# Patient Record
Sex: MALE | Race: WHITE | Employment: OTHER | URBAN - METROPOLITAN AREA
[De-identification: names, ages, dates, MRNs, and addresses within clinical notes are randomized per-mention and may not be internally consistent; named-entity substitution may affect disease eponyms.]

---

## 2017-01-24 ENCOUNTER — OFFICE VISIT (OUTPATIENT)
Dept: FAMILY MEDICINE CLINIC | Age: 67
End: 2017-01-24

## 2017-01-24 VITALS
DIASTOLIC BLOOD PRESSURE: 80 MMHG | TEMPERATURE: 96 F | HEART RATE: 60 BPM | SYSTOLIC BLOOD PRESSURE: 130 MMHG | WEIGHT: 265.4 LBS | RESPIRATION RATE: 12 BRPM | BODY MASS INDEX: 37.99 KG/M2 | HEIGHT: 70 IN

## 2017-01-24 DIAGNOSIS — R53.83 OTHER MALAISE AND FATIGUE: ICD-10-CM

## 2017-01-24 DIAGNOSIS — R53.81 OTHER MALAISE AND FATIGUE: ICD-10-CM

## 2017-01-24 DIAGNOSIS — F19.982 DRUG INDUCED INSOMNIA (HCC): Primary | ICD-10-CM

## 2017-01-24 PROCEDURE — G8419 CALC BMI OUT NRM PARAM NOF/U: HCPCS | Performed by: FAMILY MEDICINE

## 2017-01-24 PROCEDURE — 1123F ACP DISCUSS/DSCN MKR DOCD: CPT | Performed by: FAMILY MEDICINE

## 2017-01-24 PROCEDURE — G8484 FLU IMMUNIZE NO ADMIN: HCPCS | Performed by: FAMILY MEDICINE

## 2017-01-24 PROCEDURE — 1036F TOBACCO NON-USER: CPT | Performed by: FAMILY MEDICINE

## 2017-01-24 PROCEDURE — 4040F PNEUMOC VAC/ADMIN/RCVD: CPT | Performed by: FAMILY MEDICINE

## 2017-01-24 PROCEDURE — 99213 OFFICE O/P EST LOW 20 MIN: CPT | Performed by: FAMILY MEDICINE

## 2017-01-24 PROCEDURE — G8427 DOCREV CUR MEDS BY ELIG CLIN: HCPCS | Performed by: FAMILY MEDICINE

## 2017-01-24 PROCEDURE — 3017F COLORECTAL CA SCREEN DOC REV: CPT | Performed by: FAMILY MEDICINE

## 2017-01-24 RX ORDER — FOLIC ACID 1 MG/1
1 TABLET ORAL DAILY
COMMUNITY
End: 2017-03-07 | Stop reason: SDUPTHER

## 2017-01-24 RX ORDER — TRAZODONE HYDROCHLORIDE 50 MG/1
25-50 TABLET ORAL NIGHTLY
Qty: 30 TABLET | Refills: 5 | Status: SHIPPED | OUTPATIENT
Start: 2017-01-24 | End: 2017-02-08 | Stop reason: DRUGHIGH

## 2017-01-24 RX ORDER — ALPRAZOLAM 1 MG/1
1 TABLET ORAL 3 TIMES DAILY PRN
Qty: 90 TABLET | Refills: 0 | Status: CANCELLED | OUTPATIENT
Start: 2017-01-24

## 2017-01-25 ENCOUNTER — TELEPHONE (OUTPATIENT)
Dept: FAMILY MEDICINE CLINIC | Age: 67
End: 2017-01-25

## 2017-02-02 ENCOUNTER — HOSPITAL ENCOUNTER (OUTPATIENT)
Dept: SLEEP CENTER | Age: 67
Discharge: HOME | End: 2017-02-02
Payer: MEDICARE

## 2017-02-02 PROCEDURE — 95810 POLYSOM 6/> YRS 4/> PARAM: CPT

## 2017-02-06 RX ORDER — DULOXETIN HYDROCHLORIDE 60 MG/1
60 CAPSULE, DELAYED RELEASE ORAL DAILY
Qty: 30 CAPSULE | Refills: 5 | Status: SHIPPED | OUTPATIENT
Start: 2017-02-06 | End: 2017-08-27 | Stop reason: SDUPTHER

## 2017-02-08 ENCOUNTER — TELEPHONE (OUTPATIENT)
Dept: FAMILY MEDICINE CLINIC | Age: 67
End: 2017-02-08

## 2017-02-08 RX ORDER — TRAZODONE HYDROCHLORIDE 100 MG/1
100 TABLET ORAL NIGHTLY
Qty: 30 TABLET | Refills: 5 | Status: SHIPPED | OUTPATIENT
Start: 2017-02-08 | End: 2017-07-13 | Stop reason: SDUPTHER

## 2017-03-07 ENCOUNTER — OFFICE VISIT (OUTPATIENT)
Dept: FAMILY MEDICINE CLINIC | Age: 67
End: 2017-03-07

## 2017-03-07 VITALS
DIASTOLIC BLOOD PRESSURE: 82 MMHG | HEART RATE: 66 BPM | TEMPERATURE: 95.7 F | BODY MASS INDEX: 36.85 KG/M2 | RESPIRATION RATE: 12 BRPM | SYSTOLIC BLOOD PRESSURE: 132 MMHG | HEIGHT: 70 IN | WEIGHT: 257.4 LBS

## 2017-03-07 DIAGNOSIS — F33.1 MAJOR DEPRESSIVE DISORDER, RECURRENT EPISODE, MODERATE (HCC): ICD-10-CM

## 2017-03-07 DIAGNOSIS — K51.90 ULCERATIVE COLITIS WITHOUT COMPLICATIONS, UNSPECIFIED LOCATION (HCC): ICD-10-CM

## 2017-03-07 DIAGNOSIS — I10 ESSENTIAL HYPERTENSION: ICD-10-CM

## 2017-03-07 DIAGNOSIS — G47.33 OSA (OBSTRUCTIVE SLEEP APNEA): Primary | ICD-10-CM

## 2017-03-07 PROCEDURE — 4040F PNEUMOC VAC/ADMIN/RCVD: CPT | Performed by: FAMILY MEDICINE

## 2017-03-07 PROCEDURE — G8427 DOCREV CUR MEDS BY ELIG CLIN: HCPCS | Performed by: FAMILY MEDICINE

## 2017-03-07 PROCEDURE — 1036F TOBACCO NON-USER: CPT | Performed by: FAMILY MEDICINE

## 2017-03-07 PROCEDURE — G8484 FLU IMMUNIZE NO ADMIN: HCPCS | Performed by: FAMILY MEDICINE

## 2017-03-07 PROCEDURE — 1123F ACP DISCUSS/DSCN MKR DOCD: CPT | Performed by: FAMILY MEDICINE

## 2017-03-07 PROCEDURE — 99214 OFFICE O/P EST MOD 30 MIN: CPT | Performed by: FAMILY MEDICINE

## 2017-03-07 PROCEDURE — G8417 CALC BMI ABV UP PARAM F/U: HCPCS | Performed by: FAMILY MEDICINE

## 2017-03-07 PROCEDURE — 3017F COLORECTAL CA SCREEN DOC REV: CPT | Performed by: FAMILY MEDICINE

## 2017-03-07 RX ORDER — TRAZODONE HYDROCHLORIDE 50 MG/1
TABLET ORAL
COMMUNITY
Start: 2017-02-20 | End: 2017-04-18 | Stop reason: DRUGHIGH

## 2017-03-07 RX ORDER — FOLIC ACID 1 MG/1
1 TABLET ORAL DAILY
Qty: 30 TABLET | Refills: 5 | Status: SHIPPED | OUTPATIENT
Start: 2017-03-07 | End: 2018-01-03 | Stop reason: SDUPTHER

## 2017-03-07 ASSESSMENT — PATIENT HEALTH QUESTIONNAIRE - PHQ9
8. MOVING OR SPEAKING SO SLOWLY THAT OTHER PEOPLE COULD HAVE NOTICED. OR THE OPPOSITE, BEING SO FIGETY OR RESTLESS THAT YOU HAVE BEEN MOVING AROUND A LOT MORE THAN USUAL: 3
6. FEELING BAD ABOUT YOURSELF - OR THAT YOU ARE A FAILURE OR HAVE LET YOURSELF OR YOUR FAMILY DOWN: 0
SUM OF ALL RESPONSES TO PHQ9 QUESTIONS 1 & 2: 6
4. FEELING TIRED OR HAVING LITTLE ENERGY: 3
5. POOR APPETITE OR OVEREATING: 3
3. TROUBLE FALLING OR STAYING ASLEEP: 2
1. LITTLE INTEREST OR PLEASURE IN DOING THINGS: 3
SUM OF ALL RESPONSES TO PHQ QUESTIONS 1-9: 20
2. FEELING DOWN, DEPRESSED OR HOPELESS: 3
10. IF YOU CHECKED OFF ANY PROBLEMS, HOW DIFFICULT HAVE THESE PROBLEMS MADE IT FOR YOU TO DO YOUR WORK, TAKE CARE OF THINGS AT HOME, OR GET ALONG WITH OTHER PEOPLE: 1
9. THOUGHTS THAT YOU WOULD BE BETTER OFF DEAD, OR OF HURTING YOURSELF: 0
7. TROUBLE CONCENTRATING ON THINGS, SUCH AS READING THE NEWSPAPER OR WATCHING TELEVISION: 3

## 2017-03-09 ENCOUNTER — TELEPHONE (OUTPATIENT)
Dept: FAMILY MEDICINE CLINIC | Age: 67
End: 2017-03-09

## 2017-03-22 ENCOUNTER — OFFICE VISIT (OUTPATIENT)
Dept: BEHAVIORAL/MENTAL HEALTH | Age: 67
End: 2017-03-22

## 2017-03-22 DIAGNOSIS — F33.1 MAJOR DEPRESSIVE DISORDER, RECURRENT EPISODE, MODERATE (HCC): Primary | ICD-10-CM

## 2017-03-22 PROCEDURE — 90832 PSYTX W PT 30 MINUTES: CPT | Performed by: PSYCHOLOGIST

## 2017-03-22 ASSESSMENT — PATIENT HEALTH QUESTIONNAIRE - PHQ9
3. TROUBLE FALLING OR STAYING ASLEEP: 3
6. FEELING BAD ABOUT YOURSELF - OR THAT YOU ARE A FAILURE OR HAVE LET YOURSELF OR YOUR FAMILY DOWN: 1
SUM OF ALL RESPONSES TO PHQ9 QUESTIONS 1 & 2: 6
4. FEELING TIRED OR HAVING LITTLE ENERGY: 3
2. FEELING DOWN, DEPRESSED OR HOPELESS: 3
SUM OF ALL RESPONSES TO PHQ QUESTIONS 1-9: 22
9. THOUGHTS THAT YOU WOULD BE BETTER OFF DEAD, OR OF HURTING YOURSELF: 0
7. TROUBLE CONCENTRATING ON THINGS, SUCH AS READING THE NEWSPAPER OR WATCHING TELEVISION: 3
10. IF YOU CHECKED OFF ANY PROBLEMS, HOW DIFFICULT HAVE THESE PROBLEMS MADE IT FOR YOU TO DO YOUR WORK, TAKE CARE OF THINGS AT HOME, OR GET ALONG WITH OTHER PEOPLE: 3
8. MOVING OR SPEAKING SO SLOWLY THAT OTHER PEOPLE COULD HAVE NOTICED. OR THE OPPOSITE, BEING SO FIGETY OR RESTLESS THAT YOU HAVE BEEN MOVING AROUND A LOT MORE THAN USUAL: 3
1. LITTLE INTEREST OR PLEASURE IN DOING THINGS: 3
5. POOR APPETITE OR OVEREATING: 3

## 2017-03-23 ENCOUNTER — HOSPITAL ENCOUNTER (OUTPATIENT)
Dept: SLEEP CENTER | Age: 67
Discharge: HOME | End: 2017-03-23
Payer: MEDICARE

## 2017-03-23 PROCEDURE — 95811 POLYSOM 6/>YRS CPAP 4/> PARM: CPT

## 2017-04-03 DIAGNOSIS — G47.33 OSA (OBSTRUCTIVE SLEEP APNEA): ICD-10-CM

## 2017-04-05 ENCOUNTER — OFFICE VISIT (OUTPATIENT)
Dept: BEHAVIORAL/MENTAL HEALTH | Age: 67
End: 2017-04-05

## 2017-04-05 DIAGNOSIS — F33.1 MAJOR DEPRESSIVE DISORDER, RECURRENT EPISODE, MODERATE (HCC): Primary | ICD-10-CM

## 2017-04-05 PROCEDURE — 90832 PSYTX W PT 30 MINUTES: CPT | Performed by: PSYCHOLOGIST

## 2017-04-05 ASSESSMENT — PATIENT HEALTH QUESTIONNAIRE - PHQ9
1. LITTLE INTEREST OR PLEASURE IN DOING THINGS: 3
6. FEELING BAD ABOUT YOURSELF - OR THAT YOU ARE A FAILURE OR HAVE LET YOURSELF OR YOUR FAMILY DOWN: 3
SUM OF ALL RESPONSES TO PHQ9 QUESTIONS 1 & 2: 6
3. TROUBLE FALLING OR STAYING ASLEEP: 3
4. FEELING TIRED OR HAVING LITTLE ENERGY: 3
5. POOR APPETITE OR OVEREATING: 3
10. IF YOU CHECKED OFF ANY PROBLEMS, HOW DIFFICULT HAVE THESE PROBLEMS MADE IT FOR YOU TO DO YOUR WORK, TAKE CARE OF THINGS AT HOME, OR GET ALONG WITH OTHER PEOPLE: 3
2. FEELING DOWN, DEPRESSED OR HOPELESS: 3
9. THOUGHTS THAT YOU WOULD BE BETTER OFF DEAD, OR OF HURTING YOURSELF: 0
8. MOVING OR SPEAKING SO SLOWLY THAT OTHER PEOPLE COULD HAVE NOTICED. OR THE OPPOSITE, BEING SO FIGETY OR RESTLESS THAT YOU HAVE BEEN MOVING AROUND A LOT MORE THAN USUAL: 3
SUM OF ALL RESPONSES TO PHQ QUESTIONS 1-9: 24
7. TROUBLE CONCENTRATING ON THINGS, SUCH AS READING THE NEWSPAPER OR WATCHING TELEVISION: 3

## 2017-04-07 ENCOUNTER — TELEPHONE (OUTPATIENT)
Dept: FAMILY MEDICINE CLINIC | Age: 67
End: 2017-04-07

## 2017-04-07 DIAGNOSIS — Z99.89 OSA ON CPAP: Primary | ICD-10-CM

## 2017-04-07 DIAGNOSIS — G47.33 OSA ON CPAP: Primary | ICD-10-CM

## 2017-04-07 DIAGNOSIS — G47.33 OSA (OBSTRUCTIVE SLEEP APNEA): Primary | ICD-10-CM

## 2017-04-10 ENCOUNTER — TELEPHONE (OUTPATIENT)
Dept: FAMILY MEDICINE CLINIC | Age: 67
End: 2017-04-10

## 2017-04-18 ENCOUNTER — OFFICE VISIT (OUTPATIENT)
Dept: FAMILY MEDICINE CLINIC | Age: 67
End: 2017-04-18

## 2017-04-18 VITALS
BODY MASS INDEX: 35.16 KG/M2 | WEIGHT: 245.6 LBS | TEMPERATURE: 96.2 F | HEIGHT: 70 IN | DIASTOLIC BLOOD PRESSURE: 74 MMHG | HEART RATE: 66 BPM | SYSTOLIC BLOOD PRESSURE: 128 MMHG | RESPIRATION RATE: 12 BRPM

## 2017-04-18 DIAGNOSIS — E78.5 DYSLIPIDEMIA ASSOCIATED WITH TYPE 2 DIABETES MELLITUS (HCC): ICD-10-CM

## 2017-04-18 DIAGNOSIS — K51.911 ULCERATIVE COLITIS WITH RECTAL BLEEDING, UNSPECIFIED LOCATION (HCC): ICD-10-CM

## 2017-04-18 DIAGNOSIS — I10 ESSENTIAL HYPERTENSION: ICD-10-CM

## 2017-04-18 DIAGNOSIS — E11.69 DYSLIPIDEMIA ASSOCIATED WITH TYPE 2 DIABETES MELLITUS (HCC): ICD-10-CM

## 2017-04-18 DIAGNOSIS — Z12.5 SCREENING PSA (PROSTATE SPECIFIC ANTIGEN): ICD-10-CM

## 2017-04-18 DIAGNOSIS — I10 ESSENTIAL HYPERTENSION: Primary | ICD-10-CM

## 2017-04-18 DIAGNOSIS — I49.9 IRREGULAR HEART RHYTHM: ICD-10-CM

## 2017-04-18 LAB
BASOPHILS ABSOLUTE: 0 K/UL (ref 0–0.2)
BASOPHILS RELATIVE PERCENT: 0.4 %
CHOLESTEROL, TOTAL: 196 MG/DL (ref 0–199)
CREATININE URINE: 217.9 MG/DL
EOSINOPHILS ABSOLUTE: 0.1 K/UL (ref 0–0.7)
EOSINOPHILS RELATIVE PERCENT: 1.1 %
HBA1C MFR BLD: 5.6 % (ref 4.8–5.9)
HCT VFR BLD CALC: 45.5 % (ref 42–52)
HDLC SERPL-MCNC: 47 MG/DL (ref 40–59)
HEMOGLOBIN: 15.3 G/DL (ref 14–18)
LDL CHOLESTEROL CALCULATED: 122 MG/DL (ref 0–129)
LYMPHOCYTES ABSOLUTE: 1.1 K/UL (ref 1–4.8)
LYMPHOCYTES RELATIVE PERCENT: 15.8 %
MCH RBC QN AUTO: 29.2 PG (ref 27–31.3)
MCHC RBC AUTO-ENTMCNC: 33.6 % (ref 33–37)
MCV RBC AUTO: 86.9 FL (ref 80–100)
MICROALBUMIN UR-MCNC: <1.2 MG/DL
MICROALBUMIN/CREAT UR-RTO: NORMAL MG/G (ref 0–30)
MONOCYTES ABSOLUTE: 0.4 K/UL (ref 0.2–0.8)
MONOCYTES RELATIVE PERCENT: 6.4 %
NEUTROPHILS ABSOLUTE: 5.3 K/UL (ref 1.4–6.5)
NEUTROPHILS RELATIVE PERCENT: 76.3 %
PDW BLD-RTO: 16.8 % (ref 11.5–14.5)
PLATELET # BLD: 184 K/UL (ref 130–400)
PROSTATE SPECIFIC ANTIGEN: 2.46 NG/ML (ref 0–5.4)
RBC # BLD: 5.23 M/UL (ref 4.7–6.1)
TRIGL SERPL-MCNC: 136 MG/DL (ref 0–200)
TSH REFLEX: 3.27 UIU/ML (ref 0.27–4.2)
WBC # BLD: 7 K/UL (ref 4.8–10.8)

## 2017-04-18 PROCEDURE — 99214 OFFICE O/P EST MOD 30 MIN: CPT | Performed by: FAMILY MEDICINE

## 2017-04-18 PROCEDURE — G8417 CALC BMI ABV UP PARAM F/U: HCPCS | Performed by: FAMILY MEDICINE

## 2017-04-18 PROCEDURE — 3044F HG A1C LEVEL LT 7.0%: CPT | Performed by: FAMILY MEDICINE

## 2017-04-18 PROCEDURE — 93000 ELECTROCARDIOGRAM COMPLETE: CPT | Performed by: FAMILY MEDICINE

## 2017-04-18 PROCEDURE — 4040F PNEUMOC VAC/ADMIN/RCVD: CPT | Performed by: FAMILY MEDICINE

## 2017-04-18 PROCEDURE — 1123F ACP DISCUSS/DSCN MKR DOCD: CPT | Performed by: FAMILY MEDICINE

## 2017-04-18 PROCEDURE — 3017F COLORECTAL CA SCREEN DOC REV: CPT | Performed by: FAMILY MEDICINE

## 2017-04-18 PROCEDURE — 1036F TOBACCO NON-USER: CPT | Performed by: FAMILY MEDICINE

## 2017-04-18 PROCEDURE — G8427 DOCREV CUR MEDS BY ELIG CLIN: HCPCS | Performed by: FAMILY MEDICINE

## 2017-04-18 RX ORDER — ARIPIPRAZOLE 2 MG/1
2 TABLET ORAL DAILY
Qty: 30 TABLET | Refills: 2 | Status: SHIPPED | OUTPATIENT
Start: 2017-04-18 | End: 2017-05-25 | Stop reason: ALTCHOICE

## 2017-04-26 ENCOUNTER — OFFICE VISIT (OUTPATIENT)
Dept: BEHAVIORAL/MENTAL HEALTH | Age: 67
End: 2017-04-26

## 2017-04-26 DIAGNOSIS — F33.1 MAJOR DEPRESSIVE DISORDER, RECURRENT EPISODE, MODERATE (HCC): Primary | ICD-10-CM

## 2017-04-26 PROCEDURE — 90832 PSYTX W PT 30 MINUTES: CPT | Performed by: PSYCHOLOGIST

## 2017-04-26 ASSESSMENT — PATIENT HEALTH QUESTIONNAIRE - PHQ9
8. MOVING OR SPEAKING SO SLOWLY THAT OTHER PEOPLE COULD HAVE NOTICED. OR THE OPPOSITE, BEING SO FIGETY OR RESTLESS THAT YOU HAVE BEEN MOVING AROUND A LOT MORE THAN USUAL: 3
6. FEELING BAD ABOUT YOURSELF - OR THAT YOU ARE A FAILURE OR HAVE LET YOURSELF OR YOUR FAMILY DOWN: 3
3. TROUBLE FALLING OR STAYING ASLEEP: 0
5. POOR APPETITE OR OVEREATING: 3
9. THOUGHTS THAT YOU WOULD BE BETTER OFF DEAD, OR OF HURTING YOURSELF: 0
7. TROUBLE CONCENTRATING ON THINGS, SUCH AS READING THE NEWSPAPER OR WATCHING TELEVISION: 3
SUM OF ALL RESPONSES TO PHQ9 QUESTIONS 1 & 2: 6
1. LITTLE INTEREST OR PLEASURE IN DOING THINGS: 3
10. IF YOU CHECKED OFF ANY PROBLEMS, HOW DIFFICULT HAVE THESE PROBLEMS MADE IT FOR YOU TO DO YOUR WORK, TAKE CARE OF THINGS AT HOME, OR GET ALONG WITH OTHER PEOPLE: 2
SUM OF ALL RESPONSES TO PHQ QUESTIONS 1-9: 21
2. FEELING DOWN, DEPRESSED OR HOPELESS: 3
4. FEELING TIRED OR HAVING LITTLE ENERGY: 3

## 2017-05-10 ENCOUNTER — TELEPHONE (OUTPATIENT)
Dept: FAMILY MEDICINE CLINIC | Age: 67
End: 2017-05-10

## 2017-05-25 ENCOUNTER — OFFICE VISIT (OUTPATIENT)
Dept: BEHAVIORAL/MENTAL HEALTH | Age: 67
End: 2017-05-25

## 2017-05-25 ENCOUNTER — OFFICE VISIT (OUTPATIENT)
Dept: FAMILY MEDICINE CLINIC | Age: 67
End: 2017-05-25

## 2017-05-25 VITALS
DIASTOLIC BLOOD PRESSURE: 78 MMHG | RESPIRATION RATE: 12 BRPM | SYSTOLIC BLOOD PRESSURE: 128 MMHG | BODY MASS INDEX: 36.19 KG/M2 | HEART RATE: 66 BPM | TEMPERATURE: 96.6 F | WEIGHT: 252.8 LBS | HEIGHT: 70 IN

## 2017-05-25 DIAGNOSIS — R35.0 URINARY FREQUENCY: ICD-10-CM

## 2017-05-25 DIAGNOSIS — G47.33 OSA ON CPAP: ICD-10-CM

## 2017-05-25 DIAGNOSIS — F33.1 MAJOR DEPRESSIVE DISORDER, RECURRENT EPISODE, MODERATE (HCC): Primary | ICD-10-CM

## 2017-05-25 DIAGNOSIS — I10 ESSENTIAL HYPERTENSION: Primary | ICD-10-CM

## 2017-05-25 DIAGNOSIS — Z99.89 OSA ON CPAP: ICD-10-CM

## 2017-05-25 DIAGNOSIS — F33.1 MAJOR DEPRESSIVE DISORDER, RECURRENT EPISODE, MODERATE (HCC): ICD-10-CM

## 2017-05-25 DIAGNOSIS — E11.9 TYPE 2 DIABETES MELLITUS WITHOUT COMPLICATION, WITHOUT LONG-TERM CURRENT USE OF INSULIN (HCC): ICD-10-CM

## 2017-05-25 LAB
BILIRUBIN, POC: NORMAL
BLOOD URINE, POC: NORMAL
CLARITY, POC: NORMAL
COLOR, POC: NORMAL
GLUCOSE URINE, POC: NORMAL
KETONES, POC: NORMAL
LEUKOCYTE EST, POC: NORMAL
NITRITE, POC: NORMAL
PH, POC: 6
PROTEIN, POC: NORMAL
SPECIFIC GRAVITY, POC: 1.03
UROBILINOGEN, POC: NORMAL

## 2017-05-25 PROCEDURE — G8417 CALC BMI ABV UP PARAM F/U: HCPCS | Performed by: FAMILY MEDICINE

## 2017-05-25 PROCEDURE — 4040F PNEUMOC VAC/ADMIN/RCVD: CPT | Performed by: FAMILY MEDICINE

## 2017-05-25 PROCEDURE — 81003 URINALYSIS AUTO W/O SCOPE: CPT | Performed by: FAMILY MEDICINE

## 2017-05-25 PROCEDURE — 3017F COLORECTAL CA SCREEN DOC REV: CPT | Performed by: FAMILY MEDICINE

## 2017-05-25 PROCEDURE — 1123F ACP DISCUSS/DSCN MKR DOCD: CPT | Performed by: FAMILY MEDICINE

## 2017-05-25 PROCEDURE — 1036F TOBACCO NON-USER: CPT | Performed by: FAMILY MEDICINE

## 2017-05-25 PROCEDURE — G8427 DOCREV CUR MEDS BY ELIG CLIN: HCPCS | Performed by: FAMILY MEDICINE

## 2017-05-25 PROCEDURE — 90832 PSYTX W PT 30 MINUTES: CPT | Performed by: PSYCHOLOGIST

## 2017-05-25 PROCEDURE — 3044F HG A1C LEVEL LT 7.0%: CPT | Performed by: FAMILY MEDICINE

## 2017-05-25 PROCEDURE — 99213 OFFICE O/P EST LOW 20 MIN: CPT | Performed by: FAMILY MEDICINE

## 2017-05-25 RX ORDER — ARIPIPRAZOLE 2 MG/1
TABLET ORAL
COMMUNITY
Start: 2017-04-18 | End: 2017-05-25 | Stop reason: ALTCHOICE

## 2017-05-25 RX ORDER — MESALAMINE 1000 MG/1
1000 SUPPOSITORY RECTAL NIGHTLY
COMMUNITY
End: 2017-07-13 | Stop reason: ALTCHOICE

## 2017-05-25 RX ORDER — MESALAMINE 0.38 G/1
1.5 CAPSULE, EXTENDED RELEASE ORAL DAILY
COMMUNITY
End: 2017-10-16 | Stop reason: ALTCHOICE

## 2017-05-25 ASSESSMENT — PATIENT HEALTH QUESTIONNAIRE - PHQ9
9. THOUGHTS THAT YOU WOULD BE BETTER OFF DEAD, OR OF HURTING YOURSELF: 0
SUM OF ALL RESPONSES TO PHQ QUESTIONS 1-9: 0
5. POOR APPETITE OR OVEREATING: 0
2. FEELING DOWN, DEPRESSED OR HOPELESS: 0
3. TROUBLE FALLING OR STAYING ASLEEP: 0
SUM OF ALL RESPONSES TO PHQ9 QUESTIONS 1 & 2: 0
1. LITTLE INTEREST OR PLEASURE IN DOING THINGS: 0
10. IF YOU CHECKED OFF ANY PROBLEMS, HOW DIFFICULT HAVE THESE PROBLEMS MADE IT FOR YOU TO DO YOUR WORK, TAKE CARE OF THINGS AT HOME, OR GET ALONG WITH OTHER PEOPLE: 0
7. TROUBLE CONCENTRATING ON THINGS, SUCH AS READING THE NEWSPAPER OR WATCHING TELEVISION: 0
6. FEELING BAD ABOUT YOURSELF - OR THAT YOU ARE A FAILURE OR HAVE LET YOURSELF OR YOUR FAMILY DOWN: 0
4. FEELING TIRED OR HAVING LITTLE ENERGY: 0
8. MOVING OR SPEAKING SO SLOWLY THAT OTHER PEOPLE COULD HAVE NOTICED. OR THE OPPOSITE, BEING SO FIGETY OR RESTLESS THAT YOU HAVE BEEN MOVING AROUND A LOT MORE THAN USUAL: 0

## 2017-05-27 LAB — URINE CULTURE, ROUTINE: NORMAL

## 2017-05-30 ENCOUNTER — HOSPITAL ENCOUNTER (OUTPATIENT)
Dept: ULTRASOUND IMAGING | Age: 67
Discharge: HOME | End: 2017-05-30
Payer: MEDICARE

## 2017-05-30 DIAGNOSIS — R35.0 URINARY FREQUENCY: ICD-10-CM

## 2017-05-30 PROCEDURE — 76770 US EXAM ABDO BACK WALL COMP: CPT

## 2017-06-30 LAB
ALBUMIN SERPL-MCNC: 4.3 G/DL
ALP BLD-CCNC: 54 U/L
ALT SERPL-CCNC: 20 U/L
AST SERPL-CCNC: 14 U/L
BASOPHILS ABSOLUTE: 0 /ΜL
BASOPHILS RELATIVE PERCENT: 0 %
BILIRUB SERPL-MCNC: 0.3 MG/DL (ref 0.1–1.4)
BUN BLDV-MCNC: 25 MG/DL
CALCIUM SERPL-MCNC: 10.3 MG/DL
CHLORIDE BLD-SCNC: 101 MMOL/L
CO2: 25 MMOL/L
CREAT SERPL-MCNC: 0.94 MG/DL
EOSINOPHILS ABSOLUTE: 0 /ΜL
EOSINOPHILS RELATIVE PERCENT: 0 %
GFR CALCULATED: 85
GLUCOSE BLD-MCNC: 111 MG/DL
HCT VFR BLD CALC: 46 % (ref 41–53)
HEMOGLOBIN: 15.5 G/DL (ref 13.5–17.5)
LYMPHOCYTES ABSOLUTE: 1.1 /ΜL
LYMPHOCYTES RELATIVE PERCENT: 7 %
MCH RBC QN AUTO: 30 PG
MCHC RBC AUTO-ENTMCNC: 33.7 G/DL
MCV RBC AUTO: 89 FL
MONOCYTES ABSOLUTE: 0.6 /ΜL
MONOCYTES RELATIVE PERCENT: 4 %
NEUTROPHILS ABSOLUTE: 13 /ΜL
NEUTROPHILS RELATIVE PERCENT: 89 %
PLATELET # BLD: 237 K/ΜL
PMV BLD AUTO: NORMAL FL
POTASSIUM SERPL-SCNC: 5.1 MMOL/L
RBC # BLD: 5.16 10^6/ΜL
SODIUM BLD-SCNC: 143 MMOL/L
TOTAL PROTEIN: 6.9
WBC # BLD: 14.9 10^3/ML

## 2017-07-13 ENCOUNTER — OFFICE VISIT (OUTPATIENT)
Dept: FAMILY MEDICINE CLINIC | Age: 67
End: 2017-07-13

## 2017-07-13 VITALS
BODY MASS INDEX: 36.54 KG/M2 | WEIGHT: 255.2 LBS | RESPIRATION RATE: 12 BRPM | HEIGHT: 70 IN | DIASTOLIC BLOOD PRESSURE: 80 MMHG | HEART RATE: 72 BPM | SYSTOLIC BLOOD PRESSURE: 132 MMHG | TEMPERATURE: 96.7 F

## 2017-07-13 DIAGNOSIS — N32.89 MASS OF URINARY BLADDER: Primary | ICD-10-CM

## 2017-07-13 DIAGNOSIS — Z99.89 OSA ON CPAP: ICD-10-CM

## 2017-07-13 DIAGNOSIS — G47.33 OSA ON CPAP: ICD-10-CM

## 2017-07-13 DIAGNOSIS — I10 ESSENTIAL HYPERTENSION: ICD-10-CM

## 2017-07-13 DIAGNOSIS — R25.2 CRAMPS OF RIGHT LOWER EXTREMITY: ICD-10-CM

## 2017-07-13 DIAGNOSIS — E11.9 TYPE 2 DIABETES MELLITUS WITHOUT COMPLICATION, WITHOUT LONG-TERM CURRENT USE OF INSULIN (HCC): ICD-10-CM

## 2017-07-13 PROCEDURE — G8417 CALC BMI ABV UP PARAM F/U: HCPCS | Performed by: FAMILY MEDICINE

## 2017-07-13 PROCEDURE — G8427 DOCREV CUR MEDS BY ELIG CLIN: HCPCS | Performed by: FAMILY MEDICINE

## 2017-07-13 PROCEDURE — 99214 OFFICE O/P EST MOD 30 MIN: CPT | Performed by: FAMILY MEDICINE

## 2017-07-13 PROCEDURE — 3046F HEMOGLOBIN A1C LEVEL >9.0%: CPT | Performed by: FAMILY MEDICINE

## 2017-07-13 PROCEDURE — 4040F PNEUMOC VAC/ADMIN/RCVD: CPT | Performed by: FAMILY MEDICINE

## 2017-07-13 PROCEDURE — 1123F ACP DISCUSS/DSCN MKR DOCD: CPT | Performed by: FAMILY MEDICINE

## 2017-07-13 PROCEDURE — 3017F COLORECTAL CA SCREEN DOC REV: CPT | Performed by: FAMILY MEDICINE

## 2017-07-13 PROCEDURE — 1036F TOBACCO NON-USER: CPT | Performed by: FAMILY MEDICINE

## 2017-07-13 RX ORDER — TRAZODONE HYDROCHLORIDE 100 MG/1
100 TABLET ORAL NIGHTLY
COMMUNITY
End: 2017-07-26 | Stop reason: SDUPTHER

## 2017-07-13 RX ORDER — PREDNISONE 1 MG/1
5 TABLET ORAL SEE ADMIN INSTRUCTIONS
COMMUNITY
Start: 2017-07-03 | End: 2018-01-16 | Stop reason: ALTCHOICE

## 2017-07-24 ENCOUNTER — OFFICE VISIT (OUTPATIENT)
Dept: BEHAVIORAL/MENTAL HEALTH | Age: 67
End: 2017-07-24

## 2017-07-24 DIAGNOSIS — F33.1 MAJOR DEPRESSIVE DISORDER, RECURRENT EPISODE, MODERATE (HCC): Primary | ICD-10-CM

## 2017-07-24 PROCEDURE — 90832 PSYTX W PT 30 MINUTES: CPT | Performed by: PSYCHOLOGIST

## 2017-07-24 ASSESSMENT — PATIENT HEALTH QUESTIONNAIRE - PHQ9
4. FEELING TIRED OR HAVING LITTLE ENERGY: 0
1. LITTLE INTEREST OR PLEASURE IN DOING THINGS: 0
7. TROUBLE CONCENTRATING ON THINGS, SUCH AS READING THE NEWSPAPER OR WATCHING TELEVISION: 0
5. POOR APPETITE OR OVEREATING: 0
8. MOVING OR SPEAKING SO SLOWLY THAT OTHER PEOPLE COULD HAVE NOTICED. OR THE OPPOSITE, BEING SO FIGETY OR RESTLESS THAT YOU HAVE BEEN MOVING AROUND A LOT MORE THAN USUAL: 0
6. FEELING BAD ABOUT YOURSELF - OR THAT YOU ARE A FAILURE OR HAVE LET YOURSELF OR YOUR FAMILY DOWN: 0
2. FEELING DOWN, DEPRESSED OR HOPELESS: 0
SUM OF ALL RESPONSES TO PHQ QUESTIONS 1-9: 0
SUM OF ALL RESPONSES TO PHQ9 QUESTIONS 1 & 2: 0
9. THOUGHTS THAT YOU WOULD BE BETTER OFF DEAD, OR OF HURTING YOURSELF: 0
10. IF YOU CHECKED OFF ANY PROBLEMS, HOW DIFFICULT HAVE THESE PROBLEMS MADE IT FOR YOU TO DO YOUR WORK, TAKE CARE OF THINGS AT HOME, OR GET ALONG WITH OTHER PEOPLE: 0
3. TROUBLE FALLING OR STAYING ASLEEP: 0

## 2017-07-26 RX ORDER — TRAZODONE HYDROCHLORIDE 100 MG/1
TABLET ORAL
Qty: 30 TABLET | Refills: 5 | Status: SHIPPED | OUTPATIENT
Start: 2017-07-26 | End: 2018-01-03 | Stop reason: SDUPTHER

## 2017-07-27 ENCOUNTER — OFFICE VISIT (OUTPATIENT)
Dept: UROLOGY | Age: 67
End: 2017-07-27

## 2017-07-27 VITALS
DIASTOLIC BLOOD PRESSURE: 86 MMHG | HEIGHT: 72 IN | WEIGHT: 245 LBS | BODY MASS INDEX: 33.18 KG/M2 | SYSTOLIC BLOOD PRESSURE: 132 MMHG | HEART RATE: 60 BPM

## 2017-07-27 DIAGNOSIS — D49.4 BLADDER TUMOR: ICD-10-CM

## 2017-07-27 DIAGNOSIS — R35.0 FREQUENCY OF URINATION: Primary | ICD-10-CM

## 2017-07-27 LAB
BILIRUBIN, POC: NORMAL
BLOOD URINE, POC: NORMAL
CLARITY, POC: CLEAR
COLOR, POC: YELLOW
GLUCOSE URINE, POC: NORMAL
KETONES, POC: NORMAL
LEUKOCYTE EST, POC: NORMAL
NITRITE, POC: NORMAL
PH, POC: 6
PROTEIN, POC: NORMAL
SPECIFIC GRAVITY, POC: 1.02
UROBILINOGEN, POC: 0.2

## 2017-07-27 PROCEDURE — 81003 URINALYSIS AUTO W/O SCOPE: CPT | Performed by: UROLOGY

## 2017-07-27 PROCEDURE — G8417 CALC BMI ABV UP PARAM F/U: HCPCS | Performed by: UROLOGY

## 2017-07-27 PROCEDURE — 99204 OFFICE O/P NEW MOD 45 MIN: CPT | Performed by: UROLOGY

## 2017-07-27 PROCEDURE — 4040F PNEUMOC VAC/ADMIN/RCVD: CPT | Performed by: UROLOGY

## 2017-07-27 PROCEDURE — 4004F PT TOBACCO SCREEN RCVD TLK: CPT | Performed by: UROLOGY

## 2017-07-27 PROCEDURE — 3017F COLORECTAL CA SCREEN DOC REV: CPT | Performed by: UROLOGY

## 2017-07-27 PROCEDURE — 1123F ACP DISCUSS/DSCN MKR DOCD: CPT | Performed by: UROLOGY

## 2017-07-27 PROCEDURE — G8427 DOCREV CUR MEDS BY ELIG CLIN: HCPCS | Performed by: UROLOGY

## 2017-07-27 ASSESSMENT — ENCOUNTER SYMPTOMS
RESPIRATORY NEGATIVE: 1
GASTROINTESTINAL NEGATIVE: 1
EYES NEGATIVE: 1
ALLERGIC/IMMUNOLOGIC NEGATIVE: 1

## 2017-07-28 ENCOUNTER — HOSPITAL ENCOUNTER (OUTPATIENT)
Dept: CT IMAGING | Age: 67
Discharge: HOME | End: 2017-07-28
Payer: MEDICARE

## 2017-07-28 ENCOUNTER — HOSPITAL ENCOUNTER (OUTPATIENT)
Dept: GENERAL RADIOLOGY | Age: 67
Discharge: HOME | End: 2017-07-28
Payer: MEDICARE

## 2017-07-28 VITALS — BODY MASS INDEX: 33.23 KG/M2 | WEIGHT: 245 LBS

## 2017-07-28 DIAGNOSIS — D49.4 BLADDER TUMOR: ICD-10-CM

## 2017-07-28 PROCEDURE — 74178 CT ABD&PLV WO CNTR FLWD CNTR: CPT

## 2017-07-28 PROCEDURE — 74020 XR ABDOMEN STANDARD: CPT

## 2017-07-28 PROCEDURE — 6360000004 HC RX CONTRAST MEDICATION: Performed by: RADIOLOGY

## 2017-07-28 RX ADMIN — IOPAMIDOL 100 ML: 755 INJECTION, SOLUTION INTRAVENOUS at 10:35

## 2017-08-09 ENCOUNTER — PROCEDURE VISIT (OUTPATIENT)
Dept: UROLOGY | Age: 67
End: 2017-08-09

## 2017-08-09 VITALS
WEIGHT: 245 LBS | BODY MASS INDEX: 33.18 KG/M2 | SYSTOLIC BLOOD PRESSURE: 132 MMHG | DIASTOLIC BLOOD PRESSURE: 80 MMHG | HEART RATE: 71 BPM | HEIGHT: 72 IN

## 2017-08-09 DIAGNOSIS — N32.89 BLADDER MASS: ICD-10-CM

## 2017-08-09 DIAGNOSIS — R35.0 FREQUENCY OF URINATION: Primary | ICD-10-CM

## 2017-08-09 DIAGNOSIS — N28.1 RENAL CYSTS, ACQUIRED, BILATERAL: ICD-10-CM

## 2017-08-09 LAB
BILIRUBIN, POC: NORMAL
BLOOD URINE, POC: NORMAL
CLARITY, POC: CLEAR
COLOR, POC: YELLOW
GLUCOSE URINE, POC: NORMAL
KETONES, POC: NORMAL
LEUKOCYTE EST, POC: NORMAL
NITRITE, POC: NORMAL
PH, POC: 7
PROTEIN, POC: NORMAL
SPECIFIC GRAVITY, POC: 1.02
UROBILINOGEN, POC: 0.2

## 2017-08-09 PROCEDURE — 81003 URINALYSIS AUTO W/O SCOPE: CPT | Performed by: UROLOGY

## 2017-08-09 PROCEDURE — 99213 OFFICE O/P EST LOW 20 MIN: CPT | Performed by: UROLOGY

## 2017-08-09 PROCEDURE — 52000 CYSTOURETHROSCOPY: CPT | Performed by: UROLOGY

## 2017-08-09 PROCEDURE — 4040F PNEUMOC VAC/ADMIN/RCVD: CPT | Performed by: UROLOGY

## 2017-08-09 PROCEDURE — 3017F COLORECTAL CA SCREEN DOC REV: CPT | Performed by: UROLOGY

## 2017-08-09 PROCEDURE — 4004F PT TOBACCO SCREEN RCVD TLK: CPT | Performed by: UROLOGY

## 2017-08-09 PROCEDURE — G8427 DOCREV CUR MEDS BY ELIG CLIN: HCPCS | Performed by: UROLOGY

## 2017-08-09 PROCEDURE — G8417 CALC BMI ABV UP PARAM F/U: HCPCS | Performed by: UROLOGY

## 2017-08-09 PROCEDURE — 1123F ACP DISCUSS/DSCN MKR DOCD: CPT | Performed by: UROLOGY

## 2017-08-09 RX ORDER — CIPROFLOXACIN 500 MG/1
500 TABLET, FILM COATED ORAL ONCE
Qty: 1 TABLET | Refills: 0 | COMMUNITY
Start: 2017-08-09 | End: 2017-08-09

## 2017-08-27 RX ORDER — DULOXETIN HYDROCHLORIDE 60 MG/1
CAPSULE, DELAYED RELEASE ORAL
Qty: 30 CAPSULE | Refills: 5 | Status: SHIPPED | OUTPATIENT
Start: 2017-08-27 | End: 2017-11-13

## 2017-10-16 ENCOUNTER — TELEPHONE (OUTPATIENT)
Dept: FAMILY MEDICINE CLINIC | Age: 67
End: 2017-10-16

## 2017-10-16 ENCOUNTER — OFFICE VISIT (OUTPATIENT)
Dept: FAMILY MEDICINE CLINIC | Age: 67
End: 2017-10-16

## 2017-10-16 VITALS
OXYGEN SATURATION: 97 % | HEART RATE: 61 BPM | BODY MASS INDEX: 33.21 KG/M2 | HEIGHT: 72 IN | DIASTOLIC BLOOD PRESSURE: 88 MMHG | TEMPERATURE: 97.4 F | SYSTOLIC BLOOD PRESSURE: 128 MMHG | WEIGHT: 245.2 LBS

## 2017-10-16 DIAGNOSIS — I10 ESSENTIAL HYPERTENSION: ICD-10-CM

## 2017-10-16 DIAGNOSIS — F33.1 MAJOR DEPRESSIVE DISORDER, RECURRENT EPISODE, MODERATE (HCC): Primary | ICD-10-CM

## 2017-10-16 DIAGNOSIS — Z23 NEEDS FLU SHOT: ICD-10-CM

## 2017-10-16 DIAGNOSIS — Z23 NEED FOR PNEUMOCOCCAL VACCINATION: ICD-10-CM

## 2017-10-16 PROCEDURE — 1123F ACP DISCUSS/DSCN MKR DOCD: CPT | Performed by: FAMILY MEDICINE

## 2017-10-16 PROCEDURE — 4040F PNEUMOC VAC/ADMIN/RCVD: CPT | Performed by: FAMILY MEDICINE

## 2017-10-16 PROCEDURE — G8427 DOCREV CUR MEDS BY ELIG CLIN: HCPCS | Performed by: FAMILY MEDICINE

## 2017-10-16 PROCEDURE — G8417 CALC BMI ABV UP PARAM F/U: HCPCS | Performed by: FAMILY MEDICINE

## 2017-10-16 PROCEDURE — 4004F PT TOBACCO SCREEN RCVD TLK: CPT | Performed by: FAMILY MEDICINE

## 2017-10-16 PROCEDURE — 99214 OFFICE O/P EST MOD 30 MIN: CPT | Performed by: FAMILY MEDICINE

## 2017-10-16 PROCEDURE — G8484 FLU IMMUNIZE NO ADMIN: HCPCS | Performed by: FAMILY MEDICINE

## 2017-10-16 PROCEDURE — 90662 IIV NO PRSV INCREASED AG IM: CPT | Performed by: FAMILY MEDICINE

## 2017-10-16 PROCEDURE — G0008 ADMIN INFLUENZA VIRUS VAC: HCPCS | Performed by: FAMILY MEDICINE

## 2017-10-16 PROCEDURE — 3017F COLORECTAL CA SCREEN DOC REV: CPT | Performed by: FAMILY MEDICINE

## 2017-10-16 RX ORDER — LEVOMEFOLATE CALCIUM 15 MG
1 TABLET ORAL DAILY
Qty: 30 TABLET | Refills: 5 | Status: SHIPPED | OUTPATIENT
Start: 2017-10-16 | End: 2017-10-18 | Stop reason: CLARIF

## 2017-10-16 NOTE — PROGRESS NOTES
Chief Complaint   Patient presents with    3 Month Follow-Up     LOV 07/13/17    Mass     Urinary bladder    Hypertension    Diabetes Mellitus     Type 2 w/o comp, w/o long-term insulin    Other     Cramps of RT lower extremity     HPI: Yvonne Baumann is a 78 y.o. male presenting for follow-up of Mass on urinary bladder, HTN, DM Type 2, and cramping of the RT lower extremity. I last saw the patient 3 months ago. He does note that his colitis has flared up and he is not hungry. He is on Remicade. He is having difficulty focusing and concentrating. He is not sleeping well. He is using his CPAP machine. He is falling up with Dr. Pérez Martinez tomorrow. He notes that he does not feel social and has difficulty talking with people. He has had some difficulty with episodes of diarrhea and has been concerned that he may have an accident. Past Medical History:   Diagnosis Date    Colitis     Hypertension     Type II or unspecified type diabetes mellitus without mention of complication, not stated as uncontrolled        Past Surgical History:   Procedure Laterality Date    ANTERIOR CRUCIATE LIGAMENT REPAIR      GALLBLADDER SURGERY      TOE SURGERY      TONSILLECTOMY      WRIST SURGERY  2013       family history includes Cancer in his mother and paternal uncle; Diabetes in his father. Social History     Social History    Marital status: Single     Spouse name: N/A    Number of children: N/A    Years of education: N/A     Occupational History    Not on file.      Social History Main Topics    Smoking status: Light Tobacco Smoker     Types: Cigars    Smokeless tobacco: Never Used    Alcohol use 0.0 oz/week      Comment: occ    Drug use: No    Sexual activity: Not on file     Other Topics Concern    Not on file     Social History Narrative    No narrative on file       Allergies   Allergen Reactions    Imuran [Azathioprine]        Review of Systems - General ROS: negative  Psychological ROS: positive for - anxiety, concentration difficulties, depression and sleep disturbances  ENT ROS: negative  Hematological and Lymphatic ROS: negative  Respiratory ROS: no cough, shortness of breath, or wheezing  Cardiovascular ROS: no chest pain or dyspnea on exertion  Gastrointestinal ROS: positive for - appetite loss, diarrhea, gas/bloating and stool incontinence  Genito-Urinary ROS: no dysuria, trouble voiding, or hematuria  Musculoskeletal ROS: negative  Neurological ROS: no TIA or stroke symptoms  Dermatological ROS: negative    Vitals:    10/16/17 1032   BP: 128/88   Pulse: 61   Temp: 97.4 °F (36.3 °C)   TempSrc: Temporal   SpO2: 97%   Weight: 245 lb 3.2 oz (111.2 kg)   Height: 6' (1.829 m)     Physical Examination: General appearance - alert, well appearing, and in no distress. Patient is obese. Affect is sad and mood is depressed. Skin - normal coloration and turgor, no rashes, no suspicious skin lesions noted  Normal  Neck - supple, no significant adenopathy, bilateral symmetric anterior adenopathy, carotids upstroke normal bilaterally, no bruits, thyroid exam: thyroid is normal in size without nodules or tenderness  Chest - clear to auscultation, no wheezes, rales or rhonchi, symmetric air entry. Heart - normal rate, regular rhythm, normal S1, S2, no murmurs, rubs, clicks or gallops. Abdomen - soft, nontender, nondistended, no masses or organomegaly  bowel sounds normal  Obese  Extremities - peripheral pulses normal, no pedal edema, no clubbing or cyanosis. I have reviewed the following diagnostic data: cystoscopy per Dr. Bradley Chow - normal.  Please see report for additional information. ASSESSMENT:  1. Major depressive disorder, recurrent episode, moderate (HCC)     2. Essential hypertension     3. Needs flu shot  INFLUENZA, HIGH DOSE, 65 YRS +, IM, PF, PREFILL SYR, 0.5ML (FLUZONE HD)   4.  Need for pneumococcal vaccination  CANCELED: Pneumococcal polysaccharide vaccine 23-valent greater than or equal to 3yo subcutaneous/IM       PLAN:  Orders Placed This Encounter   Procedures    INFLUENZA, HIGH DOSE, 65 YRS +, IM, PF, PREFILL SYR, 0.5ML (FLUZONE HD)        Orders Placed This Encounter   Medications    L-Methylfolate 15 MG TABS     Sig: Take 1 tablet by mouth Daily     Dispense:  30 tablet     Refill:  5       Patient will continue with use of the CPAP on a nightly basis. Patient was encouraged to continue low cholesterol diet, weight loss and exercise as tolerated. .  Patient was begun on L methyl folate 15 mg by mouth daily. Will obtain a GeneSight test on the patient for further evaluation of medications that will be effective. Return in about 4 weeks (around 11/13/2017) for follow up on medications.

## 2017-10-30 ENCOUNTER — OFFICE VISIT (OUTPATIENT)
Dept: BEHAVIORAL/MENTAL HEALTH | Age: 67
End: 2017-10-30

## 2017-10-30 DIAGNOSIS — F33.1 MAJOR DEPRESSIVE DISORDER, RECURRENT EPISODE, MODERATE (HCC): Primary | ICD-10-CM

## 2017-10-30 PROCEDURE — 4004F PT TOBACCO SCREEN RCVD TLK: CPT | Performed by: PSYCHOLOGIST

## 2017-10-30 PROCEDURE — 90832 PSYTX W PT 30 MINUTES: CPT | Performed by: PSYCHOLOGIST

## 2017-10-30 ASSESSMENT — PATIENT HEALTH QUESTIONNAIRE - PHQ9
9. THOUGHTS THAT YOU WOULD BE BETTER OFF DEAD, OR OF HURTING YOURSELF: 0
7. TROUBLE CONCENTRATING ON THINGS, SUCH AS READING THE NEWSPAPER OR WATCHING TELEVISION: 3
5. POOR APPETITE OR OVEREATING: 3
3. TROUBLE FALLING OR STAYING ASLEEP: 3
SUM OF ALL RESPONSES TO PHQ9 QUESTIONS 1 & 2: 6
10. IF YOU CHECKED OFF ANY PROBLEMS, HOW DIFFICULT HAVE THESE PROBLEMS MADE IT FOR YOU TO DO YOUR WORK, TAKE CARE OF THINGS AT HOME, OR GET ALONG WITH OTHER PEOPLE: 3
SUM OF ALL RESPONSES TO PHQ QUESTIONS 1-9: 24
1. LITTLE INTEREST OR PLEASURE IN DOING THINGS: 3
8. MOVING OR SPEAKING SO SLOWLY THAT OTHER PEOPLE COULD HAVE NOTICED. OR THE OPPOSITE, BEING SO FIGETY OR RESTLESS THAT YOU HAVE BEEN MOVING AROUND A LOT MORE THAN USUAL: 3
2. FEELING DOWN, DEPRESSED OR HOPELESS: 3
6. FEELING BAD ABOUT YOURSELF - OR THAT YOU ARE A FAILURE OR HAVE LET YOURSELF OR YOUR FAMILY DOWN: 3
4. FEELING TIRED OR HAVING LITTLE ENERGY: 3

## 2017-10-30 NOTE — PATIENT INSTRUCTIONS
1. Review the list of unhelpful thinking patterns and pick out any \"go tos\"  2. Pay attention to your body's red flags that you might be engaged in this type of thinking  3. Jump in and apply rational counter statements to increase the balance in your thinking  4. Schedule something everyday and be accountable! Give sister the key to the safe. 5. Return in 2-3 weeks        Thinking Errors  provided by [employer] © 2012    Thinking errors (or cognitive distortions) are irrational thoughts that can influence your emotions. Everyone experiences cognitive distortions to some degree, but in their more extreme forms they can be harmful. Magnification and Minimization: Exaggerating or minimizing the importance of  events. One might believe their own achievements are unimportant, or that their  mistakes are excessively important. Catastrophizing: Seeing only the worst possible outcomes of a situation. Overgeneralization: Making broad interpretations from a single or few events. I felt  awkward during my job interview. I am always so awkward.     Magical Thinking: The belief that acts will influence unrelated situations. I am a good  personbad things shouldnt happen to me.     Personalization: The belief that one is responsible for events outside of their own  control. My mom is always upset. She would be fine if I did more to help her.     Jumping to Conclusions: Interpreting the meaning of a situation with little or no  Evidence. Mind Reading: Interpreting the thoughts and beliefs of others without adequate  evidence. She would not go on a date with me. She probably thinks Im ugly.     Fortune Telling: The expectation that a situation will turn out badly without adequate evidence. Emotional Reasoning: The assumption that emotions reflect the way things really are. I feel like a bad friend, therefor I must be a bad friend.     Disqualifying the Positive: Recognizing only the negative aspects of a situation while  ignoring the positive. One might receive many compliments on an evaluation, but focus  on the single piece of negative feedback. Should Statements: The belief that things should be a certain way. I should always  be friendly.     All-or-Nothing Thinking: Thinking in absolutes such as [place], or every. I  never do a good enough job on anything.                       Automatic Thoughts (Provided by [employer] © 2013)    Our thoughts control how we feel about ourselves and the world around us. Positive thoughts lead to us feeling good and negative thoughts can put us down. Sometimes our thoughts happen so quickly that we fail to notice them, but they can still affect our mood. These are called automatic thoughts. Oftentimes, our automatic thoughts are negative and irrational. Identifying these negative  automatic thoughts and replacing them with new rational thoughts (or counterstatement) can improve our mood. Trigger   Automatic Thought     New Thought                                Example:I made a mistake at work. Im probably going to be fired. I always mess up. This is it. Im no good at this job. \"     I messed up, but mistakes happen. Im going to work through this, like I always do.                                                                                  Thought Log Provided by [employer] © 2012        Event     AutomaticThought     Behavior /  Consequence   Rational   Counterstatement       Example: Supervisor at work is angry. I must have made a mistakenow Ive done it. Theyll fire for me sure.      Feeling of sadness and anxiety/Spend time obsessing over mistakes   [facility] supervisor couldve been angry about anything. They are usually happy  with my work, so even if Ive made a mistake, it isn't a big deal.\"                                                                            Countering Anxiety Provided by [employer] © 2012    Come up with a rational counterstatement for each of the following thoughts:    Anxiety-Producing Thought:       I cant go to the mall with my hair like this--everyone will notice me. Rational Counterstatement Example:  My hair looks a little messy, but everyone will be too occupied with other things to notice. Even if they do notice, I doubt they would care.    -------------------------------------------------------------------------------    Anxiety-Producing Thought:  I know I wont be able to finish my work on time. Rational Counterstatement Example:        --------------------------------------------------------------------------------    Anxiety-Producing Thought:  I cant face by boss. Shes going to yell at me.     Rational Counterstatement Example:          Next, think of three examples of anxiety-producing thoughts and rational counterstatements from your own life:    Example 1:    Anxiety-Producing Thought:      Rational Counterstatement:      Example 2:    Anxiety-Producing Thought:      Rational Counterstatement:        Example 3:    Anxiety-Producing Thought:      Rational Counterstatement:

## 2017-11-06 ENCOUNTER — OFFICE VISIT (OUTPATIENT)
Dept: BEHAVIORAL/MENTAL HEALTH | Age: 67
End: 2017-11-06

## 2017-11-06 DIAGNOSIS — F33.1 MAJOR DEPRESSIVE DISORDER, RECURRENT EPISODE, MODERATE (HCC): Primary | ICD-10-CM

## 2017-11-06 PROCEDURE — 90832 PSYTX W PT 30 MINUTES: CPT | Performed by: PSYCHOLOGIST

## 2017-11-06 ASSESSMENT — PATIENT HEALTH QUESTIONNAIRE - PHQ9
7. TROUBLE CONCENTRATING ON THINGS, SUCH AS READING THE NEWSPAPER OR WATCHING TELEVISION: 3
SUM OF ALL RESPONSES TO PHQ QUESTIONS 1-9: 20
1. LITTLE INTEREST OR PLEASURE IN DOING THINGS: 3
3. TROUBLE FALLING OR STAYING ASLEEP: 1
4. FEELING TIRED OR HAVING LITTLE ENERGY: 3
8. MOVING OR SPEAKING SO SLOWLY THAT OTHER PEOPLE COULD HAVE NOTICED. OR THE OPPOSITE, BEING SO FIGETY OR RESTLESS THAT YOU HAVE BEEN MOVING AROUND A LOT MORE THAN USUAL: 3
SUM OF ALL RESPONSES TO PHQ9 QUESTIONS 1 & 2: 6
10. IF YOU CHECKED OFF ANY PROBLEMS, HOW DIFFICULT HAVE THESE PROBLEMS MADE IT FOR YOU TO DO YOUR WORK, TAKE CARE OF THINGS AT HOME, OR GET ALONG WITH OTHER PEOPLE: 3
2. FEELING DOWN, DEPRESSED OR HOPELESS: 3
6. FEELING BAD ABOUT YOURSELF - OR THAT YOU ARE A FAILURE OR HAVE LET YOURSELF OR YOUR FAMILY DOWN: 2
9. THOUGHTS THAT YOU WOULD BE BETTER OFF DEAD, OR OF HURTING YOURSELF: 0
5. POOR APPETITE OR OVEREATING: 2

## 2017-11-06 NOTE — PATIENT INSTRUCTIONS
feel like a failure, so I must be a failure. \"   Negative self-talk. You undervalue yourself, put yourself down or use self-deprecating humor. This can result from overreacting to a situation, such as making a mistake. For example, \"I don't deserve anything better. \"    4. Adjust your thoughts and beliefs  Now replace negative or inaccurate thoughts with accurate, constructive thoughts. Try these strategies:  Use hopeful statements. Treat yourself with kindness and encouragement. Pessimism can be a self-fulfilling prophecy. For example, if you think your presentation isn't going to go well, you might indeed stumble through it. Try telling yourself things such as, \"Even though it's tough, I can handle this situation. \"   Forgive yourself. Everyone makes mistakes  and mistakes aren't permanent reflections on you as a person. They're isolated moments in time. Tell yourself, \"I made a mistake, but that doesn't make me a bad person. \"   Avoid 'should' and 'must' statements. If you find that your thoughts are full of these words, you might be putting unreasonable demands on yourself  or on others. Removing these words from your thoughts can lead to more realistic expectations. Focus on the positive. Think about the good parts of your life. Remind yourself of things that have gone well recently. Consider the skills you've used to cope with challenging situations. Relabel upsetting thoughts. You don't need to react negatively to negative thoughts. Instead, think of negative thoughts as signals to try new, healthy patterns. Ask yourself, \"What can I think and do to make this less stressful? \"   Encourage yourself. Give yourself credit for making positive changes.  For example, \"My presentation might not have been perfect, but my colleagues asked questions and remained engaged  which means that I accomplished my goal.\"

## 2017-11-06 NOTE — PROGRESS NOTES
Behavioral Health Consultation  Susana Amato, Ph.D., [facility]  Psychologist  11/6/17  10:33 AM      Time spent with Patient: 30 minutes  This is patient's tenth  [facility] appointment. Reason for Consult:  depression, anxiety, stress and sleep concerns and numerous chronic medical issues  Referring Provider: Jericoh Christianson MD  [facility], [address] [place] [address]      Feedback given to PCP. S:  Patient reports limited progress towards goals stating \"my anxiety is off the charts\". Pt notes some improved sleep but significant distress otherwise. \"I've gotta start feeling better about myself\". Patient notes his all or nothing thinking and other concerns related to his self-confidence when he has symptoms distress but ultimately exacerbates his distress. Patient describes his daily functioning and difficulty with motivation. Explored specific struggles related to ruminating thoughts and unclear thinking such as when he is at the grocery store. Explored techniques to combat these difficulties and how self compassion might be contributing to his distress. Pt denies SI/HI.           O:  MSE:    Appearance    alert, cooperative, moderate distress  Appetite abnormal  Sleep disturbance Yes  Fatigue Yes  Loss of pleasure Yes  Impulsive behavior Yes  Speech    spontaneous, normal rate and normal volume  Mood    Anxious  Affect    anxiety  Thought Content    intact  Thought Process    linear, goal directed and coherent  Associations    logical connections, tangential connections  Insight    Fair  Judgment    Intact  Orientation    oriented to person, place, time, and general circumstances  Memory    recent and remote memory intact  Attention/Concentration    intact  Morbid ideation No  Suicide Assessment    no suicidal ideation      History:    Medications:   Current Outpatient Prescriptions   Medication Sig Dispense Refill    brexpiprazole (REXULTI) 1 MG TABS tablet Take 1 tablet by mouth daily 30 tablet 5    DULoxetine

## 2017-11-13 ENCOUNTER — OFFICE VISIT (OUTPATIENT)
Dept: FAMILY MEDICINE CLINIC | Age: 67
End: 2017-11-13

## 2017-11-13 VITALS
HEART RATE: 89 BPM | HEIGHT: 72 IN | DIASTOLIC BLOOD PRESSURE: 86 MMHG | BODY MASS INDEX: 32.4 KG/M2 | WEIGHT: 239.2 LBS | TEMPERATURE: 96.6 F | SYSTOLIC BLOOD PRESSURE: 136 MMHG | OXYGEN SATURATION: 98 %

## 2017-11-13 DIAGNOSIS — E11.9 TYPE 2 DIABETES MELLITUS WITHOUT COMPLICATION, WITHOUT LONG-TERM CURRENT USE OF INSULIN (HCC): ICD-10-CM

## 2017-11-13 DIAGNOSIS — E11.69 DYSLIPIDEMIA ASSOCIATED WITH TYPE 2 DIABETES MELLITUS (HCC): ICD-10-CM

## 2017-11-13 DIAGNOSIS — E78.5 DYSLIPIDEMIA ASSOCIATED WITH TYPE 2 DIABETES MELLITUS (HCC): ICD-10-CM

## 2017-11-13 DIAGNOSIS — G47.33 OSA ON CPAP: ICD-10-CM

## 2017-11-13 DIAGNOSIS — F33.1 MAJOR DEPRESSIVE DISORDER, RECURRENT EPISODE, MODERATE (HCC): Primary | ICD-10-CM

## 2017-11-13 DIAGNOSIS — Z99.89 OSA ON CPAP: ICD-10-CM

## 2017-11-13 PROCEDURE — 4040F PNEUMOC VAC/ADMIN/RCVD: CPT | Performed by: FAMILY MEDICINE

## 2017-11-13 PROCEDURE — G8484 FLU IMMUNIZE NO ADMIN: HCPCS | Performed by: FAMILY MEDICINE

## 2017-11-13 PROCEDURE — 4004F PT TOBACCO SCREEN RCVD TLK: CPT | Performed by: FAMILY MEDICINE

## 2017-11-13 PROCEDURE — 3017F COLORECTAL CA SCREEN DOC REV: CPT | Performed by: FAMILY MEDICINE

## 2017-11-13 PROCEDURE — 1123F ACP DISCUSS/DSCN MKR DOCD: CPT | Performed by: FAMILY MEDICINE

## 2017-11-13 PROCEDURE — G0444 DEPRESSION SCREEN ANNUAL: HCPCS | Performed by: FAMILY MEDICINE

## 2017-11-13 PROCEDURE — 3044F HG A1C LEVEL LT 7.0%: CPT | Performed by: FAMILY MEDICINE

## 2017-11-13 PROCEDURE — 99213 OFFICE O/P EST LOW 20 MIN: CPT | Performed by: FAMILY MEDICINE

## 2017-11-13 PROCEDURE — G8417 CALC BMI ABV UP PARAM F/U: HCPCS | Performed by: FAMILY MEDICINE

## 2017-11-13 PROCEDURE — G8427 DOCREV CUR MEDS BY ELIG CLIN: HCPCS | Performed by: FAMILY MEDICINE

## 2017-11-13 RX ORDER — DULOXETIN HYDROCHLORIDE 60 MG/1
CAPSULE, DELAYED RELEASE ORAL
Qty: 30 CAPSULE | Refills: 5
Start: 2017-11-13 | End: 2017-11-24 | Stop reason: SDUPTHER

## 2017-11-13 RX ORDER — LEVOMEFOLATE CALCIUM 15 MG
1 TABLET ORAL DAILY
COMMUNITY
End: 2017-11-13

## 2017-11-13 ASSESSMENT — PATIENT HEALTH QUESTIONNAIRE - PHQ9
1. LITTLE INTEREST OR PLEASURE IN DOING THINGS: 3
10. IF YOU CHECKED OFF ANY PROBLEMS, HOW DIFFICULT HAVE THESE PROBLEMS MADE IT FOR YOU TO DO YOUR WORK, TAKE CARE OF THINGS AT HOME, OR GET ALONG WITH OTHER PEOPLE: 3
4. FEELING TIRED OR HAVING LITTLE ENERGY: 3
SUM OF ALL RESPONSES TO PHQ9 QUESTIONS 1 & 2: 6
8. MOVING OR SPEAKING SO SLOWLY THAT OTHER PEOPLE COULD HAVE NOTICED. OR THE OPPOSITE, BEING SO FIGETY OR RESTLESS THAT YOU HAVE BEEN MOVING AROUND A LOT MORE THAN USUAL: 3
2. FEELING DOWN, DEPRESSED OR HOPELESS: 3
9. THOUGHTS THAT YOU WOULD BE BETTER OFF DEAD, OR OF HURTING YOURSELF: 0
5. POOR APPETITE OR OVEREATING: 2
6. FEELING BAD ABOUT YOURSELF - OR THAT YOU ARE A FAILURE OR HAVE LET YOURSELF OR YOUR FAMILY DOWN: 1
3. TROUBLE FALLING OR STAYING ASLEEP: 3
7. TROUBLE CONCENTRATING ON THINGS, SUCH AS READING THE NEWSPAPER OR WATCHING TELEVISION: 3
SUM OF ALL RESPONSES TO PHQ QUESTIONS 1-9: 21

## 2017-11-13 NOTE — PROGRESS NOTES
no cough, shortness of breath, or wheezing  Cardiovascular ROS: no chest pain or dyspnea on exertion  Gastrointestinal ROS: positive for - constipation, diarrhea and gas/bloating  Genito-Urinary ROS: no dysuria, trouble voiding, or hematuria  Musculoskeletal ROS: negative  Neurological ROS: no TIA or stroke symptoms  Dermatological ROS: negative    Vitals:    11/13/17 1103 11/13/17 1113 11/13/17 1141   BP: (!) 140/100 (!) 140/100 136/86   Pulse: 89     Temp: 96.6 °F (35.9 °C)     TempSrc: Temporal     SpO2: 98%     Weight: 239 lb 3.2 oz (108.5 kg)     Height: 6' (1.829 m)       Physical Examination: General appearance - alert, well appearing, and in no distress. Patient is obese. Affect is sad and mood is depressed. Skin - normal coloration and turgor, no rashes, no suspicious skin lesions noted  Normal  Neck - supple, no significant adenopathy, bilateral symmetric anterior adenopathy, carotids upstroke normal bilaterally, no bruits, thyroid exam: thyroid is normal in size without nodules or tenderness  Chest - clear to auscultation, no wheezes, rales or rhonchi, symmetric air entry. Heart - normal rate, regular rhythm, normal S1, S2, no murmurs, rubs, clicks or gallops. Extremities - peripheral pulses normal, no pedal edema, no clubbing or cyanosis. I have reviewed the following diagnostic data: NA.  Please see report for additional information. ASSESSMENT:  1. Major depressive disorder, recurrent episode, moderate (HCC)     2. Type 2 diabetes mellitus without complication, without long-term current use of insulin ([address]     3. Dyslipidemia associated with type 2 diabetes mellitus ([address]     4. ETIENNE on CPAP         PLAN:  No orders of the defined types were placed in this encounter.        Orders Placed This Encounter   Medications    DULoxetine (CYMBALTA) 60 MG extended release capsule     Sig: TAKE 1 CAPSULE BY MOUTH DAILY     Dispense:  30 capsule     Refill:  5     Patient will continue on his duloxetine

## 2017-11-15 ENCOUNTER — TELEPHONE (OUTPATIENT)
Dept: FAMILY MEDICINE CLINIC | Age: 67
End: 2017-11-15

## 2017-11-15 NOTE — TELEPHONE ENCOUNTER
patient called in confused about what medication he was supposed to be taking. Patient thought Dr. Kina Jeffrey was changing his medication and after going over his medication with me he realized there was no change in his medication. Told patient to call back if he had anymore questions.

## 2017-11-20 ENCOUNTER — TELEPHONE (OUTPATIENT)
Dept: FAMILY MEDICINE CLINIC | Age: 67
End: 2017-11-20

## 2017-11-20 ENCOUNTER — OFFICE VISIT (OUTPATIENT)
Dept: BEHAVIORAL/MENTAL HEALTH | Age: 67
End: 2017-11-20

## 2017-11-20 DIAGNOSIS — F33.1 MAJOR DEPRESSIVE DISORDER, RECURRENT EPISODE, MODERATE (HCC): Primary | ICD-10-CM

## 2017-11-20 PROCEDURE — 90834 PSYTX W PT 45 MINUTES: CPT | Performed by: PSYCHOLOGIST

## 2017-11-20 ASSESSMENT — PATIENT HEALTH QUESTIONNAIRE - PHQ9
8. MOVING OR SPEAKING SO SLOWLY THAT OTHER PEOPLE COULD HAVE NOTICED. OR THE OPPOSITE, BEING SO FIGETY OR RESTLESS THAT YOU HAVE BEEN MOVING AROUND A LOT MORE THAN USUAL: 3
5. POOR APPETITE OR OVEREATING: 3
SUM OF ALL RESPONSES TO PHQ QUESTIONS 1-9: 22
4. FEELING TIRED OR HAVING LITTLE ENERGY: 3
SUM OF ALL RESPONSES TO PHQ9 QUESTIONS 1 & 2: 6
10. IF YOU CHECKED OFF ANY PROBLEMS, HOW DIFFICULT HAVE THESE PROBLEMS MADE IT FOR YOU TO DO YOUR WORK, TAKE CARE OF THINGS AT HOME, OR GET ALONG WITH OTHER PEOPLE: 2
9. THOUGHTS THAT YOU WOULD BE BETTER OFF DEAD, OR OF HURTING YOURSELF: 0
3. TROUBLE FALLING OR STAYING ASLEEP: 3
1. LITTLE INTEREST OR PLEASURE IN DOING THINGS: 3
2. FEELING DOWN, DEPRESSED OR HOPELESS: 3
7. TROUBLE CONCENTRATING ON THINGS, SUCH AS READING THE NEWSPAPER OR WATCHING TELEVISION: 3
6. FEELING BAD ABOUT YOURSELF - OR THAT YOU ARE A FAILURE OR HAVE LET YOURSELF OR YOUR FAMILY DOWN: 1

## 2017-11-20 NOTE — PROGRESS NOTES
change. Pt would benefit from continued [facility] services to increase coping skills to provide symptom management/control/relief. PHQ Scores 11/20/2017 11/13/2017 11/6/2017 10/30/2017 7/24/2017 5/25/2017 4/26/2017   PHQ2 Score 6 6 6 6 0 0 6   PHQ9 Score 22 21 20 24 0 0 21     Interpretation of Total Score Depression Severity: 1-4 = Minimal depression, 5-9 = Mild depression, 10-14 = Moderate depression, 15-19 = Moderately severe depression, 20-27 = Severe depression      Diagnosis:    Major depressive disorder; recurrent, moderate with some anxious distress, seasonal pattern      Diagnosis Date    Colitis     Hypertension     Type II or unspecified type diabetes mellitus without mention of complication, not stated as uncontrolled          Plan:  Pt interventions:  Conducted functional assessment, [place]-setting to identify pt's primary goals for [facility] visit / overall health, Supportive techniques, Provided Psychoeducation re: MH benefits of exercise and increased activity, CBT to target cognitive distortions including catastraphizing, increase self-confidence, behavioral strategies such as writing down his to do list for behavioral activitation, practice of gratitudes, actively questioning his thinking to shift from \"why\" to [place] now\", use of spiritual base for coping, Identified maladaptive thoughts and Emphasized importance of regular practice of relaxation strategies to target / promote symptom relief. Pt Behavioral Change Plan:    1. Consider some of the spiritual/biblical info below about suffering  2. Continue to write down your todo list and gratitudes  3. When you ask the \"Why\" go to the \"what now\". 4. Follow through with [employer] as soon as that card arrives. Increase that activity  5.  Return in 2 weeks

## 2017-11-20 NOTE — PATIENT INSTRUCTIONS
1. Consider some of the spiritual/biblical info below about suffering  2. Continue to write down your todo list and gratitudes  3. When you ask the \"Why\" go to the \"what now\". 4. Follow through with [employer] as soon as that card arrives. Increase that activity  5. Return in 2 weeks        Scriptures to Remember*  When Suffering with Chronic Pain/Illness    Some Scriptures of Comfort in the Midst of Suffering    Psalm 73:26 My flesh and my heart may fail, but God is the strength of my heart and my portion forever. Psalm 102 The entire Psalm. Rony Angry 40:29-31  He gives strength to the weary and increases the power of the weak. Even youths grow tired and weary, and young men stumble and fall; but those who hope in the [place] will renew their strength. They will soar on wings like eagles; they will run and not grow weary, they will walk and not grow faint. [id number]:93-58  Come to me, all you who are weary and burdened, and I will give you rest. Take my yoke upon you and learn from me, for I am gentle and humble in heart, and you will find rest for your souls. For my yoke is easy and my burden is light. Margo Scott 16:21-22  A woman giving birth to a child has pain because her time has come; but when her baby is born she forgets the anguish because of her lili that a child is born into the world. So with you:  Now is your time of grief, but I will see you again and you will rejoice, and no one will take away your lili. Lashon 8:18 I consider that our present sufferings are not worth comparing with the glory that will be revealed in us. Lashon 8:28 And we know that in all things God works for the good of those who love Him, who have been called according to His purpose.     II Ranjit 1:3-5 Praise be to the God and Father of our [employer], the Father of compassion and the God of all comfort, who comforts us in all our troubles, so that we can comfort those in any trouble with the comfort we ourselves have received from God. For just as the sufferings of Daniel flow over into our lives, so all through [place] our comfort overflows. Nicolas 3:20-21  But our citizenship is in heaven. And we eagerly await a Savior from there, the [facility], who, by the power that enables Him to bring everything under His control, will transform our lowly bodies so that they will be like his glorious body. Some Scriptures to Answer the [facility]?  question    Job 2:7-10  (Entire book is helpful, in particular the following verses) So Evan went out from the presence of the Lord and afflicted Job with painful sores from the soles of his feet to the top of his head. Then Job took a piece of broken pottery and scraped himself with it as he sat among the ashes. His wife said to him, Elige Skeeters you still holding on to your integrity? Curse God and die!   He replied, You are talking like a foolish woman. Shall we accept good from God, and not trouble?   In all this, Job did not sin in what he said. Psalm 38  The entire Psa. Tunde Sultana 9:2-3  His disciples asked him, Neha Hester, who sinned, this man or his parents, that he was born blind?   Neither this man nor his parents sinned, said Bashir, but this happened so that the work of God might be displayed in his life. Yvonna Pain 11:3-4  So the sisters sent word to Claudia Doan, the one you love is sick.   When he heard this, Bashir said, This sickness will not end in death. No, it is for Gods glory so that [employer] may be glorified through it.     II Ranjit 1: 8-9  We do not want you to be uninformed brothers, about the hardships we suffered in the [place]. We were under great pressure, far beyond our ability to endure, so that we despaired even of life. Indeed, in our hearts we felt the sentence of death. But this happened that we might not rely on ourselves but on God, who raises the dead.       II Ranjit 12:7-9   To keep me from becoming conceited because of these surpassingly great revelations, there was given me a thorn in my flesh, a messenger of Evan, to torment me. Three times I pleaded with the Lord to take it away from me. But he said to me, [facility] argelia is sufficient for you, for my power is made perfect in weakness.   Therefore I will boast all the more gladly about my weaknesses, so that Daniels power may rest on me. Major Brenda 1:2,12 Consider it pure lili, my brothers, whenever you face trials of many kinds, because you know that the testing of you walter develops perseverance. Perseverance must finish its work so that you may be mature and complete, not lacking in anything. Blessed is the man who perseveres under trial, because when he has stood the test, he will receive the crown of life that God has promised to those who love Him. I Peter1:6-7  In this you greatly rejoice, though now for a little while you may have had to suffer grief in all kinds of trials. These have come so that your walterof greater worth than gold, which perishes even though refined by firemay be proved genuine and may result in praise, glory and honor when [employer] is revealed. Some Scriptures on How God Calls You to Respond to Suffering    II Chronicles 16:12 In the [age]-n[age] year of his reign [facility] was afflicted with a disease in his feet. Though his disease was severe, even in his illness he did not seek help from the [facility], but only from physicians. [facility] 32:3-5  When I kept silent, my bones wasted away through my groaning all day long. For day and night your hand was heavy upon me: my strength was sapped as in the heat of summer. Then I acknowledged my sin to you and did not cover up my iniquity. I said I confess my transgressions to the Lord  and you forgave the guilt of my sin. Proverbs 3:7-8  Do not be wise in your own eyes; fear the Lord and shun evil.   This will bring health to your body and nourishment are some tips to help:    Need to make a phone call? Walk and talk. Skip the elevator and take the stairs. Head outside for 10 mins during lunch. Park at the back of the parking lot and walk. Do an activity you enjoy & it wont be a chore. Get an exercise partner to hold you accountable. What days of the week can you find time to exercise? Choose a minimum of three days per week to exercise. You should exercise for at least  30 minutes, but the time can be split up throughout the day. M T W TH F       SAT    SUN      List three types of exercise you would like to try. The best exercises are activities that youll genuinely enjoy, without having too many obstacles to get started. 1._____________________________________________________________________    2._____________________________________________________________________    3._____________________________________________________________________      What strategies can you use to make sure you remember to exercise? Its easy to put off exercise, prioritize other things, or simply forget about your plan. [place] the ideas that might help you stick to your plan:     Plan to exercise with a friend so you can both hold one another responsible. Set an alarm to remind you when to exercise. If youre using your phone alarm,  set it to automatically repeat every day at the same time! Get into a routine by exercising at the same time every day. Try attaching   exercise to a particular part of your day, such as walking every evening right   after dinner. Reward yourself for a job well done.  Treat yourself to something you enjoy!  --------------------------------------------------------------------------------------------------------------------------------------------------------------------------------------------------------------------------------------------  There are several approaches to stress management. Some of these include making changes in your life to remove stressors, seeking mental health counseling and expanding your spiritual horizons. These are great options and can provide good stress relief. But don't forget one activity that can also bring pleasure to your life while reducing stress: exercise. Regular exercise: A key to stress management  Exercise can be a mood booster. It   Relaxes tense muscles   Helps you sleep better, making you better equipped to face the stresses of your day   Releases endorphins, adrenaline, serotonin and dopamines, chemicals that give you a sense of well-being  A  and wife team (he's a cardiologist, she's a ), Susan Bess and Mariella Ramirez, have written a book called Healing Moves in which they suggest that we try to think of exercise as \"recess. \" We needed play time as children, and we still need it as adults. Remember the days when you couldn't wait to rush out the door to play after sitting at your desk? We often still want to rush out the door, but too many of us rush to our cars, then home to have dinner and watch television. As adults, we still need to stretch our muscles, get our hearts pumping, breathe fresh air and take a break from our responsibilities. Consider some of these ideas as you explore ways to reduce stress by adding exercise to your life:     Don't confuse work activities with stress-reducing exercise. It's true that if you're a  you're getting more exercise at work than if you have a desk job. But to get a stress-reducing benefit from exercise, you need to choose an activity that's separate from the work you do all day long. Chasing after children, using the stairs at work, and running the vacuum  are good ways to keep burning calories throughout the day, but they're not going to do much about reducing stress. Instead, you need activities that take your mind away from the daily grind.      Keep it varied. Many people get bored if they do the same activity day after day. And getting bored puts you at risk of giving up. Mix it up. For example, take a yoga class one night a week, go for a walk a few times per week and maybe play racoomaall with a friend regularly. Remember activities you enjoyed as a child. Chances are, you'll still like them. Was ballet a lot of fun for you? Take dance lessons. Did you love to go skating? Adults can do that too. Did you enjoy competitive sports? Check the local [employer], health club or similar organization to look into group activities. Schedule it in and keep it regular. This can be the hardest part. Only you can figure out how to fit regular exercise into your schedule. One thing you need to face is that if your days already seem too crowded without exercise, you probably need to give something up. That could mean not having dinner after work with friends as often, trying to cut back on working late, giving up going out to lunch sometimes or spending less time watching television. If you have children, see what you can do to swap childcare with other parents to give yourself some time on your own. What kind is best?  What kind of exercise is best for stress reduction? For many people, repeating the same motion for a period of time can have an effect that's similar to meditation. Swimming laps, walking, running and cycling are rhythmic exercises that can provide this effect. On the other hand, some people enjoy exercises that focus on the breath and fluid movement, such as yoga and t'ai chi. Others might want to play tennis or dance the tango. The main thing is to find things you like and commit to doing them regularly. Source:  Tobias Shen. Healing Moves. [employer], [place], [place], 2000; Lorna Mendoza. The Women's Heart Book. [place], [place], [place], [address], 2001.

## 2017-11-24 RX ORDER — ARIPIPRAZOLE 2 MG/1
2 TABLET ORAL DAILY
Qty: 30 TABLET | Refills: 2 | Status: SHIPPED | OUTPATIENT
Start: 2017-11-24 | End: 2017-12-11 | Stop reason: ALTCHOICE

## 2017-11-24 RX ORDER — DULOXETIN HYDROCHLORIDE 60 MG/1
CAPSULE, DELAYED RELEASE ORAL
Qty: 30 CAPSULE | Refills: 5 | Status: SHIPPED | OUTPATIENT
Start: 2017-11-24 | End: 2017-12-11 | Stop reason: ALTCHOICE

## 2017-12-04 ENCOUNTER — TELEPHONE (OUTPATIENT)
Dept: BEHAVIORAL/MENTAL HEALTH | Age: 67
End: 2017-12-04

## 2017-12-04 RX ORDER — FENOFIBRATE 145 MG/1
TABLET, COATED ORAL
Qty: 30 TABLET | Refills: 5 | Status: SHIPPED | OUTPATIENT
Start: 2017-12-04 | End: 2018-04-21 | Stop reason: SDUPTHER

## 2017-12-04 NOTE — TELEPHONE ENCOUNTER
PHARMACY REQUESTING REFILL  PATIENT LAST SEEN 11/13/17  LAST REFILL 11/21/16  PLEASE APPROVE OR DENY. Future Appointments  Date Time Provider Иван Schreiber   12/4/2017 11:00 AM Susana Marion,  [facility]   12/11/2017 1:45 PM MD Serg Marino [facility] EMERGENC[facility] [place]   8/2/2018 11:00 AM [facility]   8/9/2018 1:15 PM Lauren Forrester MD [facility]

## 2017-12-04 NOTE — TELEPHONE ENCOUNTER
Outreach phone call after pt did not show for scheduled [facility] appt. LVM to call the office and reschedule as soon as able.

## 2017-12-05 ENCOUNTER — OFFICE VISIT (OUTPATIENT)
Dept: BEHAVIORAL/MENTAL HEALTH | Age: 67
End: 2017-12-05

## 2017-12-05 DIAGNOSIS — F33.1 MAJOR DEPRESSIVE DISORDER, RECURRENT EPISODE, MODERATE (HCC): Primary | ICD-10-CM

## 2017-12-05 PROCEDURE — 90832 PSYTX W PT 30 MINUTES: CPT | Performed by: PSYCHOLOGIST

## 2017-12-05 ASSESSMENT — PATIENT HEALTH QUESTIONNAIRE - PHQ9
5. POOR APPETITE OR OVEREATING: 2
8. MOVING OR SPEAKING SO SLOWLY THAT OTHER PEOPLE COULD HAVE NOTICED. OR THE OPPOSITE, BEING SO FIGETY OR RESTLESS THAT YOU HAVE BEEN MOVING AROUND A LOT MORE THAN USUAL: 3
4. FEELING TIRED OR HAVING LITTLE ENERGY: 3
9. THOUGHTS THAT YOU WOULD BE BETTER OFF DEAD, OR OF HURTING YOURSELF: 0
1. LITTLE INTEREST OR PLEASURE IN DOING THINGS: 3
SUM OF ALL RESPONSES TO PHQ9 QUESTIONS 1 & 2: 6
6. FEELING BAD ABOUT YOURSELF - OR THAT YOU ARE A FAILURE OR HAVE LET YOURSELF OR YOUR FAMILY DOWN: 2
3. TROUBLE FALLING OR STAYING ASLEEP: 2
2. FEELING DOWN, DEPRESSED OR HOPELESS: 3
10. IF YOU CHECKED OFF ANY PROBLEMS, HOW DIFFICULT HAVE THESE PROBLEMS MADE IT FOR YOU TO DO YOUR WORK, TAKE CARE OF THINGS AT HOME, OR GET ALONG WITH OTHER PEOPLE: 1
7. TROUBLE CONCENTRATING ON THINGS, SUCH AS READING THE NEWSPAPER OR WATCHING TELEVISION: 3
SUM OF ALL RESPONSES TO PHQ QUESTIONS 1-9: 21

## 2017-12-05 NOTE — PATIENT INSTRUCTIONS
1. Let's revisit the action step of all of this. When you catch yourself in the \"why\" habit of thinking- get up and get moving (i.e. 50steps, 1 min on the bike, etc.)  2. Keep up the activity with [employer], wonderful benefits to class. 3. Get up and out of bed and have a to do list EVERYDAY. 4. REMOVE THE JUDGEMENT! Show yourself compassion with your self talk. Ask would I speak to a friend like that? 5.  Return in about 2-3 weeks

## 2017-12-05 NOTE — PROGRESS NOTES
Behavioral Health Consultation  Susana Cr, Ph.D., [facility]  Psychologist  12/5/17  10:36 AM      Time spent with Patient: 30 minutes  This is patient's 12th  [facility] [place]. Reason for Consult:  depression, anxiety, agitation and sleep concerns, numerous chronic medical concerns  Referring Provider: Alexandra Rosa MD  [address] [place] [address]    Feedback given to PCP. S:   Pt reports limited progress towards goals but has been focused on the \"why\". Details some of his unhelpful behaviors, such as laying in bed up to 90 mins before starting his day, pattern of identifying his unhelpful patterns of thinking but not taking action against them. Explored opportunities in daily routines to create change, challenge thinking. Pt denies SI/HI.            O:  MSE:    Appearance    alert, cooperative  Appetite abnormal  Sleep disturbance Yes  Fatigue Yes  Loss of pleasure Yes  Impulsive behavior Yes  Speech    spontaneous, normal rate and normal volume  Mood    Anxious  Depressed  Affect    depressed affect  Thought Content    intact  Thought Process    linear, goal directed and coherent  Associations    logical connections, tangential connections  Insight    Fair  Judgment    Intact  Orientation    oriented to person, place, time, and general circumstances  Memory    recent and remote memory intact  Attention/Concentration    intact  Morbid ideation No  Suicide Assessment    no suicidal ideation      History:    Medications:   Current Outpatient Prescriptions   Medication Sig Dispense Refill    fenofibrate (TRICOR) 145 MG tablet TAKE 1 TABLET BY MOUTH DAILY 30 tablet 5    DULoxetine (CYMBALTA) 60 MG extended release capsule TAKE 1 CAPSULE BY MOUTH DAILY 30 capsule 5    ARIPiprazole (ABILIFY) 2 MG tablet Take 1 tablet by mouth daily 30 tablet 2    traZODone (DESYREL) 100 MG tablet TAKE 1 TABLET BY MOUTH EVERY NIGHT 30 tablet 5    predniSONE (DELTASONE) 5 MG tablet 5 mg See Admin Instructions As directed      candesartan (ATACAND) 16 MG tablet TAKE 1 TABLET BY MOUTH DAILY 30 tablet 5    folic acid (FOLVITE) 1 MG tablet Take 1 tablet by mouth daily 30 tablet 5    methotrexate (RHEUMATREX) 2.5 MG chemo tablet Take 5 tablets by mouth one a week      loperamide (ANTI-DIARRHEAL) 2 MG CAPS Take 2 mg by mouth See Admin Instructions 2 tablets in the morning and 2 tablets at night       No current facility-administered medications for this visit. Social History:   Social History     Social History    Marital status: Single     Spouse name: N/A    Number of children: N/A    Years of education: N/A     Occupational History    Not on file. Social History Main Topics    Smoking status: Light Tobacco Smoker     Types: Cigars    Smokeless tobacco: Never Used    Alcohol use 0.0 oz/week      Comment: occ    Drug use: No    Sexual activity: Not on file     Other Topics Concern    Not on file     Social History Narrative    No narrative on file       TOBACCO:   reports that he has been smoking Cigars. He has never used smokeless tobacco.  ETOH:   reports that he drinks alcohol. Family History:   Family History   Problem Relation Age of Onset    Cancer Mother     Diabetes Father     Cancer Paternal Uncle          A:  Administered the PHQ9 which indicates a self report of severe symptom distress, indicating symptom stability since his last report. Pt would benefit from continued [facility] services to increase coping skills to provide symptom management/control/relief.        PHQ Scores 12/5/2017 11/20/2017 11/13/2017 11/6/2017 10/30/2017 7/24/2017 5/25/2017   PHQ2 Score 6 6 6 6 6 0 0   PHQ9 Score 21 22 21 20 24 0 0     Interpretation of Total Score Depression Severity: 1-4 = Minimal depression, 5-9 = Mild depression, 10-14 = Moderate depression, 15-19 = Moderately severe depression, 20-27 = Severe depression      Diagnosis:    Major depressive disorder; recurrent, moderate with some anxious distress,

## 2017-12-11 ENCOUNTER — OFFICE VISIT (OUTPATIENT)
Dept: FAMILY MEDICINE CLINIC | Age: 67
End: 2017-12-11

## 2017-12-11 VITALS
BODY MASS INDEX: 32.64 KG/M2 | SYSTOLIC BLOOD PRESSURE: 118 MMHG | OXYGEN SATURATION: 96 % | RESPIRATION RATE: 18 BRPM | HEART RATE: 98 BPM | DIASTOLIC BLOOD PRESSURE: 86 MMHG | WEIGHT: 241 LBS | HEIGHT: 72 IN | TEMPERATURE: 97.9 F

## 2017-12-11 DIAGNOSIS — H91.13 PRESBYCUSIS OF BOTH EARS: ICD-10-CM

## 2017-12-11 DIAGNOSIS — F33.1 MAJOR DEPRESSIVE DISORDER, RECURRENT EPISODE, MODERATE (HCC): Primary | ICD-10-CM

## 2017-12-11 PROCEDURE — G8417 CALC BMI ABV UP PARAM F/U: HCPCS | Performed by: FAMILY MEDICINE

## 2017-12-11 PROCEDURE — 4040F PNEUMOC VAC/ADMIN/RCVD: CPT | Performed by: FAMILY MEDICINE

## 2017-12-11 PROCEDURE — 4004F PT TOBACCO SCREEN RCVD TLK: CPT | Performed by: FAMILY MEDICINE

## 2017-12-11 PROCEDURE — 1123F ACP DISCUSS/DSCN MKR DOCD: CPT | Performed by: FAMILY MEDICINE

## 2017-12-11 PROCEDURE — 3017F COLORECTAL CA SCREEN DOC REV: CPT | Performed by: FAMILY MEDICINE

## 2017-12-11 PROCEDURE — 99213 OFFICE O/P EST LOW 20 MIN: CPT | Performed by: FAMILY MEDICINE

## 2017-12-11 PROCEDURE — G8427 DOCREV CUR MEDS BY ELIG CLIN: HCPCS | Performed by: FAMILY MEDICINE

## 2017-12-11 PROCEDURE — G8484 FLU IMMUNIZE NO ADMIN: HCPCS | Performed by: FAMILY MEDICINE

## 2017-12-11 RX ORDER — DESVENLAFAXINE 50 MG/1
50 TABLET, EXTENDED RELEASE ORAL DAILY
Qty: 30 TABLET | Refills: 5 | Status: SHIPPED | OUTPATIENT
Start: 2017-12-11 | End: 2018-07-18 | Stop reason: SDUPTHER

## 2017-12-11 NOTE — PROGRESS NOTES
Opinion     Referral Reason:   Specialty Services Required     Referred to Provider:   Rodger Stoll MD     Requested Specialty:   Otolaryngology     Number of Visits Requested:   1        Orders Placed This Encounter   Medications    desvenlafaxine succinate (PRISTIQ) 50 MG TB24 extended release tablet     Sig: Take 1 tablet by mouth daily     Dispense:  30 tablet     Refill:  5     [facility] testing revealed that he would not respond to duloxetine or Abilify. It did predict that he would respond to Pristiq and so he was switched to Pristiq 50 mg by mouth daily. He will discontinue the duloxetine and Abilify. He may start the Pristiq if covered by his current insurance but otherwise may wait to the beginning of the year when his insurance coverage changes. Patient was referred to Dr. Vivek Macdonald for further evaluation of his hearing. Patient will continue with his follow up with Dr. Valeria Rivera. Patient will continue with use of the CPAP on a nightly basis. Patient was encouraged to continue low cholesterol diet, weight loss and exercise as tolerated. .  Return in about 4 weeks (around 1/8/2018) for follow up on medications.

## 2017-12-12 ENCOUNTER — TELEPHONE (OUTPATIENT)
Dept: FAMILY MEDICINE CLINIC | Age: 67
End: 2017-12-12

## 2017-12-12 NOTE — TELEPHONE ENCOUNTER
Received a PA for Desvenlaxfaxine Succinate ER (Pristiq) from [address]. Completed on cover my meds through [employer]. Will receive response by fax in 1-5 business days.

## 2017-12-15 NOTE — TELEPHONE ENCOUNTER
Received a PA Approval. For the patients prescription of Pristiq. Medication is approved from 12/29/16 through 12/30/18. Pharmacy is aware.

## 2017-12-18 ENCOUNTER — OFFICE VISIT (OUTPATIENT)
Dept: BEHAVIORAL/MENTAL HEALTH | Age: 67
End: 2017-12-18

## 2017-12-18 DIAGNOSIS — F33.1 MAJOR DEPRESSIVE DISORDER, RECURRENT EPISODE, MODERATE (HCC): Primary | ICD-10-CM

## 2017-12-18 PROCEDURE — 90832 PSYTX W PT 30 MINUTES: CPT | Performed by: PSYCHOLOGIST

## 2017-12-18 ASSESSMENT — PATIENT HEALTH QUESTIONNAIRE - PHQ9
5. POOR APPETITE OR OVEREATING: 3
7. TROUBLE CONCENTRATING ON THINGS, SUCH AS READING THE NEWSPAPER OR WATCHING TELEVISION: 1
9. THOUGHTS THAT YOU WOULD BE BETTER OFF DEAD, OR OF HURTING YOURSELF: 3
2. FEELING DOWN, DEPRESSED OR HOPELESS: 3
8. MOVING OR SPEAKING SO SLOWLY THAT OTHER PEOPLE COULD HAVE NOTICED. OR THE OPPOSITE, BEING SO FIGETY OR RESTLESS THAT YOU HAVE BEEN MOVING AROUND A LOT MORE THAN USUAL: 3
3. TROUBLE FALLING OR STAYING ASLEEP: 3
10. IF YOU CHECKED OFF ANY PROBLEMS, HOW DIFFICULT HAVE THESE PROBLEMS MADE IT FOR YOU TO DO YOUR WORK, TAKE CARE OF THINGS AT HOME, OR GET ALONG WITH OTHER PEOPLE: 0
4. FEELING TIRED OR HAVING LITTLE ENERGY: 3
1. LITTLE INTEREST OR PLEASURE IN DOING THINGS: 3
SUM OF ALL RESPONSES TO PHQ QUESTIONS 1-9: 22
SUM OF ALL RESPONSES TO PHQ9 QUESTIONS 1 & 2: 6

## 2017-12-18 NOTE — PATIENT INSTRUCTIONS
1. A to-do list is critical. Today will be desk, make your sausage dinner, grocery list ; tomorrow will be emails, grocery store. 2. Get out of bed  3. Remember the three step process- Review the list of unhelpful thinking patterns and pick out any \"go tos\",  Pay attention to your body's red flags that you might be engaged in this type of thinking, Jump in and apply rational counter statements to increase the balance in your thinking  4. Return in 3-4 weeks      Thinking Errors  provided by [employer] © 2012    Thinking errors (or cognitive distortions) are irrational thoughts that can influence your emotions. Everyone experiences cognitive distortions to some degree, but in their more extreme forms they can be harmful. Magnification and Minimization: Exaggerating or minimizing the importance of  events. One might believe their own achievements are unimportant, or that their  mistakes are excessively important. Catastrophizing: Seeing only the worst possible outcomes of a situation. Overgeneralization: Making broad interpretations from a single or few events. I felt  awkward during my job interview. I am always so awkward.     Magical Thinking: The belief that acts will influence unrelated situations. I am a good  personbad things shouldnt happen to me.     Personalization: The belief that one is responsible for events outside of their own  control. My mom is always upset. She would be fine if I did more to help her.     Jumping to Conclusions: Interpreting the meaning of a situation with little or no  Evidence. Mind Reading: Interpreting the thoughts and beliefs of others without adequate  evidence. She would not go on a date with me. She probably thinks Im ugly.     Fortune Telling: The expectation that a situation will turn out badly without adequate evidence. Emotional Reasoning: The assumption that emotions reflect the way things really are.   I feel like a bad friend, therefor I must be a bad friend.     Disqualifying the Positive: Recognizing only the negative aspects of a situation while  ignoring the positive. One might receive many compliments on an evaluation, but focus  on the single piece of negative feedback. Should Statements: The belief that things should be a certain way. I should always  be friendly.     All-or-Nothing Thinking: Thinking in absolutes such as [place], or every. I  never do a good enough job on anything.                       Automatic Thoughts (Provided by [employer] © 2013)    Our thoughts control how we feel about ourselves and the world around us. Positive thoughts lead to us feeling good and negative thoughts can put us down. Sometimes our thoughts happen so quickly that we fail to notice them, but they can still affect our mood. These are called automatic thoughts. Oftentimes, our automatic thoughts are negative and irrational. Identifying these negative  automatic thoughts and replacing them with new rational thoughts (or counterstatement) can improve our mood. Trigger   Automatic Thought     New Thought                                Example:I made a mistake at work. Im probably going to be fired. I always mess up. This is it. Im no good at this job. \"     I messed up, but mistakes happen. Im going to work through this, like I always do.                                                                                  Thought Log Provided by [employer] © 2012        Event     AutomaticThought     Behavior /  Consequence   Rational   Counterstatement       Example: Supervisor at work is angry. I must have made a mistakenow Jori done it. Theyll fire for me sure.      Feeling of sadness and anxiety/Spend time obsessing over mistakes   [facility] supervisor couldve been angry about anything.  They are usually happy  with my work, so even if Jori made a mistake, it isn't a big deal.\"

## 2017-12-18 NOTE — PROGRESS NOTES
15-19 = Moderately severe depression, 20-27 = Severe depression      Diagnosis:  Major depressive disorder; recurrent, moderate with some anxious distress, potential seasonal pattern      Diagnosis Date    Colitis     Hypertension     Type II or unspecified type diabetes mellitus without mention of complication, not stated as uncontrolled          Plan:  Pt interventions:  Conducted functional assessment, [place]-setting to identify pt's primary goals for [facility] visit / overall health, Supportive techniques, Provided Psychoeducation re: behavioral activation, CBT to target cognitive distortions that exacerbate symptom distress, Identified maladaptive thoughts, Identified relevant behavioral strategies for targeting depressive symptoms including to do list, not laying in bed after waking up, etc. and Emphasized importance of regular practice of relaxation strategies to target / promote symptom relief. Pt Behavioral Change Plan:  1. A to-do list is critical. Today will be desk, make your sausage dinner, grocery list ; tomorrow will be emails, grocery store. 2. Get out of bed and get moving  3. Remember the three step process- Review the list of unhelpful thinking patterns and pick out any \"go tos\",  Pay attention to your body's red flags that you might be engaged in this type of thinking, Jump in and apply rational counter statements to increase the balance in your thinking  4.  Return in 3-4 weeks

## 2018-01-03 RX ORDER — FOLIC ACID 1 MG/1
TABLET ORAL
Qty: 30 TABLET | Refills: 5 | Status: SHIPPED | OUTPATIENT
Start: 2018-01-03 | End: 2018-01-17 | Stop reason: SDUPTHER

## 2018-01-03 RX ORDER — TRAZODONE HYDROCHLORIDE 100 MG/1
TABLET ORAL
Qty: 30 TABLET | Refills: 5 | Status: SHIPPED | OUTPATIENT
Start: 2018-01-03 | End: 2018-01-17 | Stop reason: SDUPTHER

## 2018-01-12 RX ORDER — DESVENLAFAXINE 50 MG/1
50 TABLET, EXTENDED RELEASE ORAL DAILY
Qty: 30 TABLET | Refills: 5 | Status: CANCELLED | OUTPATIENT
Start: 2018-01-12

## 2018-01-16 ENCOUNTER — OFFICE VISIT (OUTPATIENT)
Dept: FAMILY MEDICINE CLINIC | Age: 68
End: 2018-01-16

## 2018-01-16 ENCOUNTER — OFFICE VISIT (OUTPATIENT)
Dept: BEHAVIORAL/MENTAL HEALTH | Age: 68
End: 2018-01-16

## 2018-01-16 VITALS
SYSTOLIC BLOOD PRESSURE: 144 MMHG | HEIGHT: 72 IN | HEART RATE: 78 BPM | BODY MASS INDEX: 35.89 KG/M2 | WEIGHT: 265 LBS | OXYGEN SATURATION: 98 % | DIASTOLIC BLOOD PRESSURE: 76 MMHG | TEMPERATURE: 98.3 F

## 2018-01-16 DIAGNOSIS — K51.911: ICD-10-CM

## 2018-01-16 DIAGNOSIS — F33.42 RECURRENT MAJOR DEPRESSIVE EPISODES, IN FULL REMISSION (HCC): ICD-10-CM

## 2018-01-16 DIAGNOSIS — I10 ESSENTIAL HYPERTENSION: ICD-10-CM

## 2018-01-16 DIAGNOSIS — E11.9 TYPE 2 DIABETES MELLITUS WITHOUT COMPLICATION, WITHOUT LONG-TERM CURRENT USE OF INSULIN (HCC): ICD-10-CM

## 2018-01-16 DIAGNOSIS — F33.1 MAJOR DEPRESSIVE DISORDER, RECURRENT EPISODE, MODERATE (HCC): ICD-10-CM

## 2018-01-16 DIAGNOSIS — F33.1 MAJOR DEPRESSIVE DISORDER, RECURRENT EPISODE, MODERATE (HCC): Primary | ICD-10-CM

## 2018-01-16 DIAGNOSIS — E11.69 DYSLIPIDEMIA ASSOCIATED WITH TYPE 2 DIABETES MELLITUS (HCC): ICD-10-CM

## 2018-01-16 DIAGNOSIS — E78.5 DYSLIPIDEMIA ASSOCIATED WITH TYPE 2 DIABETES MELLITUS (HCC): ICD-10-CM

## 2018-01-16 PROCEDURE — 4040F PNEUMOC VAC/ADMIN/RCVD: CPT | Performed by: FAMILY MEDICINE

## 2018-01-16 PROCEDURE — 3017F COLORECTAL CA SCREEN DOC REV: CPT | Performed by: FAMILY MEDICINE

## 2018-01-16 PROCEDURE — G8417 CALC BMI ABV UP PARAM F/U: HCPCS | Performed by: FAMILY MEDICINE

## 2018-01-16 PROCEDURE — 3046F HEMOGLOBIN A1C LEVEL >9.0%: CPT | Performed by: FAMILY MEDICINE

## 2018-01-16 PROCEDURE — G8484 FLU IMMUNIZE NO ADMIN: HCPCS | Performed by: FAMILY MEDICINE

## 2018-01-16 PROCEDURE — 90832 PSYTX W PT 30 MINUTES: CPT | Performed by: PSYCHOLOGIST

## 2018-01-16 PROCEDURE — G8427 DOCREV CUR MEDS BY ELIG CLIN: HCPCS | Performed by: FAMILY MEDICINE

## 2018-01-16 PROCEDURE — 4004F PT TOBACCO SCREEN RCVD TLK: CPT | Performed by: FAMILY MEDICINE

## 2018-01-16 PROCEDURE — 99213 OFFICE O/P EST LOW 20 MIN: CPT | Performed by: FAMILY MEDICINE

## 2018-01-16 PROCEDURE — 1123F ACP DISCUSS/DSCN MKR DOCD: CPT | Performed by: FAMILY MEDICINE

## 2018-01-16 ASSESSMENT — PATIENT HEALTH QUESTIONNAIRE - PHQ9
SUM OF ALL RESPONSES TO PHQ9 QUESTIONS 1 & 2: 0
SUM OF ALL RESPONSES TO PHQ QUESTIONS 1-9: 0
3. TROUBLE FALLING OR STAYING ASLEEP: 0
9. THOUGHTS THAT YOU WOULD BE BETTER OFF DEAD, OR OF HURTING YOURSELF: 0
7. TROUBLE CONCENTRATING ON THINGS, SUCH AS READING THE NEWSPAPER OR WATCHING TELEVISION: 0
SUM OF ALL RESPONSES TO PHQ9 QUESTIONS 1 & 2: 0
9. THOUGHTS THAT YOU WOULD BE BETTER OFF DEAD, OR OF HURTING YOURSELF: 0
5. POOR APPETITE OR OVEREATING: 0
1. LITTLE INTEREST OR PLEASURE IN DOING THINGS: 0
8. MOVING OR SPEAKING SO SLOWLY THAT OTHER PEOPLE COULD HAVE NOTICED. OR THE OPPOSITE, BEING SO FIGETY OR RESTLESS THAT YOU HAVE BEEN MOVING AROUND A LOT MORE THAN USUAL: 0
2. FEELING DOWN, DEPRESSED OR HOPELESS: 0
4. FEELING TIRED OR HAVING LITTLE ENERGY: 0
3. TROUBLE FALLING OR STAYING ASLEEP: 0
2. FEELING DOWN, DEPRESSED OR HOPELESS: 0
SUM OF ALL RESPONSES TO PHQ QUESTIONS 1-9: 0
8. MOVING OR SPEAKING SO SLOWLY THAT OTHER PEOPLE COULD HAVE NOTICED. OR THE OPPOSITE, BEING SO FIGETY OR RESTLESS THAT YOU HAVE BEEN MOVING AROUND A LOT MORE THAN USUAL: 0
4. FEELING TIRED OR HAVING LITTLE ENERGY: 0
6. FEELING BAD ABOUT YOURSELF - OR THAT YOU ARE A FAILURE OR HAVE LET YOURSELF OR YOUR FAMILY DOWN: 0
1. LITTLE INTEREST OR PLEASURE IN DOING THINGS: 0
7. TROUBLE CONCENTRATING ON THINGS, SUCH AS READING THE NEWSPAPER OR WATCHING TELEVISION: 0
10. IF YOU CHECKED OFF ANY PROBLEMS, HOW DIFFICULT HAVE THESE PROBLEMS MADE IT FOR YOU TO DO YOUR WORK, TAKE CARE OF THINGS AT HOME, OR GET ALONG WITH OTHER PEOPLE: 0
6. FEELING BAD ABOUT YOURSELF - OR THAT YOU ARE A FAILURE OR HAVE LET YOURSELF OR YOUR FAMILY DOWN: 0
10. IF YOU CHECKED OFF ANY PROBLEMS, HOW DIFFICULT HAVE THESE PROBLEMS MADE IT FOR YOU TO DO YOUR WORK, TAKE CARE OF THINGS AT HOME, OR GET ALONG WITH OTHER PEOPLE: 0
5. POOR APPETITE OR OVEREATING: 0

## 2018-01-16 NOTE — PROGRESS NOTES
chest pain or dyspnea on exertion  Gastrointestinal ROS: no abdominal pain, change in bowel habits, or black or bloody stools  Genito-Urinary ROS: no dysuria, trouble voiding, or hematuria  Musculoskeletal ROS: negative  Neurological ROS: no TIA or stroke symptoms  Dermatological ROS: negative    Vitals:    01/16/18 1142 01/16/18 1149 01/16/18 1219   BP: (!) 142/90 (!) 140/90 (!) 144/76   Site: Right Arm Right Arm    Position: Sitting Sitting    Cuff Size: Large Adult Large Adult    Pulse: 78     Temp: 98.3 °F (36.8 °C)     TempSrc: Temporal     SpO2: 98%     Weight: 265 lb (120.2 kg)     Height: 6' (1.829 m)       Physical Examination: General appearance - alert, well appearing, and in no distress. Patient is obese. Affect is sad and mood is depressed Although he does seem better than his last visit. Skin - normal coloration and turgor, no rashes, no suspicious skin lesions noted  Normal  Ears - bilateral TM's and external ear canals normal  Neck - supple, no significant adenopathy, bilateral symmetric anterior adenopathy, carotids upstroke normal bilaterally, no bruits, thyroid exam: thyroid is normal in size without nodules or tenderness  Chest - clear to auscultation, no wheezes, rales or rhonchi, symmetric air entry. Heart - normal rate, regular rhythm, normal S1, S2, no murmurs, rubs, clicks or gallops. Extremities - peripheral pulses normal, no pedal edema, no clubbing or cyanosis. I have reviewed the following diagnostic data: NA.  Please see report for additional information. ASSESSMENT:  1. Essential hypertension     2. Major depressive disorder, recurrent episode, moderate (HCC)     3. Type 2 diabetes mellitus without complication, without long-term current use of insulin ([address]     4. Dyslipidemia associated with type 2 diabetes mellitus ([address]     5. Recurrent major depressive episodes, in full remission ([address]     6.  Mild chronic ulcerative colitis with rectal bleeding ([address]

## 2018-01-17 RX ORDER — CANDESARTAN 16 MG/1
TABLET ORAL
Qty: 30 TABLET | Refills: 5 | Status: SHIPPED | OUTPATIENT
Start: 2018-01-17 | End: 2018-06-16 | Stop reason: SDUPTHER

## 2018-01-17 RX ORDER — FOLIC ACID 1 MG/1
TABLET ORAL
Qty: 30 TABLET | Refills: 5 | Status: SHIPPED | OUTPATIENT
Start: 2018-01-17 | End: 2018-06-18 | Stop reason: SDUPTHER

## 2018-01-17 RX ORDER — TRAZODONE HYDROCHLORIDE 100 MG/1
TABLET ORAL
Qty: 30 TABLET | Refills: 5 | Status: SHIPPED | OUTPATIENT
Start: 2018-01-17 | End: 2018-06-18 | Stop reason: SDUPTHER

## 2018-01-17 NOTE — TELEPHONE ENCOUNTER
From: Oneyda Murray  Sent: 1/17/2018 11:33 AM EST  Subject: Medication Renewal Request    Mir Greer would like a refill of the following medications:  candesartan (ATACAND) 16 MG tablet Ladonna Benavidez MD]  folic acid (FOLVITE) 1 MG tablet Ladonna Benavidez MD]  traZODone (DESYREL) 100 MG tablet Ladonna Benavidez MD]    Preferred pharmacy: [id number] - [place] - [address]    Comment:

## 2018-04-16 ENCOUNTER — OFFICE VISIT (OUTPATIENT)
Dept: FAMILY MEDICINE CLINIC | Age: 68
End: 2018-04-16
Payer: MEDICARE

## 2018-04-16 VITALS
RESPIRATION RATE: 16 BRPM | HEIGHT: 72 IN | DIASTOLIC BLOOD PRESSURE: 84 MMHG | TEMPERATURE: 96.7 F | WEIGHT: 267 LBS | OXYGEN SATURATION: 97 % | SYSTOLIC BLOOD PRESSURE: 140 MMHG | HEART RATE: 66 BPM | BODY MASS INDEX: 36.16 KG/M2

## 2018-04-16 DIAGNOSIS — E78.5 DYSLIPIDEMIA ASSOCIATED WITH TYPE 2 DIABETES MELLITUS (HCC): ICD-10-CM

## 2018-04-16 DIAGNOSIS — E11.9 TYPE 2 DIABETES MELLITUS WITHOUT COMPLICATION, WITHOUT LONG-TERM CURRENT USE OF INSULIN (HCC): ICD-10-CM

## 2018-04-16 DIAGNOSIS — F33.1 MAJOR DEPRESSIVE DISORDER, RECURRENT EPISODE, MODERATE (HCC): ICD-10-CM

## 2018-04-16 DIAGNOSIS — E11.69 DYSLIPIDEMIA ASSOCIATED WITH TYPE 2 DIABETES MELLITUS (HCC): ICD-10-CM

## 2018-04-16 DIAGNOSIS — I10 ESSENTIAL HYPERTENSION: Primary | ICD-10-CM

## 2018-04-16 DIAGNOSIS — Z23 NEED FOR PNEUMOCOCCAL VACCINATION: ICD-10-CM

## 2018-04-16 PROCEDURE — 3046F HEMOGLOBIN A1C LEVEL >9.0%: CPT | Performed by: FAMILY MEDICINE

## 2018-04-16 PROCEDURE — G8417 CALC BMI ABV UP PARAM F/U: HCPCS | Performed by: FAMILY MEDICINE

## 2018-04-16 PROCEDURE — 4004F PT TOBACCO SCREEN RCVD TLK: CPT | Performed by: FAMILY MEDICINE

## 2018-04-16 PROCEDURE — 1123F ACP DISCUSS/DSCN MKR DOCD: CPT | Performed by: FAMILY MEDICINE

## 2018-04-16 PROCEDURE — 99214 OFFICE O/P EST MOD 30 MIN: CPT | Performed by: FAMILY MEDICINE

## 2018-04-16 PROCEDURE — 2022F DILAT RTA XM EVC RTNOPTHY: CPT | Performed by: FAMILY MEDICINE

## 2018-04-16 PROCEDURE — G0009 ADMIN PNEUMOCOCCAL VACCINE: HCPCS | Performed by: FAMILY MEDICINE

## 2018-04-16 PROCEDURE — 90732 PPSV23 VACC 2 YRS+ SUBQ/IM: CPT | Performed by: FAMILY MEDICINE

## 2018-04-16 PROCEDURE — 3017F COLORECTAL CA SCREEN DOC REV: CPT | Performed by: FAMILY MEDICINE

## 2018-04-16 PROCEDURE — 4040F PNEUMOC VAC/ADMIN/RCVD: CPT | Performed by: FAMILY MEDICINE

## 2018-04-16 PROCEDURE — G8427 DOCREV CUR MEDS BY ELIG CLIN: HCPCS | Performed by: FAMILY MEDICINE

## 2018-04-16 RX ORDER — MAGNESIUM OXIDE 400 MG/1
400 TABLET ORAL DAILY
COMMUNITY
End: 2018-07-18 | Stop reason: ALTCHOICE

## 2018-04-16 RX ORDER — DICYCLOMINE HCL 20 MG
TABLET ORAL
Refills: 2 | COMMUNITY
Start: 2018-03-22 | End: 2018-08-20 | Stop reason: ALTCHOICE

## 2018-04-16 RX ORDER — PREDNISONE 1 MG/1
TABLET ORAL
Refills: 1 | COMMUNITY
Start: 2018-04-04 | End: 2018-05-25 | Stop reason: ALTCHOICE

## 2018-04-16 RX ORDER — CLINDAMYCIN HYDROCHLORIDE 150 MG/1
150 CAPSULE ORAL 3 TIMES DAILY
COMMUNITY
End: 2018-05-25 | Stop reason: ALTCHOICE

## 2018-04-22 RX ORDER — FENOFIBRATE 145 MG/1
TABLET, COATED ORAL
Qty: 30 TABLET | Refills: 5 | Status: SHIPPED | OUTPATIENT
Start: 2018-04-22 | End: 2018-11-18 | Stop reason: SDUPTHER

## 2018-05-22 ENCOUNTER — OFFICE VISIT (OUTPATIENT)
Dept: BEHAVIORAL/MENTAL HEALTH CLINIC | Age: 68
End: 2018-05-22
Payer: MEDICARE

## 2018-05-22 DIAGNOSIS — F33.1 MAJOR DEPRESSIVE DISORDER, RECURRENT EPISODE, MODERATE (HCC): Primary | ICD-10-CM

## 2018-05-22 PROCEDURE — 90832 PSYTX W PT 30 MINUTES: CPT | Performed by: PSYCHOLOGIST

## 2018-05-22 ASSESSMENT — PATIENT HEALTH QUESTIONNAIRE - PHQ9
8. MOVING OR SPEAKING SO SLOWLY THAT OTHER PEOPLE COULD HAVE NOTICED. OR THE OPPOSITE, BEING SO FIGETY OR RESTLESS THAT YOU HAVE BEEN MOVING AROUND A LOT MORE THAN USUAL: 0
3. TROUBLE FALLING OR STAYING ASLEEP: 1
7. TROUBLE CONCENTRATING ON THINGS, SUCH AS READING THE NEWSPAPER OR WATCHING TELEVISION: 3
4. FEELING TIRED OR HAVING LITTLE ENERGY: 3
10. IF YOU CHECKED OFF ANY PROBLEMS, HOW DIFFICULT HAVE THESE PROBLEMS MADE IT FOR YOU TO DO YOUR WORK, TAKE CARE OF THINGS AT HOME, OR GET ALONG WITH OTHER PEOPLE: 2
9. THOUGHTS THAT YOU WOULD BE BETTER OFF DEAD, OR OF HURTING YOURSELF: 0
2. FEELING DOWN, DEPRESSED OR HOPELESS: 2
SUM OF ALL RESPONSES TO PHQ9 QUESTIONS 1 & 2: 5
1. LITTLE INTEREST OR PLEASURE IN DOING THINGS: 3
6. FEELING BAD ABOUT YOURSELF - OR THAT YOU ARE A FAILURE OR HAVE LET YOURSELF OR YOUR FAMILY DOWN: 0
5. POOR APPETITE OR OVEREATING: 3
SUM OF ALL RESPONSES TO PHQ QUESTIONS 1-9: 15

## 2018-05-25 ENCOUNTER — OFFICE VISIT (OUTPATIENT)
Dept: FAMILY MEDICINE CLINIC | Age: 68
End: 2018-05-25
Payer: MEDICARE

## 2018-05-25 VITALS
RESPIRATION RATE: 16 BRPM | WEIGHT: 261 LBS | SYSTOLIC BLOOD PRESSURE: 118 MMHG | DIASTOLIC BLOOD PRESSURE: 74 MMHG | HEIGHT: 72 IN | OXYGEN SATURATION: 95 % | HEART RATE: 68 BPM | BODY MASS INDEX: 35.35 KG/M2 | TEMPERATURE: 98 F

## 2018-05-25 DIAGNOSIS — Z13.31 POSITIVE DEPRESSION SCREENING: ICD-10-CM

## 2018-05-25 DIAGNOSIS — F33.1 MAJOR DEPRESSIVE DISORDER, RECURRENT EPISODE, MODERATE (HCC): Primary | ICD-10-CM

## 2018-05-25 PROCEDURE — 3017F COLORECTAL CA SCREEN DOC REV: CPT | Performed by: FAMILY MEDICINE

## 2018-05-25 PROCEDURE — G8427 DOCREV CUR MEDS BY ELIG CLIN: HCPCS | Performed by: FAMILY MEDICINE

## 2018-05-25 PROCEDURE — G8417 CALC BMI ABV UP PARAM F/U: HCPCS | Performed by: FAMILY MEDICINE

## 2018-05-25 PROCEDURE — 4004F PT TOBACCO SCREEN RCVD TLK: CPT | Performed by: FAMILY MEDICINE

## 2018-05-25 PROCEDURE — 99213 OFFICE O/P EST LOW 20 MIN: CPT | Performed by: FAMILY MEDICINE

## 2018-05-25 PROCEDURE — 4040F PNEUMOC VAC/ADMIN/RCVD: CPT | Performed by: FAMILY MEDICINE

## 2018-05-25 PROCEDURE — 1123F ACP DISCUSS/DSCN MKR DOCD: CPT | Performed by: FAMILY MEDICINE

## 2018-05-25 PROCEDURE — G8431 POS CLIN DEPRES SCRN F/U DOC: HCPCS | Performed by: FAMILY MEDICINE

## 2018-05-25 RX ORDER — OXCARBAZEPINE 150 MG/1
150 TABLET, FILM COATED ORAL 2 TIMES DAILY
Qty: 60 TABLET | Refills: 1 | Status: SHIPPED | OUTPATIENT
Start: 2018-05-25 | End: 2018-06-16 | Stop reason: SDUPTHER

## 2018-06-01 ENCOUNTER — TELEPHONE (OUTPATIENT)
Dept: FAMILY MEDICINE CLINIC | Age: 68
End: 2018-06-01

## 2018-06-01 ENCOUNTER — OFFICE VISIT (OUTPATIENT)
Dept: BEHAVIORAL/MENTAL HEALTH CLINIC | Age: 68
End: 2018-06-01
Payer: MEDICARE

## 2018-06-01 DIAGNOSIS — F33.1 MAJOR DEPRESSIVE DISORDER, RECURRENT EPISODE, MODERATE (HCC): Primary | ICD-10-CM

## 2018-06-01 DIAGNOSIS — F41.1 GENERALIZED ANXIETY DISORDER: ICD-10-CM

## 2018-06-01 PROCEDURE — 90832 PSYTX W PT 30 MINUTES: CPT | Performed by: PSYCHOLOGIST

## 2018-06-01 RX ORDER — LORAZEPAM 0.5 MG/1
0.5 TABLET ORAL EVERY 8 HOURS PRN
Qty: 15 TABLET | Refills: 1 | Status: SHIPPED | OUTPATIENT
Start: 2018-06-01 | End: 2018-07-01

## 2018-06-01 RX ORDER — LORAZEPAM 0.5 MG/1
0.5 TABLET ORAL EVERY 8 HOURS PRN
Qty: 15 TABLET | Refills: 0 | Status: CANCELLED | OUTPATIENT
Start: 2018-06-01 | End: 2018-07-01

## 2018-06-01 ASSESSMENT — PATIENT HEALTH QUESTIONNAIRE - PHQ9
2. FEELING DOWN, DEPRESSED OR HOPELESS: 3
3. TROUBLE FALLING OR STAYING ASLEEP: 3
1. LITTLE INTEREST OR PLEASURE IN DOING THINGS: 3
5. POOR APPETITE OR OVEREATING: 3
10. IF YOU CHECKED OFF ANY PROBLEMS, HOW DIFFICULT HAVE THESE PROBLEMS MADE IT FOR YOU TO DO YOUR WORK, TAKE CARE OF THINGS AT HOME, OR GET ALONG WITH OTHER PEOPLE: 3
7. TROUBLE CONCENTRATING ON THINGS, SUCH AS READING THE NEWSPAPER OR WATCHING TELEVISION: 3
SUM OF ALL RESPONSES TO PHQ9 QUESTIONS 1 & 2: 6
6. FEELING BAD ABOUT YOURSELF - OR THAT YOU ARE A FAILURE OR HAVE LET YOURSELF OR YOUR FAMILY DOWN: 0
9. THOUGHTS THAT YOU WOULD BE BETTER OFF DEAD, OR OF HURTING YOURSELF: 0
SUM OF ALL RESPONSES TO PHQ QUESTIONS 1-9: 21
8. MOVING OR SPEAKING SO SLOWLY THAT OTHER PEOPLE COULD HAVE NOTICED. OR THE OPPOSITE, BEING SO FIGETY OR RESTLESS THAT YOU HAVE BEEN MOVING AROUND A LOT MORE THAN USUAL: 3
4. FEELING TIRED OR HAVING LITTLE ENERGY: 3

## 2018-06-18 RX ORDER — CANDESARTAN 16 MG/1
TABLET ORAL
Qty: 30 TABLET | Refills: 5 | Status: SHIPPED | OUTPATIENT
Start: 2018-06-18 | End: 2019-01-08 | Stop reason: SDUPTHER

## 2018-06-18 RX ORDER — FOLIC ACID 1 MG/1
TABLET ORAL
Qty: 30 TABLET | Refills: 5 | Status: SHIPPED | OUTPATIENT
Start: 2018-06-18 | End: 2019-01-11 | Stop reason: ALTCHOICE

## 2018-06-18 RX ORDER — OXCARBAZEPINE 150 MG/1
TABLET, FILM COATED ORAL
Qty: 60 TABLET | Refills: 5 | Status: SHIPPED | OUTPATIENT
Start: 2018-06-18 | End: 2018-07-18 | Stop reason: ALTCHOICE

## 2018-06-18 RX ORDER — TRAZODONE HYDROCHLORIDE 100 MG/1
TABLET ORAL
Qty: 30 TABLET | Refills: 5 | Status: SHIPPED | OUTPATIENT
Start: 2018-06-18 | End: 2018-07-18 | Stop reason: ALTCHOICE

## 2018-06-21 ENCOUNTER — OFFICE VISIT (OUTPATIENT)
Dept: FAMILY MEDICINE CLINIC | Age: 68
End: 2018-06-21
Payer: MEDICARE

## 2018-06-21 VITALS
RESPIRATION RATE: 16 BRPM | HEIGHT: 72 IN | OXYGEN SATURATION: 95 % | BODY MASS INDEX: 34.13 KG/M2 | TEMPERATURE: 98.9 F | HEART RATE: 68 BPM | SYSTOLIC BLOOD PRESSURE: 106 MMHG | DIASTOLIC BLOOD PRESSURE: 72 MMHG | WEIGHT: 252 LBS

## 2018-06-21 DIAGNOSIS — Z91.89 AT RISK FOR INFECTIOUS DISEASE DUE TO RECENT FOREIGN TRAVEL: ICD-10-CM

## 2018-06-21 DIAGNOSIS — R11.0 NAUSEA: ICD-10-CM

## 2018-06-21 DIAGNOSIS — K51.911 ULCERATIVE COLITIS WITH RECTAL BLEEDING, UNSPECIFIED LOCATION (HCC): ICD-10-CM

## 2018-06-21 DIAGNOSIS — K51.911 ULCERATIVE COLITIS WITH RECTAL BLEEDING, UNSPECIFIED LOCATION (HCC): Primary | ICD-10-CM

## 2018-06-21 LAB
ALBUMIN SERPL-MCNC: 3.7 G/DL (ref 3.9–4.9)
ALP BLD-CCNC: 62 U/L (ref 35–104)
ALT SERPL-CCNC: 13 U/L (ref 0–41)
ANION GAP SERPL CALCULATED.3IONS-SCNC: 22 MEQ/L (ref 7–13)
AST SERPL-CCNC: 13 U/L (ref 0–40)
BASOPHILS ABSOLUTE: 0 K/UL (ref 0–0.2)
BASOPHILS RELATIVE PERCENT: 0.2 %
BILIRUB SERPL-MCNC: 0.5 MG/DL (ref 0–1.2)
BUN BLDV-MCNC: 12 MG/DL (ref 8–23)
CALCIUM SERPL-MCNC: 9.9 MG/DL (ref 8.6–10.2)
CHLORIDE BLD-SCNC: 93 MEQ/L (ref 98–107)
CO2: 22 MEQ/L (ref 22–29)
CREAT SERPL-MCNC: 0.99 MG/DL (ref 0.7–1.2)
EOSINOPHILS ABSOLUTE: 0.3 K/UL (ref 0–0.7)
EOSINOPHILS RELATIVE PERCENT: 1.9 %
GFR AFRICAN AMERICAN: >60
GFR NON-AFRICAN AMERICAN: >60
GLOBULIN: 3.4 G/DL (ref 2.3–3.5)
GLUCOSE BLD-MCNC: 47 MG/DL (ref 74–109)
HCT VFR BLD CALC: 41 % (ref 42–52)
HEMOGLOBIN: 13.5 G/DL (ref 14–18)
LYMPHOCYTES ABSOLUTE: 1.2 K/UL (ref 1–4.8)
LYMPHOCYTES RELATIVE PERCENT: 8.6 %
MCH RBC QN AUTO: 28.9 PG (ref 27–31.3)
MCHC RBC AUTO-ENTMCNC: 33 % (ref 33–37)
MCV RBC AUTO: 87.7 FL (ref 80–100)
MONOCYTES ABSOLUTE: 1.1 K/UL (ref 0.2–0.8)
MONOCYTES RELATIVE PERCENT: 7.7 %
NEUTROPHILS ABSOLUTE: 11.6 K/UL (ref 1.4–6.5)
NEUTROPHILS RELATIVE PERCENT: 81.6 %
PDW BLD-RTO: 16.1 % (ref 11.5–14.5)
PLATELET # BLD: 388 K/UL (ref 130–400)
POTASSIUM SERPL-SCNC: 4.3 MEQ/L (ref 3.5–5.1)
RBC # BLD: 4.67 M/UL (ref 4.7–6.1)
SODIUM BLD-SCNC: 137 MEQ/L (ref 132–144)
TOTAL PROTEIN: 7.1 G/DL (ref 6.4–8.1)
WBC # BLD: 14.2 K/UL (ref 4.8–10.8)

## 2018-06-21 PROCEDURE — 1123F ACP DISCUSS/DSCN MKR DOCD: CPT | Performed by: NURSE PRACTITIONER

## 2018-06-21 PROCEDURE — G8417 CALC BMI ABV UP PARAM F/U: HCPCS | Performed by: NURSE PRACTITIONER

## 2018-06-21 PROCEDURE — 3017F COLORECTAL CA SCREEN DOC REV: CPT | Performed by: NURSE PRACTITIONER

## 2018-06-21 PROCEDURE — 4040F PNEUMOC VAC/ADMIN/RCVD: CPT | Performed by: NURSE PRACTITIONER

## 2018-06-21 PROCEDURE — G8427 DOCREV CUR MEDS BY ELIG CLIN: HCPCS | Performed by: NURSE PRACTITIONER

## 2018-06-21 PROCEDURE — 99214 OFFICE O/P EST MOD 30 MIN: CPT | Performed by: NURSE PRACTITIONER

## 2018-06-21 PROCEDURE — 4004F PT TOBACCO SCREEN RCVD TLK: CPT | Performed by: NURSE PRACTITIONER

## 2018-06-21 RX ORDER — CIPROFLOXACIN 500 MG/1
500 TABLET, FILM COATED ORAL 2 TIMES DAILY
Qty: 14 TABLET | Refills: 0 | Status: SHIPPED | OUTPATIENT
Start: 2018-06-21 | End: 2018-06-28

## 2018-06-21 RX ORDER — ONDANSETRON 8 MG/1
8 TABLET, ORALLY DISINTEGRATING ORAL EVERY 8 HOURS PRN
Qty: 60 TABLET | Refills: 1 | Status: SHIPPED | OUTPATIENT
Start: 2018-06-21 | End: 2018-08-20 | Stop reason: ALTCHOICE

## 2018-06-21 RX ORDER — PREDNISONE 20 MG/1
20 TABLET ORAL 2 TIMES DAILY
Qty: 14 TABLET | Refills: 0 | Status: SHIPPED | OUTPATIENT
Start: 2018-06-21 | End: 2018-06-28

## 2018-06-21 RX ORDER — METRONIDAZOLE 500 MG/1
500 TABLET ORAL 3 TIMES DAILY
Qty: 7 TABLET | Refills: 0 | Status: SHIPPED | OUTPATIENT
Start: 2018-06-21 | End: 2018-07-01

## 2018-06-25 ENCOUNTER — TELEPHONE (OUTPATIENT)
Dept: FAMILY MEDICINE CLINIC | Age: 68
End: 2018-06-25

## 2018-06-29 ENCOUNTER — TELEPHONE (OUTPATIENT)
Dept: FAMILY MEDICINE CLINIC | Age: 68
End: 2018-06-29

## 2018-07-01 ASSESSMENT — ENCOUNTER SYMPTOMS
BELCHING: 0
WHEEZING: 0
SHORTNESS OF BREATH: 0
COUGH: 0
CONSTIPATION: 0
RECTAL PAIN: 0
DIARRHEA: 1
VOMITING: 0
HEMATOCHEZIA: 0
ABDOMINAL DISTENTION: 0
FLATUS: 0
NAUSEA: 1
BLOOD IN STOOL: 1
ANAL BLEEDING: 1
ABDOMINAL PAIN: 1

## 2018-07-01 NOTE — TELEPHONE ENCOUNTER
Please call the patient and see how he is doing today. I would like him to see Dr Anthony Russell. He needs to make sure he is using his relaxation and coping mechanisms. If he feels at any time that he is a danger to himself or others he needs to make sure that he goes to the ER.

## 2018-07-02 NOTE — PROGRESS NOTES
BY MOUTH DAILY AT BEDTIME 30 tablet 5    candesartan (ATACAND) 16 MG tablet TAKE 1 TABLET BY MOUTH DAILY 30 tablet 5    OXcarbazepine (TRILEPTAL) 150 MG tablet TAKE 1 TABLET BY MOUTH TWICE DAILY 60 tablet 5    LORazepam (ATIVAN) 0.5 MG tablet Take 1 tablet by mouth every 8 hours as needed for Anxiety for up to 30 days. . 15 tablet 1    fenofibrate (TRICOR) 145 MG tablet TAKE 1 TABLET EVERY DAY 30 tablet 5    dicyclomine (BENTYL) 20 MG tablet TAKE 1 TABLET BY MOUTH THREE TIMES DAILY AS NEEDED  2    magnesium oxide (MAG-OX) 400 MG tablet Take 400 mg by mouth daily      desvenlafaxine succinate (PRISTIQ) 50 MG TB24 extended release tablet Take 1 tablet by mouth daily 30 tablet 5    methotrexate (RHEUMATREX) 2.5 MG chemo tablet Take 5 tablets by mouth one a week      loperamide (ANTI-DIARRHEAL) 2 MG CAPS Take 2 mg by mouth as needed 2 tablets in the morning and 2 tablets at night       No current facility-administered medications for this visit. Review of Systems   Constitutional: Positive for appetite change and fatigue. Negative for chills, diaphoresis, fever, unexpected weight change and weight loss. Respiratory: Negative for cough, shortness of breath and wheezing. Cardiovascular: Negative for chest pain, palpitations and leg swelling. Gastrointestinal: Positive for abdominal pain, anal bleeding, anorexia, blood in stool, diarrhea, melena and nausea. Negative for abdominal distention, constipation, flatus, hematochezia, rectal pain and vomiting. Genitourinary: Negative for dysuria, frequency and hematuria. Musculoskeletal: Negative for arthralgias and myalgias. Skin: Negative for rash and wound. Neurological: Negative for dizziness and headaches. PMH, Surgical Hx, Family Hx, and Social Hx reviewed and updated. Health Maintenance reviewed.     Objective    Vitals:    06/21/18 1203   BP: 106/72   Pulse: 68   Resp: 16   Temp: 98.9 °F (37.2 °C)   TempSrc: Temporal   SpO2: 95% coordination of care. I have reviewed the patient's medical history in detail and updated the computerized patient record.     Shy Carter, APRN - CNP

## 2018-07-03 NOTE — TELEPHONE ENCOUNTER
Called patient Hamilton Donnelly now for heat exhaustion. He states they have found an irregular heart beat. He will schedule appointments when he is released. He does see BB 7/13/18.

## 2018-07-05 ENCOUNTER — OFFICE VISIT (OUTPATIENT)
Dept: CARDIOLOGY CLINIC | Age: 68
End: 2018-07-05
Payer: MEDICARE

## 2018-07-05 VITALS
WEIGHT: 241 LBS | BODY MASS INDEX: 32.64 KG/M2 | SYSTOLIC BLOOD PRESSURE: 110 MMHG | HEART RATE: 96 BPM | DIASTOLIC BLOOD PRESSURE: 70 MMHG | OXYGEN SATURATION: 95 % | HEIGHT: 72 IN

## 2018-07-05 DIAGNOSIS — E11.69 DYSLIPIDEMIA ASSOCIATED WITH TYPE 2 DIABETES MELLITUS (HCC): ICD-10-CM

## 2018-07-05 DIAGNOSIS — I49.5 SICK SINUS SYNDROME (HCC): ICD-10-CM

## 2018-07-05 DIAGNOSIS — R94.31 ABNORMAL EKG: ICD-10-CM

## 2018-07-05 DIAGNOSIS — R07.1 CHEST PAIN ON BREATHING: ICD-10-CM

## 2018-07-05 DIAGNOSIS — G47.33 OSA ON CPAP: ICD-10-CM

## 2018-07-05 DIAGNOSIS — E78.5 DYSLIPIDEMIA ASSOCIATED WITH TYPE 2 DIABETES MELLITUS (HCC): ICD-10-CM

## 2018-07-05 DIAGNOSIS — Z99.89 OSA ON CPAP: ICD-10-CM

## 2018-07-05 DIAGNOSIS — I10 ESSENTIAL HYPERTENSION: Primary | ICD-10-CM

## 2018-07-05 PROCEDURE — 2022F DILAT RTA XM EVC RTNOPTHY: CPT | Performed by: INTERNAL MEDICINE

## 2018-07-05 PROCEDURE — 4004F PT TOBACCO SCREEN RCVD TLK: CPT | Performed by: INTERNAL MEDICINE

## 2018-07-05 PROCEDURE — 4040F PNEUMOC VAC/ADMIN/RCVD: CPT | Performed by: INTERNAL MEDICINE

## 2018-07-05 PROCEDURE — 93000 ELECTROCARDIOGRAM COMPLETE: CPT | Performed by: INTERNAL MEDICINE

## 2018-07-05 PROCEDURE — 3017F COLORECTAL CA SCREEN DOC REV: CPT | Performed by: INTERNAL MEDICINE

## 2018-07-05 PROCEDURE — G8427 DOCREV CUR MEDS BY ELIG CLIN: HCPCS | Performed by: INTERNAL MEDICINE

## 2018-07-05 PROCEDURE — 1123F ACP DISCUSS/DSCN MKR DOCD: CPT | Performed by: INTERNAL MEDICINE

## 2018-07-05 PROCEDURE — 99214 OFFICE O/P EST MOD 30 MIN: CPT | Performed by: INTERNAL MEDICINE

## 2018-07-05 PROCEDURE — 3046F HEMOGLOBIN A1C LEVEL >9.0%: CPT | Performed by: INTERNAL MEDICINE

## 2018-07-05 PROCEDURE — G8417 CALC BMI ABV UP PARAM F/U: HCPCS | Performed by: INTERNAL MEDICINE

## 2018-07-05 NOTE — PROGRESS NOTES
Chief Complaint   Patient presents with    Follow-Up from Hospital       Patient presents for initial medical evaluation. Patient is followed on a regular basis by Dr. Dg Meadows MD.   S/p hospitalization for weakness, fatigue and near syncope. 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 Echo with normal LVF. He continues to have symptoms today. Does not feel good, has dry heaves and cough. His stools are dark. He has a hx of UC. Pt denies chest pain, dyspnea, dyspnea on exertion, change in exercise capacity, fatigue,  nausea, vomiting, diarrhea, constipation, motor weakness, insomnia, weight loss, syncope,PND, orthopnea, or claudication. Had recent colitis flare up. Was placed on ABX in hospital. His WBC was 199 and noted to have ARF. No hx of MI, CHF or arrhythmia. No hx of stress test or LHC. Patient Active Problem List   Diagnosis    [facility]    HTN (hypertension)    Major depressive disorder, recurrent episode, moderate (HCC)    DM w/o complication type II ([facility]    Dyslipidemia associated with type 2 diabetes mellitus ([facility]    ETIENNE on CPAP    Generalized anxiety disorder       Past Surgical History:   Procedure Laterality Date    ANTERIOR CRUCIATE LIGAMENT REPAIR      GALLBLADDER SURGERY      SIGMOIDOSCOPY  11/03/2017    DR. BONILLA    TOE SURGERY      TONSILLECTOMY      WRIST SURGERY  2013       Social History     Social History    Marital status: Single     Spouse name: N/A    Number of children: N/A    Years of education: N/A     Social History Main Topics    Smoking status: Light Tobacco Smoker     Packs/day: 0.25     Types: Cigars     Start date: 6/21/2008    Smokeless tobacco: Never Used    Alcohol use 0.0 oz/week      Comment: occ    Drug use: No    Sexual activity: Not on file     Other Topics Concern    Not on file     Social History Narrative    No narrative on file       Family History   Problem 32.69 kg/m². Physical Examination:  General appearance - alert, well appearing, and in no distress  Mental status - alert, oriented to person, place, and time  Neck - Neck is supple, no JVD or carotid bruits. No thyromegaly or adenopathy. Chest - clear to auscultation, no wheezes, rales or rhonchi, symmetric air entry  Heart - normal rate, regular rhythm, normal S1, S2, no murmurs, rubs, clicks or gallops  Abdomen - soft, nontender, nondistended, no masses or organomegaly  Neurological - alert, oriented, normal speech, no focal findings or movement disorder noted  Extremities - peripheral pulses normal, no pedal edema, no clubbing or cyanosis  Skin - normal coloration and turgor, no rashes, no suspicious skin lesions noted        Orders Placed This Encounter   Procedures    NM MYOCARDIAL SPECT REST EXERCISE OR RX       ASSESSMENT:     Diagnosis Orders   1. Essential hypertension     2. TEIENNE on CPAP     3. Dyslipidemia associated with type 2 diabetes mellitus ([facility]     4. Sick sinus syndrome ([facility]  NM MYOCARDIAL SPECT REST EXERCISE OR RX         PLAN:     Patient will need to continue to follow up with you for their general medical care     As always, aggressive risk factor modification is strongly recommended. We should adhere to the JNC VIII guidelines for HTN management and the NCEP ATP III guidelines for LDL-C management. Cardiac diet is always recommended with low fat, cholesterol, calories and sodium. Continue medications at current doses. Obtain a nuclear myocardial perfusion stress test to r/o cardiac ischemia. Given AVB and risk factors. Will need PPM when infection/colitis clears up. Patient was advised and encouraged to check blood pressure at home or at a pharmacy, maintain a logbook, and also call us back if blood pressure are above the target ranges or if it is low. Patient clearly understands and agrees to the instructions.      We will need to continue to monitor muscle and liver enzymes, BUN, CR, and electrolytes. Details of medical condition explained and patient was warned about adverse consequences of uncontrolled medical conditions and possible side effects of prescribed medications.

## 2018-07-07 ENCOUNTER — CARE COORDINATION (OUTPATIENT)
Dept: CASE MANAGEMENT | Age: 68
End: 2018-07-07

## 2018-07-07 NOTE — CARE COORDINATION
[facility] Transitions Initial Follow Up Call    Call within 2 business days of discharge: No    Patient: Karan Scanlon Patient : 1950   MRN: <[id number]>  Reason for Admission: No discharge information exists for this patient. Discharge Date:   RARS: No Data Recorded     Spoke with: Attempted to contact patient for transition call. Unable to reach, left voicemail with contact information for patient to call back.       Facility: [facility] Trans[facility] 24 Hour Call    Care Transitions Interventions         Follow Up  Future Appointments  Date Time Provider Иван Schreiber   2018 8:45 AM Kalyan Lora MD MEDICAL [facility]   2018 11:00 AM [facility] [address] [facility]   2018 1:15 PM Rebecca Loja MD [facility] Maricel Coulter RN

## 2018-07-18 ENCOUNTER — OFFICE VISIT (OUTPATIENT)
Dept: FAMILY MEDICINE CLINIC | Age: 68
End: 2018-07-18
Payer: MEDICARE

## 2018-07-18 VITALS
WEIGHT: 235 LBS | OXYGEN SATURATION: 97 % | DIASTOLIC BLOOD PRESSURE: 66 MMHG | RESPIRATION RATE: 16 BRPM | TEMPERATURE: 97.8 F | SYSTOLIC BLOOD PRESSURE: 102 MMHG | BODY MASS INDEX: 31.83 KG/M2 | HEART RATE: 68 BPM | HEIGHT: 72 IN

## 2018-07-18 DIAGNOSIS — E11.9 TYPE 2 DIABETES MELLITUS WITHOUT COMPLICATION, WITHOUT LONG-TERM CURRENT USE OF INSULIN (HCC): ICD-10-CM

## 2018-07-18 DIAGNOSIS — Z95.0 PRESENCE OF PERMANENT CARDIAC PACEMAKER: ICD-10-CM

## 2018-07-18 DIAGNOSIS — F33.1 MAJOR DEPRESSIVE DISORDER, RECURRENT EPISODE, MODERATE (HCC): Primary | ICD-10-CM

## 2018-07-18 DIAGNOSIS — I10 ESSENTIAL HYPERTENSION: ICD-10-CM

## 2018-07-18 DIAGNOSIS — F41.1 GENERALIZED ANXIETY DISORDER: ICD-10-CM

## 2018-07-18 DIAGNOSIS — I49.5 SSS (SICK SINUS SYNDROME) (HCC): ICD-10-CM

## 2018-07-18 PROCEDURE — G8427 DOCREV CUR MEDS BY ELIG CLIN: HCPCS | Performed by: FAMILY MEDICINE

## 2018-07-18 PROCEDURE — 4040F PNEUMOC VAC/ADMIN/RCVD: CPT | Performed by: FAMILY MEDICINE

## 2018-07-18 PROCEDURE — 3046F HEMOGLOBIN A1C LEVEL >9.0%: CPT | Performed by: FAMILY MEDICINE

## 2018-07-18 PROCEDURE — 1036F TOBACCO NON-USER: CPT | Performed by: FAMILY MEDICINE

## 2018-07-18 PROCEDURE — 1123F ACP DISCUSS/DSCN MKR DOCD: CPT | Performed by: FAMILY MEDICINE

## 2018-07-18 PROCEDURE — 3017F COLORECTAL CA SCREEN DOC REV: CPT | Performed by: FAMILY MEDICINE

## 2018-07-18 PROCEDURE — 1101F PT FALLS ASSESS-DOCD LE1/YR: CPT | Performed by: FAMILY MEDICINE

## 2018-07-18 PROCEDURE — 2022F DILAT RTA XM EVC RTNOPTHY: CPT | Performed by: FAMILY MEDICINE

## 2018-07-18 PROCEDURE — G8417 CALC BMI ABV UP PARAM F/U: HCPCS | Performed by: FAMILY MEDICINE

## 2018-07-18 PROCEDURE — 99214 OFFICE O/P EST MOD 30 MIN: CPT | Performed by: FAMILY MEDICINE

## 2018-07-18 RX ORDER — DESVENLAFAXINE 100 MG/1
100 TABLET, EXTENDED RELEASE ORAL DAILY
Qty: 30 TABLET | Refills: 5 | Status: SHIPPED | OUTPATIENT
Start: 2018-07-18 | End: 2018-08-20

## 2018-07-18 NOTE — PROGRESS NOTES
Chief Complaint   Patient presents with    Anxiety    Depression    Diabetes    Hypertension    Dizziness     [facility] - 7/2. ended up with pacemaker placed. HPI: Akira Reyes is a 80 y.o. male presenting for follow-up of anxiety, depression, diabetes and hypertension. I last saw the patient 2 months ago. He did go to the ER for 3 consecutive trips after getting dizzy and feeling faint. He was golfing at the time. He was found to have sick sinus syndrome and had a pacemaker inserted on 7-2. Despite the pacemaker, he still is having marked difficulty with his depression. The medication only seems to work for a short period of time and then his ineffective. Past Medical History:   Diagnosis Date    Colitis     Hypertension     Type II or unspecified type diabetes mellitus without mention of complication, not stated as uncontrolled        Past Surgical History:   Procedure Laterality Date    ANTERIOR CRUCIATE LIGAMENT REPAIR      GALLBLADDER SURGERY      PACEMAKER PLACEMENT      SIGMOIDOSCOPY  11/03/2017    DR. BONILLA    TOE SURGERY      TONSILLECTOMY      WRIST SURGERY  2013       family history includes Cancer in his mother and paternal uncle; Diabetes in his father. Social History     Social History    Marital status: Single     Spouse name: N/A    Number of children: N/A    Years of education: N/A     Occupational History    Not on file.      Social History Main Topics    Smoking status: Former Smoker     Packs/day: 0.25     Years: 10.00     Types: Cigars     Start date: 6/21/2008     Quit date: 7/4/2018    Smokeless tobacco: Never Used    Alcohol use 0.0 oz/week      Comment: occ    Drug use: No    Sexual activity: Not on file     Other Topics Concern    Not on file     Social History Narrative    No narrative on file       Allergies   Allergen Reactions    Imuran [Azathioprine]        Review of Systems - General ROS: positive for  - fatigue and sleep complication, without long-term current use of insulin ([address]     3. Essential hypertension     4. Generalized anxiety disorder     5. SSS (sick sinus syndrome) (HCC)     6. Presence of permanent cardiac pacemaker       PLAN:     Patient will increase his Pristiq to 100 mg by mouth daily. Patient will continue with his follow up with Dr. Jodie Munoz As needed. Patient was referred to Dr. Julia Tillman for further evaluation and treatment of his depression. Patient will continue with use of the CPAP on a nightly basis. Patient will need fasting labs prior to their next visit. Patient was encouraged to continue low cholesterol diet, weight loss and exercise as tolerated. Follow up with cardiology as scheduled. Return in about 2 weeks (around 8/1/2018) for follow up on medications.

## 2018-07-22 PROBLEM — I49.5 SSS (SICK SINUS SYNDROME) (HCC): Status: ACTIVE | Noted: 2018-07-22

## 2018-07-22 PROBLEM — Z95.0 PRESENCE OF PERMANENT CARDIAC PACEMAKER: Status: ACTIVE | Noted: 2018-07-22

## 2018-07-26 PROBLEM — R07.1 CHEST PAIN ON BREATHING: Status: ACTIVE | Noted: 2018-07-26

## 2018-07-27 ENCOUNTER — TELEPHONE (OUTPATIENT)
Dept: FAMILY MEDICINE CLINIC | Age: 68
End: 2018-07-27

## 2018-08-08 ENCOUNTER — HOSPITAL ENCOUNTER (OUTPATIENT)
Dept: NON INVASIVE DIAGNOSTICS | Age: 68
Discharge: HOME | End: 2018-08-08
Payer: MEDICARE

## 2018-08-08 ENCOUNTER — HOSPITAL ENCOUNTER (OUTPATIENT)
Dept: NUCLEAR MEDICINE | Age: 68
Discharge: HOME | End: 2018-08-10
Payer: MEDICARE

## 2018-08-08 VITALS — DIASTOLIC BLOOD PRESSURE: 70 MMHG | HEART RATE: 77 BPM | SYSTOLIC BLOOD PRESSURE: 108 MMHG

## 2018-08-08 DIAGNOSIS — R94.31 ABNORMAL EKG: ICD-10-CM

## 2018-08-08 PROCEDURE — A9502 TC99M TETROFOSMIN: HCPCS | Performed by: INTERNAL MEDICINE

## 2018-08-08 PROCEDURE — 93017 CV STRESS TEST TRACING ONLY: CPT

## 2018-08-08 PROCEDURE — 6360000004 HC RX CONTRAST MEDICATION: Performed by: INTERNAL MEDICINE

## 2018-08-08 PROCEDURE — 2580000003 HC RX 258: Performed by: INTERNAL MEDICINE

## 2018-08-08 PROCEDURE — 3430000000 HC RX DIAGNOSTIC RADIOPHARMACEUTICAL: Performed by: INTERNAL MEDICINE

## 2018-08-08 PROCEDURE — 78452 HT MUSCLE IMAGE SPECT MULT: CPT

## 2018-08-08 RX ORDER — SODIUM CHLORIDE 0.9 % (FLUSH) 0.9 %
10 SYRINGE (ML) INJECTION 2 TIMES DAILY
Status: DISCONTINUED | OUTPATIENT
Start: 2018-08-08 | End: 2018-08-11 | Stop reason: HOSPADM

## 2018-08-08 RX ORDER — SODIUM CHLORIDE 0.9 % (FLUSH) 0.9 %
10 SYRINGE (ML) INJECTION PRN
Status: DISCONTINUED | OUTPATIENT
Start: 2018-08-08 | End: 2018-08-11 | Stop reason: HOSPADM

## 2018-08-08 RX ADMIN — TETROFOSMIN 10.9 MILLICURIE: 0.23 INJECTION, POWDER, LYOPHILIZED, FOR SOLUTION INTRAVENOUS at 08:41

## 2018-08-08 RX ADMIN — Medication 10 ML: at 09:59

## 2018-08-08 RX ADMIN — TETROFOSMIN 32.3 MILLICURIE: 0.23 INJECTION, POWDER, LYOPHILIZED, FOR SOLUTION INTRAVENOUS at 09:58

## 2018-08-08 RX ADMIN — Medication 10 ML: at 08:42

## 2018-08-08 RX ADMIN — Medication 10 ML: at 09:57

## 2018-08-08 RX ADMIN — REGADENOSON 0.4 MG: 0.08 INJECTION, SOLUTION INTRAVENOUS at 09:56

## 2018-08-20 ENCOUNTER — OFFICE VISIT (OUTPATIENT)
Dept: FAMILY MEDICINE CLINIC | Age: 68
End: 2018-08-20
Payer: MEDICARE

## 2018-08-20 VITALS
SYSTOLIC BLOOD PRESSURE: 110 MMHG | WEIGHT: 229.6 LBS | HEART RATE: 64 BPM | BODY MASS INDEX: 31.1 KG/M2 | RESPIRATION RATE: 20 BRPM | TEMPERATURE: 96.7 F | HEIGHT: 72 IN | DIASTOLIC BLOOD PRESSURE: 70 MMHG

## 2018-08-20 DIAGNOSIS — K51.011 ULCERATIVE PANCOLITIS WITH RECTAL BLEEDING (HCC): Primary | ICD-10-CM

## 2018-08-20 DIAGNOSIS — I49.5 SSS (SICK SINUS SYNDROME) (HCC): ICD-10-CM

## 2018-08-20 DIAGNOSIS — I10 ESSENTIAL HYPERTENSION: ICD-10-CM

## 2018-08-20 DIAGNOSIS — E11.9 TYPE 2 DIABETES MELLITUS WITHOUT COMPLICATION, WITHOUT LONG-TERM CURRENT USE OF INSULIN (HCC): ICD-10-CM

## 2018-08-20 DIAGNOSIS — E78.5 DYSLIPIDEMIA ASSOCIATED WITH TYPE 2 DIABETES MELLITUS (HCC): ICD-10-CM

## 2018-08-20 DIAGNOSIS — E11.69 DYSLIPIDEMIA ASSOCIATED WITH TYPE 2 DIABETES MELLITUS (HCC): ICD-10-CM

## 2018-08-20 DIAGNOSIS — F33.1 MAJOR DEPRESSIVE DISORDER, RECURRENT EPISODE, MODERATE (HCC): ICD-10-CM

## 2018-08-20 PROCEDURE — 1123F ACP DISCUSS/DSCN MKR DOCD: CPT | Performed by: FAMILY MEDICINE

## 2018-08-20 PROCEDURE — 1101F PT FALLS ASSESS-DOCD LE1/YR: CPT | Performed by: FAMILY MEDICINE

## 2018-08-20 PROCEDURE — 99214 OFFICE O/P EST MOD 30 MIN: CPT | Performed by: FAMILY MEDICINE

## 2018-08-20 PROCEDURE — 4040F PNEUMOC VAC/ADMIN/RCVD: CPT | Performed by: FAMILY MEDICINE

## 2018-08-20 PROCEDURE — 3017F COLORECTAL CA SCREEN DOC REV: CPT | Performed by: FAMILY MEDICINE

## 2018-08-20 PROCEDURE — G8427 DOCREV CUR MEDS BY ELIG CLIN: HCPCS | Performed by: FAMILY MEDICINE

## 2018-08-20 PROCEDURE — 3046F HEMOGLOBIN A1C LEVEL >9.0%: CPT | Performed by: FAMILY MEDICINE

## 2018-08-20 PROCEDURE — 2022F DILAT RTA XM EVC RTNOPTHY: CPT | Performed by: FAMILY MEDICINE

## 2018-08-20 PROCEDURE — G8417 CALC BMI ABV UP PARAM F/U: HCPCS | Performed by: FAMILY MEDICINE

## 2018-08-20 PROCEDURE — 1036F TOBACCO NON-USER: CPT | Performed by: FAMILY MEDICINE

## 2018-08-20 PROCEDURE — 1111F DSCHRG MED/CURRENT MED MERGE: CPT | Performed by: FAMILY MEDICINE

## 2018-08-20 RX ORDER — PREDNISONE 1 MG/1
TABLET ORAL
COMMUNITY
End: 2018-10-16 | Stop reason: ALTCHOICE

## 2018-08-20 RX ORDER — DESVENLAFAXINE 50 MG/1
50 TABLET, EXTENDED RELEASE ORAL DAILY
Qty: 7 TABLET | Refills: 0 | Status: SHIPPED | OUTPATIENT
Start: 2018-08-20 | End: 2018-09-20

## 2018-08-20 ASSESSMENT — PATIENT HEALTH QUESTIONNAIRE - PHQ9
5. POOR APPETITE OR OVEREATING: 1
SUM OF ALL RESPONSES TO PHQ QUESTIONS 1-9: 9
10. IF YOU CHECKED OFF ANY PROBLEMS, HOW DIFFICULT HAVE THESE PROBLEMS MADE IT FOR YOU TO DO YOUR WORK, TAKE CARE OF THINGS AT HOME, OR GET ALONG WITH OTHER PEOPLE: 0
2. FEELING DOWN, DEPRESSED OR HOPELESS: 1
SUM OF ALL RESPONSES TO PHQ QUESTIONS 1-9: 9
1. LITTLE INTEREST OR PLEASURE IN DOING THINGS: 2
4. FEELING TIRED OR HAVING LITTLE ENERGY: 2
3. TROUBLE FALLING OR STAYING ASLEEP: 0
SUM OF ALL RESPONSES TO PHQ9 QUESTIONS 1 & 2: 3
6. FEELING BAD ABOUT YOURSELF - OR THAT YOU ARE A FAILURE OR HAVE LET YOURSELF OR YOUR FAMILY DOWN: 0
9. THOUGHTS THAT YOU WOULD BE BETTER OFF DEAD, OR OF HURTING YOURSELF: 0
8. MOVING OR SPEAKING SO SLOWLY THAT OTHER PEOPLE COULD HAVE NOTICED. OR THE OPPOSITE, BEING SO FIGETY OR RESTLESS THAT YOU HAVE BEEN MOVING AROUND A LOT MORE THAN USUAL: 0
7. TROUBLE CONCENTRATING ON THINGS, SUCH AS READING THE NEWSPAPER OR WATCHING TELEVISION: 3

## 2018-08-20 NOTE — PROGRESS NOTES
Given:  Trintellix 5 mg # 7   Lot # [id number]  Exp. Sept 2020  Trintellix 10 mg #14  Lot # [id number]  Exp.  Dec 2020

## 2018-08-20 NOTE — PROGRESS NOTES
Post-Discharge Transitional Care Management Services or Hospital Follow Up      Lacey Hancock   YOB: 1950    Date of Office Visit:  8/20/2018  Date of Hospital Admission: 8-9-18  Date of Hospital Discharge: 8-11-18    Care management risk score Rising risk (score 2-5) and Complex Care (Scores >=6): 5     Non face to face  following discharge, date last encounter closed (first attempt may have been earlier): *No documented post hospital discharge outreach found in the last 14 days *No documented post hospital discharge outreach found in the last 14 days    Call initiated 2 business days of discharge: *No response recorded in the last 14 days    Patient Active Problem List   Diagnosis    Ulcerative colitis ([facility]    HTN (hypertension)    Major depressive disorder, recurrent episode, moderate ([facility]    DM w/o complication type II ([facility]    Dyslipidemia associated with type 2 diabetes mellitus ([facility]    ETIENNE on CPAP    Generalized anxiety disorder    SSS ([facility] syndrome) [facility]    Presence of permanent cardiac pacemaker    Chest pain on breathing       Allergies   Allergen Reactions    Imuran [Azathioprine]      Medications listed as ordered at the time of discharge from hospital  Please refer to discharge summary.      Medications marked \"taking\" at this time  Outpatient Prescriptions Marked as Taking for the 8/20/18 encounter (Office Visit) with Jim Dalton MD   Medication Sig Dispense Refill    Calcium Carb-Cholecalciferol (CALCIUM CARBONATE-VITAMIN D3 PO) Take 1 tablet by mouth daily      predniSONE (DELTASONE) 5 MG tablet Take by mouth 40 mg daily x2 weeks  35 mg daily x1 week  30 mg daily x1 week  25 mg daily x1 week  20 mg daily x1 week      desvenlafaxine succinate (PRISTIQ) 50 MG TB24 extended release tablet Take 1 tablet by mouth daily for 7 days 7 tablet 0    VORTIoxetine (TRINTELLIX) 10 MG TABS tablet Take 10 mg by mouth daily 30 tablet 2    folic acid (FOLVITE) 1 MG clicks, or gallops, distal pulses intact, no carotid bruits  Abdomen: soft, non-tender, non-distended, normal bowel sounds, no masses or organomegaly. Obese, protuberant. Extremities: no cyanosis, clubbing or edema  Neurologic: reflexes normal and symmetric, no cranial nerve deficit, gait, coordination and speech normal.    Assessment/Plan:  1. Ulcerative pancolitis with rectal bleeding [facility])  Patient will finish the remainder of his prednisone and follow up with Dr. Serg Devine as scheduled. - Calcium Carb-Cholecalciferol (CALCIUM CARBONATE-VITAMIN D3 PO); Take 1 tablet by mouth daily  - predniSONE (DELTASONE) 5 MG tablet; Take by mouth 40 mg daily x2 weeks  35 mg daily x1 week  30 mg daily x1 week  25 mg daily x1 week  20 mg daily x1 week  - MA DISCHARGE MEDS RECONCILED W/ CURRENT OUTPATIENT MED LIST    2. Type 2 diabetes mellitus without complication, without long-term current use of insulin ([facility])  Continue diet and medications. - Hemoglobin A1C; Future  - Microalbumin / Creatinine Urine Ratio; Future    3. Major depressive disorder, recurrent episode, moderate (HCC)  Patient will taper down on the Pristiq to 50 mg by mouth daily for 1 week and then start Trintellix 5 mg by mouth daily for 1 week and then 10 mg daily thereafter. He was given samples #7 of the 5 mg tablet and #14 of the 10 mg tablet. - desvenlafaxine succinate (PRISTIQ) 50 MG TB24 extended release tablet; Take 1 tablet by mouth daily for 7 days  Dispense: 7 tablet; Refill: 0  - VORTIoxetine (TRINTELLIX) 10 MG TABS tablet; Take 10 mg by mouth daily  Dispense: 30 tablet; Refill: 2    4. Dyslipidemia associated with type 2 diabetes mellitus ([facility])  Continue diet, exercise and medications. 5. SSS (sick sinus syndrome) ([facility])  Stable status post permanent pacemaker insertion    6. Essential hypertension  Stable. Continue current medications. Return in about 6 weeks (around 10/1/2018) for follow up on medications.   Medical Decision Making: high complexity

## 2018-09-04 ENCOUNTER — OFFICE VISIT (OUTPATIENT)
Dept: CARDIOLOGY CLINIC | Age: 68
End: 2018-09-04
Payer: MEDICARE

## 2018-09-04 VITALS
SYSTOLIC BLOOD PRESSURE: 128 MMHG | BODY MASS INDEX: 31.65 KG/M2 | DIASTOLIC BLOOD PRESSURE: 84 MMHG | RESPIRATION RATE: 14 BRPM | OXYGEN SATURATION: 100 % | WEIGHT: 233.4 LBS | HEART RATE: 79 BPM

## 2018-09-04 DIAGNOSIS — G47.33 OSA ON CPAP: ICD-10-CM

## 2018-09-04 DIAGNOSIS — I10 ESSENTIAL HYPERTENSION: ICD-10-CM

## 2018-09-04 DIAGNOSIS — Z99.89 OSA ON CPAP: ICD-10-CM

## 2018-09-04 DIAGNOSIS — E78.5 DYSLIPIDEMIA ASSOCIATED WITH TYPE 2 DIABETES MELLITUS (HCC): ICD-10-CM

## 2018-09-04 DIAGNOSIS — Z95.0 PRESENCE OF PERMANENT CARDIAC PACEMAKER: ICD-10-CM

## 2018-09-04 DIAGNOSIS — I49.5 SSS (SICK SINUS SYNDROME) (HCC): Primary | ICD-10-CM

## 2018-09-04 DIAGNOSIS — E11.69 DYSLIPIDEMIA ASSOCIATED WITH TYPE 2 DIABETES MELLITUS (HCC): ICD-10-CM

## 2018-09-04 PROBLEM — R07.1 CHEST PAIN ON BREATHING: Status: RESOLVED | Noted: 2018-07-26 | Resolved: 2018-09-04

## 2018-09-04 PROCEDURE — 2022F DILAT RTA XM EVC RTNOPTHY: CPT | Performed by: INTERNAL MEDICINE

## 2018-09-04 PROCEDURE — 4040F PNEUMOC VAC/ADMIN/RCVD: CPT | Performed by: INTERNAL MEDICINE

## 2018-09-04 PROCEDURE — 1101F PT FALLS ASSESS-DOCD LE1/YR: CPT | Performed by: INTERNAL MEDICINE

## 2018-09-04 PROCEDURE — 3017F COLORECTAL CA SCREEN DOC REV: CPT | Performed by: INTERNAL MEDICINE

## 2018-09-04 PROCEDURE — 1036F TOBACCO NON-USER: CPT | Performed by: INTERNAL MEDICINE

## 2018-09-04 PROCEDURE — 3046F HEMOGLOBIN A1C LEVEL >9.0%: CPT | Performed by: INTERNAL MEDICINE

## 2018-09-04 PROCEDURE — G8427 DOCREV CUR MEDS BY ELIG CLIN: HCPCS | Performed by: INTERNAL MEDICINE

## 2018-09-04 PROCEDURE — 99213 OFFICE O/P EST LOW 20 MIN: CPT | Performed by: INTERNAL MEDICINE

## 2018-09-04 PROCEDURE — 1123F ACP DISCUSS/DSCN MKR DOCD: CPT | Performed by: INTERNAL MEDICINE

## 2018-09-04 PROCEDURE — G8417 CALC BMI ABV UP PARAM F/U: HCPCS | Performed by: INTERNAL MEDICINE

## 2018-09-04 NOTE — PROGRESS NOTES
Chief Complaint   Patient presents with    Results     stress test 8/8/2018    Hypertension    Follow-Up from [facility]     pacemaker 7/10/2018    Fatigue       7-5-18: Patient presents for initial medical evaluation. Patient is followed on a regular basis by Dr. Bhavya Drew MD.   S/p hospitalization for weakness, fatigue and near syncope. 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 Echo with normal LVF. He continues to have symptoms today. Does not feel good, has dry heaves and cough. His stools are dark. He has a hx of UC. Pt denies chest pain, dyspnea, dyspnea on exertion, change in exercise capacity, fatigue,  nausea, vomiting, diarrhea, constipation, motor weakness, insomnia, weight loss, syncope,PND, orthopnea, or claudication. Had recent colitis flare up. Was placed on ABX in hospital. His WBC was high  and noted to have ARF. No hx of MI, CHF or arrhythmia. No hx of stress test or LHC. 9-4-18: s/p PPM insertion on 7-10-18. S/p normal nuclear stress test. States LH/Dizz has resolved. Does have some fatigue. Does have hx of ulcerative colitis. S/p recent C.diff infection. Dealing with depression. Pt denies chest pain, dyspnea, dyspnea on exertion, change in exercise capacity,   nausea, vomiting, diarrhea, constipation, motor weakness, insomnia, weight loss, syncope, dizziness, lightheadedness, palpitations, PND, orthopnea, or claudication. No nitro use. BP and hr are good. CAD is stable. No LE discoloration or ulcers. No LE edema. No CHF type symptoms.  Lipid profile is normal.     Patient Active Problem List   Diagnosis    Ulcerative colitis ([address]    HTN (hypertension)    Major depressive disorder, recurrent episode, moderate (HCC)    DM w/o complication type II ([address]    Dyslipidemia associated with type 2 diabetes mellitus (HCC)    ETIENNE on CPAP    Generalized anxiety disorder    SSS (sick sinus syndrome) ([address]    Presence of permanent negative  Psychological ROS: negative  Hematological and Lymphatic ROS: No history of blood clots or bleeding disorder. Respiratory ROS: no cough, shortness of breath, or wheezing  Cardiovascular ROS: positive for - palpitations  Gastrointestinal ROS: no abdominal pain, change in bowel habits, or black or bloody stools  Genito-Urinary ROS: no dysuria, trouble voiding, or hematuria  Musculoskeletal ROS: negative  Neurological ROS: no TIA or stroke symptoms  Dermatological ROS: negative    VITALS:  Blood pressure 128/84, pulse 79, resp. rate 14, weight 233 lb 6.4 oz (105.9 kg), SpO2 100 %. Body mass index is 31.65 kg/m². Physical Examination:  General appearance - alert, well appearing, and in no distress  Mental status - alert, oriented to person, place, and time  Neck - Neck is supple, no JVD or carotid bruits. No thyromegaly or adenopathy. Chest - clear to auscultation, no wheezes, rales or rhonchi, symmetric air entry  Heart - normal rate, regular rhythm, normal S1, S2, no murmurs, rubs, clicks or gallops  Abdomen - soft, nontender, nondistended, no masses or organomegaly  Neurological - alert, oriented, normal speech, no focal findings or movement disorder noted  Extremities - peripheral pulses normal, no pedal edema, no clubbing or cyanosis  Skin - normal coloration and turgor, no rashes, no suspicious skin lesions noted    Pacer site has healed. Orders Placed This Encounter   Procedures    Internal Referral to Pacemaker Clinic       ASSESSMENT:     Diagnosis Orders   1. SSS (sick sinus syndrome) ([facility]     2. Presence of permanent cardiac pacemaker  Internal Referral to Pacemaker Clinic   3. ETIENNE on CPAP     4. Essential hypertension     5. Dyslipidemia associated with type 2 diabetes mellitus ([address]           PLAN:     Patient will need to continue to follow up with you for their general medical care     As always, aggressive risk factor modification is strongly recommended.  We should adhere to

## 2018-09-10 ENCOUNTER — TELEPHONE (OUTPATIENT)
Dept: FAMILY MEDICINE CLINIC | Age: 68
End: 2018-09-10

## 2018-09-10 DIAGNOSIS — F41.9 ANXIETY: Primary | ICD-10-CM

## 2018-09-10 NOTE — TELEPHONE ENCOUNTER
Patient called because he states that he could not sleep last night and today he is very jittery and having anxiety. He says it is so bad that he is having trouble doing anything today.  He says he read the side effects from the Trintellix that he started 2 weeks and thinks it might be from that       Please advise

## 2018-09-11 RX ORDER — ALPRAZOLAM 1 MG/1
1 TABLET ORAL 3 TIMES DAILY PRN
Qty: 21 TABLET | Refills: 0 | Status: SHIPPED | OUTPATIENT
Start: 2018-09-11 | End: 2018-09-18

## 2018-09-20 ENCOUNTER — OFFICE VISIT (OUTPATIENT)
Dept: FAMILY MEDICINE CLINIC | Age: 68
End: 2018-09-20
Payer: MEDICARE

## 2018-09-20 VITALS
HEIGHT: 72 IN | HEART RATE: 64 BPM | RESPIRATION RATE: 20 BRPM | WEIGHT: 232.2 LBS | SYSTOLIC BLOOD PRESSURE: 120 MMHG | DIASTOLIC BLOOD PRESSURE: 90 MMHG | BODY MASS INDEX: 31.45 KG/M2 | TEMPERATURE: 98.4 F

## 2018-09-20 DIAGNOSIS — F33.1 MAJOR DEPRESSIVE DISORDER, RECURRENT EPISODE, MODERATE (HCC): ICD-10-CM

## 2018-09-20 DIAGNOSIS — I10 ESSENTIAL HYPERTENSION: ICD-10-CM

## 2018-09-20 DIAGNOSIS — E11.9 TYPE 2 DIABETES MELLITUS WITHOUT COMPLICATION, WITHOUT LONG-TERM CURRENT USE OF INSULIN (HCC): ICD-10-CM

## 2018-09-20 DIAGNOSIS — F33.1 MAJOR DEPRESSIVE DISORDER, RECURRENT EPISODE, MODERATE (HCC): Primary | ICD-10-CM

## 2018-09-20 LAB
ALBUMIN SERPL-MCNC: 4.1 G/DL (ref 3.9–4.9)
ALP BLD-CCNC: 46 U/L (ref 35–104)
ALT SERPL-CCNC: 18 U/L (ref 0–41)
ANION GAP SERPL CALCULATED.3IONS-SCNC: 11 MEQ/L (ref 7–13)
AST SERPL-CCNC: 15 U/L (ref 0–40)
BASOPHILS ABSOLUTE: 0 K/UL (ref 0–0.2)
BASOPHILS RELATIVE PERCENT: 0.3 %
BILIRUB SERPL-MCNC: 0.3 MG/DL (ref 0–1.2)
BUN BLDV-MCNC: 19 MG/DL (ref 8–23)
CALCIUM SERPL-MCNC: 10.7 MG/DL (ref 8.6–10.2)
CHLORIDE BLD-SCNC: 104 MEQ/L (ref 98–107)
CO2: 28 MEQ/L (ref 22–29)
CREAT SERPL-MCNC: 0.93 MG/DL (ref 0.7–1.2)
EOSINOPHILS ABSOLUTE: 0.1 K/UL (ref 0–0.7)
EOSINOPHILS RELATIVE PERCENT: 0.5 %
GFR AFRICAN AMERICAN: >60
GFR NON-AFRICAN AMERICAN: >60
GLOBULIN: 2.6 G/DL (ref 2.3–3.5)
GLUCOSE BLD-MCNC: 81 MG/DL (ref 74–109)
HCT VFR BLD CALC: 44 % (ref 42–52)
HEMOGLOBIN: 14.7 G/DL (ref 14–18)
LYMPHOCYTES ABSOLUTE: 1.5 K/UL (ref 1–4.8)
LYMPHOCYTES RELATIVE PERCENT: 9.7 %
MCH RBC QN AUTO: 29.4 PG (ref 27–31.3)
MCHC RBC AUTO-ENTMCNC: 33.4 % (ref 33–37)
MCV RBC AUTO: 88 FL (ref 80–100)
MONOCYTES ABSOLUTE: 0.8 K/UL (ref 0.2–0.8)
MONOCYTES RELATIVE PERCENT: 5.3 %
NEUTROPHILS ABSOLUTE: 12.7 K/UL (ref 1.4–6.5)
NEUTROPHILS RELATIVE PERCENT: 84.2 %
PDW BLD-RTO: 18.6 % (ref 11.5–14.5)
PLATELET # BLD: 208 K/UL (ref 130–400)
POTASSIUM SERPL-SCNC: 4.9 MEQ/L (ref 3.5–5.1)
RBC # BLD: 4.99 M/UL (ref 4.7–6.1)
SODIUM BLD-SCNC: 143 MEQ/L (ref 132–144)
T4 FREE: 1.21 NG/DL (ref 0.93–1.7)
TOTAL PROTEIN: 6.7 G/DL (ref 6.4–8.1)
TSH SERPL DL<=0.05 MIU/L-ACNC: 3.89 UIU/ML (ref 0.27–4.2)
WBC # BLD: 15.1 K/UL (ref 4.8–10.8)

## 2018-09-20 PROCEDURE — 3017F COLORECTAL CA SCREEN DOC REV: CPT | Performed by: FAMILY MEDICINE

## 2018-09-20 PROCEDURE — 99214 OFFICE O/P EST MOD 30 MIN: CPT | Performed by: FAMILY MEDICINE

## 2018-09-20 PROCEDURE — G8417 CALC BMI ABV UP PARAM F/U: HCPCS | Performed by: FAMILY MEDICINE

## 2018-09-20 PROCEDURE — 1036F TOBACCO NON-USER: CPT | Performed by: FAMILY MEDICINE

## 2018-09-20 PROCEDURE — 1101F PT FALLS ASSESS-DOCD LE1/YR: CPT | Performed by: FAMILY MEDICINE

## 2018-09-20 PROCEDURE — 2022F DILAT RTA XM EVC RTNOPTHY: CPT | Performed by: FAMILY MEDICINE

## 2018-09-20 PROCEDURE — 3046F HEMOGLOBIN A1C LEVEL >9.0%: CPT | Performed by: FAMILY MEDICINE

## 2018-09-20 PROCEDURE — 1123F ACP DISCUSS/DSCN MKR DOCD: CPT | Performed by: FAMILY MEDICINE

## 2018-09-20 PROCEDURE — G8427 DOCREV CUR MEDS BY ELIG CLIN: HCPCS | Performed by: FAMILY MEDICINE

## 2018-09-20 PROCEDURE — 4040F PNEUMOC VAC/ADMIN/RCVD: CPT | Performed by: FAMILY MEDICINE

## 2018-09-20 ASSESSMENT — PATIENT HEALTH QUESTIONNAIRE - PHQ9
5. POOR APPETITE OR OVEREATING: 2
4. FEELING TIRED OR HAVING LITTLE ENERGY: 3
SUM OF ALL RESPONSES TO PHQ QUESTIONS 1-9: 17
SUM OF ALL RESPONSES TO PHQ9 QUESTIONS 1 & 2: 6
3. TROUBLE FALLING OR STAYING ASLEEP: 2
1. LITTLE INTEREST OR PLEASURE IN DOING THINGS: 3
9. THOUGHTS THAT YOU WOULD BE BETTER OFF DEAD, OR OF HURTING YOURSELF: 0
2. FEELING DOWN, DEPRESSED OR HOPELESS: 3
10. IF YOU CHECKED OFF ANY PROBLEMS, HOW DIFFICULT HAVE THESE PROBLEMS MADE IT FOR YOU TO DO YOUR WORK, TAKE CARE OF THINGS AT HOME, OR GET ALONG WITH OTHER PEOPLE: 2
7. TROUBLE CONCENTRATING ON THINGS, SUCH AS READING THE NEWSPAPER OR WATCHING TELEVISION: 2
SUM OF ALL RESPONSES TO PHQ QUESTIONS 1-9: 17
8. MOVING OR SPEAKING SO SLOWLY THAT OTHER PEOPLE COULD HAVE NOTICED. OR THE OPPOSITE, BEING SO FIGETY OR RESTLESS THAT YOU HAVE BEEN MOVING AROUND A LOT MORE THAN USUAL: 1
6. FEELING BAD ABOUT YOURSELF - OR THAT YOU ARE A FAILURE OR HAVE LET YOURSELF OR YOUR FAMILY DOWN: 1

## 2018-09-20 ASSESSMENT — ENCOUNTER SYMPTOMS
SHORTNESS OF BREATH: 0
BLOOD IN STOOL: 0
DIARRHEA: 0
SINUS PAIN: 0
SORE THROAT: 0
CONSTIPATION: 0
NAUSEA: 0
TROUBLE SWALLOWING: 0
VOICE CHANGE: 0
WHEEZING: 0
CHEST TIGHTNESS: 0
ABDOMINAL PAIN: 0
COUGH: 0
COLOR CHANGE: 0
VOMITING: 0
RHINORRHEA: 0

## 2018-09-20 NOTE — PROGRESS NOTES
Chief Complaint   Patient presents with    Follow-up     LOV 08/20/2018    Anxiety    Other     Ulcerative Pancolitis, SSS    Diabetes Mellitus     Type 2    Depression     pt states that he has been unable to make decisions, he has no desire todo anything and has lost pleasure in all hobbies    Hyperlipidemia    Hypertension    Discuss Medications     HPI: Dominga Washburn is a 80 y.o. male presenting for follow-up of Ulcerative Colitis, SSS, DM Type 2, HLD, HTN, Depression, and Anxiety. I last saw the patient 08/20/2018. He is taking the Trintellix and has noted a twitch in the right leg and some tremors in the arms. Patient is not sure that the Trintellix is working well for him at this time. He has had a miserable summer due to his health conditions. He did see Dr. Mary Jane Melendez on Tuesday, 9-18-18. He may be a candidate for ECT in the future. Patient does note difficulty making decisions and takes no pleasure in any hobbies or extracurricular activities. Current Outpatient Prescriptions on File Prior to Visit   Medication Sig Dispense Refill    Calcium Carb-Cholecalciferol (CALCIUM CARBONATE-VITAMIN D3 PO) Take 1 tablet by mouth daily      predniSONE (DELTASONE) 5 MG tablet Take by mouth 40 mg daily x2 weeks  35 mg daily x1 week  30 mg daily x1 week  25 mg daily x1 week  20 mg daily x1 week      VORTIoxetine (TRINTELLIX) 10 MG TABS tablet Take 10 mg by mouth daily 30 tablet 2    folic acid (FOLVITE) 1 MG tablet TAKE 1 TABLET BY MOUTH EVERY DAY 30 tablet 5    candesartan (ATACAND) 16 MG tablet TAKE 1 TABLET BY MOUTH DAILY 30 tablet 5    fenofibrate (TRICOR) 145 MG tablet TAKE 1 TABLET EVERY DAY 30 tablet 5    methotrexate (RHEUMATREX) 2.5 MG chemo tablet Take 5 tablets by mouth one a week       No current facility-administered medications on file prior to visit.         Past Medical History:   Diagnosis Date    Colitis     Hypertension     Type II or unspecified type diabetes mellitus without

## 2018-09-23 DIAGNOSIS — D72.829 LEUKOCYTOSIS, UNSPECIFIED TYPE: Primary | ICD-10-CM

## 2018-10-10 ENCOUNTER — OFFICE VISIT (OUTPATIENT)
Dept: BEHAVIORAL/MENTAL HEALTH CLINIC | Age: 68
End: 2018-10-10
Payer: MEDICARE

## 2018-10-10 DIAGNOSIS — F33.1 MAJOR DEPRESSIVE DISORDER, RECURRENT EPISODE, MODERATE (HCC): Primary | ICD-10-CM

## 2018-10-10 PROCEDURE — 1036F TOBACCO NON-USER: CPT | Performed by: PSYCHOLOGIST

## 2018-10-10 PROCEDURE — 90832 PSYTX W PT 30 MINUTES: CPT | Performed by: PSYCHOLOGIST

## 2018-10-10 ASSESSMENT — PATIENT HEALTH QUESTIONNAIRE - PHQ9
4. FEELING TIRED OR HAVING LITTLE ENERGY: 3
SUM OF ALL RESPONSES TO PHQ QUESTIONS 1-9: 23
2. FEELING DOWN, DEPRESSED OR HOPELESS: 3
7. TROUBLE CONCENTRATING ON THINGS, SUCH AS READING THE NEWSPAPER OR WATCHING TELEVISION: 3
10. IF YOU CHECKED OFF ANY PROBLEMS, HOW DIFFICULT HAVE THESE PROBLEMS MADE IT FOR YOU TO DO YOUR WORK, TAKE CARE OF THINGS AT HOME, OR GET ALONG WITH OTHER PEOPLE: 3
3. TROUBLE FALLING OR STAYING ASLEEP: 3
SUM OF ALL RESPONSES TO PHQ9 QUESTIONS 1 & 2: 6
9. THOUGHTS THAT YOU WOULD BE BETTER OFF DEAD, OR OF HURTING YOURSELF: 0
5. POOR APPETITE OR OVEREATING: 3
1. LITTLE INTEREST OR PLEASURE IN DOING THINGS: 3
SUM OF ALL RESPONSES TO PHQ QUESTIONS 1-9: 23
6. FEELING BAD ABOUT YOURSELF - OR THAT YOU ARE A FAILURE OR HAVE LET YOURSELF OR YOUR FAMILY DOWN: 2
8. MOVING OR SPEAKING SO SLOWLY THAT OTHER PEOPLE COULD HAVE NOTICED. OR THE OPPOSITE, BEING SO FIGETY OR RESTLESS THAT YOU HAVE BEEN MOVING AROUND A LOT MORE THAN USUAL: 3

## 2018-10-10 NOTE — PATIENT INSTRUCTIONS
several changes and  enters a special state called the fight-or-flight response. The body prepares to either  fight or flee the perceived danger. During the fight-or-flight response its common to experience a blank mind, increased  heart rate, sweating, tense muscles, and more. Unfortunately, these bodily responses do  little good when it comes to protecting us from modern sources of anxiety. Using a variety of skills, you can end the fight-or-flight response before the symptoms  become too extreme. These skills will require practice to work effectively, so dont wait  until the last minute to try them out! Deep Breathing  Its natural to take long, deep breaths, when relaxed. However, during the fight-or-flight  response, breathing becomes rapid and shallow. Deep breathing reverses that, and sends messages to the brain to begin calming the body. Practice will make your body respond more efficiently to deep breathing in the future. Breathe in slowly. Count in your head and make sure the inward breath lasts at  least 5 seconds. Pay attention to the feeling of the air filling your lungs. Hold your breath for 5 to 10 seconds (again, keep count). You dont want to feel   uncomfortable, but it should last quite a bit longer than an ordinary breath. Breathe out very slowly for 5 to 10 seconds (count! Should be twice as long as  your inhale). Pretend like youre breathing through a straw to slow yourself  down. Try using a real straw to practice. Repeat the breathing process until you feel calm. Imagery  Think about some of your favorite and least favorite places. If you think about the place  hard enough--if you really try to think about what its like--you may begin to have feelings you associate with that location. Our brain has the ability to create emotional reactions based entirely off of our thoughts. The imagery technique uses this to its advantage.     Make sure youre somewhere quiet without too much noise or distraction. Youll need a  few minutes to just spend quietly, in your mind. Think of a place thats calming for you. Some examples are the beach, hiking on a mountain, relaxing at home with a friend, or playing with a pet. Paint a picture of the calming place in your mind. Dont just think of the place briefly--  imagine every little detail. Go through each of your senses and imagine what you would  experience in your relaxing place. Heres an example using a beach:      Sight: The sun is high in the stas and youre surrounded by white sand. Theres  No one else around. The water is a greenish-blue and waves are calmly rolling  in from the ocean. Sound: You can hear the deep pounding and splashing of the waves. There are   seagulls somewhere in the background. Touch: The sun is warm on your back, but a breeze cools you down just enough. You can feel sand moving between my toes. Taste: You have a glass of lemonade thats sweet, tart, and refreshing. Smell: You can smell the fresh ocean air, full of salt and calming aromas. Progressive Muscle Relaxation  During the fight-or-flight response, the tension in our muscles increases. This can lead to a feeling of stiffness, or even back and neck pain. Progressive muscle relaxation teaches us to become more aware of this tension so we can better identify and address stress. Find a private and quiet location. You should sit or lie down somewhere comfortable. The idea of this technique is to intentionally tense each muscle, and then to release the tension. Lets practice with your feet:     *Tense the muscles in your toes by curling them into your foot. Notice how it   feels when your foot is tense. Hold the tension for 5 seconds. *Release the tension from your toes. Let them relax. Notice how your fingers feel   differently after you release the tension. *Tense the muscles all throughout your calf.  Hold it for with each breath you let out. Take a few more slow deep breaths. Imagine your body becoming more relaxed and feeling heavier in your chair each time you exhale. 3. To begin the PMR, start with your legs. Lift your legs slightly off the ground, tense your thighs, and point your toes towards your head. Hold that position and feel the tension. Now let your legs drop to the ground and release all the tension at once. Notice the difference between the way your legs feel now when relaxed and how they felt when they were tense. 4. Next with your palms facing the ceiling, make a fist and raise your arm bringing your fist as close to your shoulder as you can while at the same time pressing your arms to your sides. Feel the tension in your fingers, hands, and arms. Now relax. As you relax you may notice your arms feel warm and heavy. Notice the difference between relaxation and tension in your arms. 5.  Remember to continue breathing slowly and deeply, and scan your legs, arms, and shoulders releasing any excess tension you notice. Focus on the sensation of relaxation in these areas. 6. Next is your face and neck. To tense your neck bring your chin to your chest or the back of your head to the back of your chair. While doing this squint your eyes and slightly bring your bath teeth together tensing just enough to feel the muscles in your jaw. Notice the tension in your face and neck: hold it. Now relax. Let go of all the tension from your face and neck. 7. Continue breathing slowly and enjoy the relaxed feelings throughout your entire body. Scan your body from your head to your toes and notice what your muscles feel like. As you are doing this take 5 more slow deep breaths. Adapted from BRISEYDA Acosta., Ghazal Bland M.S., Jerrod(2009). [facility] in [address] [place] [address] and Cassy Nunez,. JALYN Benson,  & Shanon Fuller, [facility]. (2000).   The relaxation & stress reduction workbook (5th edition) and

## 2018-10-16 ENCOUNTER — OFFICE VISIT (OUTPATIENT)
Dept: FAMILY MEDICINE CLINIC | Age: 68
End: 2018-10-16
Payer: MEDICARE

## 2018-10-16 VITALS
TEMPERATURE: 97.1 F | HEIGHT: 72 IN | DIASTOLIC BLOOD PRESSURE: 80 MMHG | HEART RATE: 84 BPM | WEIGHT: 232 LBS | BODY MASS INDEX: 31.42 KG/M2 | RESPIRATION RATE: 16 BRPM | SYSTOLIC BLOOD PRESSURE: 128 MMHG

## 2018-10-16 DIAGNOSIS — Z23 NEEDS FLU SHOT: ICD-10-CM

## 2018-10-16 DIAGNOSIS — K51.911 ULCERATIVE COLITIS WITH RECTAL BLEEDING, UNSPECIFIED LOCATION (HCC): ICD-10-CM

## 2018-10-16 DIAGNOSIS — E11.9 TYPE 2 DIABETES MELLITUS WITHOUT COMPLICATION, WITHOUT LONG-TERM CURRENT USE OF INSULIN (HCC): ICD-10-CM

## 2018-10-16 DIAGNOSIS — D72.829 LEUKOCYTOSIS, UNSPECIFIED TYPE: ICD-10-CM

## 2018-10-16 DIAGNOSIS — I10 ESSENTIAL HYPERTENSION: Primary | ICD-10-CM

## 2018-10-16 DIAGNOSIS — F33.1 MAJOR DEPRESSIVE DISORDER, RECURRENT EPISODE, MODERATE (HCC): ICD-10-CM

## 2018-10-16 DIAGNOSIS — F41.1 GENERALIZED ANXIETY DISORDER: ICD-10-CM

## 2018-10-16 LAB
BILIRUBIN URINE: NEGATIVE
BLOOD, URINE: NEGATIVE
CLARITY: CLEAR
COLOR: YELLOW
CREATININE URINE: 80.7 MG/DL
GLUCOSE URINE: NEGATIVE MG/DL
HBA1C MFR BLD: 5.8 % (ref 4.8–5.9)
KETONES, URINE: NEGATIVE MG/DL
LEUKOCYTE ESTERASE, URINE: NEGATIVE
MICROALBUMIN UR-MCNC: <1.2 MG/DL
MICROALBUMIN/CREAT UR-RTO: NORMAL MG/G (ref 0–30)
NITRITE, URINE: NEGATIVE
PH UA: 6.5 (ref 5–9)
PROTEIN UA: NEGATIVE MG/DL
SPECIFIC GRAVITY UA: 1.02 (ref 1–1.03)
UROBILINOGEN, URINE: 0.2 E.U./DL

## 2018-10-16 PROCEDURE — G0008 ADMIN INFLUENZA VIRUS VAC: HCPCS | Performed by: FAMILY MEDICINE

## 2018-10-16 PROCEDURE — 90662 IIV NO PRSV INCREASED AG IM: CPT | Performed by: FAMILY MEDICINE

## 2018-10-16 PROCEDURE — 1101F PT FALLS ASSESS-DOCD LE1/YR: CPT | Performed by: FAMILY MEDICINE

## 2018-10-16 PROCEDURE — G8482 FLU IMMUNIZE ORDER/ADMIN: HCPCS | Performed by: FAMILY MEDICINE

## 2018-10-16 PROCEDURE — G8417 CALC BMI ABV UP PARAM F/U: HCPCS | Performed by: FAMILY MEDICINE

## 2018-10-16 PROCEDURE — 1123F ACP DISCUSS/DSCN MKR DOCD: CPT | Performed by: FAMILY MEDICINE

## 2018-10-16 PROCEDURE — 4040F PNEUMOC VAC/ADMIN/RCVD: CPT | Performed by: FAMILY MEDICINE

## 2018-10-16 PROCEDURE — 99214 OFFICE O/P EST MOD 30 MIN: CPT | Performed by: FAMILY MEDICINE

## 2018-10-16 PROCEDURE — 2022F DILAT RTA XM EVC RTNOPTHY: CPT | Performed by: FAMILY MEDICINE

## 2018-10-16 PROCEDURE — 3017F COLORECTAL CA SCREEN DOC REV: CPT | Performed by: FAMILY MEDICINE

## 2018-10-16 PROCEDURE — 3044F HG A1C LEVEL LT 7.0%: CPT | Performed by: FAMILY MEDICINE

## 2018-10-16 PROCEDURE — 1036F TOBACCO NON-USER: CPT | Performed by: FAMILY MEDICINE

## 2018-10-16 PROCEDURE — G8427 DOCREV CUR MEDS BY ELIG CLIN: HCPCS | Performed by: FAMILY MEDICINE

## 2018-10-16 ASSESSMENT — ENCOUNTER SYMPTOMS
SORE THROAT: 0
SHORTNESS OF BREATH: 0
ABDOMINAL PAIN: 0
CONSTIPATION: 0
CHEST TIGHTNESS: 0
WHEEZING: 0
TROUBLE SWALLOWING: 0
VOICE CHANGE: 0
COLOR CHANGE: 0
NAUSEA: 0
VOMITING: 0
COUGH: 0
RHINORRHEA: 0
DIARRHEA: 0
EYES NEGATIVE: 1
SINUS PAIN: 0
BLOOD IN STOOL: 0

## 2018-10-16 NOTE — PROGRESS NOTES
Chief Complaint   Patient presents with    Depression     lov 9/20/18     HPI: Melvin Lozano is a 80 y.o. male presenting for follow-up of Major depressive disorder. I last saw the patient 1 month ago. He is scheduled to see Dr. Tuan Reyes today at 2:30 pm today. His energy level is decreased with a lack of motivation. He is no longer walking for exercise. He is taking the Trintellix 10 mg by mouth daily. He is having a colonoscopy on Monday, 10-22. Dr. Patti Lee is his gastroenterologist and the patient has been taking methotrexate at a low dose. Patient does wonder if any of his medications might be causing him to feel depressed. He also has noted some mild headaches as well. Current Outpatient Prescriptions on File Prior to Visit   Medication Sig Dispense Refill    Calcium Carb-Cholecalciferol (CALCIUM CARBONATE-VITAMIN D3 PO) Take 1 tablet by mouth daily      VORTIoxetine (TRINTELLIX) 10 MG TABS tablet Take 10 mg by mouth daily 30 tablet 2    folic acid (FOLVITE) 1 MG tablet TAKE 1 TABLET BY MOUTH EVERY DAY 30 tablet 5    candesartan (ATACAND) 16 MG tablet TAKE 1 TABLET BY MOUTH DAILY 30 tablet 5    fenofibrate (TRICOR) 145 MG tablet TAKE 1 TABLET EVERY DAY 30 tablet 5    methotrexate (RHEUMATREX) 2.5 MG chemo tablet Take 5 tablets by mouth one a week       No current facility-administered medications on file prior to visit. Past Medical History:   Diagnosis Date    Colitis     Hypertension     Type II or unspecified type diabetes mellitus without mention of complication, not stated as uncontrolled        Past Surgical History:   Procedure Laterality Date    ANTERIOR CRUCIATE LIGAMENT REPAIR      GALLBLADDER SURGERY      PACEMAKER PLACEMENT  07/10/2018    [facility] W HOLIDAY DO    SIGMOIDOSCOPY  11/03/2017    DR. BONILLA    TOE SURGERY      TONSILLECTOMY      WRIST SURGERY  2013     family history includes Cancer in his mother and paternal uncle; Diabetes in his father.     Social

## 2018-10-26 DIAGNOSIS — Z95.0 S/P PLACEMENT OF CARDIAC PACEMAKER: Primary | ICD-10-CM

## 2018-10-29 ENCOUNTER — OFFICE VISIT (OUTPATIENT)
Dept: BEHAVIORAL/MENTAL HEALTH CLINIC | Age: 68
End: 2018-10-29
Payer: MEDICARE

## 2018-10-29 DIAGNOSIS — F33.1 MAJOR DEPRESSIVE DISORDER, RECURRENT EPISODE, MODERATE (HCC): Primary | ICD-10-CM

## 2018-10-29 PROCEDURE — 1036F TOBACCO NON-USER: CPT | Performed by: PSYCHOLOGIST

## 2018-10-29 PROCEDURE — 90832 PSYTX W PT 30 MINUTES: CPT | Performed by: PSYCHOLOGIST

## 2018-10-29 ASSESSMENT — PATIENT HEALTH QUESTIONNAIRE - PHQ9
8. MOVING OR SPEAKING SO SLOWLY THAT OTHER PEOPLE COULD HAVE NOTICED. OR THE OPPOSITE, BEING SO FIGETY OR RESTLESS THAT YOU HAVE BEEN MOVING AROUND A LOT MORE THAN USUAL: 3
SUM OF ALL RESPONSES TO PHQ9 QUESTIONS 1 & 2: 6
SUM OF ALL RESPONSES TO PHQ QUESTIONS 1-9: 20
1. LITTLE INTEREST OR PLEASURE IN DOING THINGS: 3
4. FEELING TIRED OR HAVING LITTLE ENERGY: 3
7. TROUBLE CONCENTRATING ON THINGS, SUCH AS READING THE NEWSPAPER OR WATCHING TELEVISION: 3
9. THOUGHTS THAT YOU WOULD BE BETTER OFF DEAD, OR OF HURTING YOURSELF: 0
6. FEELING BAD ABOUT YOURSELF - OR THAT YOU ARE A FAILURE OR HAVE LET YOURSELF OR YOUR FAMILY DOWN: 1
5. POOR APPETITE OR OVEREATING: 3
3. TROUBLE FALLING OR STAYING ASLEEP: 1
SUM OF ALL RESPONSES TO PHQ QUESTIONS 1-9: 20
10. IF YOU CHECKED OFF ANY PROBLEMS, HOW DIFFICULT HAVE THESE PROBLEMS MADE IT FOR YOU TO DO YOUR WORK, TAKE CARE OF THINGS AT HOME, OR GET ALONG WITH OTHER PEOPLE: 2
2. FEELING DOWN, DEPRESSED OR HOPELESS: 3

## 2018-10-29 NOTE — PROGRESS NOTES
Behavioral Health Consultation  Susana Zheng Doctor, Ph.D., [facility]  Psychologist  10/29/18  11:03 AM      Time spent with Patient: 30 minutes  This is patient's 18th  [facility] appointment. Reason for Consult:  depression, anxiety, stress and agitation and chronic medical concerns  Referring Provider: Fadia Beckford MD      Feedback given to PCP. S:  Pt reports some good progress towards goals stating \"I think things are little bit better\", noting some progress towards goals. Pt notes continued difficulty with simple decision making, lack of appetite, nervousness. . Pt followed up with Dr Dewey Barbour for his [address] medication- including a dbl dosed of trintellix two weeks ago (now taking 20mg). Noting some minimal benefit to that medication change. Pt notes positive impact of socialization. Continued benefit of CPAP with sleep- is getting up and out of bed in the mornings. Took a fall last week- just lost footing. Got a lump on the head but reports no current concern. Colonoscopy a week ago- notes a combination of colitis and chrones. Goes back to psychiatrist next week. Reviewed breathing and other relaxation techniques. Pt denies SI/HI.                  O:  MSE:    Appearance    alert, cooperative  Appetite abnormal: lacks  Sleep disturbance No  Fatigue Yes  Loss of pleasure Yes  Impulsive behavior Yes  Speech    spontaneous, normal rate and normal volume  Mood    Mildly anxious  Affect    anxiety  Thought Content    intact  Thought Process    linear, goal directed and coherent  Associations    logical connections  Insight    Fair  Judgment    Intact  Orientation    oriented to person, place, time, and general circumstances  Memory    recent and remote memory intact  Attention/Concentration    intact  Morbid ideation No  Suicide Assessment    no suicidal ideation      History:    Medications:   Current Outpatient Prescriptions   Medication Sig Dispense Refill    Calcium Carb-Cholecalciferol (CALCIUM CARBONATE-VITAMIN D3 PO)

## 2018-10-30 ENCOUNTER — TELEPHONE (OUTPATIENT)
Dept: CARDIOLOGY CLINIC | Age: 68
End: 2018-10-30

## 2018-10-30 DIAGNOSIS — I48.91 ATRIAL FIBRILLATION, UNSPECIFIED TYPE (HCC): Primary | ICD-10-CM

## 2018-11-01 ENCOUNTER — OFFICE VISIT (OUTPATIENT)
Dept: CARDIOLOGY CLINIC | Age: 68
End: 2018-11-01
Payer: MEDICARE

## 2018-11-01 VITALS
DIASTOLIC BLOOD PRESSURE: 84 MMHG | WEIGHT: 237.2 LBS | HEART RATE: 87 BPM | HEIGHT: 72 IN | SYSTOLIC BLOOD PRESSURE: 138 MMHG | BODY MASS INDEX: 32.13 KG/M2

## 2018-11-01 DIAGNOSIS — I48.0 PAROXYSMAL ATRIAL FIBRILLATION (HCC): ICD-10-CM

## 2018-11-01 DIAGNOSIS — I49.5 SSS (SICK SINUS SYNDROME) (HCC): ICD-10-CM

## 2018-11-01 DIAGNOSIS — E11.69 DYSLIPIDEMIA ASSOCIATED WITH TYPE 2 DIABETES MELLITUS (HCC): ICD-10-CM

## 2018-11-01 DIAGNOSIS — I10 ESSENTIAL HYPERTENSION: ICD-10-CM

## 2018-11-01 DIAGNOSIS — I49.9 IRREGULAR HEART BEAT: Primary | ICD-10-CM

## 2018-11-01 DIAGNOSIS — E78.5 DYSLIPIDEMIA ASSOCIATED WITH TYPE 2 DIABETES MELLITUS (HCC): ICD-10-CM

## 2018-11-01 PROCEDURE — G8482 FLU IMMUNIZE ORDER/ADMIN: HCPCS | Performed by: INTERNAL MEDICINE

## 2018-11-01 PROCEDURE — 93000 ELECTROCARDIOGRAM COMPLETE: CPT | Performed by: INTERNAL MEDICINE

## 2018-11-01 PROCEDURE — 3044F HG A1C LEVEL LT 7.0%: CPT | Performed by: INTERNAL MEDICINE

## 2018-11-01 PROCEDURE — 99214 OFFICE O/P EST MOD 30 MIN: CPT | Performed by: INTERNAL MEDICINE

## 2018-11-01 PROCEDURE — 4040F PNEUMOC VAC/ADMIN/RCVD: CPT | Performed by: INTERNAL MEDICINE

## 2018-11-01 PROCEDURE — G8417 CALC BMI ABV UP PARAM F/U: HCPCS | Performed by: INTERNAL MEDICINE

## 2018-11-01 PROCEDURE — 1036F TOBACCO NON-USER: CPT | Performed by: INTERNAL MEDICINE

## 2018-11-01 PROCEDURE — 2022F DILAT RTA XM EVC RTNOPTHY: CPT | Performed by: INTERNAL MEDICINE

## 2018-11-01 PROCEDURE — 1123F ACP DISCUSS/DSCN MKR DOCD: CPT | Performed by: INTERNAL MEDICINE

## 2018-11-01 PROCEDURE — G8427 DOCREV CUR MEDS BY ELIG CLIN: HCPCS | Performed by: INTERNAL MEDICINE

## 2018-11-01 PROCEDURE — 3017F COLORECTAL CA SCREEN DOC REV: CPT | Performed by: INTERNAL MEDICINE

## 2018-11-01 PROCEDURE — 1101F PT FALLS ASSESS-DOCD LE1/YR: CPT | Performed by: INTERNAL MEDICINE

## 2018-11-01 RX ORDER — BUDESONIDE 9 MG/1
TABLET, FILM COATED, EXTENDED RELEASE ORAL DAILY
COMMUNITY
End: 2019-01-11 | Stop reason: ALTCHOICE

## 2018-11-01 NOTE — PROGRESS NOTES
cyanosis  Skin - normal coloration and turgor, no rashes, no suspicious skin lesions noted    Pacer site has healed. Orders Placed This Encounter   Procedures    EKG 12 Lead       ASSESSMENT:     Diagnosis Orders   1. Irregular heart beat  EKG 12 Lead   2. Paroxysmal atrial fibrillation (HCC)     3. SSS (sick sinus syndrome) ([facility] [address]     4. Essential hypertension     5. Dyslipidemia associated with type 2 diabetes mellitus ([facility]           PLAN:     Patient will need to continue to follow up with you for their general medical care     As always, aggressive risk factor modification is strongly recommended. We should adhere to the JNC VIII guidelines for HTN management and the NCEP ATP III guidelines for LDL-C management. Cardiac diet is always recommended with low fat, cholesterol, calories and sodium. Continue medications at current doses. Follow up with device clinic. EM. Check EKG next office visit. Start BBLocker    Start NOAC when [place] with GI. If not ok to start NOAC, then refer to [facility] for REESE closure device. Patient was advised and encouraged to check blood pressure at home or at a pharmacy, maintain a logbook, and also call us back if blood pressure are above the target ranges or if it is low. Patient clearly understands and agrees to the instructions. We will need to continue to monitor muscle and liver enzymes, BUN, CR, and electrolytes. Details of medical condition explained and patient was warned about adverse consequences of uncontrolled medical conditions and possible side effects of prescribed medications.

## 2018-11-05 ENCOUNTER — TELEPHONE (OUTPATIENT)
Dept: CARDIOLOGY CLINIC | Age: 68
End: 2018-11-05

## 2018-11-12 ENCOUNTER — OFFICE VISIT (OUTPATIENT)
Dept: BEHAVIORAL/MENTAL HEALTH CLINIC | Age: 68
End: 2018-11-12
Payer: MEDICARE

## 2018-11-12 DIAGNOSIS — F33.1 MAJOR DEPRESSIVE DISORDER, RECURRENT EPISODE, MODERATE (HCC): Primary | ICD-10-CM

## 2018-11-12 PROCEDURE — 90832 PSYTX W PT 30 MINUTES: CPT | Performed by: PSYCHOLOGIST

## 2018-11-12 PROCEDURE — 1036F TOBACCO NON-USER: CPT | Performed by: PSYCHOLOGIST

## 2018-11-12 ASSESSMENT — PATIENT HEALTH QUESTIONNAIRE - PHQ9
2. FEELING DOWN, DEPRESSED OR HOPELESS: 3
8. MOVING OR SPEAKING SO SLOWLY THAT OTHER PEOPLE COULD HAVE NOTICED. OR THE OPPOSITE, BEING SO FIGETY OR RESTLESS THAT YOU HAVE BEEN MOVING AROUND A LOT MORE THAN USUAL: 1
10. IF YOU CHECKED OFF ANY PROBLEMS, HOW DIFFICULT HAVE THESE PROBLEMS MADE IT FOR YOU TO DO YOUR WORK, TAKE CARE OF THINGS AT HOME, OR GET ALONG WITH OTHER PEOPLE: 1
SUM OF ALL RESPONSES TO PHQ QUESTIONS 1-9: 17
SUM OF ALL RESPONSES TO PHQ QUESTIONS 1-9: 17
SUM OF ALL RESPONSES TO PHQ9 QUESTIONS 1 & 2: 6
7. TROUBLE CONCENTRATING ON THINGS, SUCH AS READING THE NEWSPAPER OR WATCHING TELEVISION: 3
4. FEELING TIRED OR HAVING LITTLE ENERGY: 3
1. LITTLE INTEREST OR PLEASURE IN DOING THINGS: 3
6. FEELING BAD ABOUT YOURSELF - OR THAT YOU ARE A FAILURE OR HAVE LET YOURSELF OR YOUR FAMILY DOWN: 1
3. TROUBLE FALLING OR STAYING ASLEEP: 1
5. POOR APPETITE OR OVEREATING: 2
9. THOUGHTS THAT YOU WOULD BE BETTER OFF DEAD, OR OF HURTING YOURSELF: 0

## 2018-11-18 RX ORDER — FENOFIBRATE 145 MG/1
TABLET, COATED ORAL
Qty: 30 TABLET | Refills: 5 | Status: SHIPPED | OUTPATIENT
Start: 2018-11-18 | End: 2019-01-08 | Stop reason: SDUPTHER

## 2018-11-27 ENCOUNTER — OFFICE VISIT (OUTPATIENT)
Dept: FAMILY MEDICINE CLINIC | Age: 68
End: 2018-11-27
Payer: MEDICARE

## 2018-11-27 VITALS
RESPIRATION RATE: 18 BRPM | BODY MASS INDEX: 31.56 KG/M2 | OXYGEN SATURATION: 95 % | HEART RATE: 71 BPM | HEIGHT: 72 IN | DIASTOLIC BLOOD PRESSURE: 66 MMHG | SYSTOLIC BLOOD PRESSURE: 106 MMHG | TEMPERATURE: 96.5 F | WEIGHT: 233 LBS

## 2018-11-27 DIAGNOSIS — E11.9 TYPE 2 DIABETES MELLITUS WITHOUT COMPLICATION, WITHOUT LONG-TERM CURRENT USE OF INSULIN (HCC): ICD-10-CM

## 2018-11-27 DIAGNOSIS — K50.111 CROHN'S DISEASE OF COLON WITH RECTAL BLEEDING (HCC): ICD-10-CM

## 2018-11-27 DIAGNOSIS — E78.5 DYSLIPIDEMIA ASSOCIATED WITH TYPE 2 DIABETES MELLITUS (HCC): ICD-10-CM

## 2018-11-27 DIAGNOSIS — K51.911 ULCERATIVE COLITIS WITH RECTAL BLEEDING, UNSPECIFIED LOCATION (HCC): ICD-10-CM

## 2018-11-27 DIAGNOSIS — I49.5 SSS (SICK SINUS SYNDROME) (HCC): ICD-10-CM

## 2018-11-27 DIAGNOSIS — I10 ESSENTIAL HYPERTENSION: ICD-10-CM

## 2018-11-27 DIAGNOSIS — E11.69 DYSLIPIDEMIA ASSOCIATED WITH TYPE 2 DIABETES MELLITUS (HCC): ICD-10-CM

## 2018-11-27 DIAGNOSIS — F33.1 MAJOR DEPRESSIVE DISORDER, RECURRENT EPISODE, MODERATE (HCC): Primary | ICD-10-CM

## 2018-11-27 LAB
ALBUMIN SERPL-MCNC: 3.7 G/DL (ref 3.9–4.9)
ALP BLD-CCNC: 61 U/L (ref 35–104)
ALT SERPL-CCNC: 16 U/L (ref 0–41)
ANION GAP SERPL CALCULATED.3IONS-SCNC: 10 MEQ/L (ref 7–13)
AST SERPL-CCNC: 14 U/L (ref 0–40)
BASOPHILS ABSOLUTE: 0.1 K/UL (ref 0–0.2)
BASOPHILS RELATIVE PERCENT: 0.6 %
BILIRUB SERPL-MCNC: 0.3 MG/DL (ref 0–1.2)
BUN BLDV-MCNC: 14 MG/DL (ref 8–23)
CALCIUM SERPL-MCNC: 10.9 MG/DL (ref 8.6–10.2)
CHLORIDE BLD-SCNC: 103 MEQ/L (ref 98–107)
CO2: 28 MEQ/L (ref 22–29)
CREAT SERPL-MCNC: 1.05 MG/DL (ref 0.7–1.2)
EOSINOPHILS ABSOLUTE: 0.5 K/UL (ref 0–0.7)
EOSINOPHILS RELATIVE PERCENT: 5.9 %
GFR AFRICAN AMERICAN: >60
GFR NON-AFRICAN AMERICAN: >60
GLOBULIN: 3.1 G/DL (ref 2.3–3.5)
GLUCOSE BLD-MCNC: 104 MG/DL (ref 74–109)
HCT VFR BLD CALC: 40.6 % (ref 42–52)
HEMOGLOBIN: 13.4 G/DL (ref 14–18)
LYMPHOCYTES ABSOLUTE: 1.3 K/UL (ref 1–4.8)
LYMPHOCYTES RELATIVE PERCENT: 13.8 %
MCH RBC QN AUTO: 29.5 PG (ref 27–31.3)
MCHC RBC AUTO-ENTMCNC: 33.2 % (ref 33–37)
MCV RBC AUTO: 89 FL (ref 80–100)
MONOCYTES ABSOLUTE: 0.7 K/UL (ref 0.2–0.8)
MONOCYTES RELATIVE PERCENT: 7.2 %
NEUTROPHILS ABSOLUTE: 6.6 K/UL (ref 1.4–6.5)
NEUTROPHILS RELATIVE PERCENT: 72.5 %
PDW BLD-RTO: 15.3 % (ref 11.5–14.5)
PLATELET # BLD: 237 K/UL (ref 130–400)
POTASSIUM SERPL-SCNC: 4.7 MEQ/L (ref 3.5–5.1)
RBC # BLD: 4.56 M/UL (ref 4.7–6.1)
SODIUM BLD-SCNC: 141 MEQ/L (ref 132–144)
TOTAL PROTEIN: 6.8 G/DL (ref 6.4–8.1)
WBC # BLD: 9.2 K/UL (ref 4.8–10.8)

## 2018-11-27 PROCEDURE — 1101F PT FALLS ASSESS-DOCD LE1/YR: CPT | Performed by: FAMILY MEDICINE

## 2018-11-27 PROCEDURE — 1036F TOBACCO NON-USER: CPT | Performed by: FAMILY MEDICINE

## 2018-11-27 PROCEDURE — G8482 FLU IMMUNIZE ORDER/ADMIN: HCPCS | Performed by: FAMILY MEDICINE

## 2018-11-27 PROCEDURE — G8417 CALC BMI ABV UP PARAM F/U: HCPCS | Performed by: FAMILY MEDICINE

## 2018-11-27 PROCEDURE — G8427 DOCREV CUR MEDS BY ELIG CLIN: HCPCS | Performed by: FAMILY MEDICINE

## 2018-11-27 PROCEDURE — 99214 OFFICE O/P EST MOD 30 MIN: CPT | Performed by: FAMILY MEDICINE

## 2018-11-27 PROCEDURE — 2022F DILAT RTA XM EVC RTNOPTHY: CPT | Performed by: FAMILY MEDICINE

## 2018-11-27 PROCEDURE — 4040F PNEUMOC VAC/ADMIN/RCVD: CPT | Performed by: FAMILY MEDICINE

## 2018-11-27 PROCEDURE — 1123F ACP DISCUSS/DSCN MKR DOCD: CPT | Performed by: FAMILY MEDICINE

## 2018-11-27 PROCEDURE — 3044F HG A1C LEVEL LT 7.0%: CPT | Performed by: FAMILY MEDICINE

## 2018-11-27 PROCEDURE — 3017F COLORECTAL CA SCREEN DOC REV: CPT | Performed by: FAMILY MEDICINE

## 2018-11-27 RX ORDER — MESALAMINE 1000 MG/1
1000 SUPPOSITORY RECTAL NIGHTLY PRN
COMMUNITY
End: 2019-01-11 | Stop reason: ALTCHOICE

## 2018-11-27 RX ORDER — LOPERAMIDE HYDROCHLORIDE 2 MG/1
2 CAPSULE ORAL PRN
COMMUNITY
End: 2019-10-01 | Stop reason: ALTCHOICE

## 2018-11-27 ASSESSMENT — ENCOUNTER SYMPTOMS
RHINORRHEA: 0
CHEST TIGHTNESS: 0
TROUBLE SWALLOWING: 0
VOMITING: 0
CONSTIPATION: 0
BLOOD IN STOOL: 1
WHEEZING: 0
SHORTNESS OF BREATH: 0
VOICE CHANGE: 0
SORE THROAT: 0
NAUSEA: 0
COUGH: 0
SINUS PAIN: 0
ABDOMINAL PAIN: 1
DIARRHEA: 0
COLOR CHANGE: 0

## 2018-11-29 ENCOUNTER — TELEPHONE (OUTPATIENT)
Dept: FAMILY MEDICINE CLINIC | Age: 68
End: 2018-11-29

## 2019-01-08 RX ORDER — FENOFIBRATE 145 MG/1
TABLET, COATED ORAL
Qty: 30 TABLET | Refills: 5 | Status: SHIPPED | OUTPATIENT
Start: 2019-01-08 | End: 2019-01-24 | Stop reason: SDUPTHER

## 2019-01-08 RX ORDER — CANDESARTAN 16 MG/1
16 TABLET ORAL DAILY
Qty: 30 TABLET | Refills: 5 | Status: SHIPPED | OUTPATIENT
Start: 2019-01-08 | End: 2019-01-24 | Stop reason: SDUPTHER

## 2019-01-11 ENCOUNTER — HOSPITAL ENCOUNTER (OUTPATIENT)
Dept: GENERAL RADIOLOGY | Age: 69
Discharge: HOME | End: 2019-01-13
Payer: MEDICARE

## 2019-01-11 ENCOUNTER — OFFICE VISIT (OUTPATIENT)
Dept: FAMILY MEDICINE CLINIC | Age: 69
End: 2019-01-11
Payer: MEDICARE

## 2019-01-11 VITALS
SYSTOLIC BLOOD PRESSURE: 108 MMHG | OXYGEN SATURATION: 95 % | TEMPERATURE: 97.6 F | HEIGHT: 72 IN | HEART RATE: 71 BPM | RESPIRATION RATE: 20 BRPM | DIASTOLIC BLOOD PRESSURE: 66 MMHG | BODY MASS INDEX: 33.72 KG/M2 | WEIGHT: 249 LBS

## 2019-01-11 DIAGNOSIS — J20.9 ACUTE BRONCHITIS, UNSPECIFIED ORGANISM: ICD-10-CM

## 2019-01-11 DIAGNOSIS — J01.90 ACUTE SINUSITIS, RECURRENCE NOT SPECIFIED, UNSPECIFIED LOCATION: Primary | ICD-10-CM

## 2019-01-11 PROCEDURE — 3017F COLORECTAL CA SCREEN DOC REV: CPT | Performed by: INTERNAL MEDICINE

## 2019-01-11 PROCEDURE — 4040F PNEUMOC VAC/ADMIN/RCVD: CPT | Performed by: INTERNAL MEDICINE

## 2019-01-11 PROCEDURE — G8417 CALC BMI ABV UP PARAM F/U: HCPCS | Performed by: INTERNAL MEDICINE

## 2019-01-11 PROCEDURE — 1123F ACP DISCUSS/DSCN MKR DOCD: CPT | Performed by: INTERNAL MEDICINE

## 2019-01-11 PROCEDURE — 1101F PT FALLS ASSESS-DOCD LE1/YR: CPT | Performed by: INTERNAL MEDICINE

## 2019-01-11 PROCEDURE — 71046 X-RAY EXAM CHEST 2 VIEWS: CPT

## 2019-01-11 PROCEDURE — G8427 DOCREV CUR MEDS BY ELIG CLIN: HCPCS | Performed by: INTERNAL MEDICINE

## 2019-01-11 PROCEDURE — G8482 FLU IMMUNIZE ORDER/ADMIN: HCPCS | Performed by: INTERNAL MEDICINE

## 2019-01-11 PROCEDURE — 1036F TOBACCO NON-USER: CPT | Performed by: INTERNAL MEDICINE

## 2019-01-11 PROCEDURE — 99213 OFFICE O/P EST LOW 20 MIN: CPT | Performed by: INTERNAL MEDICINE

## 2019-01-11 RX ORDER — BENZONATATE 200 MG/1
200 CAPSULE ORAL 3 TIMES DAILY PRN
Qty: 30 CAPSULE | Refills: 0 | Status: SHIPPED | OUTPATIENT
Start: 2019-01-11 | End: 2019-01-18

## 2019-01-11 RX ORDER — LEVOFLOXACIN 750 MG/1
750 TABLET ORAL DAILY
Qty: 5 TABLET | Refills: 0 | Status: SHIPPED | OUTPATIENT
Start: 2019-01-11 | End: 2019-01-16

## 2019-01-11 RX ORDER — PREDNISONE 10 MG/1
TABLET ORAL
Refills: 0 | COMMUNITY
Start: 2018-11-30 | End: 2019-02-19 | Stop reason: ALTCHOICE

## 2019-01-12 ASSESSMENT — ENCOUNTER SYMPTOMS
SINUS PRESSURE: 0
SINUS PAIN: 0
RHINORRHEA: 0
DIARRHEA: 0
WHEEZING: 0
COUGH: 1
SORE THROAT: 1
VOMITING: 0
SHORTNESS OF BREATH: 0
ABDOMINAL PAIN: 0
CHEST TIGHTNESS: 1
NAUSEA: 0

## 2019-01-25 RX ORDER — FENOFIBRATE 145 MG/1
TABLET, COATED ORAL
Qty: 90 TABLET | Refills: 3 | Status: SHIPPED | OUTPATIENT
Start: 2019-01-25 | End: 2022-09-29 | Stop reason: ALTCHOICE

## 2019-01-25 RX ORDER — CANDESARTAN 16 MG/1
16 TABLET ORAL DAILY
Qty: 90 TABLET | Refills: 3 | Status: SHIPPED | OUTPATIENT
Start: 2019-01-25 | End: 2019-03-05 | Stop reason: ALTCHOICE

## 2019-02-19 ENCOUNTER — HOSPITAL ENCOUNTER (OUTPATIENT)
Dept: GENERAL RADIOLOGY | Age: 69
Discharge: HOME | End: 2019-02-21
Payer: MEDICARE

## 2019-02-19 ENCOUNTER — OFFICE VISIT (OUTPATIENT)
Dept: FAMILY MEDICINE CLINIC | Age: 69
End: 2019-02-19
Payer: MEDICARE

## 2019-02-19 VITALS
OXYGEN SATURATION: 97 % | HEART RATE: 87 BPM | HEIGHT: 72 IN | SYSTOLIC BLOOD PRESSURE: 148 MMHG | BODY MASS INDEX: 37.08 KG/M2 | DIASTOLIC BLOOD PRESSURE: 88 MMHG | WEIGHT: 273.8 LBS | TEMPERATURE: 98.1 F

## 2019-02-19 DIAGNOSIS — I48.0 PAROXYSMAL ATRIAL FIBRILLATION (HCC): ICD-10-CM

## 2019-02-19 DIAGNOSIS — Z13.220 SCREENING, LIPID: ICD-10-CM

## 2019-02-19 DIAGNOSIS — M17.11 PRIMARY OSTEOARTHRITIS OF RIGHT KNEE: ICD-10-CM

## 2019-02-19 DIAGNOSIS — E11.9 TYPE 2 DIABETES MELLITUS WITHOUT COMPLICATION, WITHOUT LONG-TERM CURRENT USE OF INSULIN (HCC): ICD-10-CM

## 2019-02-19 DIAGNOSIS — I10 ESSENTIAL HYPERTENSION: Primary | ICD-10-CM

## 2019-02-19 PROCEDURE — 3017F COLORECTAL CA SCREEN DOC REV: CPT | Performed by: FAMILY MEDICINE

## 2019-02-19 PROCEDURE — 99214 OFFICE O/P EST MOD 30 MIN: CPT | Performed by: FAMILY MEDICINE

## 2019-02-19 PROCEDURE — 3046F HEMOGLOBIN A1C LEVEL >9.0%: CPT | Performed by: FAMILY MEDICINE

## 2019-02-19 PROCEDURE — 4040F PNEUMOC VAC/ADMIN/RCVD: CPT | Performed by: FAMILY MEDICINE

## 2019-02-19 PROCEDURE — G8482 FLU IMMUNIZE ORDER/ADMIN: HCPCS | Performed by: FAMILY MEDICINE

## 2019-02-19 PROCEDURE — 1123F ACP DISCUSS/DSCN MKR DOCD: CPT | Performed by: FAMILY MEDICINE

## 2019-02-19 PROCEDURE — G8427 DOCREV CUR MEDS BY ELIG CLIN: HCPCS | Performed by: FAMILY MEDICINE

## 2019-02-19 PROCEDURE — 2022F DILAT RTA XM EVC RTNOPTHY: CPT | Performed by: FAMILY MEDICINE

## 2019-02-19 PROCEDURE — 73562 X-RAY EXAM OF KNEE 3: CPT

## 2019-02-19 PROCEDURE — G8417 CALC BMI ABV UP PARAM F/U: HCPCS | Performed by: FAMILY MEDICINE

## 2019-02-19 PROCEDURE — 1036F TOBACCO NON-USER: CPT | Performed by: FAMILY MEDICINE

## 2019-02-19 PROCEDURE — 1101F PT FALLS ASSESS-DOCD LE1/YR: CPT | Performed by: FAMILY MEDICINE

## 2019-02-19 RX ORDER — CANDESARTAN CILEXETIL AND HYDROCHLOROTHIAZIDE 16; 12.5 MG/1; MG/1
1 TABLET ORAL DAILY
Qty: 90 TABLET | Refills: 3 | Status: SHIPPED | OUTPATIENT
Start: 2019-02-19 | End: 2019-10-01 | Stop reason: ALTCHOICE

## 2019-02-19 RX ORDER — MULTIVIT-MIN/IRON/FOLIC ACID/K 18-600-40
1 CAPSULE ORAL DAILY
COMMUNITY

## 2019-02-19 ASSESSMENT — ENCOUNTER SYMPTOMS
NAUSEA: 0
WHEEZING: 0
CONSTIPATION: 0
VOICE CHANGE: 0
TROUBLE SWALLOWING: 0
ABDOMINAL PAIN: 0
RHINORRHEA: 0
SINUS PAIN: 0
CHEST TIGHTNESS: 0
VOMITING: 0
DIARRHEA: 0
COLOR CHANGE: 0
BLOOD IN STOOL: 0
SORE THROAT: 0
SHORTNESS OF BREATH: 0
COUGH: 0

## 2019-02-25 ENCOUNTER — TELEPHONE (OUTPATIENT)
Dept: FAMILY MEDICINE CLINIC | Age: 69
End: 2019-02-25

## 2019-02-26 ENCOUNTER — TELEPHONE (OUTPATIENT)
Dept: ADMINISTRATIVE | Age: 69
End: 2019-02-26

## 2019-02-27 DIAGNOSIS — E11.9 TYPE 2 DIABETES MELLITUS WITHOUT COMPLICATION, WITHOUT LONG-TERM CURRENT USE OF INSULIN (HCC): ICD-10-CM

## 2019-02-27 DIAGNOSIS — Z13.220 SCREENING, LIPID: ICD-10-CM

## 2019-02-27 LAB
CHOLESTEROL, TOTAL: 170 MG/DL (ref 0–199)
HBA1C MFR BLD: 6.3 % (ref 4.8–5.9)
HDLC SERPL-MCNC: 41 MG/DL (ref 40–59)
LDL CHOLESTEROL CALCULATED: 103 MG/DL (ref 0–129)
TRIGL SERPL-MCNC: 132 MG/DL (ref 0–150)

## 2019-02-28 ENCOUNTER — TELEPHONE (OUTPATIENT)
Dept: FAMILY MEDICINE CLINIC | Age: 69
End: 2019-02-28

## 2019-02-28 DIAGNOSIS — M17.11 TRICOMPARTMENT OSTEOARTHRITIS OF RIGHT KNEE: Primary | ICD-10-CM

## 2019-03-05 ENCOUNTER — OFFICE VISIT (OUTPATIENT)
Dept: CARDIOLOGY CLINIC | Age: 69
End: 2019-03-05
Payer: MEDICARE

## 2019-03-05 VITALS
HEIGHT: 72 IN | DIASTOLIC BLOOD PRESSURE: 80 MMHG | HEART RATE: 82 BPM | WEIGHT: 271.6 LBS | SYSTOLIC BLOOD PRESSURE: 110 MMHG | BODY MASS INDEX: 36.79 KG/M2

## 2019-03-05 DIAGNOSIS — I48.0 PAROXYSMAL ATRIAL FIBRILLATION (HCC): ICD-10-CM

## 2019-03-05 DIAGNOSIS — E78.5 DYSLIPIDEMIA ASSOCIATED WITH TYPE 2 DIABETES MELLITUS (HCC): ICD-10-CM

## 2019-03-05 DIAGNOSIS — I49.9 IRREGULAR HEART BEAT: Primary | ICD-10-CM

## 2019-03-05 DIAGNOSIS — Z95.0 PRESENCE OF PERMANENT CARDIAC PACEMAKER: ICD-10-CM

## 2019-03-05 DIAGNOSIS — E11.69 DYSLIPIDEMIA ASSOCIATED WITH TYPE 2 DIABETES MELLITUS (HCC): ICD-10-CM

## 2019-03-05 DIAGNOSIS — G47.33 OSA ON CPAP: ICD-10-CM

## 2019-03-05 DIAGNOSIS — Z99.89 OSA ON CPAP: ICD-10-CM

## 2019-03-05 DIAGNOSIS — I49.5 SSS (SICK SINUS SYNDROME) (HCC): ICD-10-CM

## 2019-03-05 DIAGNOSIS — I10 ESSENTIAL HYPERTENSION: ICD-10-CM

## 2019-03-05 PROCEDURE — G8417 CALC BMI ABV UP PARAM F/U: HCPCS | Performed by: INTERNAL MEDICINE

## 2019-03-05 PROCEDURE — 3044F HG A1C LEVEL LT 7.0%: CPT | Performed by: INTERNAL MEDICINE

## 2019-03-05 PROCEDURE — G8427 DOCREV CUR MEDS BY ELIG CLIN: HCPCS | Performed by: INTERNAL MEDICINE

## 2019-03-05 PROCEDURE — 3017F COLORECTAL CA SCREEN DOC REV: CPT | Performed by: INTERNAL MEDICINE

## 2019-03-05 PROCEDURE — 1101F PT FALLS ASSESS-DOCD LE1/YR: CPT | Performed by: INTERNAL MEDICINE

## 2019-03-05 PROCEDURE — 93000 ELECTROCARDIOGRAM COMPLETE: CPT | Performed by: INTERNAL MEDICINE

## 2019-03-05 PROCEDURE — 99214 OFFICE O/P EST MOD 30 MIN: CPT | Performed by: INTERNAL MEDICINE

## 2019-03-05 PROCEDURE — 4040F PNEUMOC VAC/ADMIN/RCVD: CPT | Performed by: INTERNAL MEDICINE

## 2019-03-05 PROCEDURE — 2022F DILAT RTA XM EVC RTNOPTHY: CPT | Performed by: INTERNAL MEDICINE

## 2019-03-05 PROCEDURE — G8482 FLU IMMUNIZE ORDER/ADMIN: HCPCS | Performed by: INTERNAL MEDICINE

## 2019-03-05 PROCEDURE — 1123F ACP DISCUSS/DSCN MKR DOCD: CPT | Performed by: INTERNAL MEDICINE

## 2019-03-05 PROCEDURE — 1036F TOBACCO NON-USER: CPT | Performed by: INTERNAL MEDICINE

## 2019-03-10 ENCOUNTER — PATIENT MESSAGE (OUTPATIENT)
Dept: FAMILY MEDICINE CLINIC | Age: 69
End: 2019-03-10

## 2019-05-24 ENCOUNTER — OFFICE VISIT (OUTPATIENT)
Dept: FAMILY MEDICINE CLINIC | Age: 69
End: 2019-05-24
Payer: MEDICARE

## 2019-05-24 VITALS
BODY MASS INDEX: 37.79 KG/M2 | DIASTOLIC BLOOD PRESSURE: 62 MMHG | RESPIRATION RATE: 14 BRPM | WEIGHT: 279 LBS | HEART RATE: 64 BPM | OXYGEN SATURATION: 95 % | HEIGHT: 72 IN | SYSTOLIC BLOOD PRESSURE: 92 MMHG | TEMPERATURE: 97.7 F

## 2019-05-24 DIAGNOSIS — R53.83 FATIGUE, UNSPECIFIED TYPE: ICD-10-CM

## 2019-05-24 DIAGNOSIS — R11.0 NAUSEA: ICD-10-CM

## 2019-05-24 DIAGNOSIS — R19.7 DIARRHEA, UNSPECIFIED TYPE: Primary | ICD-10-CM

## 2019-05-24 PROCEDURE — G8427 DOCREV CUR MEDS BY ELIG CLIN: HCPCS | Performed by: NURSE PRACTITIONER

## 2019-05-24 PROCEDURE — 99213 OFFICE O/P EST LOW 20 MIN: CPT | Performed by: NURSE PRACTITIONER

## 2019-05-24 PROCEDURE — 3017F COLORECTAL CA SCREEN DOC REV: CPT | Performed by: NURSE PRACTITIONER

## 2019-05-24 PROCEDURE — G8417 CALC BMI ABV UP PARAM F/U: HCPCS | Performed by: NURSE PRACTITIONER

## 2019-05-24 PROCEDURE — 1123F ACP DISCUSS/DSCN MKR DOCD: CPT | Performed by: NURSE PRACTITIONER

## 2019-05-24 PROCEDURE — 4040F PNEUMOC VAC/ADMIN/RCVD: CPT | Performed by: NURSE PRACTITIONER

## 2019-05-24 PROCEDURE — 1036F TOBACCO NON-USER: CPT | Performed by: NURSE PRACTITIONER

## 2019-05-24 RX ORDER — CANDESARTAN 16 MG/1
TABLET ORAL
COMMUNITY
Start: 2019-05-03 | End: 2021-08-03

## 2019-05-24 RX ORDER — PREDNISONE 1 MG/1
TABLET ORAL
Refills: 0 | COMMUNITY
Start: 2019-04-08 | End: 2019-10-01 | Stop reason: ALTCHOICE

## 2019-05-24 RX ORDER — ONDANSETRON 4 MG/1
4 TABLET, ORALLY DISINTEGRATING ORAL EVERY 8 HOURS PRN
Qty: 30 TABLET | Refills: 0 | Status: SHIPPED | OUTPATIENT
Start: 2019-05-24 | End: 2019-10-01 | Stop reason: ALTCHOICE

## 2019-05-24 RX ORDER — FLUOCINONIDE GEL 0.5 MG/G
GEL TOPICAL
Refills: 1 | COMMUNITY
Start: 2019-03-25 | End: 2019-05-24 | Stop reason: ALTCHOICE

## 2019-05-24 RX ORDER — MIRTAZAPINE 15 MG/1
TABLET, FILM COATED ORAL
Refills: 2 | COMMUNITY
Start: 2019-05-09 | End: 2019-10-01 | Stop reason: ALTCHOICE

## 2019-05-24 RX ORDER — SERTRALINE HYDROCHLORIDE 100 MG/1
TABLET, FILM COATED ORAL
Refills: 2 | COMMUNITY
Start: 2019-04-18 | End: 2019-10-01 | Stop reason: ALTCHOICE

## 2019-05-24 RX ORDER — BUSPIRONE HYDROCHLORIDE 5 MG/1
TABLET ORAL
Refills: 2 | COMMUNITY
Start: 2019-04-29 | End: 2019-10-01 | Stop reason: ALTCHOICE

## 2019-05-25 DIAGNOSIS — R53.83 FATIGUE, UNSPECIFIED TYPE: ICD-10-CM

## 2019-05-25 DIAGNOSIS — R19.7 DIARRHEA, UNSPECIFIED TYPE: ICD-10-CM

## 2019-05-25 LAB
ALBUMIN SERPL-MCNC: 3.8 G/DL (ref 3.5–4.6)
ALP BLD-CCNC: 44 U/L (ref 35–104)
ALT SERPL-CCNC: 16 U/L (ref 0–41)
ANION GAP SERPL CALCULATED.3IONS-SCNC: 13 MEQ/L (ref 9–15)
AST SERPL-CCNC: 15 U/L (ref 0–40)
BASOPHILS ABSOLUTE: 0.1 K/UL (ref 0–0.2)
BASOPHILS RELATIVE PERCENT: 0.7 %
BILIRUB SERPL-MCNC: 0.3 MG/DL (ref 0.2–0.7)
BUN BLDV-MCNC: 22 MG/DL (ref 8–23)
CALCIUM SERPL-MCNC: 10.1 MG/DL (ref 8.5–9.9)
CHLORIDE BLD-SCNC: 106 MEQ/L (ref 95–107)
CO2: 21 MEQ/L (ref 20–31)
CREAT SERPL-MCNC: 1.42 MG/DL (ref 0.7–1.2)
EOSINOPHILS ABSOLUTE: 0.1 K/UL (ref 0–0.7)
EOSINOPHILS RELATIVE PERCENT: 1.4 %
GFR AFRICAN AMERICAN: >60
GFR NON-AFRICAN AMERICAN: 49.6
GLOBULIN: 3.2 G/DL (ref 2.3–3.5)
GLUCOSE BLD-MCNC: 108 MG/DL (ref 70–99)
HCT VFR BLD CALC: 41.3 % (ref 42–52)
HEMOGLOBIN: 14.1 G/DL (ref 14–18)
LYMPHOCYTES ABSOLUTE: 1.6 K/UL (ref 1–4.8)
LYMPHOCYTES RELATIVE PERCENT: 21.7 %
MCH RBC QN AUTO: 27.7 PG (ref 27–31.3)
MCHC RBC AUTO-ENTMCNC: 34 % (ref 33–37)
MCV RBC AUTO: 81.3 FL (ref 80–100)
MONOCYTES ABSOLUTE: 0.6 K/UL (ref 0.2–0.8)
MONOCYTES RELATIVE PERCENT: 7.4 %
NEUTROPHILS ABSOLUTE: 5.2 K/UL (ref 1.4–6.5)
NEUTROPHILS RELATIVE PERCENT: 68.8 %
PDW BLD-RTO: 16.6 % (ref 11.5–14.5)
PLATELET # BLD: 214 K/UL (ref 130–400)
POTASSIUM SERPL-SCNC: 4.1 MEQ/L (ref 3.4–4.9)
RBC # BLD: 5.08 M/UL (ref 4.7–6.1)
SODIUM BLD-SCNC: 140 MEQ/L (ref 135–144)
TOTAL PROTEIN: 7 G/DL (ref 6.3–8)
WBC # BLD: 7.6 K/UL (ref 4.8–10.8)

## 2019-05-30 DIAGNOSIS — R79.89 ELEVATED SERUM CREATININE: Primary | ICD-10-CM

## 2019-07-14 ASSESSMENT — ENCOUNTER SYMPTOMS
BELCHING: 0
ANAL BLEEDING: 0
WHEEZING: 0
HEMATOCHEZIA: 0
ABDOMINAL PAIN: 1
CHEST TIGHTNESS: 0
ABDOMINAL DISTENTION: 0
VOMITING: 0
FLATUS: 0
NAUSEA: 1
BLOOD IN STOOL: 0
COUGH: 0
SHORTNESS OF BREATH: 0
CONSTIPATION: 0
DIARRHEA: 1

## 2019-09-23 ENCOUNTER — TELEPHONE (OUTPATIENT)
Dept: ADMINISTRATIVE | Age: 69
End: 2019-09-23

## 2019-09-23 NOTE — TELEPHONE ENCOUNTER
Spoke with patient. The Pt called to share that he has followed  Dr Kan Rule to [facility].   [facility]

## 2019-10-01 ENCOUNTER — OFFICE VISIT (OUTPATIENT)
Dept: CARDIOLOGY CLINIC | Age: 69
End: 2019-10-01
Payer: MEDICARE

## 2019-10-01 VITALS
DIASTOLIC BLOOD PRESSURE: 80 MMHG | SYSTOLIC BLOOD PRESSURE: 130 MMHG | WEIGHT: 271 LBS | HEART RATE: 72 BPM | HEIGHT: 72 IN | BODY MASS INDEX: 36.7 KG/M2

## 2019-10-01 DIAGNOSIS — I10 ESSENTIAL HYPERTENSION: ICD-10-CM

## 2019-10-01 DIAGNOSIS — Z23 NEED FOR INFLUENZA VACCINATION: ICD-10-CM

## 2019-10-01 DIAGNOSIS — E78.5 DYSLIPIDEMIA ASSOCIATED WITH TYPE 2 DIABETES MELLITUS (HCC): ICD-10-CM

## 2019-10-01 DIAGNOSIS — Z95.0 PRESENCE OF PERMANENT CARDIAC PACEMAKER: ICD-10-CM

## 2019-10-01 DIAGNOSIS — I48.0 PAROXYSMAL ATRIAL FIBRILLATION (HCC): Primary | ICD-10-CM

## 2019-10-01 DIAGNOSIS — I49.5 SSS (SICK SINUS SYNDROME) (HCC): ICD-10-CM

## 2019-10-01 DIAGNOSIS — G47.33 OSA ON CPAP: ICD-10-CM

## 2019-10-01 DIAGNOSIS — Z99.89 OSA ON CPAP: ICD-10-CM

## 2019-10-01 DIAGNOSIS — E11.69 DYSLIPIDEMIA ASSOCIATED WITH TYPE 2 DIABETES MELLITUS (HCC): ICD-10-CM

## 2019-10-01 PROCEDURE — G0008 ADMIN INFLUENZA VIRUS VAC: HCPCS | Performed by: INTERNAL MEDICINE

## 2019-10-01 PROCEDURE — 1036F TOBACCO NON-USER: CPT | Performed by: INTERNAL MEDICINE

## 2019-10-01 PROCEDURE — 3017F COLORECTAL CA SCREEN DOC REV: CPT | Performed by: INTERNAL MEDICINE

## 2019-10-01 PROCEDURE — G8427 DOCREV CUR MEDS BY ELIG CLIN: HCPCS | Performed by: INTERNAL MEDICINE

## 2019-10-01 PROCEDURE — G8482 FLU IMMUNIZE ORDER/ADMIN: HCPCS | Performed by: INTERNAL MEDICINE

## 2019-10-01 PROCEDURE — 4040F PNEUMOC VAC/ADMIN/RCVD: CPT | Performed by: INTERNAL MEDICINE

## 2019-10-01 PROCEDURE — 90653 IIV ADJUVANT VACCINE IM: CPT | Performed by: INTERNAL MEDICINE

## 2019-10-01 PROCEDURE — G8417 CALC BMI ABV UP PARAM F/U: HCPCS | Performed by: INTERNAL MEDICINE

## 2019-10-01 PROCEDURE — 2022F DILAT RTA XM EVC RTNOPTHY: CPT | Performed by: INTERNAL MEDICINE

## 2019-10-01 PROCEDURE — 1123F ACP DISCUSS/DSCN MKR DOCD: CPT | Performed by: INTERNAL MEDICINE

## 2019-10-01 PROCEDURE — 99213 OFFICE O/P EST LOW 20 MIN: CPT | Performed by: INTERNAL MEDICINE

## 2019-10-01 PROCEDURE — 3044F HG A1C LEVEL LT 7.0%: CPT | Performed by: INTERNAL MEDICINE

## 2019-10-01 PROCEDURE — 93000 ELECTROCARDIOGRAM COMPLETE: CPT | Performed by: INTERNAL MEDICINE

## 2019-10-01 RX ORDER — BUPROPION HYDROCHLORIDE 300 MG/1
300 TABLET ORAL EVERY MORNING
COMMUNITY
End: 2022-09-29

## 2019-10-17 ENCOUNTER — NURSE ONLY (OUTPATIENT)
Dept: OTHER | Facility: CLINIC | Age: 69
End: 2019-10-17

## 2020-02-27 ENCOUNTER — CARE COORDINATION (OUTPATIENT)
Dept: CARE COORDINATION | Age: 70
End: 2020-02-27

## 2020-03-05 ENCOUNTER — TELEPHONE (OUTPATIENT)
Dept: PRIMARY CARE CLINIC | Age: 70
End: 2020-03-05

## 2020-03-05 NOTE — TELEPHONE ENCOUNTER
Day Bianca is overdue for Hemoglobin A1C check. This should be completed every year and has not been completed in over one year.

## 2020-03-09 ENCOUNTER — OFFICE VISIT (OUTPATIENT)
Dept: PRIMARY CARE CLINIC | Age: 70
End: 2020-03-09
Payer: MEDICARE

## 2020-03-09 VITALS
OXYGEN SATURATION: 95 % | HEART RATE: 83 BPM | TEMPERATURE: 98.2 F | RESPIRATION RATE: 12 BRPM | BODY MASS INDEX: 37.93 KG/M2 | WEIGHT: 280 LBS | HEIGHT: 72 IN | SYSTOLIC BLOOD PRESSURE: 138 MMHG | DIASTOLIC BLOOD PRESSURE: 78 MMHG

## 2020-03-09 LAB — HBA1C MFR BLD: 7 %

## 2020-03-09 PROCEDURE — 2022F DILAT RTA XM EVC RTNOPTHY: CPT | Performed by: NURSE PRACTITIONER

## 2020-03-09 PROCEDURE — 83036 HEMOGLOBIN GLYCOSYLATED A1C: CPT | Performed by: NURSE PRACTITIONER

## 2020-03-09 PROCEDURE — 4040F PNEUMOC VAC/ADMIN/RCVD: CPT | Performed by: NURSE PRACTITIONER

## 2020-03-09 PROCEDURE — 3051F HG A1C>EQUAL 7.0%<8.0%: CPT | Performed by: NURSE PRACTITIONER

## 2020-03-09 PROCEDURE — 1123F ACP DISCUSS/DSCN MKR DOCD: CPT | Performed by: NURSE PRACTITIONER

## 2020-03-09 PROCEDURE — 3017F COLORECTAL CA SCREEN DOC REV: CPT | Performed by: NURSE PRACTITIONER

## 2020-03-09 PROCEDURE — G8417 CALC BMI ABV UP PARAM F/U: HCPCS | Performed by: NURSE PRACTITIONER

## 2020-03-09 PROCEDURE — G8427 DOCREV CUR MEDS BY ELIG CLIN: HCPCS | Performed by: NURSE PRACTITIONER

## 2020-03-09 PROCEDURE — 1036F TOBACCO NON-USER: CPT | Performed by: NURSE PRACTITIONER

## 2020-03-09 PROCEDURE — 99213 OFFICE O/P EST LOW 20 MIN: CPT | Performed by: NURSE PRACTITIONER

## 2020-03-09 PROCEDURE — G8482 FLU IMMUNIZE ORDER/ADMIN: HCPCS | Performed by: NURSE PRACTITIONER

## 2020-03-09 RX ORDER — LOPERAMIDE HCL
POWDER (GRAM) MISCELLANEOUS
COMMUNITY
End: 2021-08-03 | Stop reason: SDUPTHER

## 2020-03-09 RX ORDER — USTEKINUMAB 45 MG/.5ML
INJECTION, SOLUTION SUBCUTANEOUS
COMMUNITY
End: 2021-08-03 | Stop reason: ALTCHOICE

## 2020-03-09 RX ORDER — LOPERAMIDE HYDROCHLORIDE 2 MG/1
2 CAPSULE ORAL 4 TIMES DAILY PRN
COMMUNITY
End: 2022-09-29

## 2020-03-09 RX ORDER — DOXYCYCLINE 100 MG/1
TABLET ORAL
COMMUNITY
Start: 2020-02-25 | End: 2020-06-30 | Stop reason: ALTCHOICE

## 2020-03-09 SDOH — ECONOMIC STABILITY: FOOD INSECURITY: WITHIN THE PAST 12 MONTHS, YOU WORRIED THAT YOUR FOOD WOULD RUN OUT BEFORE YOU GOT MONEY TO BUY MORE.: NEVER TRUE

## 2020-03-09 SDOH — ECONOMIC STABILITY: FOOD INSECURITY: WITHIN THE PAST 12 MONTHS, THE FOOD YOU BOUGHT JUST DIDN'T LAST AND YOU DIDN'T HAVE MONEY TO GET MORE.: NEVER TRUE

## 2020-03-09 ASSESSMENT — PATIENT HEALTH QUESTIONNAIRE - PHQ9
SUM OF ALL RESPONSES TO PHQ QUESTIONS 1-9: 0
SUM OF ALL RESPONSES TO PHQ QUESTIONS 1-9: 0
2. FEELING DOWN, DEPRESSED OR HOPELESS: 0
SUM OF ALL RESPONSES TO PHQ9 QUESTIONS 1 & 2: 0
1. LITTLE INTEREST OR PLEASURE IN DOING THINGS: 0

## 2020-03-09 NOTE — PROGRESS NOTES
Subjective:     Patient ID: Emil Parks is a 77 y.o. male who presentstoday for:  Chief Complaint   Patient presents with    Diabetes     Presents today for his routine diabetic check up. Diabetes   He presents for his follow-up diabetic visit. He has type 2 diabetes mellitus. His disease course has been stable. There are no hypoglycemic associated symptoms. Pertinent negatives for diabetes include no blurred vision, no chest pain, no fatigue, no foot paresthesias, no foot ulcerations, no polydipsia, no polyphagia, no polyuria, no visual change, no weakness and no weight loss. There are no hypoglycemic complications. Symptoms are stable. There are no diabetic complications. Risk factors for coronary artery disease include hypertension, male sex, obesity and diabetes mellitus.        Past Medical History:   Diagnosis Date    Colitis     Hypertension     Paroxysmal atrial fibrillation (HCC) 11/1/2018    Persistent atrial fibrillation 11/1/2018    Type II or unspecified type diabetes mellitus without mention of complication, not stated as uncontrolled      Current Outpatient Medications on File Prior to Visit   Medication Sig Dispense Refill    buPROPion (WELLBUTRIN XL) 300 MG extended release tablet Take 300 mg by mouth every morning      candesartan (ATACAND) 16 MG tablet       Cholecalciferol (VITAMIN D) 2000 units CAPS capsule Take 1 capsule by mouth daily      metoprolol tartrate (LOPRESSOR) 25 MG tablet Take 1 tablet by mouth 2 times daily 180 tablet 3    rivaroxaban (XARELTO) 20 MG TABS tablet Take 1 tablet by mouth daily (with breakfast) 90 tablet 3    fenofibrate (TRICOR) 145 MG tablet TAKE 1 TABLET EVERY DAY 90 tablet 3    doxycycline monohydrate (ADOXA) 100 MG tablet TAKE 1 TABLET EVERY 12 HOURS      loperamide (IMODIUM) 2 MG capsule Take 2 mg by mouth 4 times daily as needed      Loperamide HCl POWD       ustekinumab (STELARA) 45 MG/0.5ML SOSY prefilled syringe        No current facility-administered medications on file prior to visit. Past Surgical History:   Procedure Laterality Date    ANTERIOR CRUCIATE LIGAMENT REPAIR      COLONOSCOPY  2019    DR Brenton Llanes    GALLBLADDER SURGERY      PACEMAKER PLACEMENT  07/10/2018    [facility] HOLIDAY DO    SIGMOIDOSCOPY  2017    DR. BONILLA    SIGMOIDOSCOPY  2019    DR Brenton Bone.[facility] TOE SURGERY      TONSILLECTOMY      WRIST SURGERY  2013     Family History   Problem Relation Age of Onset    Cancer Mother     Diabetes Father     Cancer Paternal Uncle      Social History     Socioeconomic History    Marital status: Single     Spouse name: Not on file    Number of children: Not on file    Years of education: Not on file    Highest education level: Not on file   Occupational History    Not on file   Social Needs    Financial resource strain: Not on file    Food insecurity     Worry: Never true     Inability: Never true   [employer] needs     Medical: Not on file     Non-medical: Not on file   Tobacco Use    Smoking status: Former Smoker     Packs/day: 0.25     Years: 10.00     Pack years: 2.50     Types: Cigars     Start date: 2008     Last attempt to quit: 2018     Years since quittin.7    Smokeless tobacco: Never Used   Substance and Sexual Activity    Alcohol use:  Yes     Alcohol/week: 0.0 standard drinks     Comment: occ    Drug use: No    Sexual activity: Not on file   Lifestyle    Physical activity     Days per week: Not on file     Minutes per session: Not on file    Stress: Not on file   Relationships    Social connections     Talks on phone: Not on file     Gets together: Not on file     Attends [employer] service: Not on file     Active member of club or organization: Not on file     Attends meetings of clubs or organizations: Not on file     Relationship status: Not on file    Intimate partner violence     Fear of current or ex partner: Not on file     Emotionally abused: Not on Hemoglobin A1C 7.0 %     Assessment & Plan:       Diagnosis Orders   1. Type 2 diabetes mellitus without complication, without long-term current use of insulin ([facility])  POCT glycosylated hemoglobin (Hb A1C)    blood glucose monitor kit and supplies     Orders Placed This Encounter   Procedures    POCT glycosylated hemoglobin (Hb A1C)     Orders Placed This Encounter   Medications    blood glucose monitor kit and supplies     Sig: Test 2-3 times a day & as needed for symptoms of irregular blood glucose. Dispense:  1 kit     Refill:  0     Brand per patient preference. May round up to next available package size. There are no discontinued medications. Return in about 3 months (around 6/9/2020), or if symptoms worsen or fail to improve, for follow up with PCP. Patient will diet and increase physical activity and repeat testing in 3 months. Reviewed with the patient: currentclinical status, medications, activities and diet. Side effects, adverse effects of the medicationprescribed today, as well as treatment plan/ rationale and result expectations havebeen discussed with the patient who expresses understanding and desires to proceed. Pt instructions reviewed and given to patient.     Close follow up to evaluate treatment resultsand for coordination of care. I have reviewed the patient's medical historyin detail and updated the computerized patient record.     Quinn Gallardo, APRN - CNP

## 2020-03-10 ENCOUNTER — TELEPHONE (OUTPATIENT)
Dept: PRIMARY CARE CLINIC | Age: 70
End: 2020-03-10

## 2020-03-10 NOTE — TELEPHONE ENCOUNTER
Rosalba from [facility] calling regarding the glucose monitor. She has to have a script for test strips and lancets. She needs a new script also for the monitor? All of these have to have how many times a day he is testing and diagnosis code. Her # is [phone].

## 2020-03-25 ASSESSMENT — ENCOUNTER SYMPTOMS
BLURRED VISION: 0
NAUSEA: 0
DIARRHEA: 0
SHORTNESS OF BREATH: 0
CONSTIPATION: 0
COUGH: 0
CHEST TIGHTNESS: 0
ABDOMINAL PAIN: 0
ABDOMINAL DISTENTION: 0
VISUAL CHANGE: 0
WHEEZING: 0
ANAL BLEEDING: 0
BLOOD IN STOOL: 0
VOMITING: 0

## 2020-06-30 ENCOUNTER — OFFICE VISIT (OUTPATIENT)
Dept: CARDIOLOGY CLINIC | Age: 70
End: 2020-06-30
Payer: MEDICARE

## 2020-06-30 VITALS
DIASTOLIC BLOOD PRESSURE: 82 MMHG | BODY MASS INDEX: 38.38 KG/M2 | RESPIRATION RATE: 14 BRPM | HEART RATE: 67 BPM | OXYGEN SATURATION: 98 % | WEIGHT: 283 LBS | SYSTOLIC BLOOD PRESSURE: 128 MMHG

## 2020-06-30 PROCEDURE — 3017F COLORECTAL CA SCREEN DOC REV: CPT | Performed by: INTERNAL MEDICINE

## 2020-06-30 PROCEDURE — 99214 OFFICE O/P EST MOD 30 MIN: CPT | Performed by: INTERNAL MEDICINE

## 2020-06-30 PROCEDURE — 93000 ELECTROCARDIOGRAM COMPLETE: CPT | Performed by: INTERNAL MEDICINE

## 2020-06-30 PROCEDURE — 1036F TOBACCO NON-USER: CPT | Performed by: INTERNAL MEDICINE

## 2020-06-30 PROCEDURE — 4040F PNEUMOC VAC/ADMIN/RCVD: CPT | Performed by: INTERNAL MEDICINE

## 2020-06-30 PROCEDURE — 2022F DILAT RTA XM EVC RTNOPTHY: CPT | Performed by: INTERNAL MEDICINE

## 2020-06-30 PROCEDURE — G8417 CALC BMI ABV UP PARAM F/U: HCPCS | Performed by: INTERNAL MEDICINE

## 2020-06-30 PROCEDURE — 1123F ACP DISCUSS/DSCN MKR DOCD: CPT | Performed by: INTERNAL MEDICINE

## 2020-06-30 PROCEDURE — 3051F HG A1C>EQUAL 7.0%<8.0%: CPT | Performed by: INTERNAL MEDICINE

## 2020-06-30 PROCEDURE — G8427 DOCREV CUR MEDS BY ELIG CLIN: HCPCS | Performed by: INTERNAL MEDICINE

## 2020-06-30 RX ORDER — TOFACITINIB 10 MG/1
5 TABLET, FILM COATED ORAL 2 TIMES DAILY
COMMUNITY
End: 2022-09-29

## 2020-06-30 RX ORDER — MIRTAZAPINE 30 MG/1
30 TABLET, FILM COATED ORAL NIGHTLY
COMMUNITY
End: 2021-08-03 | Stop reason: DRUGHIGH

## 2020-06-30 RX ORDER — LEVOTHYROXINE SODIUM 0.03 MG/1
25 TABLET ORAL DAILY
COMMUNITY

## 2020-06-30 NOTE — PROGRESS NOTES
Chief Complaint   Patient presents with    6 Month Follow-Up    Atrial Fibrillation       7-5-18: Patient presents for initial medical evaluation. Patient is followed on a regular basis by Dr. Rossy Perez MD. S/p hospitalization for weakness, fatigue and near syncope. 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 Echo with normal LVF. He continues to have symptoms today. Does not feel good, has dry heaves and cough. His stools are dark. He has a hx of UC. Pt denies chest pain, dyspnea, dyspnea on exertion, change in exercise capacity, fatigue,  nausea, vomiting, diarrhea, constipation, motor weakness, insomnia, weight loss, syncope,PND, orthopnea, or claudication. Had recent colitis flare up. Was placed on ABX in hospital. His WBC was high  and noted to have ARF. No hx of MI, CHF or arrhythmia. No hx of stress test or LHC. 9-4-18: s/p PPM insertion on 7-10-18. S/p normal nuclear stress test. States LH/Dizz has resolved. Does have some fatigue. Does have hx of ulcerative colitis. S/p recent C.diff infection. Dealing with depression. Pt denies chest pain, dyspnea, dyspnea on exertion, change in exercise capacity,   nausea, vomiting, diarrhea, constipation, motor weakness, insomnia, weight loss, syncope, dizziness, lightheadedness, palpitations, PND, orthopnea, or claudication. No nitro use. BP and hr are good. CAD is stable. No LE discoloration or ulcers. No LE edema. No CHF type symptoms. Lipid profile is normal.       11-1-18: noted to have episodes of New onset afibb on PPM check, 16% afibb burden. Started on NOAC but has not picked it up. States he has a hx of colitis, following with  GI, ? chrons disease, he is having some GIB episodes. EKG with Afibb today, V paced.  Pt denies chest pain, dyspnea, dyspnea on exertion, change in exercise capacity, fatigue,  nausea, vomiting, diarrhea, constipation, motor weakness, insomnia, weight loss, syncope, dizziness, lightheadedness, palpitations, PND, orthopnea, or claudication. No nitro use. BP and hr are good. CAD is stable. No LE discoloration or ulcers. No LE edema. No CHF type symptoms. Lipid profile is normal. No recent hospitalization. No change in meds. 3-5-19: EKG with NSR, V paced. On DOAC now and no bleeding issues. Was cleared by GI. Pt denies chest pain, dyspnea, dyspnea on exertion, change in exercise capacity, fatigue,  nausea, vomiting, diarrhea, constipation, motor weakness, insomnia, weight loss, syncope, dizziness, lightheadedness, palpitations, PND, orthopnea, or claudication. No nitro use. BP and hr are good. CAD is stable. No LE discoloration or ulcers. No LE edema. No CHF type symptoms. Lipid profile is normal. No recent hospitalization. No change in meds. S/p PPm check     10-1-19: TOLERATING DOAC OK. Will have colonoscopy tomorrow. Pt denies chest pain, dyspnea, dyspnea on exertion, change in exercise capacity, fatigue,  nausea, vomiting, diarrhea, constipation, motor weakness, insomnia, weight loss, syncope, dizziness, lightheadedness, palpitations, PND, orthopnea, or claudication. No nitro use. BP and hr are good. CAD is stable. No LE discoloration or ulcers. No LE edema. No CHF type symptoms. Lipid profile is normal. No recent hospitalization. No change in meds. EKG with NSR, V paced. S/p recent PPM check but no report. Hgb is 14 on 5/19. CPAP at night. 6-30-20: + hx of UC, following with GI. On DOAC though and no bleeding issues. Pt denies chest pain, dyspnea, dyspnea on exertion, change in exercise capacity, fatigue,  nausea, vomiting, diarrhea, constipation, motor weakness, insomnia, weight loss, syncope, dizziness, lightheadedness, palpitations, PND, orthopnea, or claudication. Hx of PAFib, SSS s/p PPM.   EKG with NSR, V paced.  Pt denies chest pain, dyspnea, dyspnea on exertion, change in exercise capacity, fatigue,  nausea, vomiting, diarrhea, constipation, motor weakness, insomnia, weight loss, syncope, dizziness, lightheadedness, palpitations, PND, orthopnea, or claudication. S/p PPM check last week and is ok. Hx of negative stress test in 2018. Patient Active Problem List   Diagnosis    HTN (hypertension)    Major depressive disorder, recurrent episode, moderate (HCC)    DM w/o complication type II ([facility] [address]    Dyslipidemia associated with type 2 diabetes mellitus (HCC)    ETIENNE on CPAP    Generalized anxiety disorder    [facility]SS [facility]ick [facility] syndrome) [facility]    Presence of permanent cardiac pacemaker    [facility]    Crohn's disease of colon with rectal bleeding [facility])       Past Surgical History:   Procedure Laterality Date    ANTERIOR CRUCIATE LIGAMENT REPAIR      CARDIAC PACEMAKER PLACEMENT      COLONOSCOPY  2019    DR Amos Benavides    GALLBLADDER SURGERY      PACEMAKER PLACEMENT  07/10/2018    [facility]IDAY DO    SIGMOIDOSCOPY  2017    DR. BONILLA    SIGMOIDOSCOPY  2019    DR Amos Benavides   [facility] TOE SURGERY      TONSILLECTOMY      WRIST SURGERY         Social History     Socioeconomic History    Marital status: Single     Spouse name: None    Number of children: None    Years of education: None    Highest education level: None   Occupational History    None   Social Needs    Financial resource strain: None    Food insecurity     Worry: Never true     Inability: Never true    Transportation needs     Medical: None     Non-medical: None   Tobacco Use    Smoking status: Former Smoker     Packs/day: 0.25     Years: 10.00     Pack years: 2.50     Types: Cigars     Start date: 2008     Last attempt to quit: 2018     Years since quittin.9    Smokeless tobacco: Never Used   Substance and Sexual Activity    Alcohol use:  Yes     Alcohol/week: 0.0 standard drinks     Comment: occ    Drug use: No    Sexual activity: None   Lifestyle    Physical activity     Days per week: None     Minutes per session: None    bleeding disorder. Respiratory ROS: no cough, shortness of breath, or wheezing  Cardiovascular ROS: positive for - palpitations  Gastrointestinal ROS: no abdominal pain, change in bowel habits, or black or bloody stools  Genito-Urinary ROS: no dysuria, trouble voiding, or hematuria  Musculoskeletal ROS: negative  Neurological ROS: no TIA or stroke symptoms  Dermatological ROS: negative    VITALS:  Blood pressure 128/82, pulse 67, resp. rate 14, weight 283 lb (128.4 kg), SpO2 98 %. Body mass index is 38.38 kg/m². Physical Examination:  General appearance - alert, well appearing, and in no distress  Mental status - alert, oriented to person, place, and time  Neck - Neck is supple, no JVD or carotid bruits. No thyromegaly or adenopathy. Chest - clear to auscultation, no wheezes, rales or rhonchi, symmetric air entry  Heart - normal rate, regular rhythm, normal S1, S2, no murmurs, rubs, clicks or gallops  Abdomen - soft, nontender, nondistended, no masses or organomegaly  Neurological - alert, oriented, normal speech, no focal findings or movement disorder noted  Extremities - peripheral pulses normal, no pedal edema, no clubbing or cyanosis  Skin - normal coloration and turgor, no rashes, no suspicious skin lesions noted    Pacer site has healed. Orders Placed This Encounter   Procedures    EKG 12 lead       ASSESSMENT:     Diagnosis Orders   1. Essential hypertension     2. Paroxysmal atrial fibrillation (HCC)  EKG 12 lead   3. Dyslipidemia associated with type 2 diabetes mellitus ([address]     4. SSS (sick sinus syndrome) ([facility] [address]     5. Presence of permanent cardiac pacemaker           PLAN:     Patient will need to continue to follow up with you for their general medical care     As always, aggressive risk factor modification is strongly recommended. We should adhere to the JNC VIII guidelines for HTN management and the NCEP ATP III guidelines for LDL-C management.     Cardiac diet is always recommended with low fat, cholesterol, calories and sodium. Continue medications at current doses. Follow up with device clinic. [facility]. Check EKG    NOAC since ok with GI. If not anble to tolerate NOAC, then refer to [facility] for REESE closure device. Patient was advised and encouraged to check blood pressure at home or at a pharmacy, maintain a logbook, and also call us back if blood pressure are above the target ranges or if it is low. Patient clearly understands and agrees to the instructions. We will need to continue to monitor muscle and liver enzymes, BUN, CR, and electrolytes. Details of medical condition explained and patient was warned about adverse consequences of uncontrolled medical conditions and possible side effects of prescribed medications.

## 2020-07-29 ENCOUNTER — CLINICAL DOCUMENTATION (OUTPATIENT)
Dept: CARDIOLOGY CLINIC | Age: 70
End: 2020-07-29

## 2020-08-03 ENCOUNTER — CLINICAL DOCUMENTATION (OUTPATIENT)
Dept: CARDIOLOGY CLINIC | Age: 70
End: 2020-08-03

## 2020-08-03 ENCOUNTER — VIRTUAL VISIT (OUTPATIENT)
Dept: FAMILY MEDICINE CLINIC | Age: 70
End: 2020-08-03
Payer: MEDICARE

## 2020-08-03 PROCEDURE — 4040F PNEUMOC VAC/ADMIN/RCVD: CPT | Performed by: NURSE PRACTITIONER

## 2020-08-03 PROCEDURE — 99406 BEHAV CHNG SMOKING 3-10 MIN: CPT | Performed by: NURSE PRACTITIONER

## 2020-08-03 PROCEDURE — G0438 PPPS, INITIAL VISIT: HCPCS | Performed by: NURSE PRACTITIONER

## 2020-08-03 PROCEDURE — 3017F COLORECTAL CA SCREEN DOC REV: CPT | Performed by: NURSE PRACTITIONER

## 2020-08-03 PROCEDURE — 1123F ACP DISCUSS/DSCN MKR DOCD: CPT | Performed by: NURSE PRACTITIONER

## 2020-08-03 NOTE — PATIENT INSTRUCTIONS
Personalized Preventive Plan for Patricia Jeffries - 8/3/2020  Medicare offers a range of preventive health benefits. Some of the tests and screenings are paid in full while other may be subject to a deductible, co-insurance, and/or copay. Some of these benefits include a comprehensive review of your medical history including lifestyle, illnesses that may run in your family, and various assessments and screenings as appropriate. After reviewing your medical record and screening and assessments performed today your provider may have ordered immunizations, labs, imaging, and/or referrals for you. A list of these orders (if applicable) as well as your Preventive Care list are included within your After Visit Summary for your review. Other Preventive Recommendations:    · A preventive eye exam performed by an eye specialist is recommended every 1-2 years to screen for glaucoma; cataracts, macular degeneration, and other eye disorders. · A preventive dental visit is recommended every 6 months. · Try to get at least 150 minutes of exercise per week or 10,000 steps per day on a pedometer . · Order or download the FREE \"Exercise & Physical Activity: Your Everyday Guide\" from [employer]. Call 6-363.451.6193 or search [employer] online. · You need 4690-3078 mg of calcium and 9669-6901 IU of vitamin D per day. It is possible to meet your calcium requirement with diet alone, but a vitamin D supplement is usually necessary to meet this goal.  · When exposed to the sun, use a sunscreen that protects against both UVA and UVB radiation with an SPF of 30 or greater. Reapply every 2 to 3 hours or after sweating, drying off with a towel, or swimming. · Always wear a seat belt when traveling in a car. Always wear a helmet when riding a bicycle or motorcycle.

## 2020-08-03 NOTE — PROGRESS NOTES
TAKE 1 TABLET EVERY DAY  Avtar J Holiday, DO         Past Medical History:   Diagnosis Date    Colitis     Hypertension     Paroxysmal atrial fibrillation (HCC) 11/1/2018    Persistent atrial fibrillation 11/1/2018    Type II or unspecified type diabetes mellitus without mention of complication, not stated as uncontrolled        Past Surgical History:   Procedure Laterality Date    ANTERIOR CRUCIATE LIGAMENT REPAIR      CARDIAC PACEMAKER PLACEMENT      COLONOSCOPY  05/22/2019    DR Yesenia Eid    GALLBLADDER SURGERY      PACEMAKER PLACEMENT  07/10/2018    [facility] DO    SIGMOIDOSCOPY  11/03/2017    DR. BONILLA    SIGMOIDOSCOPY  05/28/2019    DR Hood Bright TOE SURGERY      TONSILLECTOMY      WRIST SURGERY  2013         Family History   Problem Relation Age of Onset    Cancer Mother     Diabetes Father     Cancer Paternal Uncle        CareTeam (Including outside providers/suppliers regularly involved in providing care):   Patient Care Team:  Hiram Manrique MD as PCP - General (Family Medicine)  EULALIO Long - CNP as PCP - [facility] Empaneled Provider  Aamir Hi RN as [facility]    Wt Readings from Last 3 Encounters:   06/30/20 283 lb (128.4 kg)   03/09/20 280 lb (127 kg)   10/01/19 271 lb (122.9 kg)      No flowsheet data found. There is no height or weight on file to calculate BMI. Based upon direct observation of the patient, evaluation of cognition reveals recent and remote memory intact. This was a telephone encounter and I did not physically see this patient in person. Patient's complete Health Risk Assessment and screening values have been reviewed and are found in Flowsheets. The following problems were reviewed today and where indicated follow up appointments were made and/or referrals ordered. Positive Risk Factor Screenings with Interventions:     Health Habits/Nutrition:     There is no height or weight on file to calculate BMI.   Health Habits/Nutrition Interventions:  · N/A    Personalized Preventive Plan   Current Health Maintenance Status  Immunization History   Administered Date(s) Administered    Influenza Vaccine, unspecified formulation 10/27/2015    Influenza, High Dose (Fluzone 65 yrs and older) 11/16/2016, 10/16/2017, 10/16/2018, 09/24/2019    Influenza, Triv, inactivated, subunit, adjuvanted, IM (Fluad 65 yrs and older) 10/01/2019    Pneumococcal Conjugate 13-valent ([id number]) 09/10/2010    Pneumococcal Polysaccharide (Ilcordimt16) 10/09/2007, 04/16/2018    Tdap (Boostrix, Adacel) 05/05/2016    Zoster Live (Zostavax) 11/23/2012    Zoster Recombinant (Shingrix) 07/10/2019, 07/19/2019        Health Maintenance   Topic Date Due    Annual Wellness Visit (AWV)  05/29/2019    Shingles Vaccine (3 of 3) 09/13/2019    Diabetic retinal exam  09/17/2019    Diabetic microalbuminuria test  10/16/2019    Diabetic foot exam  02/19/2020    Lipid screen  02/27/2020    Colon cancer screen colonoscopy  05/22/2020    Potassium monitoring  05/25/2020    Creatinine monitoring  05/25/2020    Flu vaccine (1) 09/01/2020    A1C test (Diabetic or Prediabetic)  03/09/2021    DTaP/Tdap/Td vaccine (2 - Td) 05/05/2026    Pneumococcal 65+ years Vaccine  Completed    AAA screen  Completed    Hepatitis C screen  Addressed    Hepatitis A vaccine  Aged Out    Hib vaccine  Aged Out    Meningococcal (ACWY) vaccine  Aged Out     Recommendations for [employer] Due: see orders and patient instructions/AVS.  . Recommended screening schedule for the next 5-10 years is provided to the patient in written form: see Patient Instructions/AVS.    There are no diagnoses linked to this encounter. Sreekanth Foreman is a 72 y.o. male being evaluated by a Virtual Visit (phone) encounter to address concerns as mentioned above. A caregiver was present when appropriate.  Due to this being a TeleHealth encounter (During RGDVN-26 public health emergency), evaluation of the following organ systems was limited: Vitals/Constitutional/EENT/Resp/CV/GI//MS/Neuro/Skin/Heme-Lymph-Imm. Pursuant to the emergency declaration under the [facility], [address] [place] [address] authority and the [employer] Act, this Virtual Visit was conducted with patient's (and/or legal guardian's) consent, to reduce the patient's risk of exposure to COVID-19 and provide necessary medical care. The patient (and/or legal guardian) has also been advised to contact this office for worsening conditions or problems, and seek emergency medical treatment and/or call 911 if deemed necessary. Patient identification was verified at the start of the visit: Yes    Services were provided through phone to substitute for in-person clinic visit. Patient and provider were located at their individual homes. --Sudeep Johnson Born, APRN - CNP on 8/3/2020 at 9:55 AM    An electronic signature was used to authenticate this note. Cardiovascular Disease Risk Counseling: Assessed the patient's risk to develop cardiovascular disease and reviewed main risk factors. Reviewed steps to reduce disease risk including:   · Quitting tobacco use, reducing amount smoked, or not starting the habit  · Making healthy food choices  · Being physically active and gradualy increasing activity levels   · Reduce weight and determine a healthy BMI goal  · Monitor blood pressure and treat if higher than 140/90 mmHg  · Maintain blood total cholesterol levels under 5 mmol/l or 190 mg/dl  · Maintain LDL cholesterol levels under 3.0 mmol/l or 115 mg/dl   · Control blood glucose levels  · Consider taking aspirin (75 mg daily), once blood pressure is controlled   Provided a follow up plan.   Time spent (minutes): 3

## 2020-09-01 ENCOUNTER — CARE COORDINATION (OUTPATIENT)
Dept: CARE COORDINATION | Age: 70
End: 2020-09-01

## 2020-09-01 NOTE — CARE COORDINATION
ACM CALLED PATIENT LEFT MESSAGE REQUESTING RETURN CALL FOR ACC PROGRAM  INFORMATION. CONTACT INFORMATION  PROVIDED.  MY CHART MESSAGE LETTER SENT  LETTER IN MAIL

## 2020-09-02 ENCOUNTER — CARE COORDINATION (OUTPATIENT)
Dept: CARE COORDINATION | Age: 70
End: 2020-09-02

## 2020-09-02 NOTE — CARE COORDINATION
ACVICTOR M SPOKE TO PATIENT. HIS PCP IS DR. FINCH WITH . HE IS NOT SURE HOW OR WHY HE HAD APPOINTMENTS IN MARCH OR VV THIS PAST AUGUST. HE STATED SOMEONE CALLED HIM STATING HE HAD THESE APPOINTMENTS. HE IS AWARE OF THE PROVIDER SPLIT WITH [facility]. HE STATES HE FOLLOWS DR. Jaimie Jordan

## 2020-10-19 ENCOUNTER — TELEPHONE (OUTPATIENT)
Dept: CARDIOLOGY CLINIC | Age: 70
End: 2020-10-19

## 2020-10-19 NOTE — TELEPHONE ENCOUNTER
Patient is waitng to hear back from 's MA about Patient Assistance for XARELTO 20mg. Please contact the patient. He says it's been about 2 months since he has heard anything.

## 2020-10-21 NOTE — TELEPHONE ENCOUNTER
Left message on machine to call office  [employer] mailed letter in august informing patient he was denied assistance

## 2020-12-08 ENCOUNTER — OFFICE VISIT (OUTPATIENT)
Dept: CARDIOLOGY CLINIC | Age: 70
End: 2020-12-08
Payer: MEDICARE

## 2020-12-08 VITALS
TEMPERATURE: 97.2 F | BODY MASS INDEX: 38.65 KG/M2 | DIASTOLIC BLOOD PRESSURE: 80 MMHG | SYSTOLIC BLOOD PRESSURE: 120 MMHG | WEIGHT: 285 LBS | HEART RATE: 76 BPM

## 2020-12-08 PROBLEM — E66.01 MORBIDLY OBESE (HCC): Status: ACTIVE | Noted: 2020-12-08

## 2020-12-08 PROCEDURE — G8417 CALC BMI ABV UP PARAM F/U: HCPCS | Performed by: INTERNAL MEDICINE

## 2020-12-08 PROCEDURE — G8484 FLU IMMUNIZE NO ADMIN: HCPCS | Performed by: INTERNAL MEDICINE

## 2020-12-08 PROCEDURE — 2022F DILAT RTA XM EVC RTNOPTHY: CPT | Performed by: INTERNAL MEDICINE

## 2020-12-08 PROCEDURE — 1123F ACP DISCUSS/DSCN MKR DOCD: CPT | Performed by: INTERNAL MEDICINE

## 2020-12-08 PROCEDURE — 93000 ELECTROCARDIOGRAM COMPLETE: CPT | Performed by: INTERNAL MEDICINE

## 2020-12-08 PROCEDURE — 4040F PNEUMOC VAC/ADMIN/RCVD: CPT | Performed by: INTERNAL MEDICINE

## 2020-12-08 PROCEDURE — 3017F COLORECTAL CA SCREEN DOC REV: CPT | Performed by: INTERNAL MEDICINE

## 2020-12-08 PROCEDURE — 99214 OFFICE O/P EST MOD 30 MIN: CPT | Performed by: INTERNAL MEDICINE

## 2020-12-08 PROCEDURE — 1036F TOBACCO NON-USER: CPT | Performed by: INTERNAL MEDICINE

## 2020-12-08 PROCEDURE — G8427 DOCREV CUR MEDS BY ELIG CLIN: HCPCS | Performed by: INTERNAL MEDICINE

## 2020-12-08 PROCEDURE — 3051F HG A1C>EQUAL 7.0%<8.0%: CPT | Performed by: INTERNAL MEDICINE

## 2020-12-08 RX ORDER — FUROSEMIDE 20 MG/1
20 TABLET ORAL DAILY
Qty: 60 TABLET | Refills: 6 | Status: SHIPPED | OUTPATIENT
Start: 2020-12-08 | End: 2022-01-13

## 2020-12-08 RX ORDER — RIVAROXABAN 10 MG/1
20 TABLET, FILM COATED ORAL
Qty: 42 TABLET | Refills: 0 | COMMUNITY
Start: 2020-12-08 | End: 2021-08-03 | Stop reason: DRUGHIGH

## 2020-12-08 NOTE — PROGRESS NOTES
Chief Complaint   Patient presents with    Atrial Fibrillation    6 Month Follow-Up       7-5-18: Patient presents for initial medical evaluation. Patient is followed on a regular basis by Dr. Marcy Cantu MD. S/p hospitalization for weakness, fatigue and near syncope. 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 Echo with normal LVF. He continues to have symptoms today. Does not feel good, has dry heaves and cough. His stools are dark. He has a hx of UC. Pt denies chest pain, dyspnea, dyspnea on exertion, change in exercise capacity, fatigue,  nausea, vomiting, diarrhea, constipation, motor weakness, insomnia, weight loss, syncope,PND, orthopnea, or claudication. Had recent colitis flare up. Was placed on ABX in hospital. His WBC was high  and noted to have ARF. No hx of MI, CHF or arrhythmia. No hx of stress test or LHC. 9-4-18: s/p PPM insertion on 7-10-18. S/p normal nuclear stress test. States LH/Dizz has resolved. Does have some fatigue. Does have hx of ulcerative colitis. S/p recent C.diff infection. Dealing with depression. Pt denies chest pain, dyspnea, dyspnea on exertion, change in exercise capacity,   nausea, vomiting, diarrhea, constipation, motor weakness, insomnia, weight loss, syncope, dizziness, lightheadedness, palpitations, PND, orthopnea, or claudication. No nitro use. BP and hr are good. CAD is stable. No LE discoloration or ulcers. No LE edema. No CHF type symptoms. Lipid profile is normal.       11-1-18: noted to have episodes of New onset afibb on PPM check, 16% afibb burden. Started on NOAC but has not picked it up. States he has a hx of colitis, following with  GI, ? chrons disease, he is having some GIB episodes. EKG with Afibb today, V paced.  Pt denies chest pain, dyspnea, dyspnea on exertion, change in exercise capacity, fatigue,  nausea, vomiting, diarrhea, constipation, motor weakness, insomnia, weight loss, syncope, insomnia, weight loss, syncope, dizziness, lightheadedness, palpitations, PND, orthopnea, or claudication. S/p PPM check last week and is ok. Hx of negative stress test in 2018.       12/8/2020: States he is more short of breath than usual.  Started around summertime. Patient with history of paroxysmal atrial fibrillation/sick sinus syndrome status post permanent pacemaker placement. History of negative stress test in 2018. He is on oral anticoagulation again. Recent lab work was within normal limits per patient. EKG with sinus rhythm V paced rhythm. He denies any lower extremity edema. In March 2019 he weighed 271 pounds and today he weighs 285 pounds. He is compliant with his CPAP machine at night. Patient Active Problem List   Diagnosis    HTN (hypertension)    Major depressive disorder, recurrent episode, moderate (HCC)    DM w/o complication type II ([address]    Dyslipidemia associated with type 2 diabetes mellitus (HCC)    ETIENNE on CPAP    Generalized anxiety disorder    SSS (sick sinus syndrome) (HCC)    Presence of permanent cardiac pacemaker    Paroxysmal atrial fibrillation ([address]    Crohn's disease of colon with rectal bleeding ([address]    Morbidly obese ([address]       Past Surgical History:   Procedure Laterality Date    ANTERIOR CRUCIATE LIGAMENT REPAIR      CARDIAC PACEMAKER PLACEMENT      COLONOSCOPY  05/22/2019    DR Boom Roberts    GALLBLADDER SURGERY      PACEMAKER PLACEMENT  07/10/2018    [facility] W HOLIDAY DO    SIGMOIDOSCOPY  11/03/2017    DR. BONILLA    SIGMOIDOSCOPY  05/28/2019    DR Boom Roberts   [facility] TOE SURGERY      TONSILLECTOMY      WRIST SURGERY  2013       Social History     Socioeconomic History    Marital status: Single     Spouse name: Not on file    Number of children: Not on file    Years of education: Not on file    Highest education level: Not on file   Occupational History    Not on file   Social Needs    Financial resource strain: Not on file    Food insecurity Worry: Never true     Inability: Never true    Transportation needs     Medical: Not on file     Non-medical: Not on file   Tobacco Use    Smoking status: Former Smoker     Packs/day: 0.25     Years: 10.00     Pack years: 2.50     Types: Cigars     Start date: 2008     Last attempt to quit: 2018     Years since quittin.4    Smokeless tobacco: Never Used   Substance and Sexual Activity    Alcohol use:  Yes     Alcohol/week: 0.0 standard drinks     Comment: occ    Drug use: No    Sexual activity: Not on file   Lifestyle    Physical activity     Days per week: Not on file     Minutes per session: Not on file    Stress: Not on file   Relationships    Social connections     Talks on phone: Not on file     Gets together: Not on file     Attends Congregational service: Not on file     Active member of club or organization: Not on file     Attends meetings of clubs or organizations: Not on file     Relationship status: Not on file    Intimate partner violence     Fear of current or ex partner: Not on file     Emotionally abused: Not on file     Physically abused: Not on file     Forced sexual activity: Not on file   Other Topics Concern    Not on file   Social History Narrative    Not on file       Family History   Problem Relation Age of Onset    Cancer Mother     Diabetes Father     Cancer Paternal Uncle        Current Outpatient Medications   Medication Sig Dispense Refill    furosemide (LASIX) 20 MG tablet Take 1 tablet by mouth daily 60 tablet 6    rivaroxaban (XARELTO) 20 MG TABS tablet Take 1 tablet by mouth daily (with breakfast) 30 tablet 5    mirtazapine (REMERON) 30 MG tablet Take 30 mg by mouth nightly      Tofacitinib Citrate (XELJANZ) 10 MG TABS Take 10 mg by mouth 2 times daily      levothyroxine (SYNTHROID) 25 MCG tablet Take 25 mcg by mouth Daily      loperamide (IMODIUM) 2 MG capsule Take 2 mg by mouth 4 times daily as needed      Loperamide HCl POWD       ustekinumab (STELARA) 45 MG/0.5ML SOSY prefilled syringe       blood glucose monitor kit and supplies Test 2-3 times a day & as needed for symptoms of irregular blood glucose. 1 kit 0    buPROPion (WELLBUTRIN XL) 300 MG extended release tablet Take 300 mg by mouth every morning      candesartan (ATACAND) 16 MG tablet       Cholecalciferol (VITAMIN D) 2000 units CAPS capsule Take 1 capsule by mouth daily      metoprolol tartrate (LOPRESSOR) 25 MG tablet Take 1 tablet by mouth 2 times daily 180 tablet 3    fenofibrate (TRICOR) 145 MG tablet TAKE 1 TABLET EVERY DAY 90 tablet 3     No current facility-administered medications for this visit. Azathioprine and Clindamycin    Review of Systems:  General ROS: negative  Psychological ROS: negative  Hematological and Lymphatic ROS: No history of blood clots or bleeding disorder. Respiratory ROS: no cough, shortness of breath, or wheezing  Cardiovascular ROS: positive for - palpitations  Gastrointestinal ROS: no abdominal pain, change in bowel habits, or black or bloody stools  Genito-Urinary ROS: no dysuria, trouble voiding, or hematuria  Musculoskeletal ROS: negative  Neurological ROS: no TIA or stroke symptoms  Dermatological ROS: negative    VITALS:  Blood pressure 120/80, pulse 76, temperature 97.2 °F (36.2 °C), temperature source Infrared, weight 285 lb (129.3 kg). Body mass index is 38.65 kg/m². Physical Examination:  General appearance - alert, well appearing, and in no distress  Mental status - alert, oriented to person, place, and time  Neck - Neck is supple, no JVD or carotid bruits. No thyromegaly or adenopathy.    Chest - clear to auscultation, no wheezes, rales or rhonchi, symmetric air entry  Heart - normal rate, regular rhythm, normal S1, S2, no murmurs, rubs, clicks or gallops  Abdomen - soft, nontender, nondistended, no masses or organomegaly  Neurological - alert, oriented, normal speech, no focal findings or movement disorder noted  Extremities - peripheral pulses normal, no pedal edema, no clubbing or cyanosis  Skin - normal coloration and turgor, no rashes, no suspicious skin lesions noted    Pacer site has healed. Orders Placed This Encounter   Procedures    XR CHEST STANDARD (2 VW)    NM MYOCARDIAL SPECT REST EXERCISE OR RX    Brain Natriuretic Peptide    EKG 12 Lead    ECHO Complete 2D W Doppler W Color       ASSESSMENT:     Diagnosis Orders   1. Paroxysmal atrial fibrillation (HCC)  EKG 12 Lead   2. Dyslipidemia associated with type 2 diabetes mellitus ([address]     3. Morbidly obese ([address]     4. Essential hypertension     5. ETIENNE on CPAP     6. SSS (sick sinus syndrome) ([address]     7. Presence of permanent cardiac pacemaker     8. Dyspnea on exertion  XR CHEST STANDARD (2 VW)    ECHO Complete 2D W Doppler W Color    Brain Natriuretic Peptide    NM MYOCARDIAL SPECT REST EXERCISE OR RX         PLAN:     Patient will need to continue to follow up with you for their general medical care     As always, aggressive risk factor modification is strongly recommended. We should adhere to the JNC VIII guidelines for HTN management and the NCEP ATP III guidelines for LDL-C management. Cardiac diet is always recommended with low fat, cholesterol, calories and sodium. Continue medications at current doses. Non invasive cardiac testing will be ordered to further evaluate for any ischemic or structural heart disease as a cause of the patient symptoms. We will proceed with a Cardiolite stress test and echo. Check BNP    Check CXR. Lasix 20mg daily, if ineffective, double. Follow up with device clinic. [facility]. Check EKG    NOAC since ok with GI. If not anble to tolerate NOAC, then refer to [facility] for REESE closure device. Patient was advised and encouraged to check blood pressure at home or at a pharmacy, maintain a logbook, and also call us back if blood pressure are above the target ranges or if it is low.  Patient clearly understands

## 2020-12-15 ENCOUNTER — HOSPITAL ENCOUNTER (OUTPATIENT)
Dept: NON INVASIVE DIAGNOSTICS | Age: 70
Discharge: HOME | End: 2020-12-15
Payer: MEDICARE

## 2020-12-15 ENCOUNTER — HOSPITAL ENCOUNTER (OUTPATIENT)
Dept: NUCLEAR MEDICINE | Age: 70
Discharge: HOME | End: 2020-12-17
Payer: MEDICARE

## 2020-12-15 VITALS — DIASTOLIC BLOOD PRESSURE: 70 MMHG | SYSTOLIC BLOOD PRESSURE: 124 MMHG | HEART RATE: 63 BPM

## 2020-12-15 LAB
LV EF: 60 %
LVEF MODALITY: NORMAL

## 2020-12-15 PROCEDURE — 6360000002 HC RX W HCPCS: Performed by: INTERNAL MEDICINE

## 2020-12-15 PROCEDURE — 3430000000 HC RX DIAGNOSTIC RADIOPHARMACEUTICAL: Performed by: INTERNAL MEDICINE

## 2020-12-15 PROCEDURE — 93017 CV STRESS TEST TRACING ONLY: CPT

## 2020-12-15 PROCEDURE — 2580000003 HC RX 258: Performed by: INTERNAL MEDICINE

## 2020-12-15 PROCEDURE — 93306 TTE W/DOPPLER COMPLETE: CPT

## 2020-12-15 PROCEDURE — A9502 TC99M TETROFOSMIN: HCPCS | Performed by: INTERNAL MEDICINE

## 2020-12-15 PROCEDURE — 78452 HT MUSCLE IMAGE SPECT MULT: CPT

## 2020-12-15 RX ORDER — SODIUM CHLORIDE 0.9 % (FLUSH) 0.9 %
10 SYRINGE (ML) INJECTION PRN
Status: DISCONTINUED | OUTPATIENT
Start: 2020-12-15 | End: 2020-12-18 | Stop reason: HOSPADM

## 2020-12-15 RX ADMIN — Medication 10 ML: at 11:45

## 2020-12-15 RX ADMIN — TETROFOSMIN 34.9 MILLICURIE: 1.38 INJECTION, POWDER, LYOPHILIZED, FOR SOLUTION INTRAVENOUS at 11:45

## 2020-12-15 RX ADMIN — Medication 10 ML: at 10:07

## 2020-12-15 RX ADMIN — REGADENOSON 0.4 MG: 0.08 INJECTION, SOLUTION INTRAVENOUS at 11:44

## 2020-12-15 RX ADMIN — Medication 10 ML: at 11:44

## 2020-12-15 RX ADMIN — TETROFOSMIN 10.7 MILLICURIE: 1.38 INJECTION, POWDER, LYOPHILIZED, FOR SOLUTION INTRAVENOUS at 10:07

## 2020-12-17 PROCEDURE — 78452 HT MUSCLE IMAGE SPECT MULT: CPT | Performed by: INTERNAL MEDICINE

## 2020-12-17 PROCEDURE — 93018 CV STRESS TEST I&R ONLY: CPT | Performed by: INTERNAL MEDICINE

## 2020-12-17 PROCEDURE — 93016 CV STRESS TEST SUPVJ ONLY: CPT | Performed by: INTERNAL MEDICINE

## 2021-01-04 NOTE — PROCEDURES
[address] [address]                              CARDIAC STRESS TEST    PATIENT NAME: Sean Singh                 :        1950  MED REC NO:   [id number]                              ROOM:  ACCOUNT NO:   [de-identified]                           ADMIT DATE: 12/15/2020  PROVIDER:     Ramsey Angel MD    CARDIOVASCULAR DIAGNOSTIC DEPARTMENT    DATE OF STUDY:  12/15/2020    [facility]    ORDERING PROVIDERS:  Jenise Rodas MD and Morales Reddy DO    INDICATIONS:  Shortness of breath, sleep apnea, pacemaker. TECHNIQUE:  At rest, the patient was injected with 10.7 mCi of Myoview. Resting images were obtained. The patient was then given 0.4 mg of  Lexiscan followed by administration of 34.9 mCi of Myoview. Stress  tomographic images were then obtained. Left ventricular ejection  fraction and gated wall motion were acquired. RESULTS:  Resting EKG revealed paced rhythm. Maximum heart rate  achieved was 75 beats per minute. IMAGING RESULTS:  Review of rest and stress tomographic images revealed  septal abnormality due to bundle branch block due to paced rhythm. Left  ventricular ejection fraction is calculated at 40%. TID ratio is 0.9,  which is within normal limits. IMPRESSION:  1. Underlying rhythm is paced. 2.  Perfusion defect in the septum due to conduction system abnormality. 3.  Left ventricular ejection fraction is 40%.         Vargas Metz MD    D: 2021 #8:40:49       T: 2021 10:25:05     [id number]  Job#: [id number]     Doc#: [id number]    CC:

## 2021-01-05 ENCOUNTER — OFFICE VISIT (OUTPATIENT)
Dept: CARDIOLOGY CLINIC | Age: 71
End: 2021-01-05
Payer: MEDICARE

## 2021-01-05 VITALS
HEART RATE: 79 BPM | TEMPERATURE: 97.9 F | DIASTOLIC BLOOD PRESSURE: 80 MMHG | BODY MASS INDEX: 39.06 KG/M2 | WEIGHT: 288 LBS | SYSTOLIC BLOOD PRESSURE: 120 MMHG | OXYGEN SATURATION: 97 %

## 2021-01-05 DIAGNOSIS — I10 ESSENTIAL HYPERTENSION: Primary | ICD-10-CM

## 2021-01-05 DIAGNOSIS — I48.0 PAROXYSMAL ATRIAL FIBRILLATION (HCC): ICD-10-CM

## 2021-01-05 PROCEDURE — G8427 DOCREV CUR MEDS BY ELIG CLIN: HCPCS | Performed by: INTERNAL MEDICINE

## 2021-01-05 PROCEDURE — 3017F COLORECTAL CA SCREEN DOC REV: CPT | Performed by: INTERNAL MEDICINE

## 2021-01-05 PROCEDURE — 99214 OFFICE O/P EST MOD 30 MIN: CPT | Performed by: INTERNAL MEDICINE

## 2021-01-05 PROCEDURE — G8417 CALC BMI ABV UP PARAM F/U: HCPCS | Performed by: INTERNAL MEDICINE

## 2021-01-05 PROCEDURE — 1036F TOBACCO NON-USER: CPT | Performed by: INTERNAL MEDICINE

## 2021-01-05 PROCEDURE — G8484 FLU IMMUNIZE NO ADMIN: HCPCS | Performed by: INTERNAL MEDICINE

## 2021-01-05 PROCEDURE — 4040F PNEUMOC VAC/ADMIN/RCVD: CPT | Performed by: INTERNAL MEDICINE

## 2021-01-05 PROCEDURE — 1123F ACP DISCUSS/DSCN MKR DOCD: CPT | Performed by: INTERNAL MEDICINE

## 2021-01-05 NOTE — PROGRESS NOTES
Chief Complaint   Patient presents with    Results     stress/echo 12/15/20    Atrial Fibrillation    Hypertension       7-5-18: Patient presents for initial medical evaluation. Patient is followed on a regular basis by Dr. Fortunato Bang MD. S/p hospitalization for weakness, fatigue and near syncope. 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 Echo with normal LVF. He continues to have symptoms today. Does not feel good, has dry heaves and cough. His stools are dark. He has a hx of UC. Pt denies chest pain, dyspnea, dyspnea on exertion, change in exercise capacity, fatigue,  nausea, vomiting, diarrhea, constipation, motor weakness, insomnia, weight loss, syncope,PND, orthopnea, or claudication. Had recent colitis flare up. Was placed on ABX in hospital. His WBC was high  and noted to have ARF. No hx of MI, CHF or arrhythmia. No hx of stress test or LHC. 9-4-18: s/p PPM insertion on 7-10-18. S/p normal nuclear stress test. States LH/Dizz has resolved. Does have some fatigue. Does have hx of ulcerative colitis. S/p recent C.diff infection. Dealing with depression. Pt denies chest pain, dyspnea, dyspnea on exertion, change in exercise capacity,   nausea, vomiting, diarrhea, constipation, motor weakness, insomnia, weight loss, syncope, dizziness, lightheadedness, palpitations, PND, orthopnea, or claudication. No nitro use. BP and hr are good. CAD is stable. No LE discoloration or ulcers. No LE edema. No CHF type symptoms. Lipid profile is normal.       11-1-18: noted to have episodes of New onset afibb on PPM check, 16% afibb burden. Started on NOAC but has not picked it up. States he has a hx of colitis, following with  GI, ? chrons disease, he is having some GIB episodes. EKG with Afibb today, V paced.  Pt denies chest pain, dyspnea, dyspnea on exertion, change in exercise capacity, fatigue,  nausea, vomiting, diarrhea, constipation, motor weakness, constipation, motor weakness, insomnia, weight loss, syncope, dizziness, lightheadedness, palpitations, PND, orthopnea, or claudication. S/p PPM check last week and is ok. Hx of negative stress test in 2018.       12/8/2020: States he is more short of breath than usual.  Started around summertime. Patient with history of paroxysmal atrial fibrillation/sick sinus syndrome status post permanent pacemaker placement. History of negative stress test in 2018. He is on oral anticoagulation again. Recent lab work was within normal limits per patient. EKG with sinus rhythm V paced rhythm. He denies any lower extremity edema. In March 2019 he weighed 271 pounds and today he weighs 285 pounds. He is compliant with his CPAP machine at night. 1/5/2021: Patient was started on Lasix 20 mg daily last time and is feeling better overall. States his shortness of breath has improved. Status post nuclear stress test with ejection fraction of 40% no significant perfusion defects. Status post echocardiogram ejection fraction of 60%, mild LVH, grade 1 diastolic dysfunction no significant valve abnormalities. Chest x-ray and BNP was not performed. He is compliant with his medication. He is compliant with his CPAP machine at night. No recent hospitalizations. Pressure is 120/80 with heart rate of 79 bpm.  Status post permanent pacemaker check with 8-year longevity on generator, no episodes of any malignant tachycardia or bradycardia arrhythmias. Patient with history of paroxysmal atrial fibrillation on oral anticoagulation.       Patient Active Problem List   Diagnosis    HTN (hypertension)    Major depressive disorder, recurrent episode, moderate (HCC)    DM w/o complication type II ([address]    Dyslipidemia associated with type 2 diabetes mellitus (HCC)    ETIENNE on CPAP    Generalized anxiety disorder    SSS (sick sinus syndrome) ([address]    Presence of permanent cardiac pacemaker    Paroxysmal atrial fibrillation ([address]  Crohn's disease of colon with rectal bleeding [address]    Morbidly obese ([facility]       Past Surgical History:   Procedure Laterality Date    ANTERIOR CRUCIATE LIGAMENT REPAIR      CARDIAC PACEMAKER PLACEMENT      COLONOSCOPY  2019    DR Mile Mat[place]    GALLBLADDER SURGERY      PACEMAKER PLACEMENT  07/10/2018    [facility] W HOLIDAY DO    SIGMOIDOSCOPY  2017    DR. BONILLA    SIGMOIDOSCOPY  2019    DR [place]   [facility] TOE SURGERY      TONSILLECTOMY      WRIST SURGERY  2013       Social History     Socioeconomic History    Marital status: Single     Spouse name: Not on file    Number of children: Not on file    Years of education: Not on file    Highest education level: Not on file   Occupational History    Not on file   Social Needs    Financial resource strain: Not on file    Food insecurity     Worry: Never true     Inability: Never true   [employer] needs     Medical: Not on file     Non-medical: Not on file   Tobacco Use    Smoking status: Former Smoker     Packs/day: 0.25     Years: 10.00     Pack years: 2.50     Types: Cigars     Start date: 2008     Quit date: 2018     Years since quittin.5    Smokeless tobacco: Never Used   Substance and Sexual Activity    Alcohol use:  Yes     Alcohol/week: 0.0 standard drinks     Comment: occ    Drug use: No    Sexual activity: Not on file   Lifestyle    Physical activity     Days per week: Not on file     Minutes per session: Not on file    Stress: Not on file   Relationships    Social connections     Talks on phone: Not on file     Gets together: Not on file     Attends [employer] service: Not on file     Active member of club or organization: Not on file     Attends meetings of clubs or organizations: Not on file     Relationship status: Not on file    Intimate partner violence     Fear of current or ex partner: Not on file     Emotionally abused: Not on file     Physically abused: Not on file     Forced sexual activity: Not on file   Other Topics Concern    Not on file   Social History Narrative    Not on file       Family History   Problem Relation Age of Onset    Cancer Mother     Diabetes Father     Cancer Paternal Uncle        Current Outpatient Medications   Medication Sig Dispense Refill    furosemide (LASIX) 20 MG tablet Take 1 tablet by mouth daily 60 tablet 6    rivaroxaban (XARELTO) 10 MG TABS tablet Take 2 tablets by mouth daily (with breakfast) 42 tablet 0    rivaroxaban (XARELTO) 20 MG TABS tablet Take 1 tablet by mouth daily (with breakfast) 30 tablet 5    mirtazapine (REMERON) 30 MG tablet Take 30 mg by mouth nightly      Tofacitinib Citrate (XELJANZ) 10 MG TABS Take 10 mg by mouth 2 times daily      levothyroxine (SYNTHROID) 25 MCG tablet Take 25 mcg by mouth Daily      loperamide (IMODIUM) 2 MG capsule Take 2 mg by mouth 4 times daily as needed      Loperamide HCl POWD       ustekinumab (STELARA) 45 MG/0.5ML SOSY prefilled syringe       blood glucose monitor kit and supplies Test 2-3 times a day & as needed for symptoms of irregular blood glucose. 1 kit 0    buPROPion (WELLBUTRIN XL) 300 MG extended release tablet Take 300 mg by mouth every morning      candesartan (ATACAND) 16 MG tablet       Cholecalciferol (VITAMIN D) 2000 units CAPS capsule Take 1 capsule by mouth daily      metoprolol tartrate (LOPRESSOR) 25 MG tablet Take 1 tablet by mouth 2 times daily 180 tablet 3    fenofibrate (TRICOR) 145 MG tablet TAKE 1 TABLET EVERY DAY 90 tablet 3     No current facility-administered medications for this visit. Azathioprine and Clindamycin    Review of Systems:  General ROS: negative  Psychological ROS: negative  Hematological and Lymphatic ROS: No history of blood clots or bleeding disorder.    Respiratory ROS: no cough, shortness of breath, or wheezing  Cardiovascular ROS: positive for - palpitations  Gastrointestinal ROS: no abdominal pain, change in bowel habits, or black or bloody stools  Genito-Urinary ROS: no dysuria, trouble voiding, or hematuria  Musculoskeletal ROS: negative  Neurological ROS: no TIA or stroke symptoms  Dermatological ROS: negative    VITALS:  Blood pressure 120/80, pulse 79, temperature 97.9 °F (36.6 °C), temperature source Infrared, weight 288 lb (130.6 kg), SpO2 97 %. Body mass index is 39.06 kg/m². Physical Examination:  General appearance - alert, well appearing, and in no distress  Mental status - alert, oriented to person, place, and time  Neck - Neck is supple, no JVD or carotid bruits. No thyromegaly or adenopathy. Chest - clear to auscultation, no wheezes, rales or rhonchi, symmetric air entry  Heart - normal rate, regular rhythm, normal S1, S2, no murmurs, rubs, clicks or gallops  Abdomen - soft, nontender, nondistended, no masses or organomegaly  Neurological - alert, oriented, normal speech, no focal findings or movement disorder noted  Extremities - peripheral pulses normal, no pedal edema, no clubbing or cyanosis  Skin - normal coloration and turgor, no rashes, no suspicious skin lesions noted    Pacer site has healed. No orders of the defined types were placed in this encounter. ASSESSMENT:     Diagnosis Orders   1. Essential hypertension     2. Paroxysmal atrial fibrillation (HCC)           PLAN:     Patient will need to continue to follow up with you for their general medical care     As always, aggressive risk factor modification is strongly recommended. We should adhere to the JNC VIII guidelines for HTN management and the NCEP ATP III guidelines for LDL-C management. Cardiac diet is always recommended with low fat, cholesterol, calories and sodium. Continue medications at current doses. Consider left heart catheterization if has recurrent symptoms of shortness of breath/worsening shortness of breath or chest pain. Lasix 20mg daily, if ineffective, double. Follow up with device clinic. [facility] since ok with GI. If not anble to tolerate NOAC, then refer to [facility] for REESE closure device. Patient was advised and encouraged to check blood pressure at home or at a pharmacy, maintain a logbook, and also call us back if blood pressure are above the target ranges or if it is low. Patient clearly understands and agrees to the instructions. We will need to continue to monitor muscle and liver enzymes, BUN, CR, and electrolytes. Details of medical condition explained and patient was warned about adverse consequences of uncontrolled medical conditions and possible side effects of prescribed medications.

## 2021-03-05 ENCOUNTER — TELEPHONE (OUTPATIENT)
Dept: CARDIOLOGY CLINIC | Age: 71
End: 2021-03-05

## 2021-03-05 NOTE — TELEPHONE ENCOUNTER
PATIENT CALLED STATING THAT DR. FINCH HAS ORDERED AN MRI OF HIS KNEE. THE TECHNICIAN TOLD THE PATIENT HE CAN NOT HAVE THIS DONE UNLESS THEY TURN OFF HIS PACEMAKER. PATIENT IS ASKING YOU TO ADVISE.

## 2021-03-10 NOTE — TELEPHONE ENCOUNTER
Please call [employer] rep Allen Tejeda at [phone] to see if Pacer is MRI compatible.      Electronically signed by Rk Sheriff DO on 3/10/2021 at 8:56 AM

## 2021-03-30 ENCOUNTER — TELEPHONE (OUTPATIENT)
Dept: CARDIOLOGY CLINIC | Age: 71
End: 2021-03-30

## 2021-03-30 DIAGNOSIS — I48.0 PAROXYSMAL ATRIAL FIBRILLATION (HCC): ICD-10-CM

## 2021-08-03 ENCOUNTER — OFFICE VISIT (OUTPATIENT)
Dept: CARDIOLOGY CLINIC | Age: 71
End: 2021-08-03
Payer: MEDICARE

## 2021-08-03 VITALS
SYSTOLIC BLOOD PRESSURE: 138 MMHG | HEART RATE: 66 BPM | RESPIRATION RATE: 16 BRPM | OXYGEN SATURATION: 99 % | BODY MASS INDEX: 39.33 KG/M2 | DIASTOLIC BLOOD PRESSURE: 86 MMHG | WEIGHT: 290 LBS

## 2021-08-03 DIAGNOSIS — I10 ESSENTIAL HYPERTENSION: Primary | ICD-10-CM

## 2021-08-03 DIAGNOSIS — R06.02 SHORTNESS OF BREATH: ICD-10-CM

## 2021-08-03 DIAGNOSIS — I48.0 PAROXYSMAL ATRIAL FIBRILLATION (HCC): ICD-10-CM

## 2021-08-03 LAB
B-TYPE NATRIURETIC PEPTIDE: 120 PG/ML (ref 0–99)
D DIMER: 1373 NG/ML FEU

## 2021-08-03 PROCEDURE — 3017F COLORECTAL CA SCREEN DOC REV: CPT | Performed by: INTERNAL MEDICINE

## 2021-08-03 PROCEDURE — 1123F ACP DISCUSS/DSCN MKR DOCD: CPT | Performed by: INTERNAL MEDICINE

## 2021-08-03 PROCEDURE — 99215 OFFICE O/P EST HI 40 MIN: CPT | Performed by: INTERNAL MEDICINE

## 2021-08-03 PROCEDURE — 93000 ELECTROCARDIOGRAM COMPLETE: CPT | Performed by: INTERNAL MEDICINE

## 2021-08-03 PROCEDURE — G8427 DOCREV CUR MEDS BY ELIG CLIN: HCPCS | Performed by: INTERNAL MEDICINE

## 2021-08-03 PROCEDURE — 4040F PNEUMOC VAC/ADMIN/RCVD: CPT | Performed by: INTERNAL MEDICINE

## 2021-08-03 PROCEDURE — 1036F TOBACCO NON-USER: CPT | Performed by: INTERNAL MEDICINE

## 2021-08-03 PROCEDURE — G8417 CALC BMI ABV UP PARAM F/U: HCPCS | Performed by: INTERNAL MEDICINE

## 2021-08-03 RX ORDER — BUPROPION HYDROCHLORIDE 150 MG/1
150 TABLET, EXTENDED RELEASE ORAL DAILY
COMMUNITY
End: 2022-04-14

## 2021-08-03 RX ORDER — MIRTAZAPINE 45 MG/1
45 TABLET, FILM COATED ORAL NIGHTLY
COMMUNITY

## 2021-08-03 NOTE — PROGRESS NOTES
Chief Complaint   Patient presents with    6 Month Follow-Up    Atrial Fibrillation    Hypertension    Shortness of Breath     increased       7-5-18: Patient presents for initial medical evaluation. Patient is followed on a regular basis by Dr. Hilary Meyers MD. S/p hospitalization for weakness, fatigue and near syncope. 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 Echo with normal LVF. He continues to have symptoms today. Does not feel good, has dry heaves and cough. His stools are dark. He has a hx of UC. Pt denies chest pain, dyspnea, dyspnea on exertion, change in exercise capacity, fatigue,  nausea, vomiting, diarrhea, constipation, motor weakness, insomnia, weight loss, syncope,PND, orthopnea, or claudication. Had recent colitis flare up. Was placed on ABX in hospital. His WBC was high  and noted to have ARF. No hx of MI, CHF or arrhythmia. No hx of stress test or LHC. 9-4-18: s/p PPM insertion on 7-10-18. S/p normal nuclear stress test. States LH/Dizz has resolved. Does have some fatigue. Does have hx of ulcerative colitis. S/p recent C.diff infection. Dealing with depression. Pt denies chest pain, dyspnea, dyspnea on exertion, change in exercise capacity,   nausea, vomiting, diarrhea, constipation, motor weakness, insomnia, weight loss, syncope, dizziness, lightheadedness, palpitations, PND, orthopnea, or claudication. No nitro use. BP and hr are good. CAD is stable. No LE discoloration or ulcers. No LE edema. No CHF type symptoms. Lipid profile is normal.       11-1-18: noted to have episodes of New onset afibb on PPM check, 16% afibb burden. Started on NOAC but has not picked it up. States he has a hx of colitis, following with  GI, ? chrons disease, he is having some GIB episodes. EKG with Afibb today, V paced.  Pt denies chest pain, dyspnea, dyspnea on exertion, change in exercise capacity, fatigue,  nausea, vomiting, diarrhea, constipation, motor weakness, insomnia, weight loss, syncope, dizziness, lightheadedness, palpitations, PND, orthopnea, or claudication. No nitro use. BP and hr are good. CAD is stable. No LE discoloration or ulcers. No LE edema. No CHF type symptoms. Lipid profile is normal. No recent hospitalization. No change in meds. 3-5-19: EKG with NSR, V paced. On DOAC now and no bleeding issues. Was cleared by GI. Pt denies chest pain, dyspnea, dyspnea on exertion, change in exercise capacity, fatigue,  nausea, vomiting, diarrhea, constipation, motor weakness, insomnia, weight loss, syncope, dizziness, lightheadedness, palpitations, PND, orthopnea, or claudication. No nitro use. BP and hr are good. CAD is stable. No LE discoloration or ulcers. No LE edema. No CHF type symptoms. Lipid profile is normal. No recent hospitalization. No change in meds. S/p PPm check     10-1-19: TOLERATING DOAC OK. Will have colonoscopy tomorrow. Pt denies chest pain, dyspnea, dyspnea on exertion, change in exercise capacity, fatigue,  nausea, vomiting, diarrhea, constipation, motor weakness, insomnia, weight loss, syncope, dizziness, lightheadedness, palpitations, PND, orthopnea, or claudication. No nitro use. BP and hr are good. CAD is stable. No LE discoloration or ulcers. No LE edema. No CHF type symptoms. Lipid profile is normal. No recent hospitalization. No change in meds. EKG with NSR, V paced. S/p recent PPM check but no report. Hgb is 14 on 5/19. CPAP at night. 6-30-20: + hx of UC, following with GI. On DOAC though and no bleeding issues. Pt denies chest pain, dyspnea, dyspnea on exertion, change in exercise capacity, fatigue,  nausea, vomiting, diarrhea, constipation, motor weakness, insomnia, weight loss, syncope, dizziness, lightheadedness, palpitations, PND, orthopnea, or claudication. Hx of PAFib, SSS s/p PPM.   EKG with NSR, V paced.  Pt denies chest pain, dyspnea, dyspnea on exertion, change in exercise capacity, fatigue,  nausea, vomiting, diarrhea, constipation, motor weakness, insomnia, weight loss, syncope, dizziness, lightheadedness, palpitations, PND, orthopnea, or claudication. S/p PPM check last week and is ok. Hx of negative stress test in 2018.       12/8/2020: States he is more short of breath than usual.  Started around summertime. Patient with history of paroxysmal atrial fibrillation/sick sinus syndrome status post permanent pacemaker placement. History of negative stress test in 2018. He is on oral anticoagulation again. Recent lab work was within normal limits per patient. EKG with sinus rhythm V paced rhythm. He denies any lower extremity edema. In March 2019 he weighed 271 pounds and today he weighs 285 pounds. He is compliant with his CPAP machine at night. 1/5/2021: Patient was started on Lasix 20 mg daily last time and is feeling better overall. States his shortness of breath has improved. Status post nuclear stress test with ejection fraction of 40% no significant perfusion defects. Status post echocardiogram ejection fraction of 60%, mild LVH, grade 1 diastolic dysfunction no significant valve abnormalities. Chest x-ray and BNP was not performed. He is compliant with his medication. He is compliant with his CPAP machine at night. No recent hospitalizations. Pressure is 120/80 with heart rate of 79 bpm.  Status post permanent pacemaker check with 8-year longevity on generator, no episodes of any malignant tachycardia or bradycardia arrhythmias. Patient with history of paroxysmal atrial fibrillation on oral anticoagulation. 8-3-21: Continues to have SOB and worse with very mild exertion. Did try inhaler by PCP and no help. S/p recent negative stress test with EF of 40%, echo with LVF 55%. No LE edema. Hx of pafib. Sss/ s/p PPM.   BP is elevated today. On DOAC< no bleeding issues.  Pt denies   nausea, vomiting, diarrhea, constipation, motor weakness, insomnia, weight loss, syncope, dizziness, lightheadedness, palpitations, PND, orthopnea, or claudication. EKG ? afib , V paced. Patient Active Problem List   Diagnosis    HTN (hypertension)    Major depressive disorder, recurrent episode, moderate (HCC)    DM w/o complication type II ([facility]    Dyslipidemia associated with type 2 diabetes mellitus (HCC)    ETIENNE on CPAP    Generalized anxiety disorder    SSS (sick sinus syndrome) (HCC)    Presence of permanent cardiac pacemaker    Paroxysmal atrial fibrillation ([facility] [address] [facility]    Crohn's disease of colon with rectal bleeding ([facility] [address] [facility]    Morbidly obese ([facility] [address] [facility]       Past Surgical History:   Procedure Laterality Date    ANTERIOR CRUCIATE LIGAMENT REPAIR      CARDIAC PACEMAKER PLACEMENT      COLONOSCOPY  05/22/2019    DR Balaji Huang    GALLBLADDER SURGERY      PACEMAKER PLACEMENT  07/10/2018    [facility] DO    SIGMOIDOSCOPY  11/03/2017    DR. BONILLA    SIGMOIDOSCOPY  05/28/2019    DR Balaji Huang   [facility] TOE SURGERY      TONSILLECTOMY      WRIST SURGERY  2013       Social History     Socioeconomic History    Marital status: Single     Spouse name: None    Number of children: None    Years of education: None    Highest education level: None   Occupational History    None   Tobacco Use    Smoking status: Former Smoker     Packs/day: 0.25     Years: 10.00     Pack years: 2.50     Types: Cigars     Start date: 6/21/2008     Quit date: 7/4/2018     Years since quitting: 3.0    Smokeless tobacco: Never Used   Substance and Sexual Activity    Alcohol use:  Yes     Alcohol/week: 0.0 standard drinks     Comment: occ    Drug use: No    Sexual activity: None   Other Topics Concern    None   Social History Narrative    None     Social Determinants of Health     Financial Resource Strain:     Difficulty of Paying Living Expenses:    Food Insecurity:     Worried About Running Out of Food in the Last Year:     Ran Out of Food in the Last Year:    Transportation Needs:     Lack of Transportation (Medical): Clindamycin    Review of Systems:  General ROS: negative  Psychological ROS: negative  Hematological and Lymphatic ROS: No history of blood clots or bleeding disorder. Respiratory ROS: no cough, shortness of breath, or wheezing  Cardiovascular ROS: positive for - palpitations  Gastrointestinal ROS: no abdominal pain, change in bowel habits, or black or bloody stools  Genito-Urinary ROS: no dysuria, trouble voiding, or hematuria  Musculoskeletal ROS: negative  Neurological ROS: no TIA or stroke symptoms  Dermatological ROS: negative    VITALS:  Blood pressure 138/86, pulse 66, resp. rate 16, weight 290 lb (131.5 kg), SpO2 99 %. Body mass index is 39.33 kg/m². Physical Examination:  General appearance - alert, well appearing, and in no distress  Mental status - alert, oriented to person, place, and time  Neck - Neck is supple, no JVD or carotid bruits. No thyromegaly or adenopathy. Chest - clear to auscultation, no wheezes, rales or rhonchi, symmetric air entry  Heart - normal rate, regular rhythm, normal S1, S2, no murmurs, rubs, clicks or gallops  Abdomen - soft, nontender, nondistended, no masses or organomegaly  Neurological - alert, oriented, normal speech, no focal findings or movement disorder noted  Extremities - peripheral pulses normal, no pedal edema, no clubbing or cyanosis  Skin - normal coloration and turgor, no rashes, no suspicious skin lesions noted    Pacer site has healed. Orders Placed This Encounter   Procedures    XR CHEST STANDARD (2 VW)    Brain Natriuretic Peptide    D-Dimer, Quantitative    Faiza Rosario MD, Pulmonology, [facility] EKG 12 lead       ASSESSMENT:     Diagnosis Orders   1. Essential hypertension     2. Paroxysmal atrial fibrillation (HCC)  EKG 12 lead    rivaroxaban (XARELTO) 20 MG TABS tablet   3.  Shortness of breath  XR CHEST STANDARD (2 VW)    LEFT HEART CATH    Brain Natriuretic Peptide    D-Dimer, Quantitative    Kimi Vargas MD, [facility]         PLAN:     Patient will need to continue to follow up with you for their general medical care     As always, aggressive risk factor modification is strongly recommended. We should adhere to the JNC VIII guidelines for HTN management and the NCEP ATP III guidelines for LDL-C management. Cardiac diet is always recommended with low fat, cholesterol, calories and sodium. Continue medications at current doses. left heart catheterization for recurrent symptoms of shortness of breath/worsening shortness of breath      Lasix 20mg daily, if ineffective, double. Follow up with device clinic. [facility]. NOAC since ok with GI. If not anble to tolerate NOAC, then refer to [facility] for REESE closure device. Check CXR    Check BNP, DDimer    Refer to pulmonary for SOB. Patient was advised and encouraged to check blood pressure at home or at a pharmacy, maintain a logbook, and also call us back if blood pressure are above the target ranges or if it is low. Patient clearly understands and agrees to the instructions. We will need to continue to monitor muscle and liver enzymes, BUN, CR, and electrolytes. Details of medical condition explained and patient was warned about adverse consequences of uncontrolled medical conditions and possible side effects of prescribed medications.

## 2021-08-24 LAB
ANION GAP SERPL CALCULATED.3IONS-SCNC: 12 MMOL/L (ref 10–20)
APTT: 29 SEC (ref 25–35)
BICARBONATE: 25 MMOL/L (ref 21–32)
CALCIUM SERPL-MCNC: 9.9 MG/DL (ref 8.6–10.3)
CHLORIDE BLD-SCNC: 107 MMOL/L (ref 98–107)
CREAT SERPL-MCNC: 1.36 MG/DL (ref 0.5–1.3)
ERYTHROCYTE [DISTWIDTH] IN BLOOD BY AUTOMATED COUNT: 15 % (ref 11.5–14)
GFR AFRICAN AMERICAN: 63 ML/MIN/1.73M2
GFR NON-AFRICAN AMERICAN: 52 ML/MIN/1.73M2
GLUCOSE: 101 MG/DL (ref 74–99)
HCT VFR BLD CALC: 41.6 % (ref 41–52)
HEMOGLOBIN: 13.3 G/DL (ref 13.5–17)
INR BLD: 1 (ref 0.9–1.1)
MCHC RBC AUTO-ENTMCNC: 32 G/DL (ref 32–36)
MCV RBC AUTO: 90 FL (ref 80–100)
PLATELET # BLD: 216 X10E9/L (ref 150–450)
POTASSIUM SERPL-SCNC: 4.3 MMOL/L (ref 3.5–5.3)
PROTHROMBIN TIME: 11.8 SEC (ref 10.1–13)
RBC # BLD: 4.63 X10E12/L (ref 4.5–5.9)
SODIUM BLD-SCNC: 140 MMOL/L (ref 136–145)
UREA NITROGEN: 18 MG/DL (ref 6–23)
WBC: 7.5 X10E9/L (ref 4.4–11.3)

## 2021-09-17 ENCOUNTER — OFFICE VISIT (OUTPATIENT)
Dept: PULMONOLOGY | Age: 71
End: 2021-09-17
Payer: MEDICARE

## 2021-09-17 ENCOUNTER — HOSPITAL ENCOUNTER (OUTPATIENT)
Dept: GENERAL RADIOLOGY | Age: 71
Discharge: HOME | End: 2021-09-19
Payer: MEDICARE

## 2021-09-17 VITALS
SYSTOLIC BLOOD PRESSURE: 139 MMHG | TEMPERATURE: 98.8 F | OXYGEN SATURATION: 99 % | HEART RATE: 88 BPM | HEIGHT: 72 IN | WEIGHT: 280 LBS | DIASTOLIC BLOOD PRESSURE: 78 MMHG | BODY MASS INDEX: 37.93 KG/M2

## 2021-09-17 DIAGNOSIS — E66.9 OBESITY (BMI 30-39.9): ICD-10-CM

## 2021-09-17 DIAGNOSIS — R06.00 DYSPNEA, UNSPECIFIED TYPE: Primary | ICD-10-CM

## 2021-09-17 DIAGNOSIS — G47.33 OSA ON CPAP: ICD-10-CM

## 2021-09-17 DIAGNOSIS — R06.00 DYSPNEA, UNSPECIFIED TYPE: ICD-10-CM

## 2021-09-17 DIAGNOSIS — Z99.89 OSA ON CPAP: ICD-10-CM

## 2021-09-17 PROCEDURE — 99204 OFFICE O/P NEW MOD 45 MIN: CPT | Performed by: INTERNAL MEDICINE

## 2021-09-17 PROCEDURE — 1036F TOBACCO NON-USER: CPT | Performed by: INTERNAL MEDICINE

## 2021-09-17 PROCEDURE — 71046 X-RAY EXAM CHEST 2 VIEWS: CPT

## 2021-09-17 PROCEDURE — G8427 DOCREV CUR MEDS BY ELIG CLIN: HCPCS | Performed by: INTERNAL MEDICINE

## 2021-09-17 PROCEDURE — 3017F COLORECTAL CA SCREEN DOC REV: CPT | Performed by: INTERNAL MEDICINE

## 2021-09-17 PROCEDURE — 1123F ACP DISCUSS/DSCN MKR DOCD: CPT | Performed by: INTERNAL MEDICINE

## 2021-09-17 PROCEDURE — 4040F PNEUMOC VAC/ADMIN/RCVD: CPT | Performed by: INTERNAL MEDICINE

## 2021-09-17 PROCEDURE — G8417 CALC BMI ABV UP PARAM F/U: HCPCS | Performed by: INTERNAL MEDICINE

## 2021-09-17 NOTE — PROGRESS NOTES
Tobacco Use    Smoking status: Former Smoker     Packs/day: 0.25     Years: 10.00     Pack years: 2.50     Types: Cigars     Start date: 6/21/2008     Quit date: 7/4/2018     Years since quitting: 3.2    Smokeless tobacco: Never Used   Substance and Sexual Activity    Alcohol use: Yes     Alcohol/week: 0.0 standard drinks     Comment: occ    Drug use: No    Sexual activity: Not on file   Other Topics Concern    Not on file   Social History Narrative    Not on file     Social Determinants of Health     Financial Resource Strain:     Difficulty of Paying Living Expenses:    Food Insecurity:     Worried About Running Out of Food in the Last Year:     [address] in the Last Year:    Transportation Needs:     Lack of Transportation (Medical):      Lack of Transportation (Non-Medical):    Physical Activity:     Days of Exercise per Week:     Minutes of Exercise per Session:    Stress:     Feeling of Stress :    Social Connections:     Frequency of Communication with Friends and Family:     Frequency of Social Gatherings with Friends and Family:     Attends Yarsani Services:     Active Member of Clubs or Organizations:     Attends Club or Organization Meetings:     Marital Status:    Intimate Partner Violence:     Fear of Current or Ex-Partner:     Emotionally Abused:     Physically Abused:     Sexually Abused:          Review of Systems      :     Vitals:    09/17/21 1201   BP: 139/78   Pulse: 88   Temp: 98.8 °F (37.1 °C)   SpO2: 99%   Weight: 280 lb (127 kg)   Height: 6' (1.829 m)     Wt Readings from Last 3 Encounters:   09/17/21 280 lb (127 kg)   08/03/21 290 lb (131.5 kg)   01/05/21 288 lb (130.6 kg)         Physical Exam    Current Outpatient Medications   Medication Sig Dispense Refill    mirtazapine (REMERON) 45 MG tablet Take 45 mg by mouth nightly      buPROPion (WELLBUTRIN SR) 150 MG extended release tablet Take 150 mg by mouth daily      rivaroxaban (XARELTO) 20 MG TABS tablet Take 1 tablet by mouth daily (with breakfast) 28 tablet 0    furosemide (LASIX) 20 MG tablet Take 1 tablet by mouth daily 60 tablet 6    Tofacitinib Citrate (XELJANZ) 10 MG TABS Take 10 mg by mouth 2 times daily      levothyroxine (SYNTHROID) 25 MCG tablet Take 25 mcg by mouth Daily      loperamide (IMODIUM) 2 MG capsule Take 2 mg by mouth 4 times daily as needed      blood glucose monitor kit and supplies Test 2-3 times a day & as needed for symptoms of irregular blood glucose. 1 kit 0    buPROPion (WELLBUTRIN XL) 300 MG extended release tablet Take 300 mg by mouth every morning      Cholecalciferol (VITAMIN D) 2000 units CAPS capsule Take 1 capsule by mouth daily      metoprolol tartrate (LOPRESSOR) 25 MG tablet Take 1 tablet by mouth 2 times daily 180 tablet 3    fenofibrate (TRICOR) 145 MG tablet TAKE 1 TABLET EVERY DAY 90 tablet 3     No current facility-administered medications for this visit. Results for orders placed during the hospital encounter of 01/11/19    XR CHEST STANDARD (2 VW)    Narrative  EXAMINATION: XR CHEST (2 VW)    CLINICAL HISTORY: J20.9 Acute bronchitis, unspecified organism ICD10    COMPARISONS: None available. FINDINGS: Cardiac size is borderline. There is a prominent left epicardial fat pad. Pulmonary vascularity is normal. The lungs are clear. There is a dual-chamber left permanent pacemaker. There is no pneumothorax. There are no pleural effusions. There is  no evidence of adenopathy. The bones are unremarkable. Impression  NEGATIVE CHEST RADIOGRAPHS      Results for orders placed in visit on 10/26/18    XR CHEST STANDARD (2 VW)    Narrative  DATE OF EXAM:      Oct 26 2018 10:32AM    CLINICAL HISTORY/   MRN: [id number]  Patient Name: Jairo Mortensen    STUDY:  CHEST 2 VIEW; 10/26/2018 10:32 am    INDICATION:  s/p pacemaker.     COMPARISON:  07/11/2018    ACCESSION NUMBER(S):  [id number]    ORDERING CLINICIAN:  TRACEE CARRERO    TECHNIQUE:  2 radiographs of the chest were performed. FINDINGS:  A dual lead intracardiac device is in place with lead tips overlying the  right atrium and right ventricle. The heart is of normal size and contour. The pulmonary vessels are within  normal limits. The lungs and the pleural spaces are clear. There is no  pneumothorax. The osseous structures are intact. CONCLUSION:   IMPRESSION:  No sign of acute cardiopulmonary disease. Results for orders placed in visit on 07/05/18    XR CHEST STANDARD (2 VW)    Narrative  DATE OF EXAM:      Jul 11 2018  7:35AM    CLINICAL HISTORY/   MRN: [id number]  Patient Name: Je Park    STUDY:  CHEST 2 VIEW; 7/11/2018 7:35 am    INDICATION:  ICD/PPM Placement. COMPARISON:  07/10/2018. ACCESSION NUMBER(S):  [id number]    ORDERING CLINICIAN:  TRACEE HOLIDAY    FINDINGS:  Left-sided cardiac device leads in the right atrium and right ventricle. The heart is mildly enlarged. No pulmonary venous congestion or  pneumothorax. No pleural effusion. The visualized upper abdomen is unremarkable. The visualized osseous  structures are intact. CONCLUSION:   IMPRESSION:  ICD leads in the right atrium and right ventricle. No pneumothorax. Assessment/Plan:     1. Dyspnea, unspecified type  He said he is diagnose with COPD in July 2021. He had PFT done at [facility] and he was told he has mild COPD . He was started on breztri 2 puff BID, albuterol HFA prn CXR 8/3/21 show  Streaky atelectasis. No pneumothorax or obvious effusion. He has C/o shortness of breath  with exertion. Gogo Sims He said he quit smoking 4/21. Get report of PFT from [facility]    - XR CHEST STANDARD (2 VW); Future    2. ETIENNE on CPAP  He also has ETIENNE  and he was placed on CPAP  With 5 since last 2 yrs. With full face mask  and sleep well . Continue CPAP therapy as before    3. Obesity (BMI 30-39. 9)  He is advised try to lose weight. obesity related risk explained to the patient ,  Current weight:  280 lb (127 kg) Lbs.  BMI:  Body mass index is 37.97 kg/m². Suggested weight control approaches, including dietary changes , exercise, behavioral modification. Return in about 2 months (around 11/17/2021) for COPD, shortness of breath, harry.       Savage Nicole MD

## 2021-10-04 DIAGNOSIS — R06.02 SHORTNESS OF BREATH: ICD-10-CM

## 2021-10-05 ENCOUNTER — OFFICE VISIT (OUTPATIENT)
Dept: CARDIOLOGY CLINIC | Age: 71
End: 2021-10-05
Payer: MEDICARE

## 2021-10-05 VITALS
TEMPERATURE: 97.4 F | WEIGHT: 284 LBS | SYSTOLIC BLOOD PRESSURE: 130 MMHG | HEART RATE: 71 BPM | DIASTOLIC BLOOD PRESSURE: 80 MMHG | BODY MASS INDEX: 38.52 KG/M2

## 2021-10-05 DIAGNOSIS — G47.33 OSA ON CPAP: ICD-10-CM

## 2021-10-05 DIAGNOSIS — I48.0 PAROXYSMAL ATRIAL FIBRILLATION (HCC): Primary | ICD-10-CM

## 2021-10-05 DIAGNOSIS — Z99.89 OSA ON CPAP: ICD-10-CM

## 2021-10-05 DIAGNOSIS — E66.01 MORBIDLY OBESE (HCC): ICD-10-CM

## 2021-10-05 DIAGNOSIS — Z95.0 PRESENCE OF PERMANENT CARDIAC PACEMAKER: ICD-10-CM

## 2021-10-05 DIAGNOSIS — I10 PRIMARY HYPERTENSION: ICD-10-CM

## 2021-10-05 DIAGNOSIS — I48.0 PAROXYSMAL ATRIAL FIBRILLATION (HCC): ICD-10-CM

## 2021-10-05 DIAGNOSIS — I49.5 SSS (SICK SINUS SYNDROME) (HCC): ICD-10-CM

## 2021-10-05 PROCEDURE — 1123F ACP DISCUSS/DSCN MKR DOCD: CPT | Performed by: INTERNAL MEDICINE

## 2021-10-05 PROCEDURE — G8484 FLU IMMUNIZE NO ADMIN: HCPCS | Performed by: INTERNAL MEDICINE

## 2021-10-05 PROCEDURE — 4040F PNEUMOC VAC/ADMIN/RCVD: CPT | Performed by: INTERNAL MEDICINE

## 2021-10-05 PROCEDURE — 93000 ELECTROCARDIOGRAM COMPLETE: CPT | Performed by: INTERNAL MEDICINE

## 2021-10-05 PROCEDURE — G8427 DOCREV CUR MEDS BY ELIG CLIN: HCPCS | Performed by: INTERNAL MEDICINE

## 2021-10-05 PROCEDURE — 99214 OFFICE O/P EST MOD 30 MIN: CPT | Performed by: INTERNAL MEDICINE

## 2021-10-05 PROCEDURE — G8417 CALC BMI ABV UP PARAM F/U: HCPCS | Performed by: INTERNAL MEDICINE

## 2021-10-05 PROCEDURE — 3017F COLORECTAL CA SCREEN DOC REV: CPT | Performed by: INTERNAL MEDICINE

## 2021-10-05 PROCEDURE — 1036F TOBACCO NON-USER: CPT | Performed by: INTERNAL MEDICINE

## 2021-10-05 RX ORDER — TELMISARTAN 40 MG/1
40 TABLET ORAL DAILY
COMMUNITY
Start: 2021-08-12

## 2021-10-05 NOTE — PROGRESS NOTES
Chief Complaint   Patient presents with    1 Month Follow-Up    Follow Up After Procedure     CARDIAC CATHETERIZATION       7-5-18: Patient presents for initial medical evaluation. Patient is followed on a regular basis by Dr. Graeme Flynn MD. S/p hospitalization for weakness, fatigue and near syncope. 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 Echo with normal LVF. He continues to have symptoms today. Does not feel good, has dry heaves and cough. His stools are dark. He has a hx of UC. Pt denies chest pain, dyspnea, dyspnea on exertion, change in exercise capacity, fatigue,  nausea, vomiting, diarrhea, constipation, motor weakness, insomnia, weight loss, syncope,PND, orthopnea, or claudication. Had recent colitis flare up. Was placed on ABX in hospital. His WBC was high  and noted to have ARF. No hx of MI, CHF or arrhythmia. No hx of stress test or LHC. 9-4-18: s/p PPM insertion on 7-10-18. S/p normal nuclear stress test. States LH/Dizz has resolved. Does have some fatigue. Does have hx of ulcerative colitis. S/p recent C.diff infection. Dealing with depression. Pt denies chest pain, dyspnea, dyspnea on exertion, change in exercise capacity,   nausea, vomiting, diarrhea, constipation, motor weakness, insomnia, weight loss, syncope, dizziness, lightheadedness, palpitations, PND, orthopnea, or claudication. No nitro use. BP and hr are good. CAD is stable. No LE discoloration or ulcers. No LE edema. No CHF type symptoms. Lipid profile is normal.       11-1-18: noted to have episodes of New onset afibb on PPM check, 16% afibb burden. Started on NOAC but has not picked it up. States he has a hx of colitis, following with  GI, ? chrons disease, he is having some GIB episodes. EKG with Afibb today, V paced.  Pt denies chest pain, dyspnea, dyspnea on exertion, change in exercise capacity, fatigue,  nausea, vomiting, diarrhea, constipation, motor weakness, insomnia, weight loss, syncope, dizziness, lightheadedness, palpitations, PND, orthopnea, or claudication. No nitro use. BP and hr are good. CAD is stable. No LE discoloration or ulcers. No LE edema. No CHF type symptoms. Lipid profile is normal. No recent hospitalization. No change in meds. 3-5-19: EKG with NSR, V paced. On DOAC now and no bleeding issues. Was cleared by GI. Pt denies chest pain, dyspnea, dyspnea on exertion, change in exercise capacity, fatigue,  nausea, vomiting, diarrhea, constipation, motor weakness, insomnia, weight loss, syncope, dizziness, lightheadedness, palpitations, PND, orthopnea, or claudication. No nitro use. BP and hr are good. CAD is stable. No LE discoloration or ulcers. No LE edema. No CHF type symptoms. Lipid profile is normal. No recent hospitalization. No change in meds. S/p PPm check     10-1-19: TOLERATING DOAC OK. Will have colonoscopy tomorrow. Pt denies chest pain, dyspnea, dyspnea on exertion, change in exercise capacity, fatigue,  nausea, vomiting, diarrhea, constipation, motor weakness, insomnia, weight loss, syncope, dizziness, lightheadedness, palpitations, PND, orthopnea, or claudication. No nitro use. BP and hr are good. CAD is stable. No LE discoloration or ulcers. No LE edema. No CHF type symptoms. Lipid profile is normal. No recent hospitalization. No change in meds. EKG with NSR, V paced. S/p recent PPM check but no report. Hgb is 14 on 5/19. CPAP at night. 6-30-20: + hx of UC, following with GI. On DOAC though and no bleeding issues. Pt denies chest pain, dyspnea, dyspnea on exertion, change in exercise capacity, fatigue,  nausea, vomiting, diarrhea, constipation, motor weakness, insomnia, weight loss, syncope, dizziness, lightheadedness, palpitations, PND, orthopnea, or claudication. Hx of PAFib, SSS s/p PPM.   EKG with NSR, V paced.  Pt denies chest pain, dyspnea, dyspnea on exertion, change in exercise capacity, fatigue,  nausea, vomiting, diarrhea, constipation, motor weakness, insomnia, weight loss, syncope, dizziness, lightheadedness, palpitations, PND, orthopnea, or claudication. S/p PPM check last week and is ok. Hx of negative stress test in 2018.       12/8/2020: States he is more short of breath than usual.  Started around summertime. Patient with history of paroxysmal atrial fibrillation/sick sinus syndrome status post permanent pacemaker placement. History of negative stress test in 2018. He is on oral anticoagulation again. Recent lab work was within normal limits per patient. EKG with sinus rhythm V paced rhythm. He denies any lower extremity edema. In March 2019 he weighed 271 pounds and today he weighs 285 pounds. He is compliant with his CPAP machine at night. 1/5/2021: Patient was started on Lasix 20 mg daily last time and is feeling better overall. States his shortness of breath has improved. Status post nuclear stress test with ejection fraction of 40% no significant perfusion defects. Status post echocardiogram ejection fraction of 60%, mild LVH, grade 1 diastolic dysfunction no significant valve abnormalities. Chest x-ray and BNP was not performed. He is compliant with his medication. He is compliant with his CPAP machine at night. No recent hospitalizations. Pressure is 120/80 with heart rate of 79 bpm.  Status post permanent pacemaker check with 8-year longevity on generator, no episodes of any malignant tachycardia or bradycardia arrhythmias. Patient with history of paroxysmal atrial fibrillation on oral anticoagulation. 8-3-21: Continues to have SOB and worse with very mild exertion. Did try inhaler by PCP and no help. S/p recent negative stress test with EF of 40%, echo with LVF 55%. No LE edema. Hx of pafib. Sss/ s/p PPM.   BP is elevated today. On DOAC< no bleeding issues.  Pt denies   nausea, vomiting, diarrhea, constipation, motor weakness, insomnia, weight loss, syncope, dizziness, lightheadedness, palpitations, PND, orthopnea, or claudication. EKG ? afib , V paced. 10-5-21: Status post cardiac catheterization at [facility] showing mild to moderate diffuse LAD disease no significant focal stenosis, medical therapy only. Normal LV function. Patient follow-up with pulmonary. Continues to have shortness of breath. S/p recent negative stress test with EF of 40%, echo with LVF 55%. No LE edema. Hx of pafib. Sss/ s/p PPM.  Patient is on oral anticoagulation. Status post pulmonary function test.  BNP was only 120. D-dimer was significant elevated, CT of the chest no pulmonary pulmonary embolism small hiatal hernia  EKG EKG with normal sinus rhythm, V paced. Patient Active Problem List   Diagnosis    HTN (hypertension)    Major depressive disorder, recurrent episode, moderate (HCC)    DM w/o complication type II ([address]    Dyslipidemia associated with type 2 diabetes mellitus (HCC)    ETIENNE on CPAP    Generalized anxiety disorder    SSS (sick sinus syndrome) (HCC)    Presence of permanent cardiac pacemaker    Paroxysmal atrial fibrillation ([address]    Crohn's disease of colon with rectal bleeding ([address]    Morbidly obese ([address]       Past Surgical History:   Procedure Laterality Date    ANTERIOR CRUCIATE LIGAMENT REPAIR      CARDIAC PACEMAKER PLACEMENT      COLONOSCOPY  05/22/2019    DR Loreta Carson    GALLBLADDER SURGERY      PACEMAKER PLACEMENT  07/10/2018    [facility]    SIGMOIDOSCOPY  11/03/2017    DR. BONILLA    SIGMOIDOSCOPY  05/28/2019    DR Loreta Carson   [facility] TOE SURGERY      TONSILLECTOMY      WRIST SURGERY  2013       Social History     Socioeconomic History    Marital status: Single     Spouse name: Not on file    Number of children: Not on file    Years of education: Not on file    Highest education level: Not on file   Occupational History    Not on file   Tobacco Use    Smoking status: Former Smoker     Packs/day: 0.25     Years: 10.00     Pack years: 2.50     Types: Cigars     Start date: 6/21/2008     Quit date: 7/4/2018     Years since quitting: 3.2    Smokeless tobacco: Never Used   Substance and Sexual Activity    Alcohol use: Yes     Alcohol/week: 0.0 standard drinks     Comment: occ    Drug use: No    Sexual activity: Not on file   Other Topics Concern    Not on file   Social History Narrative    Not on file     Social Determinants of Health     Financial Resource Strain:     Difficulty of Paying Living Expenses:    Food Insecurity:     Worried About Running Out of Food in the Last Year:     [address] in the Last Year:    Transportation Needs:     Lack of Transportation (Medical):      Lack of Transportation (Non-Medical):    Physical Activity:     Days of Exercise per Week:     Minutes of Exercise per Session:    Stress:     Feeling of Stress :    Social Connections:     Frequency of Communication with Friends and Family:     Frequency of Social Gatherings with Friends and Family:     Attends Druze Services:     Active Member of Clubs or Organizations:     Attends Club or Organization Meetings:     Marital Status:    Intimate Partner Violence:     Fear of Current or Ex-Partner:     Emotionally Abused:     Physically Abused:     Sexually Abused:        Family History   Problem Relation Age of Onset    Cancer Mother     Diabetes Father     Cancer Paternal Uncle        Current Outpatient Medications   Medication Sig Dispense Refill    telmisartan (MICARDIS) 40 MG tablet       mirtazapine (REMERON) 45 MG tablet Take 45 mg by mouth nightly      buPROPion (WELLBUTRIN SR) 150 MG extended release tablet Take 150 mg by mouth daily      rivaroxaban (XARELTO) 20 MG TABS tablet Take 1 tablet by mouth daily (with breakfast) 28 tablet 0    furosemide (LASIX) 20 MG tablet Take 1 tablet by mouth daily 60 tablet 6    Tofacitinib Citrate (XELJANZ) 10 MG TABS Take 10 mg by mouth 2 times daily      levothyroxine (SYNTHROID) 25 MCG tablet Take 25 mcg by mouth Daily      loperamide (IMODIUM) 2 MG capsule Take 2 mg by mouth 4 times daily as needed      blood glucose monitor kit and supplies Test 2-3 times a day & as needed for symptoms of irregular blood glucose. 1 kit 0    buPROPion (WELLBUTRIN XL) 300 MG extended release tablet Take 300 mg by mouth every morning      Cholecalciferol (VITAMIN D) 2000 units CAPS capsule Take 1 capsule by mouth daily      metoprolol tartrate (LOPRESSOR) 25 MG tablet Take 1 tablet by mouth 2 times daily 180 tablet 3    fenofibrate (TRICOR) 145 MG tablet TAKE 1 TABLET EVERY DAY 90 tablet 3     No current facility-administered medications for this visit. Azathioprine and Clindamycin    Review of Systems:  General ROS: negative  Psychological ROS: negative  Hematological and Lymphatic ROS: No history of blood clots or bleeding disorder. Respiratory ROS: no cough, shortness of breath, or wheezing  Cardiovascular ROS: positive for - palpitations  Gastrointestinal ROS: no abdominal pain, change in bowel habits, or black or bloody stools  Genito-Urinary ROS: no dysuria, trouble voiding, or hematuria  Musculoskeletal ROS: negative  Neurological ROS: no TIA or stroke symptoms  Dermatological ROS: negative    VITALS:  Blood pressure 130/80, pulse 71, temperature 97.4 °F (36.3 °C), temperature source Infrared, weight 284 lb (128.8 kg). Body mass index is 38.52 kg/m². Physical Examination:  General appearance - alert, well appearing, and in no distress  Mental status - alert, oriented to person, place, and time  Neck - Neck is supple, no JVD or carotid bruits. No thyromegaly or adenopathy.    Chest - clear to auscultation, no wheezes, rales or rhonchi, symmetric air entry  Heart - normal rate, regular rhythm, normal S1, S2, no murmurs, rubs, clicks or gallops  Abdomen - soft, nontender, nondistended, no masses or organomegaly  Neurological - alert, oriented, normal speech, no focal findings or movement disorder noted  Extremities - peripheral pulses normal, no pedal edema, no clubbing or cyanosis  Skin - normal coloration and turgor, no rashes, no suspicious skin lesions noted    Pacer site has healed. Orders Placed This Encounter   Procedures    EKG 12 Lead       ASSESSMENT:     Diagnosis Orders   1. Paroxysmal atrial fibrillation (HCC)  EKG 12 Lead   2. SSS (sick sinus syndrome) ([facility] [address]     3. Presence of permanent cardiac pacemaker     4. ETIENNE on CPAP     5. Morbidly obese ([facility] [address]     6. Primary hypertension           PLAN:     Patient will need to continue to follow up with you for their general medical care     As always, aggressive risk factor modification is strongly recommended. We should adhere to the JNC VIII guidelines for HTN management and the NCEP ATP III guidelines for LDL-C management. Cardiac diet is always recommended with low fat, cholesterol, calories and sodium. Continue medications at current doses. Lasix 20mg daily, if ineffective, double. Follow up with device clinic. [facility]. NOAC since ok with GI. If not anble to tolerate NOAC, then refer to [address] [place] [address] for REESE closure device. Follow-up with pulmonary for SOB. Patient was advised and encouraged to check blood pressure at home or at a pharmacy, maintain a logbook, and also call us back if blood pressure are above the target ranges or if it is low. Patient clearly understands and agrees to the instructions. We will need to continue to monitor muscle and liver enzymes, BUN, CR, and electrolytes. Details of medical condition explained and patient was warned about adverse consequences of uncontrolled medical conditions and possible side effects of prescribed medications.

## 2021-12-23 ENCOUNTER — TELEPHONE (OUTPATIENT)
Dept: CARDIOLOGY CLINIC | Age: 71
End: 2021-12-23

## 2021-12-23 DIAGNOSIS — I48.0 PAROXYSMAL ATRIAL FIBRILLATION (HCC): ICD-10-CM

## 2022-01-10 ENCOUNTER — OFFICE VISIT (OUTPATIENT)
Dept: PULMONOLOGY | Age: 72
End: 2022-01-10
Payer: MEDICARE

## 2022-01-10 VITALS
DIASTOLIC BLOOD PRESSURE: 81 MMHG | OXYGEN SATURATION: 92 % | WEIGHT: 293 LBS | SYSTOLIC BLOOD PRESSURE: 137 MMHG | HEIGHT: 72 IN | HEART RATE: 73 BPM | BODY MASS INDEX: 39.68 KG/M2 | TEMPERATURE: 98 F

## 2022-01-10 DIAGNOSIS — Z99.89 OSA ON CPAP: Primary | ICD-10-CM

## 2022-01-10 DIAGNOSIS — E66.9 OBESITY (BMI 30-39.9): ICD-10-CM

## 2022-01-10 DIAGNOSIS — G47.33 OSA ON CPAP: Primary | ICD-10-CM

## 2022-01-10 DIAGNOSIS — R06.00 DYSPNEA, UNSPECIFIED TYPE: ICD-10-CM

## 2022-01-10 PROCEDURE — G8484 FLU IMMUNIZE NO ADMIN: HCPCS | Performed by: INTERNAL MEDICINE

## 2022-01-10 PROCEDURE — 4040F PNEUMOC VAC/ADMIN/RCVD: CPT | Performed by: INTERNAL MEDICINE

## 2022-01-10 PROCEDURE — 99214 OFFICE O/P EST MOD 30 MIN: CPT | Performed by: INTERNAL MEDICINE

## 2022-01-10 PROCEDURE — G8417 CALC BMI ABV UP PARAM F/U: HCPCS | Performed by: INTERNAL MEDICINE

## 2022-01-10 PROCEDURE — 1123F ACP DISCUSS/DSCN MKR DOCD: CPT | Performed by: INTERNAL MEDICINE

## 2022-01-10 PROCEDURE — 3017F COLORECTAL CA SCREEN DOC REV: CPT | Performed by: INTERNAL MEDICINE

## 2022-01-10 PROCEDURE — 1036F TOBACCO NON-USER: CPT | Performed by: INTERNAL MEDICINE

## 2022-01-10 PROCEDURE — G8427 DOCREV CUR MEDS BY ELIG CLIN: HCPCS | Performed by: INTERNAL MEDICINE

## 2022-01-10 ASSESSMENT — ENCOUNTER SYMPTOMS
RHINORRHEA: 0
VOICE CHANGE: 0
DIARRHEA: 0
EYE ITCHING: 0
VOMITING: 0
ABDOMINAL PAIN: 0
COUGH: 0
SHORTNESS OF BREATH: 1
CHEST TIGHTNESS: 0
NAUSEA: 0
WHEEZING: 0
SORE THROAT: 0

## 2022-01-10 NOTE — PROGRESS NOTES
Subjective:     Daxa Miguel is a 80 y.o. male who complains today of:     Chief Complaint   Patient presents with    COPD     SOB  4 month f/u       HPI  He said he is diagnose with COPD in July 2021. He had PFT done at [address] [place] [address] and he was told he has mild COPD . He was started on breztri 2 puff BID , he ran out of it and stop it , albuterol HFA prn .   9/21 CXR  Show no active disease. He has C/o shortness of breath , worse with exertion. No  Wheezing. Non Cough with  Sputum. No Hemoptysis. No Chest tightness. No Chest pain with radiation  or pleuritic pain. No  leg edema. No orthopnea. No Fever or chills. No Rhinorrhea and postnasal drip. Quit smoking 4/21    He also has ETIENNE  and he was placed on CPAP with 5 cm since last 4  yrs. With full face mask . And sleep well He has afib , sss and he has pacemaker 2017. Allergies:  Azathioprine and Clindamycin  Past Medical History:   Diagnosis Date    Colitis     Hypertension     Paroxysmal atrial fibrillation (HCC) 11/1/2018    Persistent atrial fibrillation ([address] 11/1/2018    Type II or unspecified type diabetes mellitus without mention of complication, not stated as uncontrolled      Past Surgical History:   Procedure Laterality Date    ANTERIOR CRUCIATE LIGAMENT REPAIR      CARDIAC PACEMAKER PLACEMENT      COLONOSCOPY  05/22/2019    DR Renato Rosas    GALLBLADDER SURGERY      PACEMAKER PLACEMENT  07/10/2018    [address] [facility]AY DO    SIGMOIDOSCOPY  11/03/2017    DR. BONILLA    SIGMOIDOSCOPY  05/28/2019    DR Renato Rosas   [place] TOE SURGERY      TONSILLECTOMY      WRIST SURGERY  2013     Family History   Problem Relation Age of Onset    Cancer Mother     Diabetes Father     Cancer Paternal Uncle      Social History     Socioeconomic History    Marital status: Single     Spouse name: Not on file    Number of children: Not on file    Years of education: Not on file    Highest education level: Not on file   Occupational History    Not on file   Tobacco Use    Smoking status: Former Smoker     Packs/day: 0.25     Years: 10.00     Pack years: 2.50     Types: Cigars     Start date: 6/21/2008     Quit date: 7/4/2018     Years since quitting: 3.5    Smokeless tobacco: Never Used   Substance and Sexual Activity    Alcohol use: Yes     Alcohol/week: 0.0 standard drinks     Comment: occ    Drug use: No    Sexual activity: Not on file   Other Topics Concern    Not on file   Social History Narrative    Not on file     Social Determinants of Health     Financial Resource Strain:     Difficulty of Paying Living Expenses: Not on file   Food Insecurity:     Worried About Running Out of Food in the Last Year: Not on file    Amee of Food in the Last Year: Not on file   Transportation Needs:     Lack of Transportation (Medical): Not on file    Lack of Transportation (Non-Medical): Not on file   Physical Activity:     Days of Exercise per Week: Not on file    Minutes of Exercise per Session: Not on file   Stress:     Feeling of Stress : Not on file   Social Connections:     Frequency of Communication with Friends and Family: Not on file    Frequency of Social Gatherings with Friends and Family: Not on file    Attends Muslim Services: Not on file    Active Member of [address] [place] [address] or Organizations: Not on file    Attends Club or Organization Meetings: Not on file    Marital Status: Not on file   Intimate Partner Violence:     Fear of Current or Ex-Partner: Not on file    Emotionally Abused: Not on file    Physically Abused: Not on file    Sexually Abused: Not on file   Housing Stability:     Unable to Pay for Housing in the Last Year: Not on file    Number of [place] in the Last Year: Not on file    Unstable Housing in the Last Year: Not on file         Review of Systems   Constitutional: Negative for chills, diaphoresis, fatigue and fever.    HENT: Negative for congestion, mouth sores, nosebleeds, postnasal drip, rhinorrhea, sneezing, sore throat and voice change. Eyes: Negative for itching and visual disturbance. Respiratory: Positive for shortness of breath. Negative for cough, chest tightness and wheezing. Cardiovascular: Negative. Negative for chest pain, palpitations and leg swelling. Gastrointestinal: Negative for abdominal pain, diarrhea, nausea and vomiting. Genitourinary: Negative for difficulty urinating and hematuria. Musculoskeletal: Negative for arthralgias, joint swelling and myalgias. Skin: Negative for rash. Allergic/Immunologic: Negative for environmental allergies. Neurological: Negative for dizziness, tremors, weakness and headaches. Psychiatric/Behavioral: Positive for sleep disturbance. Negative for behavioral problems. :     Vitals:    01/10/22 1425   BP: 137/81   Pulse: 73   Temp: 98 °F (36.7 °C)   SpO2: 92%   Weight: 293 lb (132.9 kg)   Height: 6' (1.829 m)     Wt Readings from Last 3 Encounters:   01/10/22 293 lb (132.9 kg)   10/05/21 284 lb (128.8 kg)   09/17/21 280 lb (127 kg)         Physical Exam  Constitutional:       Appearance: He is well-developed. HENT:      Head: Normocephalic and atraumatic. Nose: Nose normal.   Eyes:      Conjunctiva/sclera: Conjunctivae normal.      Pupils: Pupils are equal, round, and reactive to light. Neck:      Thyroid: No thyromegaly. Vascular: No JVD. Trachea: No tracheal deviation. Cardiovascular:      Rate and Rhythm: Normal rate and regular rhythm. Heart sounds: No murmur heard. No friction rub. No gallop. Pulmonary:      Effort: Pulmonary effort is normal. No respiratory distress. Breath sounds: Normal breath sounds. No wheezing or rales. Chest:      Chest wall: No tenderness. Abdominal:      General: There is no distension. Musculoskeletal:         General: Normal range of motion. Lymphadenopathy:      Cervical: No cervical adenopathy. Skin:     General: Skin is warm and dry.       Findings: No rash.   Neurological:      Mental Status: He is alert and oriented to person, place, and time. Cranial Nerves: No cranial nerve deficit. Psychiatric:         Behavior: Behavior normal.         Current Outpatient Medications   Medication Sig Dispense Refill    rivaroxaban (XARELTO) 20 MG TABS tablet Take 1 tablet by mouth daily (with breakfast) 28 tablet 0    telmisartan (MICARDIS) 40 MG tablet       mirtazapine (REMERON) 45 MG tablet Take 45 mg by mouth nightly      buPROPion (WELLBUTRIN SR) 150 MG extended release tablet Take 150 mg by mouth daily      furosemide (LASIX) 20 MG tablet Take 1 tablet by mouth daily 60 tablet 6    Tofacitinib Citrate (XELJANZ) 10 MG TABS Take 10 mg by mouth 2 times daily      levothyroxine (SYNTHROID) 25 MCG tablet Take 25 mcg by mouth Daily      loperamide (IMODIUM) 2 MG capsule Take 2 mg by mouth 4 times daily as needed      blood glucose monitor kit and supplies Test 2-3 times a day & as needed for symptoms of irregular blood glucose. 1 kit 0    buPROPion (WELLBUTRIN XL) 300 MG extended release tablet Take 300 mg by mouth every morning      Cholecalciferol (VITAMIN D) 2000 units CAPS capsule Take 1 capsule by mouth daily      metoprolol tartrate (LOPRESSOR) 25 MG tablet Take 1 tablet by mouth 2 times daily 180 tablet 3    fenofibrate (TRICOR) 145 MG tablet TAKE 1 TABLET EVERY DAY 90 tablet 3     No current facility-administered medications for this visit. Results for orders placed during the hospital encounter of 09/17/21    XR CHEST (2 VW)    Narrative  EXAMINATION: XR CHEST (2 VW)    CLINICAL HISTORY: R06.00 Dyspnea, unspecified type ICD10    COMPARISONS: January 11, 2019    FINDINGS:    Two views of the chest are submitted. Left-sided CCD device. Leads overlying the cardiac silhouette. The cardiac silhouette is of normal size configuration. Pulmonary vascular attenuated  Right sided trachea. Area of atelectasis, scarring left lower lobe.   No focal infiltrates. No effusions. No Pneumothoraces. Eventration right hemidiaphragm. Impression  NO ACUTE ACTIVE CARDIOPULMONARY PROCESS      Results for orders placed during the hospital encounter of 01/11/19    XR CHEST STANDARD (2 VW)    Narrative  EXAMINATION: XR CHEST (2 VW)    CLINICAL HISTORY: J20.9 Acute bronchitis, unspecified organism ICD10    COMPARISONS: None available. FINDINGS: Cardiac size is borderline. There is a prominent left epicardial fat pad. Pulmonary vascularity is normal. The lungs are clear. There is a dual-chamber left permanent pacemaker. There is no pneumothorax. There are no pleural effusions. There is  no evidence of adenopathy. The bones are unremarkable. Impression  NEGATIVE CHEST RADIOGRAPHS      Results for orders placed in visit on 10/26/18    XR CHEST STANDARD (2 VW)    Narrative  DATE OF EXAM:      Oct 26 2018 10:32AM    CLINICAL HISTORY/   MRN: [id number]  Patient Name: Wing Jones    STUDY:  CHEST 2 VIEW; 10/26/2018 10:32 am    INDICATION:  s/p pacemaker. COMPARISON:  07/11/2018    ACCESSION NUMBER(S):  [id number]    ORDERING CLINICIAN:  TRACEE CARRERO    TECHNIQUE:  2 radiographs of the chest were performed. FINDINGS:  A dual lead intracardiac device is in place with lead tips overlying the  right atrium and right ventricle. The heart is of normal size and contour. The pulmonary vessels are within  normal limits. The lungs and the pleural spaces are clear. There is no  pneumothorax. The osseous structures are intact. CONCLUSION:   IMPRESSION:  No sign of acute cardiopulmonary disease. Results for orders placed in visit on 10/04/21    CT CHEST WO CONTRAST      Assessment/Plan:     1. ETIENNE on CPAP  He also has ETIENNE  and he was placed on CPAP with 5 cm since last 4 yrs. with full face mask and sleep well continue CPAP therapy as before. 2. Dyspnea, unspecified type  He said he is diagnose with COPD in July 2021.  He had PFT [facility] at [facility] and he was told he has mild COPD . He was started on breztri 2 puff BID , he ran out of it and stop it , albuterol HFA prn .   9/21 CXR  Show no active disease. He has C/o shortness of breath , worse with exertion. No  Wheezing. Non Cough with  Sputum. Continue Breztri 2 puff twice julieth    3. Obesity (BMI 30-39. 9)  He is advised try to lose weight. obesity related risk explained to the patient ,  Current weight:  293 lb (132.9 kg) Lbs. BMI:  Body mass index is 39.74 kg/m². Suggested weight control approaches, including dietary changes , exercise, behavioral modification. Return in about 4 months (around 5/10/2022) for harry, shortness of breath.       Hiram Lopez MD

## 2022-01-13 DIAGNOSIS — I48.0 PAROXYSMAL ATRIAL FIBRILLATION (HCC): ICD-10-CM

## 2022-01-13 RX ORDER — FUROSEMIDE 20 MG/1
20 TABLET ORAL DAILY
Qty: 60 TABLET | Refills: 11 | Status: SHIPPED | OUTPATIENT
Start: 2022-01-13 | End: 2022-09-29 | Stop reason: ALTCHOICE

## 2022-01-13 RX ORDER — RIVAROXABAN 20 MG/1
TABLET, FILM COATED ORAL
Qty: 90 TABLET | Refills: 3 | Status: SHIPPED | OUTPATIENT
Start: 2022-01-13 | End: 2022-04-14 | Stop reason: SDUPTHER

## 2022-01-13 NOTE — TELEPHONE ENCOUNTER
Please approve or deny this refill request. The order is pended. Thank you.     LOV 10/5/2021    Next Visit Date:  Future Appointments   Date Time Provider Иван Schreiber   4/14/2022 12:00 PM Avtar Tasi, DO [facility]   5/10/2022  1:30 PM Dez Hilario MD [facility]

## 2022-01-13 NOTE — TELEPHONE ENCOUNTER
Please approve or deny this refill request. The order is pended. Thank you.     LOV 10/5/2021    Next Visit Date:  Future Appointments   Date Time Provider Иван Schreiber   4/14/2022 12:00 PM Avtar Chua DO [facility]   5/10/2022  1:30 PM Vance Copeland MD [address] [place] [address]

## 2022-03-15 DIAGNOSIS — Z99.89 OSA ON CPAP: Primary | ICD-10-CM

## 2022-03-15 DIAGNOSIS — G47.33 OSA ON CPAP: Primary | ICD-10-CM

## 2022-04-14 ENCOUNTER — OFFICE VISIT (OUTPATIENT)
Dept: CARDIOLOGY CLINIC | Age: 72
End: 2022-04-14
Payer: MEDICARE

## 2022-04-14 VITALS
HEART RATE: 65 BPM | BODY MASS INDEX: 37.25 KG/M2 | OXYGEN SATURATION: 95 % | DIASTOLIC BLOOD PRESSURE: 62 MMHG | WEIGHT: 275 LBS | HEIGHT: 72 IN | SYSTOLIC BLOOD PRESSURE: 112 MMHG

## 2022-04-14 DIAGNOSIS — E66.9 OBESITY (BMI 30-39.9): ICD-10-CM

## 2022-04-14 DIAGNOSIS — I48.0 PAROXYSMAL ATRIAL FIBRILLATION (HCC): Primary | ICD-10-CM

## 2022-04-14 DIAGNOSIS — N18.31 STAGE 3A CHRONIC KIDNEY DISEASE (HCC): ICD-10-CM

## 2022-04-14 DIAGNOSIS — G47.33 OSA ON CPAP: ICD-10-CM

## 2022-04-14 DIAGNOSIS — Z95.0 PRESENCE OF PERMANENT CARDIAC PACEMAKER: ICD-10-CM

## 2022-04-14 DIAGNOSIS — I10 PRIMARY HYPERTENSION: ICD-10-CM

## 2022-04-14 DIAGNOSIS — E78.00 PURE HYPERCHOLESTEROLEMIA: ICD-10-CM

## 2022-04-14 DIAGNOSIS — Z99.89 OSA ON CPAP: ICD-10-CM

## 2022-04-14 DIAGNOSIS — I49.5 SSS (SICK SINUS SYNDROME) (HCC): ICD-10-CM

## 2022-04-14 PROBLEM — K76.0 STEATOSIS OF LIVER: Status: ACTIVE | Noted: 2022-04-14

## 2022-04-14 PROBLEM — I44.2 COMPLETE ATRIOVENTRICULAR BLOCK (HCC): Status: ACTIVE | Noted: 2018-07-06

## 2022-04-14 PROBLEM — E66.01 MORBIDLY OBESE (HCC): Status: RESOLVED | Noted: 2020-12-08 | Resolved: 2022-04-14

## 2022-04-14 PROBLEM — E55.9 VITAMIN D DEFICIENCY: Status: ACTIVE | Noted: 2022-04-14

## 2022-04-14 PROBLEM — J44.9 MILD CHRONIC OBSTRUCTIVE PULMONARY DISEASE (HCC): Status: ACTIVE | Noted: 2021-06-17

## 2022-04-14 PROCEDURE — 4040F PNEUMOC VAC/ADMIN/RCVD: CPT | Performed by: INTERNAL MEDICINE

## 2022-04-14 PROCEDURE — 1036F TOBACCO NON-USER: CPT | Performed by: INTERNAL MEDICINE

## 2022-04-14 PROCEDURE — G8427 DOCREV CUR MEDS BY ELIG CLIN: HCPCS | Performed by: INTERNAL MEDICINE

## 2022-04-14 PROCEDURE — G8417 CALC BMI ABV UP PARAM F/U: HCPCS | Performed by: INTERNAL MEDICINE

## 2022-04-14 PROCEDURE — 99214 OFFICE O/P EST MOD 30 MIN: CPT | Performed by: INTERNAL MEDICINE

## 2022-04-14 PROCEDURE — 1123F ACP DISCUSS/DSCN MKR DOCD: CPT | Performed by: INTERNAL MEDICINE

## 2022-04-14 PROCEDURE — 93000 ELECTROCARDIOGRAM COMPLETE: CPT | Performed by: INTERNAL MEDICINE

## 2022-04-14 PROCEDURE — 3017F COLORECTAL CA SCREEN DOC REV: CPT | Performed by: INTERNAL MEDICINE

## 2022-04-14 RX ORDER — ALBUTEROL SULFATE 90 UG/1
AEROSOL, METERED RESPIRATORY (INHALATION)
COMMUNITY
Start: 2021-05-13 | End: 2022-09-29 | Stop reason: ALTCHOICE

## 2022-04-14 RX ORDER — ROSUVASTATIN CALCIUM 10 MG/1
10 TABLET, COATED ORAL DAILY
COMMUNITY
Start: 2022-01-25

## 2022-04-14 RX ORDER — BUDESONIDE, GLYCOPYRROLATE, AND FORMOTEROL FUMARATE 160; 9; 4.8 UG/1; UG/1; UG/1
AEROSOL, METERED RESPIRATORY (INHALATION)
COMMUNITY
Start: 2022-03-10 | End: 2022-09-29

## 2022-05-10 ENCOUNTER — TELEMEDICINE (OUTPATIENT)
Dept: PULMONOLOGY | Age: 72
End: 2022-05-10
Payer: MEDICARE

## 2022-05-10 DIAGNOSIS — Z99.89 OSA ON CPAP: Primary | ICD-10-CM

## 2022-05-10 DIAGNOSIS — E66.9 OBESITY (BMI 30-39.9): ICD-10-CM

## 2022-05-10 DIAGNOSIS — R06.00 DYSPNEA, UNSPECIFIED TYPE: ICD-10-CM

## 2022-05-10 DIAGNOSIS — G47.33 OSA ON CPAP: Primary | ICD-10-CM

## 2022-05-10 PROCEDURE — 99443 PR PHYS/QHP TELEPHONE EVALUATION 21-30 MIN: CPT | Performed by: INTERNAL MEDICINE

## 2022-05-10 ASSESSMENT — ENCOUNTER SYMPTOMS
SORE THROAT: 0
EYE ITCHING: 0
WHEEZING: 0
CHEST TIGHTNESS: 0
RHINORRHEA: 0
DIARRHEA: 0
COUGH: 0
VOMITING: 0
NAUSEA: 0
VOICE CHANGE: 0
SHORTNESS OF BREATH: 1
ABDOMINAL PAIN: 0

## 2022-05-10 NOTE — PROGRESS NOTES
Subjective:     Barbie Wu is a 80 y.o. male who complains today of:     Chief Complaint   Patient presents with    Sleep Apnea     4 month f/u     Patient and/or health care decision maker is aware that that he/she may receive a bill for this telephone service, depending on his insurance coverage, and has provided verbal consent to proceed. HPI    He is using CPAP with  5  centimeters of H2O with heated humidity. He is using CPAP for about  7  hours every night. He is using CPAP with   Full face Mask. He said  sleep is restful with the CPAP use. He is compliant with CPAP therapy and benefiting with CPAP use. No snoring with CPAP use. No complaint of daytime sleepiness or tiredness with CPAP use. He denies taking naps. No sleepiness with driving. He denies difficulty falling asleep or staying asleep    He is using albuterol HFA prn , breztri 2 puff BID  C/o shortness of breath , worse with exertion. No  Wheezing. No Cough with  Sputum. No Chest tightness. No Chest pain with radiation  or pleuritic pain. No  leg edema. No orthopnea. No Fever or chills. No Rhinorrhea and postnasal drip. Quit smoking 4/21      Allergies:  Azathioprine and Clindamycin  Past Medical History:   Diagnosis Date    Colitis     Hypertension     Paroxysmal atrial fibrillation (HCC) 11/1/2018    Persistent atrial fibrillation ([address] 11/1/2018    Type II or unspecified type diabetes mellitus without mention of complication, not stated as uncontrolled      Past Surgical History:   Procedure Laterality Date    ANTERIOR CRUCIATE LIGAMENT REPAIR      CARDIAC PACEMAKER PLACEMENT      COLONOSCOPY  05/22/2019    DR Mango Kenney    GALLBLADDER SURGERY      PACEMAKER PLACEMENT  07/10/2018    [facility]    SIGMOIDOSCOPY  11/03/2017    DR. BONILLA    SIGMOIDOSCOPY  05/28/2019    DR Mango Kenney    TOE SURGERY      TONSILLECTOMY      WRIST SURGERY  2013     Family History   Problem Relation Age of Onset  Cancer Mother     Diabetes Father     Cancer Paternal Uncle      Social History     Socioeconomic History    Marital status: Single     Spouse name: Not on file    Number of children: Not on file    Years of education: Not on file    Highest education level: Not on file   Occupational History    Not on file   Tobacco Use    Smoking status: Former Smoker     Packs/day: 0.25     Years: 10.00     Pack years: 2.50     Types: Cigars     Start date: 6/21/2008     Quit date: 7/4/2018     Years since quitting: 3.8    Smokeless tobacco: Never Used   Substance and Sexual Activity    Alcohol use: Yes     Alcohol/week: 0.0 standard drinks     Comment: occ    Drug use: No    Sexual activity: Not on file   Other Topics Concern    Not on file   Social History Narrative    Not on file     Social Determinants of Health     Financial Resource Strain:     Difficulty of Paying Living Expenses: Not on file   Food Insecurity:     Worried About Running Out of Food in the Last Year: Not on file    Amee of Food in the Last Year: Not on file   Transportation Needs:     Lack of Transportation (Medical): Not on file    Lack of Transportation (Non-Medical):  Not on file   Physical Activity:     Days of Exercise per Week: Not on file    Minutes of Exercise per Session: Not on file   Stress:     Feeling of Stress : Not on file   Social Connections:     Frequency of Communication with Friends and Family: Not on file    Frequency of Social Gatherings with Friends and Family: Not on file    Attends Yazidi Services: Not on file    Active Member of Clubs or Organizations: Not on file    Attends Club or Organization Meetings: Not on file    Marital Status: Not on file   Intimate Partner Violence:     Fear of Current or Ex-Partner: Not on file    Emotionally Abused: Not on file    Physically Abused: Not on file    Sexually Abused: Not on file   Housing Stability:     Unable to Pay for Housing in the Last Year: Not on file    Number of Places Lived in the Last Year: Not on file    Unstable Housing in the Last Year: Not on file         Review of Systems   Constitutional: Negative for chills, diaphoresis, fatigue and fever. HENT: Negative for congestion, mouth sores, nosebleeds, postnasal drip, rhinorrhea, sneezing, sore throat and voice change. Eyes: Negative for itching and visual disturbance. Respiratory: Positive for shortness of breath. Negative for cough, chest tightness and wheezing. Cardiovascular: Negative. Negative for chest pain, palpitations and leg swelling. Gastrointestinal: Negative for abdominal pain, diarrhea, nausea and vomiting. Genitourinary: Negative for difficulty urinating and hematuria. Musculoskeletal: Negative for arthralgias, joint swelling and myalgias. Skin: Negative for rash. Allergic/Immunologic: Negative for environmental allergies. Neurological: Negative for dizziness, tremors, weakness and headaches. Psychiatric/Behavioral: Positive for sleep disturbance. Negative for behavioral problems.         :   There were no vitals filed for this visit. Wt Readings from Last 3 Encounters:   04/14/22 275 lb (124.7 kg)   01/10/22 293 lb (132.9 kg)   10/05/21 284 lb (128.8 kg)         Physical Exam phone visit     Current Outpatient Medications   Medication Sig Dispense Refill    albuterol sulfate  (90 Base) MCG/ACT inhaler Inhale into the lungs      BREZTRI AEROSPHERE 160-9-4.8 MCG/ACT AERO inhale 2 PUFFS BY MOUTH TWICE DAILY. RINSE MOUTH OUT WITH WATER AFTER EVERY USE TO PREVENT THRUSH.       rosuvastatin (CRESTOR) 10 MG tablet       rivaroxaban (XARELTO) 20 MG TABS tablet Take 1 tablet by mouth daily (with breakfast) 28 tablet 0    rivaroxaban (XARELTO) 20 MG TABS tablet Take 1 tablet by mouth daily (with breakfast) 14 tablet 0    Respiratory Therapy Supplies ROSAURA New C pap 5cm H2O and supplies  DX G47.33  Z99.9 1 each 0    CPAP Machine MISC 5 cm H2O via nasal pillows 1 each 0    metoprolol tartrate (LOPRESSOR) 25 MG tablet Take 1 tablet by mouth 2 times daily 180 tablet 3    telmisartan (MICARDIS) 40 MG tablet       mirtazapine (REMERON) 45 MG tablet Take 45 mg by mouth nightly      Tofacitinib Citrate (XELJANZ) 10 MG TABS Take 10 mg by mouth 2 times daily      levothyroxine (SYNTHROID) 25 MCG tablet Take 25 mcg by mouth Daily      blood glucose monitor kit and supplies Test 2-3 times a day & as needed for symptoms of irregular blood glucose. 1 kit 0    buPROPion (WELLBUTRIN XL) 300 MG extended release tablet Take 300 mg by mouth every morning      Cholecalciferol (VITAMIN D) 2000 units CAPS capsule Take 1 capsule by mouth daily      fenofibrate (TRICOR) 145 MG tablet TAKE 1 TABLET EVERY DAY 90 tablet 3    furosemide (LASIX) 20 MG tablet Take 1 tablet by mouth daily (Patient not taking: Reported on 5/10/2022) 60 tablet 11    loperamide (IMODIUM) 2 MG capsule Take 2 mg by mouth 4 times daily as needed (Patient not taking: Reported on 4/14/2022)       No current facility-administered medications for this visit. Results for orders placed during the hospital encounter of 09/17/21    XR CHEST (2 VW)    Narrative  EXAMINATION: XR CHEST (2 VW)    CLINICAL HISTORY: R06.00 Dyspnea, unspecified type ICD10    COMPARISONS: January 11, 2019    FINDINGS:    Two views of the chest are submitted. Left-sided CCD device. Leads overlying the cardiac silhouette. The cardiac silhouette is of normal size configuration. Pulmonary vascular attenuated  Right sided trachea. Area of atelectasis, scarring left lower lobe. No focal infiltrates. No effusions. No Pneumothoraces. Eventration right hemidiaphragm.     Impression  NO ACUTE ACTIVE CARDIOPULMONARY PROCESS      Results for orders placed during the hospital encounter of 01/11/19    XR CHEST STANDARD (2 VW)    Narrative  EXAMINATION: XR CHEST (2 VW)    CLINICAL HISTORY: J20.9 Acute bronchitis, unspecified organism ICD10    COMPARISONS: None available. FINDINGS: Cardiac size is borderline. There is a prominent left epicardial fat pad. Pulmonary vascularity is normal. The lungs are clear. There is a dual-chamber left permanent pacemaker. There is no pneumothorax. There are no pleural effusions. There is  no evidence of adenopathy. The bones are unremarkable. Impression  NEGATIVE CHEST RADIOGRAPHS      Results for orders placed in visit on 10/26/18    XR CHEST STANDARD (2 VW)    Narrative  DATE OF EXAM:      Oct 26 2018 10:32AM    CLINICAL HISTORY/   MRN: [id number]  Patient Name: Malika Jama    STUDY:  CHEST 2 VIEW; 10/26/2018 10:32 am    INDICATION:  s/p pacemaker. COMPARISON:  07/11/2018    ACCESSION NUMBER(S):  [id number]    ORDERING CLINICIAN:  TRACEE CARRERO    TECHNIQUE:  2 radiographs of the chest were performed. FINDINGS:  A dual lead intracardiac device is in place with lead tips overlying the  right atrium and right ventricle. The heart is of normal size and contour. The pulmonary vessels are within  normal limits. The lungs and the pleural spaces are clear. There is no  pneumothorax. The osseous structures are intact. CONCLUSION:   IMPRESSION:  No sign of acute cardiopulmonary disease. Results for orders placed in visit on 10/04/21    CT CHEST WO CONTRAST      Assessment/Plan:     1. ETIENNE on CPAP  He is using CPAP with  5  centimeters of H2O with heated humidity. He is using CPAP for about  7  hours every night. He is using CPAP with   Full face Mask. He said  sleep is restful with the CPAP use. He is compliant with CPAP therapy and benefiting with CPAP use. No snoring with CPAP use. Continue same. Counseling: CPAP/BiPAP uses, He advised to use CPAP at least 5-6 hours every night. Driving: He is advised for extreme caution when driving or operating machinery if there is a feeling of drowsiness, especially while driving it is preferable to stop driving and take a brief nap.   Sleep hygiene:Avoid supine sleep, sleep on  sides. Avoid  sleep deprivation. Explained sleep hygiene. Advice to avoid Alcohol and sedative      2. Dyspnea, unspecified type  He is using albuterol HFA prn , breztri 2 puff BID  C/o shortness of breath , worse with exertion. No  Wheezing. No Cough with  Sputum. Continue same     3. Obesity (BMI 30-39. 9)  He is advised try to lose weight. obesity related risk explained to the patient ,  Current weight:    Lbs. BMI: Suggested weight control approaches, including dietary changes , exercise, behavioral modification. Patient notified that this is a billable service and has given verbal consent for telehealth services. Time spent with patient  23 minutes. Return in about 3 months (around 8/10/2022) for harry, shortness of breath.       Rob Knowles MD

## 2022-08-10 ENCOUNTER — TELEPHONE (OUTPATIENT)
Dept: CARDIOLOGY CLINIC | Age: 72
End: 2022-08-10

## 2022-08-10 NOTE — TELEPHONE ENCOUNTER
LMOM advising on 6 bottles of Xarelto 10MG samples available for pickup in the [facility] location.      Xarelto 10M bottles   Q: 42 tabs  Lot: [id number]  EXP: 10/2023

## 2022-08-11 ENCOUNTER — TELEMEDICINE (OUTPATIENT)
Dept: PULMONOLOGY | Age: 72
End: 2022-08-11
Payer: MEDICARE

## 2022-08-11 DIAGNOSIS — R06.00 DYSPNEA, UNSPECIFIED TYPE: ICD-10-CM

## 2022-08-11 DIAGNOSIS — G47.33 OSA ON CPAP: Primary | ICD-10-CM

## 2022-08-11 DIAGNOSIS — Z99.89 OSA ON CPAP: Primary | ICD-10-CM

## 2022-08-11 DIAGNOSIS — E66.9 OBESITY (BMI 30-39.9): ICD-10-CM

## 2022-08-11 PROCEDURE — 99443 PR PHYS/QHP TELEPHONE EVALUATION 21-30 MIN: CPT | Performed by: INTERNAL MEDICINE

## 2022-08-11 RX ORDER — CHLORTHALIDONE 25 MG/1
TABLET ORAL
COMMUNITY
Start: 2022-07-06

## 2022-08-11 RX ORDER — DOXYCYCLINE 100 MG/1
CAPSULE ORAL
COMMUNITY
Start: 2022-07-24

## 2022-08-11 RX ORDER — NYSTATIN 100000 U/G
CREAM TOPICAL
COMMUNITY
Start: 2022-05-19

## 2022-08-11 ASSESSMENT — ENCOUNTER SYMPTOMS
COUGH: 0
RHINORRHEA: 0
NAUSEA: 0
ABDOMINAL PAIN: 0
SORE THROAT: 0
CHEST TIGHTNESS: 0
VOMITING: 0
WHEEZING: 0
VOICE CHANGE: 0
EYE ITCHING: 0
SHORTNESS OF BREATH: 1
DIARRHEA: 0

## 2022-08-11 NOTE — PROGRESS NOTES
Gaby Barfield (:  1950) is a Established patient, here for evaluation of the following:    Assessment & Plan   Below is the assessment and plan developed based on review of pertinent history, physical exam, labs, studies, and medications. 1. HARRY on CPAP  2. Obesity (BMI 30-39.9)  3. Dyspnea, unspecified type    He got new CPAP  2 days ago He is using CPAP with  5 centimeters of H2O with heated humidity. for about  6-7 hours every night. with  Full face Mask. He said  sleep is restful with the CPAP use. He is compliant with CPAP therapy and benefiting with CPAP use. No snoring with CPAP use. No complaint of daytime sleepiness or tiredness with CPAP use. He denies taking naps. No sleepiness with driving. He is not using albuterol HFA prn or  breztri 2 puff BID. Occasional mild shortness of breath  with exertion. No  Wheezing. No Cough with  Sputum. No Chest tightness. He advised to use albuterol HFA prn if wheezing or SOB. Return in about 3 months (around 2022) for harry. Subjective   HPI    He got new CPAP  2 days ago   He is using CPAP with  5 centimeters of H2O with heated humidity. He is using CPAP for about  6-7 hours every night. He is using CPAP with  Full face Mask. He said  sleep is restful with the CPAP use. He is compliant with CPAP therapy and benefiting with CPAP use. No snoring with CPAP use. No complaint of daytime sleepiness or tiredness with CPAP use. He denies taking naps. No sleepiness with driving. He denies difficulty falling asleep or staying asleep    He is not using albuterol HFA prn or  breztri 2 puff BID  C/o mild shortness of breath  with exertion. No  Wheezing. No Cough with  Sputum. No Chest tightness. Quit smoking        Review of Systems   Constitutional:  Negative for chills, diaphoresis, fatigue and fever. HENT:  Negative for congestion, mouth sores, nosebleeds, postnasal drip, rhinorrhea, sneezing, sore throat and voice change.     Eyes: Negative for itching and visual disturbance. Respiratory:  Positive for shortness of breath. Negative for cough, chest tightness and wheezing. Cardiovascular: Negative. Negative for chest pain, palpitations and leg swelling. Gastrointestinal:  Negative for abdominal pain, diarrhea, nausea and vomiting. Genitourinary:  Negative for difficulty urinating and hematuria. Musculoskeletal:  Negative for arthralgias, joint swelling and myalgias. Skin:  Negative for rash. Allergic/Immunologic: Negative for environmental allergies. Neurological:  Negative for dizziness, tremors, weakness and headaches. Psychiatric/Behavioral:  Positive for sleep disturbance. Negative for behavioral problems. Objective   Patient-Reported Vitals  BP Observations: No, remote/electronic monitoring device was not used or able to be verified  Patient-Reported Temperature: 97.2  Patient-Reported Weight: 278 lb  Patient-Reported Height: 6'       Physical Exam phone visit              Zelalem Watkins, was evaluated through a synchronous (real-time) audio-video encounter. The patient (or guardian if applicable) is aware that this is a billable service, which includes applicable co-pays. This Virtual Visit was conducted with patient's (and/or legal guardian's) consent. The visit was conducted pursuant to the emergency declaration under the [facility], [facility] authority and the [employer] General Act. Patient identification was verified, and a caregiver was present when appropriate. The patient was located at Home: [address] [place] [address] [phone]. Provider was located at [facility] ([place] [address]): [address] [place] [address].         --Bryce Reich MD

## 2022-09-16 ENCOUNTER — TELEPHONE (OUTPATIENT)
Dept: CARDIOLOGY CLINIC | Age: 72
End: 2022-09-16

## 2022-09-29 ENCOUNTER — OFFICE VISIT (OUTPATIENT)
Dept: CARDIOLOGY CLINIC | Age: 72
End: 2022-09-29
Payer: MEDICARE

## 2022-09-29 VITALS
SYSTOLIC BLOOD PRESSURE: 108 MMHG | DIASTOLIC BLOOD PRESSURE: 72 MMHG | WEIGHT: 270.1 LBS | BODY MASS INDEX: 36.63 KG/M2 | HEART RATE: 83 BPM

## 2022-09-29 DIAGNOSIS — E66.9 OBESITY (BMI 30-39.9): ICD-10-CM

## 2022-09-29 DIAGNOSIS — Z01.818 PRE-OPERATIVE CLEARANCE: Primary | ICD-10-CM

## 2022-09-29 DIAGNOSIS — I48.0 PAROXYSMAL ATRIAL FIBRILLATION (HCC): ICD-10-CM

## 2022-09-29 PROCEDURE — G8417 CALC BMI ABV UP PARAM F/U: HCPCS | Performed by: NURSE PRACTITIONER

## 2022-09-29 PROCEDURE — 1036F TOBACCO NON-USER: CPT | Performed by: NURSE PRACTITIONER

## 2022-09-29 PROCEDURE — G8427 DOCREV CUR MEDS BY ELIG CLIN: HCPCS | Performed by: NURSE PRACTITIONER

## 2022-09-29 PROCEDURE — 93000 ELECTROCARDIOGRAM COMPLETE: CPT | Performed by: NURSE PRACTITIONER

## 2022-09-29 PROCEDURE — 3017F COLORECTAL CA SCREEN DOC REV: CPT | Performed by: NURSE PRACTITIONER

## 2022-09-29 PROCEDURE — 1123F ACP DISCUSS/DSCN MKR DOCD: CPT | Performed by: NURSE PRACTITIONER

## 2022-09-29 PROCEDURE — 99213 OFFICE O/P EST LOW 20 MIN: CPT | Performed by: NURSE PRACTITIONER

## 2022-09-29 RX ORDER — BUPROPION HYDROCHLORIDE 300 MG/1
300 TABLET ORAL EVERY MORNING
COMMUNITY

## 2022-09-29 RX ORDER — CHLORTHALIDONE 25 MG/1
25 TABLET ORAL DAILY
COMMUNITY

## 2022-09-29 RX ORDER — TOFACITINIB 5 MG/1
TABLET, FILM COATED ORAL
COMMUNITY

## 2022-09-29 NOTE — PROGRESS NOTES
Chief Complaint   Patient presents with    Cardiac Clearance     RIGHT Knee replacement  DR LUNDBERG 11/21/22 7-5-18: Patient presents for initial medical evaluation. Patient is followed on a regular basis by Dr. Aditi Palafox MD. S/p hospitalization for weakness, fatigue and near syncope. 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 Echo with normal LVF. He continues to have symptoms today. Does not feel good, has dry heaves and cough. His stools are dark. He has a hx of UC. Pt denies chest pain, dyspnea, dyspnea on exertion, change in exercise capacity, fatigue,  nausea, vomiting, diarrhea, constipation, motor weakness, insomnia, weight loss, syncope,PND, orthopnea, or claudication. Had recent colitis flare up. Was placed on ABX in hospital. His WBC was high  and noted to have ARF. No hx of MI, CHF or arrhythmia. No hx of stress test or LHC. 9-4-18: s/p PPM insertion on 7-10-18. S/p normal nuclear stress test. States LH/Dizz has resolved. Does have some fatigue. Does have hx of ulcerative colitis. S/p recent C.diff infection. Dealing with depression. Pt denies chest pain, dyspnea, dyspnea on exertion, change in exercise capacity,   nausea, vomiting, diarrhea, constipation, motor weakness, insomnia, weight loss, syncope, dizziness, lightheadedness, palpitations, PND, orthopnea, or claudication. No nitro use. BP and hr are good. CAD is stable. No LE discoloration or ulcers. No LE edema. No CHF type symptoms. Lipid profile is normal.       11-1-18: noted to have episodes of New onset afibb on PPM check, 16% afibb burden. Started on NOAC but has not picked it up. States he has a hx of colitis, following with  GI, ? chrons disease, he is having some GIB episodes. EKG with Afibb today, V paced.  Pt denies chest pain, dyspnea, dyspnea on exertion, change in exercise capacity, fatigue,  nausea, vomiting, diarrhea, constipation, motor weakness, insomnia, weight loss, syncope, dizziness, lightheadedness, palpitations, PND, orthopnea, or claudication. No nitro use. BP and hr are good. CAD is stable. No LE discoloration or ulcers. No LE edema. No CHF type symptoms. Lipid profile is normal. No recent hospitalization. No change in meds. 3-5-19: EKG with NSR, V paced. On DOAC now and no bleeding issues. Was cleared by GI. Pt denies chest pain, dyspnea, dyspnea on exertion, change in exercise capacity, fatigue,  nausea, vomiting, diarrhea, constipation, motor weakness, insomnia, weight loss, syncope, dizziness, lightheadedness, palpitations, PND, orthopnea, or claudication. No nitro use. BP and hr are good. CAD is stable. No LE discoloration or ulcers. No LE edema. No CHF type symptoms. Lipid profile is normal. No recent hospitalization. No change in meds. S/p PPm check     10-1-19: TOLERATING DOAC OK. Will have colonoscopy tomorrow. Pt denies chest pain, dyspnea, dyspnea on exertion, change in exercise capacity, fatigue,  nausea, vomiting, diarrhea, constipation, motor weakness, insomnia, weight loss, syncope, dizziness, lightheadedness, palpitations, PND, orthopnea, or claudication. No nitro use. BP and hr are good. CAD is stable. No LE discoloration or ulcers. No LE edema. No CHF type symptoms. Lipid profile is normal. No recent hospitalization. No change in meds. EKG with NSR, V paced. S/p recent PPM check but no report. Hgb is 14 on 5/19. CPAP at night. 6-30-20: + hx of UC, following with GI. On DOAC though and no bleeding issues. Pt denies chest pain, dyspnea, dyspnea on exertion, change in exercise capacity, fatigue,  nausea, vomiting, diarrhea, constipation, motor weakness, insomnia, weight loss, syncope, dizziness, lightheadedness, palpitations, PND, orthopnea, or claudication. Hx of PAFib, SSS s/p PPM.   EKG with NSR, V paced.  Pt denies chest pain, dyspnea, dyspnea on exertion, change in exercise capacity, fatigue,  nausea, vomiting, diarrhea, palpitations, PND, orthopnea, or claudication. EKG ? afib , V paced. 10-5-21: Status post cardiac catheterization at [facility] [place] showing mild to moderate diffuse LAD disease no significant focal stenosis, medical therapy only. Normal LV function. Patient follow-up with pulmonary. Continues to have shortness of breath. S/p recent negative stress test with EF of 40%, echo with LVF 55%. No LE edema. Hx of pafib. Sss/ s/p PPM.  Patient is on oral anticoagulation. Status post pulmonary function test.  BNP was only 120. D-dimer was significant elevated, CT of the chest no pulmonary pulmonary embolism small hiatal hernia  EKG EKG with normal sinus rhythm, V paced. 4-14-22: Patient doing okay overall other than some right knee osteoarthritis. He does have a history of paroxysmal atrial fibrillation/sick sinus syndrome status post permanent pacemaker placement. He is on oral anticoagulation. Denies any bleeding issues. No angina or significant CHF type symptoms. Status post recent negative stress test in 2021 as well as a normal LV function by echo ejection fraction of 55%  EKG with normal sinus rhythm, V paced. Status post recent pacemaker check with 0 episodes of A. fib and 8-year generator longevity    9/29/22: pt seen in office today for cardiac clearance. Pt is having knee replacement in November. He does have a history of paroxysmal atrial fibrillation/sick sinus syndrome status post permanent pacemaker placement. He is on oral anticoagulation. Denies any bleeding issues. No angina or significant CHF type symptoms. Status post recent negative stress test in 2021 as well as a normal LV function by echo ejection fraction of 55%  EKG with normal sinus rhythm, V paced.   Pt denies chest pain, dyspnea, dyspnea on exertion, change in exercise capacity, fatigue,  nausea, vomiting, diarrhea, constipation, motor weakness, insomnia, weight loss, syncope, dizziness, lightheadedness, palpitations, PND, orthopnea, or claudication. No bleeding issues. Pt denies any angina or CHF type symptoms. No recent hospitalizations. Pt is compliant with all Rx medications. Blood pressure and heart rate are under control. Patient Active Problem List   Diagnosis    HTN (hypertension)    Major depressive disorder, recurrent episode, moderate (HCC)    DM w/o complication type II ([address]    Dyslipidemia associated with type 2 diabetes mellitus (HCC)    ETIENNE on CPAP    Generalized anxiety disorder    SSS (sick sinus syndrome) (HCC)    Presence of permanent cardiac pacemaker    Paroxysmal atrial fibrillation (HCC)    Crohn's disease of colon with rectal bleeding (HCC)    Dyspnea    Obesity (BMI 30-39. 9)    Complete atrioventricular block (HCC)    Mild chronic obstructive pulmonary disease (HCC)    Stage 3a chronic kidney disease (HCC)    Steatosis of liver    Pure hypercholesterolemia    Vitamin D deficiency       Past Surgical History:   Procedure Laterality Date    ANTERIOR CRUCIATE LIGAMENT REPAIR      CARDIAC PACEMAKER PLACEMENT      COLONOSCOPY  2019    DR BONILLA    GALLBLADDER SURGERY      PACEMAKER PLACEMENT  07/10/2018    [facility] DO    SIGMOIDOSCOPY  2017    DR. BONILLA    SIGMOIDOSCOPY  2019    DR BONILLA    TOE SURGERY      TONSILLECTOMY      WRIST SURGERY         Social History     Socioeconomic History    Marital status: Single   Tobacco Use    Smoking status: Former     Packs/day: 0.25     Years: 10.00     Pack years: 2.50     Types: Cigars, Cigarettes     Start date: 2008     Quit date: 2018     Years since quittin.2     Passive exposure: Past    Smokeless tobacco: Never   Vaping Use    Vaping Use: Never used   Substance and Sexual Activity    Alcohol use:  Yes     Alcohol/week: 0.0 standard drinks     Comment: occ    Drug use: No    Sexual activity: Not Currently       Family History   Problem Relation Age of Onset    Cancer Mother     Diabetes Father     Cancer Paternal Uncle        Current Outpatient Medications   Medication Sig Dispense Refill    buPROPion (WELLBUTRIN XL) 300 MG extended release tablet Take 300 mg by mouth every morning      Tofacitinib Citrate (XELJANZ) 5 MG TABS Take by mouth      chlorthalidone (HYGROTON) 25 MG tablet Take 25 mg by mouth daily      nystatin (MYCOSTATIN) 427173 UNIT/GM cream APPLY AND RUB IN A THIN FILM TO AFFECTED AREAS TWICE DAILY. (AM AND PM). rosuvastatin (CRESTOR) 10 MG tablet Take 10 mg by mouth daily      rivaroxaban (XARELTO) 20 MG TABS tablet Take 1 tablet by mouth daily (with breakfast) 28 tablet 0    metoprolol tartrate (LOPRESSOR) 25 MG tablet Take 1 tablet by mouth 2 times daily 180 tablet 3    telmisartan (MICARDIS) 40 MG tablet Take 40 mg by mouth daily      mirtazapine (REMERON) 45 MG tablet Take 45 mg by mouth nightly      levothyroxine (SYNTHROID) 25 MCG tablet Take 25 mcg by mouth Daily      chlorthalidone (HYGROTON) 25 MG tablet Take 1 tablet daily      doxycycline monohydrate (MONODOX) 100 MG capsule TAKE 1 CAPSULE BY MOUTH EVERY 12 HOURS      mupirocin (BACTROBAN) 2 % ointment APPLY DAILY to wound      Respiratory Therapy Supplies ROSAURA New C pap 5cm H2O and supplies  DX G47.33  Z99.9 1 each 0    CPAP Machine MISC 5 cm H2O via nasal pillows 1 each 0    blood glucose monitor kit and supplies Test 2-3 times a day & as needed for symptoms of irregular blood glucose. 1 kit 0    Cholecalciferol (VITAMIN D) 2000 units CAPS capsule Take 1 capsule by mouth daily       No current facility-administered medications for this visit. Azathioprine and Clindamycin    Review of Systems:  General ROS: negative  Psychological ROS: negative  Hematological and Lymphatic ROS: No history of blood clots or bleeding disorder.    Respiratory ROS: no cough, shortness of breath, or wheezing  Cardiovascular ROS: positive for - palpitations  Gastrointestinal ROS: no abdominal pain, change in bowel habits, or black or bloody device clinic. [facility]. NOAC since ok with GI. If not able to tolerate NOAC, then refer to [facility] for REESE closure device. - On Xarelto with no issues    Check labs/lipid profile with PCP    Patient was advised and encouraged to check blood pressure at home or at a pharmacy, maintain a logbook, and also call us back if blood pressure are above the target ranges or if it is low. Patient clearly understands and agrees to the instructions. We will need to continue to monitor muscle and liver enzymes, BUN, CR, and electrolytes. Details of medical condition explained and patient was warned about adverse consequences of uncontrolled medical conditions and possible side effects of prescribed medications.

## 2022-11-10 DIAGNOSIS — I48.0 PAROXYSMAL ATRIAL FIBRILLATION (HCC): ICD-10-CM

## 2022-11-10 DIAGNOSIS — Z01.818 PRE-OPERATIVE CLEARANCE: Primary | ICD-10-CM

## 2022-11-10 NOTE — TELEPHONE ENCOUNTER
Requesting medication refill. Please approve or deny this request.    Rx requested:  Requested Prescriptions     Pending Prescriptions Disp Refills    rivaroxaban (XARELTO) 20 MG TABS tablet 90 tablet 3     Sig: Take 1 tablet by mouth daily (with breakfast)         Last Office Visit:   4/14/2022      Next Visit Date:  Future Appointments   Date Time Provider Иван Schreiber   1/17/2023 11:15 AM Avtar Folwer, DO [facility]               Last refill 4/14/22. Please approve or deny.

## 2022-11-29 NOTE — PROGRESS NOTES
Use Enhanced Medication Counseling?: No Tazorac Counseling:  Patient advised that medication is irritating and drying.  Patient may need to apply sparingly and wash off after an hour before eventually leaving it on overnight.  The patient verbalized understanding of the proper use and possible adverse effects of tazorac.  All of the patient's questions and concerns were addressed. getting good sleep but he does not feel rested  Fatigue Yes  Loss of pleasure Yes  Impulsive behavior Yes  Speech    spontaneous, normal rate and normal volume  Mood    Anxious  Affect    depressed affect  Thought Content    intact  Thought Process    linear, goal directed and coherent  Associations    logical connections, tangential connections  Insight    Fair  Judgment    Intact  Orientation    oriented to person, place, time, and general circumstances  Memory    recent and remote memory intact  Attention/Concentration    Intact but reports this to be a primary concern  Morbid ideation No  Suicide Assessment    no suicidal ideation      History:    Medications:   Current Outpatient Prescriptions   Medication Sig Dispense Refill    brexpiprazole (REXULTI) 1 MG TABS tablet Take 1 tablet by mouth daily 30 tablet 5    DULoxetine (CYMBALTA) 60 MG extended release capsule TAKE 1 CAPSULE BY MOUTH DAILY 30 capsule 5    traZODone (DESYREL) 100 MG tablet TAKE 1 TABLET BY MOUTH EVERY NIGHT 30 tablet 5    predniSONE (DELTASONE) 5 MG tablet 5 mg See Admin Instructions As directed      candesartan (ATACAND) 16 MG tablet TAKE 1 TABLET BY MOUTH DAILY 30 tablet 5    folic acid (FOLVITE) 1 MG tablet Take 1 tablet by mouth daily 30 tablet 5    methotrexate (RHEUMATREX) 2.5 MG chemo tablet Take 5 tablets by mouth one a week      fenofibrate (TRICOR) 145 MG tablet TAKE 1 TABLET BY MOUTH DAILY 30 tablet 11    loperamide (ANTI-DIARRHEAL) 2 MG CAPS Take 2 mg by mouth See Admin Instructions 2 tablets in the morning and 2 tablets at night       No current facility-administered medications for this visit. Social History:   Social History     Social History    Marital status: Single     Spouse name: N/A    Number of children: N/A    Years of education: N/A     Occupational History    Not on file.      Social History Main Topics    Smoking status: Light Tobacco Smoker     Types: Cigars    Smokeless tobacco: Never Used Topical Sulfur Applications Counseling: Topical Sulfur Counseling: Patient counseled that this medication may cause skin irritation or allergic reactions.  In the event of skin irritation, the patient was advised to reduce the amount of the drug applied or use it less frequently.   The patient verbalized understanding of the proper use and possible adverse effects of topical sulfur application.  All of the patient's questions and concerns were addressed. Aklief counseling:  Patient advised to apply a pea-sized amount only at bedtime and wait 30 minutes after washing their face before applying.  If too drying, patient may add a non-comedogenic moisturizer.  The most commonly reported side effects including irritation, redness, scaling, dryness, stinging, burning, itching, and increased risk of sunburn.  The patient verbalized understanding of the proper use and possible adverse effects of retinoids.  All of the patient's questions and concerns were addressed. Topical Clindamycin Pregnancy And Lactation Text: This medication is Pregnancy Category B and is considered safe during pregnancy. It is unknown if it is excreted in breast milk. Winlevi Pregnancy And Lactation Text: This medication is considered safe during pregnancy and breastfeeding. Doxycycline Pregnancy And Lactation Text: This medication is Pregnancy Category D and not consider safe during pregnancy. It is also excreted in breast milk but is considered safe for shorter treatment courses. Bactrim Pregnancy And Lactation Text: This medication is Pregnancy Category D and is known to cause fetal risk.  It is also excreted in breast milk. Minocycline Pregnancy And Lactation Text: This medication is Pregnancy Category D and not consider safe during pregnancy. It is also excreted in breast milk. Bactrim Counseling:  I discussed with the patient the risks of sulfa antibiotics including but not limited to GI upset, allergic reaction, drug rash, diarrhea, dizziness, photosensitivity, and yeast infections.  Rarely, more serious reactions can occur including but not limited to aplastic anemia, agranulocytosis, methemoglobinemia, blood dyscrasias, liver or kidney failure, lung infiltrates or desquamative/blistering drug rashes. Dapsone Counseling: I discussed with the patient the risks of dapsone including but not limited to hemolytic anemia, agranulocytosis, rashes, methemoglobinemia, kidney failure, peripheral neuropathy, headaches, GI upset, and liver toxicity.  Patients who start dapsone require monitoring including baseline LFTs and weekly CBCs for the first month, then every month thereafter.  The patient verbalized understanding of the proper use and possible adverse effects of dapsone.  All of the patient's questions and concerns were addressed. Spironolactone Pregnancy And Lactation Text: This medication can cause feminization of the male fetus and should be avoided during pregnancy. The active metabolite is also found in breast milk. Isotretinoin Pregnancy And Lactation Text: This medication is Pregnancy Category X and is considered extremely dangerous during pregnancy. It is unknown if it is excreted in breast milk. Doxycycline Counseling:  Patient counseled regarding possible photosensitivity and increased risk for sunburn.  Patient instructed to avoid sunlight, if possible.  When exposed to sunlight, patients should wear protective clothing, sunglasses, and sunscreen.  The patient was instructed to call the office immediately if the following severe adverse effects occur:  hearing changes, easy bruising/bleeding, severe headache, or vision changes.  The patient verbalized understanding of the proper use and possible adverse effects of doxycycline.  All of the patient's questions and concerns were addressed. Isotretinoin Counseling: Patient should get monthly blood tests, not donate blood, not drive at night if vision affected, not share medication, and not undergo elective surgery for 6 months after tx completed. Side effects reviewed, pt to contact office should one occur. High Dose Vitamin A Counseling: Side effects reviewed, pt to contact office should one occur. Minocycline Counseling: Patient advised regarding possible photosensitivity and discoloration of the teeth, skin, lips, tongue and gums.  Patient instructed to avoid sunlight, if possible.  When exposed to sunlight, patients should wear protective clothing, sunglasses, and sunscreen.  The patient was instructed to call the office immediately if the following severe adverse effects occur:  hearing changes, easy bruising/bleeding, severe headache, or vision changes.  The patient verbalized understanding of the proper use and possible adverse effects of minocycline.  All of the patient's questions and concerns were addressed. Tetracycline Counseling: Patient counseled regarding possible photosensitivity and increased risk for sunburn.  Patient instructed to avoid sunlight, if possible.  When exposed to sunlight, patients should wear protective clothing, sunglasses, and sunscreen.  The patient was instructed to call the office immediately if the following severe adverse effects occur:  hearing changes, easy bruising/bleeding, severe headache, or vision changes.  The patient verbalized understanding of the proper use and possible adverse effects of tetracycline.  All of the patient's questions and concerns were addressed. Patient understands to avoid pregnancy while on therapy due to potential birth defects. Topical Retinoid Pregnancy And Lactation Text: This medication is Pregnancy Category C. It is unknown if this medication is excreted in breast milk. Benzoyl Peroxide Pregnancy And Lactation Text: This medication is Pregnancy Category C. It is unknown if benzoyl peroxide is excreted in breast milk. Aklief Pregnancy And Lactation Text: It is unknown if this medication is safe to use during pregnancy.  It is unknown if this medication is excreted in breast milk.  Breastfeeding women should use the topical cream on the smallest area of the skin for the shortest time needed while breastfeeding.  Do not apply to nipple and areola. Azelaic Acid Counseling: Patient counseled that medicine may cause skin irritation and to avoid applying near the eyes.  In the event of skin irritation, the patient was advised to reduce the amount of the drug applied or use it less frequently.   The patient verbalized understanding of the proper use and possible adverse effects of azelaic acid.  All of the patient's questions and concerns were addressed. Topical Retinoid counseling:  Patient advised to apply a pea-sized amount only at bedtime and wait 30 minutes after washing their face before applying.  If too drying, patient may add a non-comedogenic moisturizer. The patient verbalized understanding of the proper use and possible adverse effects of retinoids.  All of the patient's questions and concerns were addressed. Topical Clindamycin Counseling: Patient counseled that this medication may cause skin irritation or allergic reactions.  In the event of skin irritation, the patient was advised to reduce the amount of the drug applied or use it less frequently.   The patient verbalized understanding of the proper use and possible adverse effects of clindamycin.  All of the patient's questions and concerns were addressed. Winlevi Counseling:  I discussed with the patient the risks of topical clascoterone including but not limited to erythema, scaling, itching, and stinging. Patient voiced their understanding. Topical Sulfur Applications Pregnancy And Lactation Text: This medication is Pregnancy Category C and has an unknown safety profile during pregnancy. It is unknown if this topical medication is excreted in breast milk. Dapsone Pregnancy And Lactation Text: This medication is Pregnancy Category C and is not considered safe during pregnancy or breast feeding. Erythromycin Pregnancy And Lactation Text: This medication is Pregnancy Category B and is considered safe during pregnancy. It is also excreted in breast milk. Azithromycin Pregnancy And Lactation Text: This medication is considered safe during pregnancy and is also secreted in breast milk. Birth Control Pills Counseling: Birth Control Pill Counseling: I discussed with the patient the potential side effects of OCPs including but not limited to increased risk of stroke, heart attack, thrombophlebitis, deep venous thrombosis, hepatic adenomas, breast changes, GI upset, headaches, and depression.  The patient verbalized understanding of the proper use and possible adverse effects of OCPs. All of the patient's questions and concerns were addressed. Sarecycline Counseling: Patient advised regarding possible photosensitivity and discoloration of the teeth, skin, lips, tongue and gums.  Patient instructed to avoid sunlight, if possible.  When exposed to sunlight, patients should wear protective clothing, sunglasses, and sunscreen.  The patient was instructed to call the office immediately if the following severe adverse effects occur:  hearing changes, easy bruising/bleeding, severe headache, or vision changes.  The patient verbalized understanding of the proper use and possible adverse effects of sarecycline.  All of the patient's questions and concerns were addressed. Azithromycin Counseling:  I discussed with the patient the risks of azithromycin including but not limited to GI upset, allergic reaction, drug rash, diarrhea, and yeast infections. Detail Level: Zone Birth Control Pills Pregnancy And Lactation Text: This medication should be avoided if pregnant and for the first 30 days post-partum. High Dose Vitamin A Pregnancy And Lactation Text: High dose vitamin A therapy is contraindicated during pregnancy and breast feeding. Erythromycin Counseling:  I discussed with the patient the risks of erythromycin including but not limited to GI upset, allergic reaction, drug rash, diarrhea, increase in liver enzymes, and yeast infections. Spironolactone Counseling: Patient advised regarding risks of diarrhea, abdominal pain, hyperkalemia, birth defects (for female patients), liver toxicity and renal toxicity. The patient may need blood work to monitor liver and kidney function and potassium levels while on therapy. The patient verbalized understanding of the proper use and possible adverse effects of spironolactone.  All of the patient's questions and concerns were addressed. Azelaic Acid Pregnancy And Lactation Text: This medication is considered safe during pregnancy and breast feeding. Tazorac Pregnancy And Lactation Text: This medication is not safe during pregnancy. It is unknown if this medication is excreted in breast milk. Benzoyl Peroxide Counseling: Patient counseled that medicine may cause skin irritation and bleach clothing.  In the event of skin irritation, the patient was advised to reduce the amount of the drug applied or use it less frequently.   The patient verbalized understanding of the proper use and possible adverse effects of benzoyl peroxide.  All of the patient's questions and concerns were addressed.

## 2023-01-09 NOTE — TELEPHONE ENCOUNTER
Please approve or deny this refill request. The order is pended. Thank you.     LOV 9/29/2022    Next Visit Date:  Future Appointments   Date Time Provider Department Center   1/17/2023 11:15 AM Avtar Loja DO [facility]

## 2023-01-17 ENCOUNTER — OFFICE VISIT (OUTPATIENT)
Dept: CARDIOLOGY CLINIC | Age: 73
End: 2023-01-17
Payer: MEDICARE

## 2023-01-17 VITALS
WEIGHT: 294 LBS | HEART RATE: 81 BPM | SYSTOLIC BLOOD PRESSURE: 144 MMHG | BODY MASS INDEX: 39.87 KG/M2 | DIASTOLIC BLOOD PRESSURE: 88 MMHG

## 2023-01-17 DIAGNOSIS — Z95.0 PRESENCE OF PERMANENT CARDIAC PACEMAKER: ICD-10-CM

## 2023-01-17 DIAGNOSIS — I44.2 COMPLETE ATRIOVENTRICULAR BLOCK (HCC): ICD-10-CM

## 2023-01-17 DIAGNOSIS — E66.9 OBESITY (BMI 30-39.9): ICD-10-CM

## 2023-01-17 DIAGNOSIS — I10 PRIMARY HYPERTENSION: ICD-10-CM

## 2023-01-17 DIAGNOSIS — I49.5 SSS (SICK SINUS SYNDROME) (HCC): ICD-10-CM

## 2023-01-17 DIAGNOSIS — I48.0 PAROXYSMAL ATRIAL FIBRILLATION (HCC): Primary | ICD-10-CM

## 2023-01-17 DIAGNOSIS — E78.00 PURE HYPERCHOLESTEROLEMIA: ICD-10-CM

## 2023-01-17 DIAGNOSIS — G47.33 OSA ON CPAP: ICD-10-CM

## 2023-01-17 DIAGNOSIS — Z99.89 OSA ON CPAP: ICD-10-CM

## 2023-01-17 PROCEDURE — G8417 CALC BMI ABV UP PARAM F/U: HCPCS | Performed by: INTERNAL MEDICINE

## 2023-01-17 PROCEDURE — 3017F COLORECTAL CA SCREEN DOC REV: CPT | Performed by: INTERNAL MEDICINE

## 2023-01-17 PROCEDURE — G8484 FLU IMMUNIZE NO ADMIN: HCPCS | Performed by: INTERNAL MEDICINE

## 2023-01-17 PROCEDURE — 1123F ACP DISCUSS/DSCN MKR DOCD: CPT | Performed by: INTERNAL MEDICINE

## 2023-01-17 PROCEDURE — 3077F SYST BP >= 140 MM HG: CPT | Performed by: INTERNAL MEDICINE

## 2023-01-17 PROCEDURE — 99214 OFFICE O/P EST MOD 30 MIN: CPT | Performed by: INTERNAL MEDICINE

## 2023-01-17 PROCEDURE — G8427 DOCREV CUR MEDS BY ELIG CLIN: HCPCS | Performed by: INTERNAL MEDICINE

## 2023-01-17 PROCEDURE — 3079F DIAST BP 80-89 MM HG: CPT | Performed by: INTERNAL MEDICINE

## 2023-01-17 PROCEDURE — 1036F TOBACCO NON-USER: CPT | Performed by: INTERNAL MEDICINE

## 2023-01-17 PROCEDURE — 93000 ELECTROCARDIOGRAM COMPLETE: CPT | Performed by: INTERNAL MEDICINE

## 2023-01-17 RX ORDER — METHYLPREDNISOLONE 4 MG/1
TABLET ORAL
COMMUNITY
Start: 2023-01-10

## 2023-01-17 NOTE — PROGRESS NOTES
Chief Complaint   Patient presents with    Atrial Fibrillation       7-5-18: Patient presents for initial medical evaluation. Patient is followed on a regular basis by Dr. Albaro Isbell MD. S/p hospitalization for weakness, fatigue and near syncope. 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 Echo with normal LVF. He continues to have symptoms today. Does not feel good, has dry heaves and cough. His stools are dark. He has a hx of UC. Pt denies chest pain, dyspnea, dyspnea on exertion, change in exercise capacity, fatigue,  nausea, vomiting, diarrhea, constipation, motor weakness, insomnia, weight loss, syncope,PND, orthopnea, or claudication. Had recent colitis flare up. Was placed on ABX in hospital. His WBC was high  and noted to have ARF. No hx of MI, CHF or arrhythmia. No hx of stress test or LHC. 9-4-18: s/p PPM insertion on 7-10-18. S/p normal nuclear stress test. States LH/Dizz has resolved. Does have some fatigue. Does have hx of ulcerative colitis. S/p recent C.diff infection. Dealing with depression. Pt denies chest pain, dyspnea, dyspnea on exertion, change in exercise capacity,   nausea, vomiting, diarrhea, constipation, motor weakness, insomnia, weight loss, syncope, dizziness, lightheadedness, palpitations, PND, orthopnea, or claudication. No nitro use. BP and hr are good. CAD is stable. No LE discoloration or ulcers. No LE edema. No CHF type symptoms. Lipid profile is normal.       11-1-18: noted to have episodes of New onset afibb on PPM check, 16% afibb burden. Started on NOAC but has not picked it up. States he has a hx of colitis, following with  GI, ? chrons disease, he is having some GIB episodes. EKG with Afibb today, V paced.  Pt denies chest pain, dyspnea, dyspnea on exertion, change in exercise capacity, fatigue,  nausea, vomiting, diarrhea, constipation, motor weakness, insomnia, weight loss, syncope, dizziness, lightheadedness, palpitations, PND, orthopnea, or claudication. No nitro use. BP and hr are good. CAD is stable. No LE discoloration or ulcers. No LE edema. No CHF type symptoms. Lipid profile is normal. No recent hospitalization. No change in meds. 3-5-19: EKG with NSR, V paced. On DOAC now and no bleeding issues. Was cleared by GI. Pt denies chest pain, dyspnea, dyspnea on exertion, change in exercise capacity, fatigue,  nausea, vomiting, diarrhea, constipation, motor weakness, insomnia, weight loss, syncope, dizziness, lightheadedness, palpitations, PND, orthopnea, or claudication. No nitro use. BP and hr are good. CAD is stable. No LE discoloration or ulcers. No LE edema. No CHF type symptoms. Lipid profile is normal. No recent hospitalization. No change in meds. S/p PPm check     10-1-19: TOLERATING DOAC OK. Will have colonoscopy tomorrow. Pt denies chest pain, dyspnea, dyspnea on exertion, change in exercise capacity, fatigue,  nausea, vomiting, diarrhea, constipation, motor weakness, insomnia, weight loss, syncope, dizziness, lightheadedness, palpitations, PND, orthopnea, or claudication. No nitro use. BP and hr are good. CAD is stable. No LE discoloration or ulcers. No LE edema. No CHF type symptoms. Lipid profile is normal. No recent hospitalization. No change in meds. EKG with NSR, V paced. S/p recent PPM check but no report. Hgb is 14 on 5/19. CPAP at night. 6-30-20: + hx of UC, following with GI. On DOAC though and no bleeding issues. Pt denies chest pain, dyspnea, dyspnea on exertion, change in exercise capacity, fatigue,  nausea, vomiting, diarrhea, constipation, motor weakness, insomnia, weight loss, syncope, dizziness, lightheadedness, palpitations, PND, orthopnea, or claudication. Hx of PAFib, SSS s/p PPM.   EKG with NSR, V paced.  Pt denies chest pain, dyspnea, dyspnea on exertion, change in exercise capacity, fatigue,  nausea, vomiting, diarrhea, constipation, motor weakness, insomnia, weight loss, syncope, dizziness, lightheadedness, palpitations, PND, orthopnea, or claudication. S/p PPM check last week and is ok. Hx of negative stress test in 2018.       12/8/2020: States he is more short of breath than usual.  Started around summertime. Patient with history of paroxysmal atrial fibrillation/sick sinus syndrome status post permanent pacemaker placement. History of negative stress test in 2018. He is on oral anticoagulation again. Recent lab work was within normal limits per patient. EKG with sinus rhythm V paced rhythm. He denies any lower extremity edema. In March 2019 he weighed 271 pounds and today he weighs 285 pounds. He is compliant with his CPAP machine at night. 1/5/2021: Patient was started on Lasix 20 mg daily last time and is feeling better overall. States his shortness of breath has improved. Status post nuclear stress test with ejection fraction of 40% no significant perfusion defects. Status post echocardiogram ejection fraction of 60%, mild LVH, grade 1 diastolic dysfunction no significant valve abnormalities. Chest x-ray and BNP was not performed. He is compliant with his medication. He is compliant with his CPAP machine at night. No recent hospitalizations. Pressure is 120/80 with heart rate of 79 bpm.  Status post permanent pacemaker check with 8-year longevity on generator, no episodes of any malignant tachycardia or bradycardia arrhythmias. Patient with history of paroxysmal atrial fibrillation on oral anticoagulation. 8-3-21: Continues to have SOB and worse with very mild exertion. Did try inhaler by PCP and no help. S/p recent negative stress test with EF of 40%, echo with LVF 55%. No LE edema. Hx of pafib. Sss/ s/p PPM.   BP is elevated today. On DOAC< no bleeding issues. Pt denies   nausea, vomiting, diarrhea, constipation, motor weakness, insomnia, weight loss, syncope, dizziness, lightheadedness, palpitations, PND, orthopnea, or claudication. EKG ?  afib , V paced. 10-5-21: Status post cardiac catheterization at [facility] [place] showing mild to moderate diffuse LAD disease no significant focal stenosis, medical therapy only. Normal LV function. Patient follow-up with pulmonary. Continues to have shortness of breath. S/p recent negative stress test with EF of 40%, echo with LVF 55%. No LE edema. Hx of pafib. Sss/ s/p PPM.  Patient is on oral anticoagulation. Status post pulmonary function test.  BNP was only 120. D-dimer was significant elevated, CT of the chest no pulmonary pulmonary embolism small hiatal hernia  EKG EKG with normal sinus rhythm, V paced. 4-14-22: Patient doing okay overall other than some right knee osteoarthritis. He does have a history of paroxysmal atrial fibrillation/sick sinus syndrome status post permanent pacemaker placement. He is on oral anticoagulation. Denies any bleeding issues. No angina or significant CHF type symptoms. Status post recent negative stress test in 2021 as well as a normal LV function by echo ejection fraction of 55%  EKG with normal sinus rhythm, V paced. Status post recent pacemaker check with 0 episodes of A. fib and 8-year generator longevity    1/17/2023: Patient is doing well overall. Status post right total knee arthroplasty. Does have history of paroxysmal atrial fibrillation/sick sinus syndrome status post permanent pacemaker placement. He remains on Xarelto without any bleeding issues. He denies any angina or heart failure type symptoms status post negative stress test in 2021. Echo at that time with normal LV function no valve abnormalities  EKG with questionable sinus rhythm, V paced.     Patient Active Problem List   Diagnosis    HTN (hypertension)    Major depressive disorder, recurrent episode, moderate (HCC)    DM w/o complication type II ([address]    Dyslipidemia associated with type 2 diabetes mellitus (HCC)    ETIENNE on CPAP    Generalized anxiety disorder    SSS (sick sinus syndrome) [facility])    Presence of permanent cardiac pacemaker    Paroxysmal atrial fibrillation (HCC)    Crohn's disease of colon with rectal bleeding (HCC)    Dyspnea    Obesity (BMI 30-39. 9)    Complete atrioventricular block (HCC)    Mild chronic obstructive pulmonary disease (HCC)    Stage 3a chronic kidney disease (HCC)    Steatosis of liver    Pure hypercholesterolemia    Vitamin D deficiency       Past Surgical History:   Procedure Laterality Date    ANTERIOR CRUCIATE LIGAMENT REPAIR      CARDIAC PACEMAKER PLACEMENT      COLONOSCOPY  2019    DR BONILLA    GALLBLADDER SURGERY      PACEMAKER PLACEMENT  07/10/2018    [facility] W HOLIDAY DO    SIGMOIDOSCOPY  2017    DR. BONILLA    SIGMOIDOSCOPY  2019    DR BONILLA    TOE SURGERY      TONSILLECTOMY      WRIST SURGERY  2013       Social History     Socioeconomic History    Marital status: Single   Tobacco Use    Smoking status: Former     Packs/day: 0.25     Years: 10.00     Pack years: 2.50     Types: Cigars, Cigarettes     Start date: 2008     Quit date: 2018     Years since quittin.5     Passive exposure: Past    Smokeless tobacco: Never   Vaping Use    Vaping Use: Never used   Substance and Sexual Activity    Alcohol use:  Yes     Alcohol/week: 0.0 standard drinks     Comment: occ    Drug use: No    Sexual activity: Not Currently       Family History   Problem Relation Age of Onset    Cancer Mother     Diabetes Father     Cancer Paternal Uncle        Current Outpatient Medications   Medication Sig Dispense Refill    methylPREDNISolone (MEDROL DOSEPACK) 4 MG tablet TAKE BY MOUTH AS DIRECTED ON PACKAGE      metoprolol tartrate (LOPRESSOR) 25 MG tablet TAKE 1 TABLET TWICE DAILY 180 tablet 3    rivaroxaban (XARELTO) 20 MG TABS tablet Take 1 tablet by mouth daily (with breakfast) 90 tablet 3    buPROPion (WELLBUTRIN XL) 300 MG extended release tablet Take 300 mg by mouth every morning      Tofacitinib Citrate (XELJANZ) 5 MG TABS Take by mouth chlorthalidone (HYGROTON) 25 MG tablet Take 25 mg by mouth daily      chlorthalidone (HYGROTON) 25 MG tablet Take 1 tablet daily      doxycycline monohydrate (MONODOX) 100 MG capsule TAKE 1 CAPSULE BY MOUTH EVERY 12 HOURS      nystatin (MYCOSTATIN) 134180 UNIT/GM cream APPLY AND RUB IN A THIN FILM TO AFFECTED AREAS TWICE DAILY. (AM AND PM). mupirocin (BACTROBAN) 2 % ointment APPLY DAILY to wound      rosuvastatin (CRESTOR) 10 MG tablet Take 10 mg by mouth daily      Respiratory Therapy Supplies ROSAURA New C pap 5cm H2O and supplies  DX G47.33  Z99.9 1 each 0    CPAP Machine MISC 5 cm H2O via nasal pillows 1 each 0    telmisartan (MICARDIS) 40 MG tablet Take 40 mg by mouth daily      mirtazapine (REMERON) 45 MG tablet Take 45 mg by mouth nightly      levothyroxine (SYNTHROID) 25 MCG tablet Take 25 mcg by mouth Daily      blood glucose monitor kit and supplies Test 2-3 times a day & as needed for symptoms of irregular blood glucose. 1 kit 0    Cholecalciferol (VITAMIN D) 2000 units CAPS capsule Take 1 capsule by mouth daily       No current facility-administered medications for this visit. Azathioprine and Clindamycin    Review of Systems:  General ROS: negative  Psychological ROS: negative  Hematological and Lymphatic ROS: No history of blood clots or bleeding disorder. Respiratory ROS: no cough, shortness of breath, or wheezing  Cardiovascular ROS: positive for - palpitations  Gastrointestinal ROS: no abdominal pain, change in bowel habits, or black or bloody stools  Genito-Urinary ROS: no dysuria, trouble voiding, or hematuria  Musculoskeletal ROS: negative  Neurological ROS: no TIA or stroke symptoms  Dermatological ROS: negative    VITALS:  Blood pressure (!) 144/88, pulse 81, weight 294 lb (133.4 kg). Body mass index is 39.87 kg/m².     Physical Examination:  General appearance - alert, well appearing, and in no distress  Mental status - alert, oriented to person, place, and time  Neck - Neck is supple, no JVD or carotid bruits. No thyromegaly or adenopathy. Chest - clear to auscultation, no wheezes, rales or rhonchi, symmetric air entry  Heart - normal rate, regular rhythm, normal S1, S2, no murmurs, rubs, clicks or gallops  Abdomen - soft, nontender, nondistended, no masses or organomegaly  Neurological - alert, oriented, normal speech, no focal findings or movement disorder noted  Extremities - peripheral pulses normal, no pedal edema, no clubbing or cyanosis  Skin - normal coloration and turgor, no rashes, no suspicious skin lesions noted    Pacer site has healed. Orders Placed This Encounter   Procedures    EKG 12 Lead       ASSESSMENT:     Diagnosis Orders   1. Paroxysmal atrial fibrillation (HCC)  EKG 12 Lead      2. Primary hypertension        3. Complete atrioventricular block (HCC)        4. Obesity (BMI 30-39.9)        5. ETIENNE on CPAP        6. Presence of permanent cardiac pacemaker        7. SSS (sick sinus syndrome) ([address]        8. Pure hypercholesterolemia              PLAN:     Patient will need to continue to follow up with you for their general medical care     As always, aggressive risk factor modification is strongly recommended. We should adhere to the JNC VIII guidelines for HTN management and the NCEP ATP III guidelines for LDL-C management. Cardiac diet is always recommended with low fat, cholesterol, calories and sodium. Continue medications at current doses. Lasix 20mg daily, if ineffective, double. Follow up with device clinic. [facility]. NOAC since ok with GI. If not anble to tolerate NOAC, then refer to [facility] for REESE closure device. Follow-up with pulmonary for SOB. Check labs/lipid profile with PCP    Patient was advised and encouraged to check blood pressure at home or at a pharmacy, maintain a logbook, and also call us back if blood pressure are above the target ranges or if it is low.  Patient clearly understands and agrees to the instructions. We will need to continue to monitor muscle and liver enzymes, BUN, CR, and electrolytes. Details of medical condition explained and patient was warned about adverse consequences of uncontrolled medical conditions and possible side effects of prescribed medications.

## 2023-01-24 ENCOUNTER — OFFICE VISIT (OUTPATIENT)
Dept: PULMONOLOGY | Age: 73
End: 2023-01-24
Payer: MEDICARE

## 2023-01-24 VITALS — OXYGEN SATURATION: 96 % | SYSTOLIC BLOOD PRESSURE: 156 MMHG | DIASTOLIC BLOOD PRESSURE: 88 MMHG | HEART RATE: 72 BPM

## 2023-01-24 DIAGNOSIS — J02.9 SORE THROAT: ICD-10-CM

## 2023-01-24 DIAGNOSIS — G47.33 OSA ON CPAP: Primary | ICD-10-CM

## 2023-01-24 DIAGNOSIS — E66.9 OBESITY (BMI 30-39.9): ICD-10-CM

## 2023-01-24 DIAGNOSIS — Z99.89 OSA ON CPAP: Primary | ICD-10-CM

## 2023-01-24 DIAGNOSIS — R06.00 DYSPNEA, UNSPECIFIED TYPE: ICD-10-CM

## 2023-01-24 PROCEDURE — G8484 FLU IMMUNIZE NO ADMIN: HCPCS | Performed by: INTERNAL MEDICINE

## 2023-01-24 PROCEDURE — G8417 CALC BMI ABV UP PARAM F/U: HCPCS | Performed by: INTERNAL MEDICINE

## 2023-01-24 PROCEDURE — 1123F ACP DISCUSS/DSCN MKR DOCD: CPT | Performed by: INTERNAL MEDICINE

## 2023-01-24 PROCEDURE — G8427 DOCREV CUR MEDS BY ELIG CLIN: HCPCS | Performed by: INTERNAL MEDICINE

## 2023-01-24 PROCEDURE — 99214 OFFICE O/P EST MOD 30 MIN: CPT | Performed by: INTERNAL MEDICINE

## 2023-01-24 PROCEDURE — 1036F TOBACCO NON-USER: CPT | Performed by: INTERNAL MEDICINE

## 2023-01-24 PROCEDURE — 3078F DIAST BP <80 MM HG: CPT | Performed by: INTERNAL MEDICINE

## 2023-01-24 PROCEDURE — 3074F SYST BP LT 130 MM HG: CPT | Performed by: INTERNAL MEDICINE

## 2023-01-24 PROCEDURE — 3017F COLORECTAL CA SCREEN DOC REV: CPT | Performed by: INTERNAL MEDICINE

## 2023-01-24 ASSESSMENT — ENCOUNTER SYMPTOMS
DIARRHEA: 0
WHEEZING: 0
SORE THROAT: 1
VOICE CHANGE: 0
CHEST TIGHTNESS: 0
RHINORRHEA: 0
NAUSEA: 0
SHORTNESS OF BREATH: 1
EYE ITCHING: 0
COUGH: 0
VOMITING: 0
ABDOMINAL PAIN: 0

## 2023-01-24 NOTE — PROGRESS NOTES
Subjective:     Steven Murcia is a 71 y.o. male who complains today of:     Chief Complaint   Patient presents with    Follow-up     2wk f/u for dyspnea     Cough     And sore throat       HPI  He said he said he is using CPAP but not all the time in last 2 month   Due to rt.  knee pain . He trying to sleep in bed and planning to start using CPAP every night   He is sleeping in recliner due to knee surgery. He was using CPAP for about  6-7 hours every night. He was using CPAP with  Full face Mask. He said  sleep was restful with the CPAP use. C/o sore throat since this morning. He had teeth work done yesterday. He is not using  breztri 2 puff BID at this time . He said he would like to use albuterol HFA with spacer prn   C/o Mild shortness of breath  with exertion. No  Wheezing. No Cough with Sputum. No Chest tightness. Quit smoking 4/21        Allergies:  Azathioprine and Clindamycin  Past Medical History:   Diagnosis Date    Colitis     Hypertension     Paroxysmal atrial fibrillation ([address] 11/1/2018    Persistent atrial fibrillation ([address] 11/1/2018    Type II or unspecified type diabetes mellitus without mention of complication, not stated as uncontrolled      Past Surgical History:   Procedure Laterality Date    ANTERIOR CRUCIATE LIGAMENT REPAIR      CARDIAC PACEMAKER PLACEMENT      COLONOSCOPY  05/22/2019    DR BONILLA    GALLBLADDER SURGERY      PACEMAKER PLACEMENT  07/10/2018    [facility] DO    SIGMOIDOSCOPY  11/03/2017    DR. BONILLA    SIGMOIDOSCOPY  05/28/2019    DR BONILLA    TOE SURGERY      TONSILLECTOMY      WRIST SURGERY  2013     Family History   Problem Relation Age of Onset    Cancer Mother     Diabetes Father     Cancer Paternal Uncle      Social History     Socioeconomic History    Marital status: Single     Spouse name: Not on file    Number of children: Not on file    Years of education: Not on file    Highest education level: Not on file   Occupational History    Not on file   Tobacco Use    Smoking status: Former     Packs/day: 0.25     Years: 10.00     Pack years: 2.50     Types: Cigars, Cigarettes     Start date: 2008     Quit date: 2018     Years since quittin.5     Passive exposure: Past    Smokeless tobacco: Never   Vaping Use    Vaping Use: Never used   Substance and Sexual Activity    Alcohol use: Yes     Alcohol/week: 0.0 standard drinks     Comment: occ    Drug use: No    Sexual activity: Not Currently   Other Topics Concern    Not on file   Social History Narrative    Not on file     Social Determinants of Health     Financial Resource Strain: Not on file   Food Insecurity: Not on file   Transportation Needs: Not on file   Physical Activity: Not on file   Stress: Not on file   Social Connections: Not on file   Intimate Partner Violence: Not on file   Housing Stability: Not on file         Review of Systems   Constitutional:  Negative for chills, diaphoresis, fatigue and fever. HENT:  Positive for sore throat. Negative for congestion, mouth sores, nosebleeds, postnasal drip, rhinorrhea, sneezing and voice change. Eyes:  Negative for itching and visual disturbance. Respiratory:  Positive for shortness of breath. Negative for cough, chest tightness and wheezing. Cardiovascular: Negative. Negative for chest pain, palpitations and leg swelling. Gastrointestinal:  Negative for abdominal pain, diarrhea, nausea and vomiting. Genitourinary:  Negative for difficulty urinating and hematuria. Musculoskeletal:  Negative for arthralgias, joint swelling and myalgias. Skin:  Negative for rash. Allergic/Immunologic: Negative for environmental allergies. Neurological:  Negative for dizziness, tremors, weakness and headaches. Psychiatric/Behavioral:  Positive for sleep disturbance. Negative for behavioral problems.         :     Vitals:    23 1034 23 1035   BP: (!) 160/90 (!) 156/88   Site: Right Upper Arm Left Upper Arm   Position: Sitting Sitting   Cuff Size: Large Adult Large Adult   Pulse: 72    SpO2: 96%      Wt Readings from Last 3 Encounters:   01/17/23 294 lb (133.4 kg)   09/29/22 270 lb 1.6 oz (122.5 kg)   04/14/22 275 lb (124.7 kg)         Physical Exam  Constitutional:       Appearance: He is well-developed. He is obese. HENT:      Head: Normocephalic and atraumatic. Nose: Nose normal.   Eyes:      Conjunctiva/sclera: Conjunctivae normal.      Pupils: Pupils are equal, round, and reactive to light. Neck:      Thyroid: No thyromegaly. Vascular: No JVD. Trachea: No tracheal deviation. Cardiovascular:      Rate and Rhythm: Normal rate and regular rhythm. Heart sounds: No murmur heard. No friction rub. No gallop. Pulmonary:      Effort: Pulmonary effort is normal. No respiratory distress. Breath sounds: Normal breath sounds. No wheezing or rales. Comments: diminished Breath sound bilaterally. Chest:      Chest wall: No tenderness. Abdominal:      General: There is no distension. Musculoskeletal:         General: Normal range of motion. Lymphadenopathy:      Cervical: No cervical adenopathy. Skin:     General: Skin is warm and dry. Findings: No rash. Neurological:      Mental Status: He is alert and oriented to person, place, and time. Cranial Nerves: No cranial nerve deficit.    Psychiatric:         Behavior: Behavior normal.       Current Outpatient Medications   Medication Sig Dispense Refill    methylPREDNISolone (MEDROL DOSEPACK) 4 MG tablet TAKE BY MOUTH AS DIRECTED ON PACKAGE      metoprolol tartrate (LOPRESSOR) 25 MG tablet TAKE 1 TABLET TWICE DAILY 180 tablet 3    rivaroxaban (XARELTO) 20 MG TABS tablet Take 1 tablet by mouth daily (with breakfast) 90 tablet 3    buPROPion (WELLBUTRIN XL) 300 MG extended release tablet Take 300 mg by mouth every morning      Tofacitinib Citrate (XELJANZ) 5 MG TABS Take by mouth      chlorthalidone (HYGROTON) 25 MG tablet Take 25 mg by mouth daily      chlorthalidone (HYGROTON) 25 MG tablet Take 1 tablet daily      doxycycline monohydrate (MONODOX) 100 MG capsule TAKE 1 CAPSULE BY MOUTH EVERY 12 HOURS      nystatin (MYCOSTATIN) 446897 UNIT/GM cream APPLY AND RUB IN A THIN FILM TO AFFECTED AREAS TWICE DAILY. (AM AND PM). mupirocin (BACTROBAN) 2 % ointment APPLY DAILY to wound      rosuvastatin (CRESTOR) 10 MG tablet Take 10 mg by mouth daily      Respiratory Therapy Supplies ROSAURA New C pap 5cm H2O and supplies  DX G47.33  Z99.9 1 each 0    CPAP Machine MISC 5 cm H2O via nasal pillows 1 each 0    telmisartan (MICARDIS) 40 MG tablet Take 40 mg by mouth daily      mirtazapine (REMERON) 45 MG tablet Take 45 mg by mouth nightly      levothyroxine (SYNTHROID) 25 MCG tablet Take 25 mcg by mouth Daily      blood glucose monitor kit and supplies Test 2-3 times a day & as needed for symptoms of irregular blood glucose. 1 kit 0    Cholecalciferol (VITAMIN D) 2000 units CAPS capsule Take 1 capsule by mouth daily       No current facility-administered medications for this visit. Results for orders placed during the hospital encounter of 09/17/21    XR CHEST (2 VW)    Narrative  EXAMINATION: XR CHEST (2 VW)    CLINICAL HISTORY: R06.00 Dyspnea, unspecified type ICD10    COMPARISONS: January 11, 2019    FINDINGS:    Two views of the chest are submitted. Left-sided CCD device. Leads overlying the cardiac silhouette. The cardiac silhouette is of normal size configuration. Pulmonary vascular attenuated  Right sided trachea. Area of atelectasis, scarring left lower lobe. No focal infiltrates. No effusions. No Pneumothoraces. Eventration right hemidiaphragm.     Impression  NO ACUTE ACTIVE CARDIOPULMONARY PROCESS      Results for orders placed during the hospital encounter of 01/11/19    XR CHEST STANDARD (2 VW)    Narrative  EXAMINATION: XR CHEST (2 VW)    CLINICAL HISTORY: J20.9 Acute bronchitis, unspecified organism ICD10    COMPARISONS: None available. FINDINGS: Cardiac size is borderline. There is a prominent left epicardial fat pad. Pulmonary vascularity is normal. The lungs are clear. There is a dual-chamber left permanent pacemaker. There is no pneumothorax. There are no pleural effusions. There is  no evidence of adenopathy. The bones are unremarkable. Impression  NEGATIVE CHEST RADIOGRAPHS      Results for orders placed in visit on 10/26/18    XR CHEST STANDARD (2 VW)    Narrative  DATE OF EXAM:      Oct 26 2018 10:32AM    CLINICAL HISTORY/   MRN: [id number]  Patient Name: Yolanda Mahmood    STUDY:  CHEST 2 VIEW; 10/26/2018 10:32 am    INDICATION:  s/p pacemaker. COMPARISON:  07/11/2018    ACCESSION NUMBER(S):  [id number]    ORDERING CLINICIAN:  TRACEE CARRERO    TECHNIQUE:  2 radiographs of the chest were performed. FINDINGS:  A dual lead intracardiac device is in place with lead tips overlying the  right atrium and right ventricle. The heart is of normal size and contour. The pulmonary vessels are within  normal limits. The lungs and the pleural spaces are clear. There is no  pneumothorax. The osseous structures are intact. CONCLUSION:   IMPRESSION:  No sign of acute cardiopulmonary disease. Results for orders placed in visit on 10/04/21    CT CHEST WO CONTRAST      Assessment/Plan:     1. ETIENNE on CPAP  He said he said he is using CPAP but not all the time in last 2 month. Due to rt.  knee pain . He trying to sleep in bed and planning to start using CPAP every night . He is sleeping in recliner due to knee surgery. He was using CPAP for about  6-7 hours every night. He was using CPAP with  Full face Mask. He said  sleep was restful with the CPAP use. 2. Obesity (BMI 30-39. 9)  He is advised try to lose weight. obesity related risk explained to the patient , Suggested weight control approaches, including dietary changes , exercise, behavioral modification.     3. Dyspnea, unspecified type  He is not using  breztri 2 puff BID at this time .He said he would like to use albuterol HFA with spacer prn . C/o Mild shortness of breath  with exertion. No  Wheezing. No Cough with Sputum. Quit smoking 4/21    - XR CHEST STANDARD (2 VW); Future    4. Sore throat  C/o sore throat since this morning. He had teeth work done yesterday. Return in about 4 months (around 5/24/2023) for CXR result, harry, shortness of breath.       Efrain Harper MD

## 2023-02-01 PROBLEM — L02.611 ABSCESS OF TOE OF RIGHT FOOT: Status: ACTIVE | Noted: 2023-02-01

## 2023-02-01 PROBLEM — E78.00 PURE HYPERCHOLESTEROLEMIA: Status: ACTIVE | Noted: 2023-02-01

## 2023-02-01 PROBLEM — R31.9 HEMATURIA OF UNDIAGNOSED CAUSE: Status: RESOLVED | Noted: 2023-02-01 | Resolved: 2023-02-01

## 2023-02-01 PROBLEM — E78.00 ELEVATED LDL CHOLESTEROL LEVEL: Status: ACTIVE | Noted: 2023-02-01

## 2023-02-01 PROBLEM — I10 BENIGN ESSENTIAL HYPERTENSION: Status: ACTIVE | Noted: 2023-02-01

## 2023-02-01 PROBLEM — R91.8 OPACITY OF LUNG ON IMAGING STUDY: Status: ACTIVE | Noted: 2023-02-01

## 2023-02-01 PROBLEM — J34.89 NASAL CONGESTION WITH RHINORRHEA: Status: ACTIVE | Noted: 2023-02-01

## 2023-02-01 PROBLEM — F32.5 MAJOR DEPRESSIVE DISORDER IN FULL REMISSION (CMS-HCC): Status: ACTIVE | Noted: 2023-02-01

## 2023-02-01 PROBLEM — G89.29 CHRONIC MIDLINE THORACIC BACK PAIN: Status: ACTIVE | Noted: 2023-02-01

## 2023-02-01 PROBLEM — U07.1 SEVERE ACUTE RESPIRATORY SYNDROME CORONAVIRUS 2 (SARS-COV-2) DETECTED: Status: ACTIVE | Noted: 2023-02-01

## 2023-02-01 PROBLEM — K51.90 ULCERATIVE COLITIS (MULTI): Status: ACTIVE | Noted: 2023-02-01

## 2023-02-01 PROBLEM — R07.81 RIB PAIN: Status: ACTIVE | Noted: 2023-02-01

## 2023-02-01 PROBLEM — M75.41 IMPINGEMENT SYNDROME OF RIGHT SHOULDER: Status: ACTIVE | Noted: 2023-02-01

## 2023-02-01 PROBLEM — E03.9 ACQUIRED HYPOTHYROIDISM: Status: ACTIVE | Noted: 2023-02-01

## 2023-02-01 PROBLEM — F33.1 MODERATE EPISODE OF RECURRENT MAJOR DEPRESSIVE DISORDER (MULTI): Status: ACTIVE | Noted: 2023-02-01

## 2023-02-01 PROBLEM — M54.6 CHRONIC MIDLINE THORACIC BACK PAIN: Status: ACTIVE | Noted: 2023-02-01

## 2023-02-01 PROBLEM — H90.3 BILATERAL SENSORINEURAL HEARING LOSS: Status: ACTIVE | Noted: 2023-02-01

## 2023-02-01 PROBLEM — K76.0 HEPATIC STEATOSIS: Status: ACTIVE | Noted: 2023-02-01

## 2023-02-01 PROBLEM — R60.9 EDEMA: Status: ACTIVE | Noted: 2023-02-01
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
## 2023-02-01 PROBLEM — R09.81 NASAL CONGESTION WITH RHINORRHEA: Status: ACTIVE | Noted: 2023-02-01

## 2023-02-01 PROBLEM — I44.2 COMPLETE ATRIOVENTRICULAR BLOCK (MULTI): Status: ACTIVE | Noted: 2023-02-01

## 2023-02-01 PROBLEM — R06.02 SOB (SHORTNESS OF BREATH) ON EXERTION: Status: ACTIVE | Noted: 2023-02-01

## 2023-02-01 PROBLEM — S70.02XA: Status: ACTIVE | Noted: 2023-02-01

## 2023-02-01 PROBLEM — S70.02XA HEMATOMA OF LEFT HIP: Status: ACTIVE | Noted: 2023-02-01

## 2023-02-01 PROBLEM — R21 RASH: Status: ACTIVE | Noted: 2023-02-01

## 2023-02-01 PROBLEM — F41.9 ANXIETY: Status: ACTIVE | Noted: 2023-02-01

## 2023-02-01 PROBLEM — J44.9 COPD, MILD (MULTI): Status: ACTIVE | Noted: 2023-02-01

## 2023-02-01 PROBLEM — Z95.0 CARDIAC PACEMAKER: Status: ACTIVE | Noted: 2023-02-01

## 2023-02-01 PROBLEM — H91.92 DECREASED HEARING OF LEFT EAR: Status: ACTIVE | Noted: 2023-02-01

## 2023-02-01 PROBLEM — T16.2XXA FOREIGN BODY OF LEFT EAR: Status: ACTIVE | Noted: 2023-02-01

## 2023-02-01 PROBLEM — J00 NASOPHARYNGITIS: Status: ACTIVE | Noted: 2023-02-01

## 2023-02-01 PROBLEM — G47.33 OBSTRUCTIVE SLEEP APNEA, ADULT: Status: ACTIVE | Noted: 2023-02-01

## 2023-02-01 PROBLEM — J20.9 ACUTE BRONCHITIS: Status: ACTIVE | Noted: 2023-02-01

## 2023-02-01 PROBLEM — N18.31 CHRONIC KIDNEY DISEASE, STAGE 3A (MULTI): Status: ACTIVE | Noted: 2023-02-01

## 2023-02-01 PROBLEM — R05.9 COUGH IN ADULT PATIENT: Status: ACTIVE | Noted: 2023-02-01

## 2023-02-01 PROBLEM — N28.9 RENAL INSUFFICIENCY: Status: ACTIVE | Noted: 2023-02-01

## 2023-02-01 PROBLEM — M17.11 PRIMARY OSTEOARTHRITIS OF RIGHT KNEE: Status: ACTIVE | Noted: 2023-02-01

## 2023-02-01 PROBLEM — M79.671 RIGHT FOOT PAIN: Status: ACTIVE | Noted: 2023-02-01

## 2023-02-01 PROBLEM — R35.0 FREQUENT URINATION: Status: ACTIVE | Noted: 2023-02-01

## 2023-02-01 PROBLEM — E55.9 VITAMIN D DEFICIENCY: Status: ACTIVE | Noted: 2023-02-01

## 2023-02-01 PROBLEM — E66.812 OBESITY, CLASS II, BMI 35-39.9: Status: ACTIVE | Noted: 2023-02-01

## 2023-02-01 PROBLEM — E66.9 OBESITY, CLASS II, BMI 35-39.9: Status: ACTIVE | Noted: 2023-02-01

## 2023-02-01 PROBLEM — F32.A DEPRESSION: Status: ACTIVE | Noted: 2023-02-01

## 2023-02-01 PROBLEM — R50.9 FEVER AND CHILLS: Status: ACTIVE | Noted: 2023-02-01

## 2023-02-01 RX ORDER — ZINC GLUCONATE 50 MG
1 TABLET ORAL DAILY
COMMUNITY
Start: 2022-03-10 | End: 2023-12-27 | Stop reason: ALTCHOICE

## 2023-02-01 RX ORDER — TELMISARTAN 40 MG/1
40 TABLET ORAL DAILY
COMMUNITY
Start: 2021-01-19 | End: 2023-06-07 | Stop reason: SDUPTHER

## 2023-02-01 RX ORDER — ACETAMINOPHEN 500 MG
1 TABLET ORAL DAILY
COMMUNITY

## 2023-02-01 RX ORDER — LEVOTHYROXINE SODIUM 25 UG/1
25 TABLET ORAL DAILY
COMMUNITY
Start: 2020-05-26 | End: 2023-03-15 | Stop reason: SDUPTHER

## 2023-02-01 RX ORDER — CYCLOBENZAPRINE HCL 10 MG
1 TABLET ORAL 3 TIMES DAILY
COMMUNITY
Start: 2022-11-08 | End: 2023-06-15 | Stop reason: ALTCHOICE

## 2023-02-01 RX ORDER — BUPROPION HYDROCHLORIDE 300 MG/1
1 TABLET ORAL DAILY
COMMUNITY
End: 2023-06-08 | Stop reason: SDUPTHER

## 2023-02-01 RX ORDER — MIRTAZAPINE 45 MG/1
45 TABLET, FILM COATED ORAL NIGHTLY
COMMUNITY
End: 2023-09-13 | Stop reason: SDUPTHER

## 2023-02-01 RX ORDER — METOPROLOL TARTRATE 25 MG/1
1 TABLET, FILM COATED ORAL 2 TIMES DAILY
COMMUNITY
Start: 2021-02-03 | End: 2024-03-26 | Stop reason: SDUPTHER

## 2023-02-01 RX ORDER — OXYCODONE AND ACETAMINOPHEN 5; 325 MG/1; MG/1
TABLET ORAL EVERY 6 HOURS PRN
COMMUNITY
End: 2023-06-15 | Stop reason: ALTCHOICE

## 2023-02-01 RX ORDER — ALBUTEROL SULFATE 90 UG/1
2 AEROSOL, METERED RESPIRATORY (INHALATION) EVERY 4 HOURS PRN
COMMUNITY
Start: 2021-05-13 | End: 2023-03-24 | Stop reason: SDUPTHER

## 2023-02-01 RX ORDER — CHLORTHALIDONE 25 MG/1
1 TABLET ORAL DAILY
COMMUNITY
Start: 2022-03-10 | End: 2023-06-15 | Stop reason: ALTCHOICE

## 2023-02-01 RX ORDER — ROSUVASTATIN CALCIUM 10 MG/1
1 TABLET, COATED ORAL NIGHTLY
COMMUNITY
Start: 2022-01-25 | End: 2023-06-07 | Stop reason: SDUPTHER

## 2023-02-01 RX ORDER — BUDESONIDE, GLYCOPYRROLATE, AND FORMOTEROL FUMARATE 160; 9; 4.8 UG/1; UG/1; UG/1
2 AEROSOL, METERED RESPIRATORY (INHALATION)
COMMUNITY
Start: 2021-06-17 | End: 2023-06-15 | Stop reason: ALTCHOICE

## 2023-02-01 RX ORDER — TIZANIDINE HYDROCHLORIDE 4 MG/1
4 CAPSULE, GELATIN COATED ORAL EVERY 8 HOURS PRN
COMMUNITY
Start: 2022-11-14 | End: 2023-06-15 | Stop reason: ALTCHOICE

## 2023-02-01 RX ORDER — METHYLPREDNISOLONE 4 MG/1
4 TABLET ORAL DAILY
COMMUNITY
End: 2023-06-15 | Stop reason: ALTCHOICE

## 2023-03-02 LAB
ALBUMIN (G/DL) IN SER/PLAS: 4.1 G/DL (ref 3.4–5)
ANION GAP IN SER/PLAS: 8 MMOL/L (ref 10–20)
APPEARANCE, URINE: CLEAR
BILIRUBIN, URINE: NEGATIVE
BLOOD, URINE: NEGATIVE
CALCIUM (MG/DL) IN SER/PLAS: 10.2 MG/DL (ref 8.6–10.3)
CARBON DIOXIDE, TOTAL (MMOL/L) IN SER/PLAS: 32 MMOL/L (ref 21–32)
CHLORIDE (MMOL/L) IN SER/PLAS: 105 MMOL/L (ref 98–107)
COLOR, URINE: YELLOW
CREATININE (MG/DL) IN SER/PLAS: 1.17 MG/DL (ref 0.5–1.3)
CREATININE (MG/DL) IN URINE: 97.2 MG/DL (ref 20–370)
ERYTHROCYTE DISTRIBUTION WIDTH (RATIO) BY AUTOMATED COUNT: 15.2 % (ref 11.5–14.5)
ERYTHROCYTE MEAN CORPUSCULAR HEMOGLOBIN CONCENTRATION (G/DL) BY AUTOMATED: 30.7 G/DL (ref 32–36)
ERYTHROCYTE MEAN CORPUSCULAR VOLUME (FL) BY AUTOMATED COUNT: 90 FL (ref 80–100)
ERYTHROCYTES (10*6/UL) IN BLOOD BY AUTOMATED COUNT: 4.56 X10E12/L (ref 4.5–5.9)
GFR MALE: 66 ML/MIN/1.73M2
GLUCOSE (MG/DL) IN SER/PLAS: 120 MG/DL (ref 74–99)
GLUCOSE, URINE: NEGATIVE MG/DL
HEMATOCRIT (%) IN BLOOD BY AUTOMATED COUNT: 41.1 % (ref 41–52)
HEMOGLOBIN (G/DL) IN BLOOD: 12.6 G/DL (ref 13.5–17.5)
KETONES, URINE: NEGATIVE MG/DL
LEUKOCYTE ESTERASE, URINE: NEGATIVE
LEUKOCYTES (10*3/UL) IN BLOOD BY AUTOMATED COUNT: 8.2 X10E9/L (ref 4.4–11.3)
NITRITE, URINE: NEGATIVE
PH, URINE: 7 (ref 5–8)
PHOSPHATE (MG/DL) IN SER/PLAS: 2.9 MG/DL (ref 2.5–4.9)
PLATELETS (10*3/UL) IN BLOOD AUTOMATED COUNT: 199 X10E9/L (ref 150–450)
POTASSIUM (MMOL/L) IN SER/PLAS: 4.8 MMOL/L (ref 3.5–5.3)
PROTEIN (MG/DL) IN URINE: 9 MG/DL (ref 5–25)
PROTEIN, URINE: NEGATIVE MG/DL
PROTEIN/CREATININE (MG/MG) IN URINE: 0.09 MG/MG CREAT (ref 0–0.17)
SODIUM (MMOL/L) IN SER/PLAS: 140 MMOL/L (ref 136–145)
SPECIFIC GRAVITY, URINE: 1.02 (ref 1–1.03)
UREA NITROGEN (MG/DL) IN SER/PLAS: 21 MG/DL (ref 6–23)
UROBILINOGEN, URINE: <2 MG/DL (ref 0–1.9)

## 2023-03-15 ENCOUNTER — OFFICE VISIT (OUTPATIENT)
Dept: PRIMARY CARE | Facility: CLINIC | Age: 73
End: 2023-03-15
Payer: MEDICARE

## 2023-03-15 VITALS
RESPIRATION RATE: 16 BRPM | HEIGHT: 72 IN | WEIGHT: 296.6 LBS | DIASTOLIC BLOOD PRESSURE: 70 MMHG | OXYGEN SATURATION: 96 % | SYSTOLIC BLOOD PRESSURE: 128 MMHG | BODY MASS INDEX: 40.17 KG/M2 | TEMPERATURE: 97.3 F | HEART RATE: 78 BPM

## 2023-03-15 DIAGNOSIS — F32.5 MAJOR DEPRESSIVE DISORDER IN FULL REMISSION, UNSPECIFIED WHETHER RECURRENT (CMS-HCC): ICD-10-CM

## 2023-03-15 DIAGNOSIS — N18.31 CHRONIC KIDNEY DISEASE, STAGE 3A (MULTI): ICD-10-CM

## 2023-03-15 DIAGNOSIS — E78.00 PURE HYPERCHOLESTEROLEMIA: ICD-10-CM

## 2023-03-15 DIAGNOSIS — M54.6 CHRONIC MIDLINE THORACIC BACK PAIN: ICD-10-CM

## 2023-03-15 DIAGNOSIS — R06.02 SOB (SHORTNESS OF BREATH) ON EXERTION: ICD-10-CM

## 2023-03-15 DIAGNOSIS — F41.9 ANXIETY: ICD-10-CM

## 2023-03-15 DIAGNOSIS — G89.29 CHRONIC MIDLINE THORACIC BACK PAIN: ICD-10-CM

## 2023-03-15 DIAGNOSIS — I44.2 COMPLETE ATRIOVENTRICULAR BLOCK (MULTI): ICD-10-CM

## 2023-03-15 DIAGNOSIS — G47.33 OBSTRUCTIVE SLEEP APNEA, ADULT: ICD-10-CM

## 2023-03-15 DIAGNOSIS — M17.11 PRIMARY OSTEOARTHRITIS OF RIGHT KNEE: ICD-10-CM

## 2023-03-15 DIAGNOSIS — E66.01 CLASS 3 SEVERE OBESITY DUE TO EXCESS CALORIES WITH SERIOUS COMORBIDITY AND BODY MASS INDEX (BMI) OF 40.0 TO 44.9 IN ADULT (MULTI): ICD-10-CM

## 2023-03-15 DIAGNOSIS — E03.9 ACQUIRED HYPOTHYROIDISM: ICD-10-CM

## 2023-03-15 DIAGNOSIS — I10 BENIGN ESSENTIAL HYPERTENSION: Primary | ICD-10-CM

## 2023-03-15 DIAGNOSIS — J44.9 COPD, MILD (MULTI): ICD-10-CM

## 2023-03-15 PROBLEM — E66.813 CLASS 3 SEVERE OBESITY DUE TO EXCESS CALORIES WITH SERIOUS COMORBIDITY AND BODY MASS INDEX (BMI) OF 40.0 TO 44.9 IN ADULT: Status: ACTIVE | Noted: 2023-02-01

## 2023-03-15 PROCEDURE — 1036F TOBACCO NON-USER: CPT | Performed by: FAMILY MEDICINE

## 2023-03-15 PROCEDURE — 3008F BODY MASS INDEX DOCD: CPT | Performed by: FAMILY MEDICINE

## 2023-03-15 PROCEDURE — 1159F MED LIST DOCD IN RCRD: CPT | Performed by: FAMILY MEDICINE

## 2023-03-15 PROCEDURE — 3078F DIAST BP <80 MM HG: CPT | Performed by: FAMILY MEDICINE

## 2023-03-15 PROCEDURE — 3074F SYST BP LT 130 MM HG: CPT | Performed by: FAMILY MEDICINE

## 2023-03-15 PROCEDURE — 99214 OFFICE O/P EST MOD 30 MIN: CPT | Performed by: FAMILY MEDICINE

## 2023-03-15 RX ORDER — LEVOTHYROXINE SODIUM 25 UG/1
25 TABLET ORAL DAILY
Qty: 90 TABLET | Refills: 1 | Status: SHIPPED | OUTPATIENT
Start: 2023-03-15 | End: 2024-01-29

## 2023-03-15 ASSESSMENT — PATIENT HEALTH QUESTIONNAIRE - PHQ9
2. FEELING DOWN, DEPRESSED OR HOPELESS: NOT AT ALL
1. LITTLE INTEREST OR PLEASURE IN DOING THINGS: NOT AT ALL
SUM OF ALL RESPONSES TO PHQ9 QUESTIONS 1 & 2: 0

## 2023-03-15 ASSESSMENT — LIFESTYLE VARIABLES
HOW MANY STANDARD DRINKS CONTAINING ALCOHOL DO YOU HAVE ON A TYPICAL DAY: 1 OR 2
HOW OFTEN DO YOU HAVE A DRINK CONTAINING ALCOHOL: MONTHLY OR LESS
SKIP TO QUESTIONS 9-10: 1
HOW OFTEN DO YOU HAVE SIX OR MORE DRINKS ON ONE OCCASION: NEVER
AUDIT-C TOTAL SCORE: 1

## 2023-03-15 ASSESSMENT — PAIN SCALES - GENERAL: PAINLEVEL: 0-NO PAIN

## 2023-03-15 NOTE — PATIENT INSTRUCTIONS
Patient to continue current medications (with any exceptions as noted) and diet.      Patient was given refill(s) on:   Levothyroxine Sodium 25 mcg, TAKE 1 TAB BY MOUTH DAILY    Rx(s) sent to pharmacy.     Patient will start using his Breztri inhaler, 2 puffs twice daily, again for wheezing and SOB. He may also use his Albuterol inhaler as needed.     Ordered chest x-rays. Will call patient with results when available.     Patient is to follow up with PT, his pulmonologist, and Dr. Salcedo as scheduled.     Patient plans on getting his CPAP machine checked out.

## 2023-03-15 NOTE — PROGRESS NOTES
Subjective   Patient ID: Anjum Hernandez is a 72 y.o. male who presents for COPD, Shortness of Breath, Hypertension, and Anxiety. Patient was last seen by me on 12/16/2022.     HPI   Patient is taking all medications without difficulty.    Pt c/o wheezing and hard time breathing at night. Onset 2-3 months ago.     Patient also states that he is frequently waking in throughout the night. Onset since the first of the year. Patient has no trouble falling back asleep.    Patient has also been taking extra strength Tylenol. He notes that every once in a while he will get a pain in his right knee and he will have to move it in order for the pain to resolve. He does not use his walker to get around his house anymore, but has his cane just in case. He notes that he is still attending PT and has a few more sessions left. He tries to do his exercises 1-2 times daily.     Patient states that he has been diagnosed with CKD III.     Review of Systems  Except positives as noted in the CC & HPI      Constitutional: Denies fevers, chills, night sweats, fatigue, weight changes, change in appetite    Eyes: Denies blurry vision, double vision    ENT: Denies otalgia, trouble hearing, tinnitus, vertigo, nasal congestion, rhinorrhea, sore throat    Neck: Denies swelling, masses    Cardiovascular: Denies chest pain, palpitations, edema, orthopnea, syncope    Respiratory: Denies cough, postural nocturnal dyspnea    Gastrointestinal: Denies abdominal pain, nausea, vomiting, diarrhea, constipation, melena, hematochezia    Genitourinary: Denies dysuria, hematuria, frequency, urgency    Musculoskeletal: Denies back pain, neck pain, arthralgias, myalgias    Integumentary: Denies skin lesions, rashes, masses    Neurological: Denies dizziness, headaches, confusion, limb weakness, paresthesias, syncope, convulsions    Psychiatric: Denies depression, anxiety, homicidal ideations, suicidal ideations  Endocrine: Denies polyphagia, polydipsia,  polyuria, weakness, hair thinning, heat intolerance, cold intolerance, weight changes    Heme/Lymph: Denies easy bruising, easy bleeding, swollen glands     Objective   BP (!) 153/97 (BP Location: Left arm, Patient Position: Sitting, BP Cuff Size: Adult)   Pulse 78   Temp 36.3 °C (97.3 °F) (Temporal)   Resp 16   Ht 1.829 m (6')   Wt 135 kg (296 lb 9.6 oz)   SpO2 96%   BMI 40.23 kg/m²     Physical Exam  128/70 on recheck of BP in the right arm.     General Appearance - well-developed, obese,  72 y.o., White male in no acute distress.     Skin - warm, pink and dry without rash or concerning lesions.     Mental Status - alert and oriented x 3. Normal mood and affect appropriate to mood.     Neck - supple without lymphadenopathy. Carotid pulses are normal without bruits. Thyroid is normal in midline without nodules.     Chest - lungs are clear to auscultation without rales, rhonchi or wheezes. Mildly diminished breath sounds at the bases bilaterally.     Heart - regular, rate and rhythm without murmurs, rubs or gallops.    Abdomen - soft, morbidly obese, protuberant, nontender, nondistended. No masses, hepatomegaly or splenomegaly is noted. No rebound, rigidity or guarding is noted. Bowel sounds are normoactive.    Extremities - no cyanosis, clubbing. Trace edema of the right ankle. Pedal pulses are 2+ normal at the dorsalis pedis and posterior pulses bilaterally. Right knee reveals a well-healed scar without any open areas. No erythema is noted.     Neurological - cranial nerves II through XII are grossly intact. Motor strength 5/5 at all fours.   Lab Results   Component Value Date    CREATININE 1.17 03/02/2023    BUN 21 03/02/2023     03/02/2023    K 4.8 03/02/2023     03/02/2023    CO2 32 03/02/2023    GFRMALE 66 03/02/2023      Assessment/Plan    Patient to continue current medications (with any exceptions as noted) and diet. Follow-up in 3 month(s) otherwise as needed.      Patient was given  refill(s) on:   Levothyroxine Sodium 25 mcg, TAKE 1 TAB BY MOUTH DAILY    Rx(s) sent to pharmacy.     Patient will start using his Breztri inhaler, 2 puffs twice daily, again for wheezing and SOB. He may also use his Albuterol inhaler as needed.     Ordered chest x-rays. Will call patient with results when available.     Patient is to follow up with PT, his pulmonologist, and Dr. Salcedo as scheduled.     Patient plans on getting his CPAP machine checked out.     Scribe Attestation  By signing my name below, IAriana Scribe   attest that this documentation has been prepared under the direction and in the presence of Rian Lindsey MD.

## 2023-03-24 DIAGNOSIS — R06.02 SOB (SHORTNESS OF BREATH) ON EXERTION: Primary | ICD-10-CM

## 2023-03-24 NOTE — TELEPHONE ENCOUNTER
Rx Refill Request Telephone Encounter    Name:  Anjum Hernandez  :  [id number]  Medication Name:  Albuterol inhaler    Specific Pharmacy location:  [facility]   Date of last appointment:  22  Date of next appointment:  6/15/23   Best number to reach patient:  [phone]

## 2023-03-25 RX ORDER — ALBUTEROL SULFATE 90 UG/1
2 AEROSOL, METERED RESPIRATORY (INHALATION) EVERY 4 HOURS PRN
Qty: 18 G | Refills: 5 | Status: SHIPPED | OUTPATIENT
Start: 2023-03-25

## 2023-04-07 LAB
ALBUMIN (G/DL) IN SER/PLAS: 4.2 G/DL (ref 3.4–5)
ALBUMIN (MG/L) IN URINE: 32 MG/L
ALBUMIN/CREATININE (UG/MG) IN URINE: 18.8 UG/MG CRT (ref 0–30)
ANION GAP IN SER/PLAS: 12 MMOL/L (ref 10–20)
APPEARANCE, URINE: CLEAR
BILIRUBIN, URINE: NEGATIVE
BLOOD, URINE: NEGATIVE
CALCIDIOL (25 OH VITAMIN D3) (NG/ML) IN SER/PLAS: 32 NG/ML
CALCIUM (MG/DL) IN SER/PLAS: 9.8 MG/DL (ref 8.6–10.3)
CARBON DIOXIDE, TOTAL (MMOL/L) IN SER/PLAS: 28 MMOL/L (ref 21–32)
CHLORIDE (MMOL/L) IN SER/PLAS: 105 MMOL/L (ref 98–107)
COLOR, URINE: YELLOW
CREATININE (MG/DL) IN SER/PLAS: 1.2 MG/DL (ref 0.5–1.3)
CREATININE (MG/DL) IN URINE: 170 MG/DL (ref 20–370)
GFR MALE: 64 ML/MIN/1.73M2
GLUCOSE (MG/DL) IN SER/PLAS: 116 MG/DL (ref 74–99)
GLUCOSE, URINE: NEGATIVE MG/DL
KETONES, URINE: NEGATIVE MG/DL
LEUKOCYTE ESTERASE, URINE: NEGATIVE
NITRITE, URINE: NEGATIVE
PARATHYRIN INTACT (PG/ML) IN SER/PLAS: 126.8 PG/ML (ref 18.5–88)
PH, URINE: 6 (ref 5–8)
PHOSPHATE (MG/DL) IN SER/PLAS: 2.2 MG/DL (ref 2.5–4.9)
POTASSIUM (MMOL/L) IN SER/PLAS: 4.3 MMOL/L (ref 3.5–5.3)
PROTEIN, URINE: NEGATIVE MG/DL
SODIUM (MMOL/L) IN SER/PLAS: 141 MMOL/L (ref 136–145)
SPECIFIC GRAVITY, URINE: 1.02 (ref 1–1.03)
UREA NITROGEN (MG/DL) IN SER/PLAS: 19 MG/DL (ref 6–23)
UROBILINOGEN, URINE: <2 MG/DL (ref 0–1.9)

## 2023-04-12 LAB
ALBUMIN ELP: 4 G/DL (ref 3.4–5)
ALPHA 1: 0.3 G/DL (ref 0.2–0.6)
ALPHA 2: 0.7 G/DL (ref 0.4–1.1)
BETA: 0.7 G/DL (ref 0.5–1.2)
GAMMA GLOBULIN: 0.9 G/DL (ref 0.5–1.4)
PATH REVIEW-SERUM PROTEIN ELECTROPHORESIS: NORMAL
PROTEIN ELECTROPHORESIS INTERPRETATION: NORMAL
PROTEIN TOTAL: 6.5 G/DL (ref 6.4–8.2)

## 2023-05-30 ENCOUNTER — OFFICE VISIT (OUTPATIENT)
Dept: PULMONOLOGY | Age: 73
End: 2023-05-30
Payer: MEDICARE

## 2023-05-30 VITALS
OXYGEN SATURATION: 93 % | TEMPERATURE: 97.5 F | SYSTOLIC BLOOD PRESSURE: 166 MMHG | HEART RATE: 78 BPM | WEIGHT: 292 LBS | BODY MASS INDEX: 39.55 KG/M2 | HEIGHT: 72 IN | DIASTOLIC BLOOD PRESSURE: 100 MMHG

## 2023-05-30 DIAGNOSIS — G47.33 OSA (OBSTRUCTIVE SLEEP APNEA): Primary | ICD-10-CM

## 2023-05-30 DIAGNOSIS — R06.00 DYSPNEA, UNSPECIFIED TYPE: ICD-10-CM

## 2023-05-30 DIAGNOSIS — E66.9 OBESITY (BMI 30-39.9): ICD-10-CM

## 2023-05-30 PROCEDURE — 3080F DIAST BP >= 90 MM HG: CPT | Performed by: INTERNAL MEDICINE

## 2023-05-30 PROCEDURE — 1123F ACP DISCUSS/DSCN MKR DOCD: CPT | Performed by: INTERNAL MEDICINE

## 2023-05-30 PROCEDURE — 3017F COLORECTAL CA SCREEN DOC REV: CPT | Performed by: INTERNAL MEDICINE

## 2023-05-30 PROCEDURE — G8417 CALC BMI ABV UP PARAM F/U: HCPCS | Performed by: INTERNAL MEDICINE

## 2023-05-30 PROCEDURE — 3077F SYST BP >= 140 MM HG: CPT | Performed by: INTERNAL MEDICINE

## 2023-05-30 PROCEDURE — 99214 OFFICE O/P EST MOD 30 MIN: CPT | Performed by: INTERNAL MEDICINE

## 2023-05-30 PROCEDURE — G8427 DOCREV CUR MEDS BY ELIG CLIN: HCPCS | Performed by: INTERNAL MEDICINE

## 2023-05-30 PROCEDURE — 1036F TOBACCO NON-USER: CPT | Performed by: INTERNAL MEDICINE

## 2023-05-30 RX ORDER — ALBUTEROL SULFATE 90 UG/1
AEROSOL, METERED RESPIRATORY (INHALATION)
COMMUNITY
Start: 2023-03-25

## 2023-05-30 ASSESSMENT — ENCOUNTER SYMPTOMS
VOICE CHANGE: 0
RHINORRHEA: 0
VOMITING: 0
DIARRHEA: 0
NAUSEA: 0
SHORTNESS OF BREATH: 1
CHEST TIGHTNESS: 0
COUGH: 0
ABDOMINAL PAIN: 0
SORE THROAT: 0
WHEEZING: 1
EYE ITCHING: 0

## 2023-06-02 DIAGNOSIS — I48.0 PAROXYSMAL ATRIAL FIBRILLATION (HCC): ICD-10-CM

## 2023-06-02 NOTE — TELEPHONE ENCOUNTER
Pharmacy is requesting medication refill.  Please approve or deny this request.    Rx requested:  Requested Prescriptions      No prescriptions requested or ordered in this encounter         Last Office Visit:   1/17/2023      Next Visit Date:  Future Appointments   Date Time Provider Иван Schreiber   8/2/2023  9:15 AM Yobani Hernandez MD [address] [place] [address]   10/17/2023 10:15 AM DO Olive Fuller

## 2023-06-07 DIAGNOSIS — I10 BENIGN ESSENTIAL HYPERTENSION: ICD-10-CM

## 2023-06-07 DIAGNOSIS — E78.00 PURE HYPERCHOLESTEROLEMIA: ICD-10-CM

## 2023-06-07 RX ORDER — ROSUVASTATIN CALCIUM 10 MG/1
10 TABLET, COATED ORAL NIGHTLY
Qty: 90 TABLET | Refills: 3 | Status: SHIPPED | OUTPATIENT
Start: 2023-06-07 | End: 2023-06-08 | Stop reason: SDUPTHER

## 2023-06-07 RX ORDER — TELMISARTAN 40 MG/1
40 TABLET ORAL DAILY
Qty: 90 TABLET | Refills: 3 | Status: SHIPPED | OUTPATIENT
Start: 2023-06-07 | End: 2023-06-08 | Stop reason: SDUPTHER

## 2023-06-07 NOTE — TELEPHONE ENCOUNTER
Received a fax from [email or web address] requesting refill for the patient's prescription of Rosuvastatin, Telmisartan. . Medication has been pended to the provider for approval.     LV: 3/15/23  NV: 6/15/23

## 2023-06-08 DIAGNOSIS — F32.5 MAJOR DEPRESSIVE DISORDER IN FULL REMISSION, UNSPECIFIED WHETHER RECURRENT (CMS-HCC): ICD-10-CM

## 2023-06-08 DIAGNOSIS — E78.00 PURE HYPERCHOLESTEROLEMIA: Primary | ICD-10-CM

## 2023-06-08 DIAGNOSIS — I10 BENIGN ESSENTIAL HYPERTENSION: ICD-10-CM

## 2023-06-08 RX ORDER — BUPROPION HYDROCHLORIDE 300 MG/1
TABLET ORAL
Qty: 90 TABLET | Refills: 3 | Status: SHIPPED | OUTPATIENT
Start: 2023-06-08 | End: 2023-12-18

## 2023-06-08 RX ORDER — ROSUVASTATIN CALCIUM 10 MG/1
10 TABLET, COATED ORAL NIGHTLY
Qty: 90 TABLET | Refills: 3 | Status: SHIPPED | OUTPATIENT
Start: 2023-06-08 | End: 2023-10-30 | Stop reason: SDUPTHER

## 2023-06-08 RX ORDER — TELMISARTAN 40 MG/1
40 TABLET ORAL DAILY
Qty: 90 TABLET | Refills: 3 | Status: SHIPPED | OUTPATIENT
Start: 2023-06-08 | End: 2023-10-31 | Stop reason: ALTCHOICE

## 2023-06-14 NOTE — PROGRESS NOTES
Subjective   Patient ID: Anjum Hernandez is a 72 y.o. male who presents for COPD, Hypertension, Chronic Kidney Disease, and Follow-up. I last saw the patient on 3/15/2023 .    HPI   He would like you to go over his meds with him as well.    Patient is now on Xeljanz 10mg.     He c/o back pain. OTC Tylenol with some relief.     Review of Systems  Except positives as noted in the CC & HPI      Constitutional: Denies fevers, chills, night sweats, fatigue, weight changes, change in appetite    Eyes: Denies blurry vision, double vision    ENT: Denies otalgia, trouble hearing, tinnitus, vertigo, nasal congestion, rhinorrhea, sore throat    Neck: Denies swelling, masses    Cardiovascular: Denies chest pain, palpitations, edema, orthopnea, syncope    Respiratory: Denies dyspnea, cough, wheezing, postural nocturnal dyspnea    Gastrointestinal: Denies abdominal pain, nausea, vomiting, diarrhea, constipation, melena, hematochezia    Genitourinary: Denies dysuria, hematuria, frequency, urgency    Musculoskeletal: Denies neck pain, arthralgias, myalgias    Integumentary: Denies skin lesions, rashes, masses    Neurological: Denies dizziness, headaches, confusion, limb weakness, paresthesias, syncope, convulsions    Psychiatric: Denies depression, anxiety, homicidal ideations, suicidal ideations, sleep disturbances    Endocrine: Denies polyphagia, polydipsia, polyuria, weakness, hair thinning, heat intolerance, cold intolerance, weight changes    Heme/Lymph: Denies easy bruising, easy bleeding, swollen glands     Objective   /82 (BP Location: Right arm, Patient Position: Sitting)   Pulse 81   Temp 36.3 °C (97.3 °F)   Resp 14   Ht 1.829 m (6')   Wt 135 kg (297 lb 4.8 oz)   SpO2 95%   BMI 40.32 kg/m²     Physical Exam  136/82 on recheck of BP in the right arm.     General Appearance - well-developed, obese, 72 y.o., White male in no acute distress.     Skin - warm, pink and dry without rash or concerning lesions.      Mental Status - alert and oriented x 3. Normal mood and affect appropriate to mood.     Neck - supple without lymphadenopathy. Carotid pulses are normal without bruits. Thyroid is normal in midline without nodules.     Chest - lungs are clear to auscultation without rales, rhonchi or wheezes. Mildly diminished breath sounds at the bases bilaterally.     Heart - regular, rate and rhythm without murmurs, rubs or gallops.    Abdomen - soft, morbidly obese, protuberant, nontender, nondistended. No masses, hepatomegaly or splenomegaly is noted. No rebound, rigidity or guarding is noted. Bowel sounds are normoactive.    Extremities - no cyanosis, clubbing. Trace edema of the right ankle. Pedal pulses are 2+ normal at the dorsalis pedis and posterior pulses bilaterally. Right knee reveals a well-healed scar without any open areas. No erythema is noted. Moderate effusion noted.     Neurological - cranial nerves II through XII are grossly intact. Motor strength 5/5 at all fours.     Assessment/Plan   1. Benign essential hypertension  CBC and Auto Differential      2. COPD, mild (CMS/HCC)        3. Cardiac pacemaker        4. Hepatic steatosis        5. Chronic kidney disease, stage 3a  Comprehensive Metabolic Panel      6. Acquired hypothyroidism        7. Pure hypercholesterolemia  Lipid Panel      8. Hyperglycemia  Hemoglobin A1C      9. Obstructive sleep apnea, adult        10. Major depressive disorder in full remission, unspecified whether recurrent (CMS/HCC)        11. Ulcerative colitis with rectal bleeding, unspecified location (CMS/HCC)        12. Class 3 severe obesity due to excess calories with serious comorbidity and body mass index (BMI) of 40.0 to 44.9 in adult (CMS/HCC)        13. Screening for AAA (abdominal aortic aneurysm)  Vascular US abdominal aorta anuerysm AAA screening      14. Encounter for hepatitis C screening test for low risk patient        Patient to continue current medications (with any  exceptions as noted) and diet. Follow-up in 3 month(s) otherwise as needed.      Patient is to return for fasting A1c, lipid panel, CBC, CMP, Vitamin B12, Hep B Surface Antibody, Hep C, TB test labs at their convenience prior to his next appointment. Fasting is nothing to eat or drink except water or black coffee for 8-12 hours. Will call patient with results when available.     Ordered Vascular abdominal US to screen for AAA. Will call patient with results when available.     Patient plans to try and start walking more and getting more activity.     Patient is to follow up with Dr. Louie, Dr. Perry and Dr. Salcedo as scheduled.         Scribe Attestation  By signing my name below, IAriana, Rupert   attest that this documentation has been prepared under the direction and in the presence of Rian Lindsey MD.

## 2023-06-15 ENCOUNTER — OFFICE VISIT (OUTPATIENT)
Dept: PRIMARY CARE | Facility: CLINIC | Age: 73
End: 2023-06-15
Payer: MEDICARE

## 2023-06-15 VITALS
DIASTOLIC BLOOD PRESSURE: 82 MMHG | BODY MASS INDEX: 40.27 KG/M2 | HEIGHT: 72 IN | SYSTOLIC BLOOD PRESSURE: 136 MMHG | RESPIRATION RATE: 14 BRPM | HEART RATE: 81 BPM | OXYGEN SATURATION: 95 % | TEMPERATURE: 97.3 F | WEIGHT: 297.3 LBS

## 2023-06-15 DIAGNOSIS — R73.9 HYPERGLYCEMIA: ICD-10-CM

## 2023-06-15 DIAGNOSIS — K76.0 HEPATIC STEATOSIS: ICD-10-CM

## 2023-06-15 DIAGNOSIS — N18.31 CHRONIC KIDNEY DISEASE, STAGE 3A (MULTI): ICD-10-CM

## 2023-06-15 DIAGNOSIS — Z13.6 SCREENING FOR AAA (ABDOMINAL AORTIC ANEURYSM): ICD-10-CM

## 2023-06-15 DIAGNOSIS — F32.5 MAJOR DEPRESSIVE DISORDER IN FULL REMISSION, UNSPECIFIED WHETHER RECURRENT (CMS-HCC): ICD-10-CM

## 2023-06-15 DIAGNOSIS — E78.00 PURE HYPERCHOLESTEROLEMIA: ICD-10-CM

## 2023-06-15 DIAGNOSIS — K51.911 ULCERATIVE COLITIS WITH RECTAL BLEEDING, UNSPECIFIED LOCATION (MULTI): ICD-10-CM

## 2023-06-15 DIAGNOSIS — J44.9 COPD, MILD (MULTI): ICD-10-CM

## 2023-06-15 DIAGNOSIS — E03.9 ACQUIRED HYPOTHYROIDISM: ICD-10-CM

## 2023-06-15 DIAGNOSIS — G47.33 OBSTRUCTIVE SLEEP APNEA, ADULT: ICD-10-CM

## 2023-06-15 DIAGNOSIS — Z95.0 CARDIAC PACEMAKER: ICD-10-CM

## 2023-06-15 DIAGNOSIS — Z11.59 ENCOUNTER FOR HEPATITIS C SCREENING TEST FOR LOW RISK PATIENT: ICD-10-CM

## 2023-06-15 DIAGNOSIS — E66.01 CLASS 3 SEVERE OBESITY DUE TO EXCESS CALORIES WITH SERIOUS COMORBIDITY AND BODY MASS INDEX (BMI) OF 40.0 TO 44.9 IN ADULT (MULTI): ICD-10-CM

## 2023-06-15 DIAGNOSIS — I10 BENIGN ESSENTIAL HYPERTENSION: Primary | ICD-10-CM

## 2023-06-15 PROBLEM — J00 NASOPHARYNGITIS: Status: RESOLVED | Noted: 2023-02-01 | Resolved: 2023-06-15

## 2023-06-15 PROBLEM — R09.81 NASAL CONGESTION WITH RHINORRHEA: Status: RESOLVED | Noted: 2023-02-01 | Resolved: 2023-06-15

## 2023-06-15 PROBLEM — I48.0 PAROXYSMAL ATRIAL FIBRILLATION (MULTI): Status: ACTIVE | Noted: 2018-11-01

## 2023-06-15 PROBLEM — T16.2XXA FOREIGN BODY OF LEFT EAR: Status: RESOLVED | Noted: 2023-02-01 | Resolved: 2023-06-15

## 2023-06-15 PROBLEM — J34.89 NASAL CONGESTION WITH RHINORRHEA: Status: RESOLVED | Noted: 2023-02-01 | Resolved: 2023-06-15

## 2023-06-15 PROBLEM — U07.1 SEVERE ACUTE RESPIRATORY SYNDROME CORONAVIRUS 2 (SARS-COV-2) DETECTED: Status: RESOLVED | Noted: 2023-02-01 | Resolved: 2023-06-15

## 2023-06-15 PROBLEM — I44.2 COMPLETE ATRIOVENTRICULAR BLOCK (MULTI): Status: RESOLVED | Noted: 2023-02-01 | Resolved: 2023-06-15

## 2023-06-15 PROBLEM — R50.9 FEVER AND CHILLS: Status: RESOLVED | Noted: 2023-02-01 | Resolved: 2023-06-15

## 2023-06-15 PROBLEM — R07.81 RIB PAIN: Status: RESOLVED | Noted: 2023-02-01 | Resolved: 2023-06-15

## 2023-06-15 PROBLEM — J20.9 ACUTE BRONCHITIS: Status: RESOLVED | Noted: 2023-02-01 | Resolved: 2023-06-15

## 2023-06-15 PROCEDURE — 3079F DIAST BP 80-89 MM HG: CPT | Performed by: FAMILY MEDICINE

## 2023-06-15 PROCEDURE — 99214 OFFICE O/P EST MOD 30 MIN: CPT | Performed by: FAMILY MEDICINE

## 2023-06-15 PROCEDURE — 3008F BODY MASS INDEX DOCD: CPT | Performed by: FAMILY MEDICINE

## 2023-06-15 PROCEDURE — 3075F SYST BP GE 130 - 139MM HG: CPT | Performed by: FAMILY MEDICINE

## 2023-06-15 PROCEDURE — 1036F TOBACCO NON-USER: CPT | Performed by: FAMILY MEDICINE

## 2023-06-15 PROCEDURE — 1159F MED LIST DOCD IN RCRD: CPT | Performed by: FAMILY MEDICINE

## 2023-06-15 ASSESSMENT — LIFESTYLE VARIABLES
AUDIT-C TOTAL SCORE: 1
HOW MANY STANDARD DRINKS CONTAINING ALCOHOL DO YOU HAVE ON A TYPICAL DAY: 1 OR 2
HOW OFTEN DO YOU HAVE SIX OR MORE DRINKS ON ONE OCCASION: NEVER
SKIP TO QUESTIONS 9-10: 1
HOW OFTEN DO YOU HAVE A DRINK CONTAINING ALCOHOL: MONTHLY OR LESS

## 2023-06-15 ASSESSMENT — ENCOUNTER SYMPTOMS
DEPRESSION: 0
OCCASIONAL FEELINGS OF UNSTEADINESS: 0
LOSS OF SENSATION IN FEET: 0

## 2023-06-15 NOTE — PATIENT INSTRUCTIONS
Patient to continue current medications (with any exceptions as noted) and diet. Follow-up in 3 month(s) otherwise as needed.      Patient is to return for fasting A1c, lipid panel, CBC, CMP, Vitamin B12, Hep B Surface Antibody, Hep C labs at their convenience prior to his next appointment. Fasting is nothing to eat or drink except water or black coffee for 8-12 hours. Will call patient with results when available.     Ordered Vascular abdominal US to screen for AAA. Will call patient with results when available.     Patient plans to try and start walking more and getting more activity.     Patient is to follow up with Dr. Louie, Dr. Perry and Dr. Salcedo as scheduled.

## 2023-06-19 ENCOUNTER — LAB (OUTPATIENT)
Dept: LAB | Facility: LAB | Age: 73
End: 2023-06-19
Payer: MEDICARE

## 2023-06-19 DIAGNOSIS — K51.911 ULCERATIVE COLITIS WITH RECTAL BLEEDING, UNSPECIFIED LOCATION (MULTI): ICD-10-CM

## 2023-06-19 DIAGNOSIS — N18.31 CHRONIC KIDNEY DISEASE, STAGE 3A (MULTI): ICD-10-CM

## 2023-06-19 DIAGNOSIS — Z11.59 ENCOUNTER FOR HEPATITIS C SCREENING TEST FOR LOW RISK PATIENT: ICD-10-CM

## 2023-06-19 DIAGNOSIS — R73.9 HYPERGLYCEMIA: ICD-10-CM

## 2023-06-19 DIAGNOSIS — I10 BENIGN ESSENTIAL HYPERTENSION: ICD-10-CM

## 2023-06-19 DIAGNOSIS — E78.00 PURE HYPERCHOLESTEROLEMIA: ICD-10-CM

## 2023-06-19 LAB
ALANINE AMINOTRANSFERASE (SGPT) (U/L) IN SER/PLAS: 34 U/L (ref 10–52)
ALBUMIN (G/DL) IN SER/PLAS: 4.4 G/DL (ref 3.4–5)
ALKALINE PHOSPHATASE (U/L) IN SER/PLAS: 66 U/L (ref 33–136)
ANION GAP IN SER/PLAS: 11 MMOL/L (ref 10–20)
ASPARTATE AMINOTRANSFERASE (SGOT) (U/L) IN SER/PLAS: 27 U/L (ref 9–39)
BASOPHILS (10*3/UL) IN BLOOD BY AUTOMATED COUNT: 0.04 X10E9/L (ref 0–0.1)
BASOPHILS/100 LEUKOCYTES IN BLOOD BY AUTOMATED COUNT: 0.5 % (ref 0–2)
BILIRUBIN TOTAL (MG/DL) IN SER/PLAS: 0.8 MG/DL (ref 0–1.2)
CALCIUM (MG/DL) IN SER/PLAS: 9.8 MG/DL (ref 8.6–10.3)
CARBON DIOXIDE, TOTAL (MMOL/L) IN SER/PLAS: 30 MMOL/L (ref 21–32)
CHLORIDE (MMOL/L) IN SER/PLAS: 105 MMOL/L (ref 98–107)
CHOLESTEROL (MG/DL) IN SER/PLAS: 138 MG/DL (ref 0–199)
CHOLESTEROL IN HDL (MG/DL) IN SER/PLAS: 49.5 MG/DL
CHOLESTEROL/HDL RATIO: 2.8
COBALAMIN (VITAMIN B12) (PG/ML) IN SER/PLAS: 238 PG/ML (ref 211–911)
CREATININE (MG/DL) IN SER/PLAS: 1.29 MG/DL (ref 0.5–1.3)
EOSINOPHILS (10*3/UL) IN BLOOD BY AUTOMATED COUNT: 0.08 X10E9/L (ref 0–0.4)
EOSINOPHILS/100 LEUKOCYTES IN BLOOD BY AUTOMATED COUNT: 0.9 % (ref 0–6)
ERYTHROCYTE DISTRIBUTION WIDTH (RATIO) BY AUTOMATED COUNT: 16.4 % (ref 11.5–14.5)
ERYTHROCYTE MEAN CORPUSCULAR HEMOGLOBIN CONCENTRATION (G/DL) BY AUTOMATED: 30.8 G/DL (ref 32–36)
ERYTHROCYTE MEAN CORPUSCULAR VOLUME (FL) BY AUTOMATED COUNT: 87 FL (ref 80–100)
ERYTHROCYTES (10*6/UL) IN BLOOD BY AUTOMATED COUNT: 5.22 X10E12/L (ref 4.5–5.9)
ESTIMATED AVERAGE GLUCOSE FOR HBA1C: 143 MG/DL
GFR MALE: 59 ML/MIN/1.73M2
GLUCOSE (MG/DL) IN SER/PLAS: 109 MG/DL (ref 74–99)
HEMATOCRIT (%) IN BLOOD BY AUTOMATED COUNT: 45.5 % (ref 41–52)
HEMOGLOBIN (G/DL) IN BLOOD: 14 G/DL (ref 13.5–17.5)
HEMOGLOBIN A1C/HEMOGLOBIN TOTAL IN BLOOD: 6.6 %
HEPATITIS B VIRUS SURFACE AB (MIU/ML) IN SERUM: <3.1 MIU/ML
HEPATITIS C VIRUS AB PRESENCE IN SERUM: NONREACTIVE
IMMATURE GRANULOCYTES/100 LEUKOCYTES IN BLOOD BY AUTOMATED COUNT: 0.5 % (ref 0–0.9)
LDL: 58 MG/DL (ref 0–99)
LEUKOCYTES (10*3/UL) IN BLOOD BY AUTOMATED COUNT: 8.5 X10E9/L (ref 4.4–11.3)
LYMPHOCYTES (10*3/UL) IN BLOOD BY AUTOMATED COUNT: 1.01 X10E9/L (ref 0.8–3)
LYMPHOCYTES/100 LEUKOCYTES IN BLOOD BY AUTOMATED COUNT: 11.9 % (ref 13–44)
MONOCYTES (10*3/UL) IN BLOOD BY AUTOMATED COUNT: 0.69 X10E9/L (ref 0.05–0.8)
MONOCYTES/100 LEUKOCYTES IN BLOOD BY AUTOMATED COUNT: 8.1 % (ref 2–10)
NEUTROPHILS (10*3/UL) IN BLOOD BY AUTOMATED COUNT: 6.62 X10E9/L (ref 1.6–5.5)
NEUTROPHILS/100 LEUKOCYTES IN BLOOD BY AUTOMATED COUNT: 78.1 % (ref 40–80)
PLATELETS (10*3/UL) IN BLOOD AUTOMATED COUNT: 190 X10E9/L (ref 150–450)
POTASSIUM (MMOL/L) IN SER/PLAS: 4.4 MMOL/L (ref 3.5–5.3)
PROTEIN TOTAL: 6.8 G/DL (ref 6.4–8.2)
SODIUM (MMOL/L) IN SER/PLAS: 142 MMOL/L (ref 136–145)
TRIGLYCERIDE (MG/DL) IN SER/PLAS: 151 MG/DL (ref 0–149)
UREA NITROGEN (MG/DL) IN SER/PLAS: 17 MG/DL (ref 6–23)
VLDL: 30 MG/DL (ref 0–40)

## 2023-06-19 PROCEDURE — 80061 LIPID PANEL: CPT

## 2023-06-19 PROCEDURE — 86803 HEPATITIS C AB TEST: CPT

## 2023-06-19 PROCEDURE — 80053 COMPREHEN METABOLIC PANEL: CPT

## 2023-06-19 PROCEDURE — 85025 COMPLETE CBC W/AUTO DIFF WBC: CPT

## 2023-06-19 PROCEDURE — 86706 HEP B SURFACE ANTIBODY: CPT

## 2023-06-19 PROCEDURE — 82607 VITAMIN B-12: CPT

## 2023-06-19 PROCEDURE — 36415 COLL VENOUS BLD VENIPUNCTURE: CPT

## 2023-06-19 PROCEDURE — 86481 TB AG RESPONSE T-CELL SUSP: CPT

## 2023-06-19 PROCEDURE — 83036 HEMOGLOBIN GLYCOSYLATED A1C: CPT

## 2023-06-22 LAB
NIL(NEG) CONTROL SPOT COUNT: NORMAL
PANEL A SPOT COUNT: 0
PANEL B SPOT COUNT: 4
POS CONTROL SPOT COUNT: NORMAL
T-SPOT. TB INTERPRETATION: NEGATIVE

## 2023-06-25 NOTE — RESULT ENCOUNTER NOTE
Please call the patient regarding his abnormal result.  Hemoglobin A1c is elevated into diabetic range at 6.6.  Patient needs to follow a low sugar/low carbohydrate diet.  Blood sugar is 109.  GFR is stable at 59.  T.Chol 138, LDL 58, HDL 50, . Follow low cholesterol, low fat diet and exercise as tolerated.  CBC is in excellent range.  Tuberculosis test is negative.  Hepatitis C and B testing are negative.  Vitamin B12 level is in low normal range at 238.  Recommend patient take vitamin B12 1000 mcg p.o. daily.

## 2023-08-02 ENCOUNTER — OFFICE VISIT (OUTPATIENT)
Dept: PULMONOLOGY | Age: 73
End: 2023-08-02
Payer: MEDICARE

## 2023-08-02 VITALS
TEMPERATURE: 97.3 F | OXYGEN SATURATION: 95 % | DIASTOLIC BLOOD PRESSURE: 82 MMHG | SYSTOLIC BLOOD PRESSURE: 136 MMHG | HEART RATE: 64 BPM | BODY MASS INDEX: 38.25 KG/M2 | WEIGHT: 282 LBS

## 2023-08-02 DIAGNOSIS — E66.9 OBESITY (BMI 30-39.9): ICD-10-CM

## 2023-08-02 DIAGNOSIS — G47.33 OSA (OBSTRUCTIVE SLEEP APNEA): Primary | ICD-10-CM

## 2023-08-02 DIAGNOSIS — F32.A DEPRESSION, UNSPECIFIED DEPRESSION TYPE: ICD-10-CM

## 2023-08-02 DIAGNOSIS — R06.02 SHORTNESS OF BREATH: ICD-10-CM

## 2023-08-02 PROCEDURE — 1036F TOBACCO NON-USER: CPT | Performed by: INTERNAL MEDICINE

## 2023-08-02 PROCEDURE — 1123F ACP DISCUSS/DSCN MKR DOCD: CPT | Performed by: INTERNAL MEDICINE

## 2023-08-02 PROCEDURE — G8417 CALC BMI ABV UP PARAM F/U: HCPCS | Performed by: INTERNAL MEDICINE

## 2023-08-02 PROCEDURE — 3074F SYST BP LT 130 MM HG: CPT | Performed by: INTERNAL MEDICINE

## 2023-08-02 PROCEDURE — 99214 OFFICE O/P EST MOD 30 MIN: CPT | Performed by: INTERNAL MEDICINE

## 2023-08-02 PROCEDURE — G8427 DOCREV CUR MEDS BY ELIG CLIN: HCPCS | Performed by: INTERNAL MEDICINE

## 2023-08-02 PROCEDURE — 3078F DIAST BP <80 MM HG: CPT | Performed by: INTERNAL MEDICINE

## 2023-08-02 PROCEDURE — 3017F COLORECTAL CA SCREEN DOC REV: CPT | Performed by: INTERNAL MEDICINE

## 2023-08-02 ASSESSMENT — ENCOUNTER SYMPTOMS
EYE ITCHING: 0
VOMITING: 0
VOICE CHANGE: 0
NAUSEA: 0
DIARRHEA: 0
CHEST TIGHTNESS: 0
ABDOMINAL PAIN: 0
RHINORRHEA: 0
SORE THROAT: 0
WHEEZING: 0
COUGH: 0
SHORTNESS OF BREATH: 0

## 2023-08-15 ENCOUNTER — NURSING HOME VISIT (OUTPATIENT)
Dept: POST ACUTE CARE | Facility: EXTERNAL LOCATION | Age: 73
End: 2023-08-15
Payer: MEDICARE

## 2023-08-15 VITALS — DIASTOLIC BLOOD PRESSURE: 78 MMHG | HEART RATE: 77 BPM | SYSTOLIC BLOOD PRESSURE: 134 MMHG

## 2023-08-15 DIAGNOSIS — F33.1 MODERATE EPISODE OF RECURRENT MAJOR DEPRESSIVE DISORDER (MULTI): ICD-10-CM

## 2023-08-15 DIAGNOSIS — E03.9 ACQUIRED HYPOTHYROIDISM: ICD-10-CM

## 2023-08-15 DIAGNOSIS — Z95.0 CARDIAC PACEMAKER: ICD-10-CM

## 2023-08-15 DIAGNOSIS — I48.0 PAROXYSMAL ATRIAL FIBRILLATION (MULTI): ICD-10-CM

## 2023-08-15 DIAGNOSIS — I10 BENIGN ESSENTIAL HYPERTENSION: ICD-10-CM

## 2023-08-15 DIAGNOSIS — N30.90 CYSTITIS: Primary | ICD-10-CM

## 2023-08-15 DIAGNOSIS — K51.919 ULCERATIVE COLITIS WITH COMPLICATION, UNSPECIFIED LOCATION (MULTI): ICD-10-CM

## 2023-08-15 PROCEDURE — 99306 1ST NF CARE HIGH MDM 50: CPT | Performed by: INTERNAL MEDICINE

## 2023-08-15 ASSESSMENT — ENCOUNTER SYMPTOMS
WEAKNESS: 1
NUMBNESS: 1
CARDIOVASCULAR NEGATIVE: 1
DIZZINESS: 0
EYES NEGATIVE: 1
HEMATURIA: 0
BRUISES/BLEEDS EASILY: 0
FATIGUE: 0
ABDOMINAL PAIN: 0
ABDOMINAL DISTENTION: 0
ACTIVITY CHANGE: 1
PSYCHIATRIC NEGATIVE: 1
RESPIRATORY NEGATIVE: 1
APPETITE CHANGE: 0
DIFFICULTY URINATING: 0
GASTROINTESTINAL NEGATIVE: 1
HEADACHES: 0
VOMITING: 0
ARTHRALGIAS: 1
WOUND: 0

## 2023-08-15 NOTE — LETTER
Patient: Anjum Hernandez  : 1950    Encounter Date: 08/15/2023    Subjective  Patient ID: Anjum Hernandez is a 73 y.o. male who is acute skilled care and presents for initial visit for skilled nursing.    73-year-old male patient known to me from previous admission with a knee replacement has been admitted after having weakness and inability to ambulate and patient was found to have a E. coli cystitis.  During hospitalization they did some imaging and there was some prominence seen in the urinary bladder so it was not very much clear it was a cystitis or urothelial neoplasm, cystoscopy was not attempted.  During hospitalization patient was stable more or less throughout, previously patient has sick sinus syndrome, atrial fibrillation, pacemaker implant.  Patient's kidney functions were not out of ordinary, patient has a history of inflammatory bowel disease the nature and severity is unclear.  When I saw this patient he was sitting in the wheelchair, he has a difficulty getting up being transferred.  All other reports and hospitalization data were reviewed, there was no significant leukocytosis, it is a simply a matter of unable to sustain mobility and ADLs patient is admitted here for skilled nursing and rehabilitation.  Overnight has been uneventful, patient has history of depression and anxiety, patient has history of hypothyroidism and lung disease of uncertain severity.         Review of Systems   Constitutional:  Positive for activity change. Negative for appetite change and fatigue.   Eyes: Negative.  Negative for visual disturbance.   Respiratory: Negative.     Cardiovascular: Negative.  Negative for leg swelling.   Gastrointestinal: Negative.  Negative for abdominal distention, abdominal pain and vomiting.   Genitourinary:  Negative for difficulty urinating and hematuria.   Musculoskeletal:  Positive for arthralgias and gait problem.   Skin: Negative.  Negative for wound.   Neurological:  Positive  for weakness and numbness. Negative for dizziness and headaches.   Hematological:  Does not bruise/bleed easily.   Psychiatric/Behavioral: Negative.         Objective  /78   Pulse 77     Physical Exam  Constitutional:       Appearance: Normal appearance. He is obese.   HENT:      Head: Normocephalic.      Nose: Nose normal.   Eyes:      Conjunctiva/sclera: Conjunctivae normal.   Cardiovascular:      Rate and Rhythm: Normal rate and regular rhythm.      Pulses: Normal pulses.      Heart sounds: Normal heart sounds.   Pulmonary:      Effort: Pulmonary effort is normal.      Breath sounds: Normal breath sounds.   Abdominal:      Palpations: Abdomen is soft.   Musculoskeletal:         General: Normal range of motion.      Cervical back: Neck supple.   Skin:     General: Skin is warm and dry.   Neurological:      Mental Status: He is oriented to person, place, and time. Mental status is at baseline.   Psychiatric:         Mood and Affect: Mood normal.         Assessment/Plan  Problem List Items Addressed This Visit       Acquired hypothyroidism    Benign essential hypertension    Cardiac pacemaker    Moderate episode of recurrent major depressive disorder (CMS/HCC)    Ulcerative colitis (CMS/HCC)    Paroxysmal atrial fibrillation (CMS/HCC)     Other Visit Diagnoses       Cystitis    -  Primary        Patient has a cystitis from E. coli, Bactrim DS will be given for 7 more days.  Otherwise patient is listed to be on breastmilk, metoprolol, mirtazapine 45 mg, Xarelto, rosuvastatin, levothyroxine, telmisartan, Wellbutrin 300 mg, Ventolin inhaler and vitamin D3 cholecalciferol.  Patient is looking well, Bactrim will cure the E. coli cystitis.  That abnormal area in the bladder needs to be assessed by urology with cystoscopy evaluation later on.  Physical therapy, Occupational Therapy evaluation are in process, routine care precautions and other safety precautions and fall precautions has to be taken as per our  routine.  Patient understand his limitations.  Pacemaker has been checked, anticoagulation will be continued, patient is expected to be here in the facility for short duration of time, previously he used to be on gel nurse, there is no biological product has been given to him.  Discussed with patient and nursing staff, care plan was reviewed, low risk for hospitalization and short-term stay is expected here at the facility.     Goals    None           Electronically Signed By: Damon Colon MD   8/15/23  9:58 PM

## 2023-08-16 ENCOUNTER — NURSING HOME VISIT (OUTPATIENT)
Dept: POST ACUTE CARE | Facility: EXTERNAL LOCATION | Age: 73
End: 2023-08-16
Payer: MEDICARE

## 2023-08-16 DIAGNOSIS — I48.0 PAROXYSMAL ATRIAL FIBRILLATION (MULTI): ICD-10-CM

## 2023-08-16 DIAGNOSIS — I10 BENIGN ESSENTIAL HYPERTENSION: Primary | ICD-10-CM

## 2023-08-16 DIAGNOSIS — E03.9 ACQUIRED HYPOTHYROIDISM: ICD-10-CM

## 2023-08-16 DIAGNOSIS — Z95.0 CARDIAC PACEMAKER: ICD-10-CM

## 2023-08-16 DIAGNOSIS — F33.1 MODERATE EPISODE OF RECURRENT MAJOR DEPRESSIVE DISORDER (MULTI): ICD-10-CM

## 2023-08-16 DIAGNOSIS — K51.919 ULCERATIVE COLITIS WITH COMPLICATION, UNSPECIFIED LOCATION (MULTI): ICD-10-CM

## 2023-08-16 PROCEDURE — 99308 SBSQ NF CARE LOW MDM 20: CPT | Performed by: INTERNAL MEDICINE

## 2023-08-16 NOTE — PROGRESS NOTES
Subjective   Patient ID: Anjum Hernandez is a 73 y.o. male who is acute skilled care and presents for initial visit for skilled nursing.    73-year-old male patient known to me from previous admission with a knee replacement has been admitted after having weakness and inability to ambulate and patient was found to have a E. coli cystitis.  During hospitalization they did some imaging and there was some prominence seen in the urinary bladder so it was not very much clear it was a cystitis or urothelial neoplasm, cystoscopy was not attempted.  During hospitalization patient was stable more or less throughout, previously patient has sick sinus syndrome, atrial fibrillation, pacemaker implant.  Patient's kidney functions were not out of ordinary, patient has a history of inflammatory bowel disease the nature and severity is unclear.  When I saw this patient he was sitting in the wheelchair, he has a difficulty getting up being transferred.  All other reports and hospitalization data were reviewed, there was no significant leukocytosis, it is a simply a matter of unable to sustain mobility and ADLs patient is admitted here for skilled nursing and rehabilitation.  Overnight has been uneventful, patient has history of depression and anxiety, patient has history of hypothyroidism and lung disease of uncertain severity.         Review of Systems   Constitutional:  Positive for activity change. Negative for appetite change and fatigue.   Eyes: Negative.  Negative for visual disturbance.   Respiratory: Negative.     Cardiovascular: Negative.  Negative for leg swelling.   Gastrointestinal: Negative.  Negative for abdominal distention, abdominal pain and vomiting.   Genitourinary:  Negative for difficulty urinating and hematuria.   Musculoskeletal:  Positive for arthralgias and gait problem.   Skin: Negative.  Negative for wound.   Neurological:  Positive for weakness and numbness. Negative for dizziness and headaches.    Hematological:  Does not bruise/bleed easily.   Psychiatric/Behavioral: Negative.         Objective   /78   Pulse 77     Physical Exam  Constitutional:       Appearance: Normal appearance. He is obese.   HENT:      Head: Normocephalic.      Nose: Nose normal.   Eyes:      Conjunctiva/sclera: Conjunctivae normal.   Cardiovascular:      Rate and Rhythm: Normal rate and regular rhythm.      Pulses: Normal pulses.      Heart sounds: Normal heart sounds.   Pulmonary:      Effort: Pulmonary effort is normal.      Breath sounds: Normal breath sounds.   Abdominal:      Palpations: Abdomen is soft.   Musculoskeletal:         General: Normal range of motion.      Cervical back: Neck supple.   Skin:     General: Skin is warm and dry.   Neurological:      Mental Status: He is oriented to person, place, and time. Mental status is at baseline.   Psychiatric:         Mood and Affect: Mood normal.         Assessment/Plan   Problem List Items Addressed This Visit       Acquired hypothyroidism    Benign essential hypertension    Cardiac pacemaker    Moderate episode of recurrent major depressive disorder (CMS/HCC)    Ulcerative colitis (CMS/HCC)    Paroxysmal atrial fibrillation (CMS/HCC)     Other Visit Diagnoses       Cystitis    -  Primary        Patient has a cystitis from E. coli, Bactrim DS will be given for 7 more days.  Otherwise patient is listed to be on bactrim DS, metoprolol, mirtazapine 45 mg, Xarelto, rosuvastatin, levothyroxine, telmisartan, Wellbutrin 300 mg, Ventolin inhaler and vitamin D3 cholecalciferol.  Patient is looking well, Bactrim will cure the E. coli cystitis.  That abnormal area in the bladder needs to be assessed by urology with cystoscopy evaluation later on.  Physical therapy, Occupational Therapy evaluation are in process, routine care precautions and other safety precautions and fall precautions has to be taken as per our routine.  Patient understand his limitations.  Pacemaker has been  checked, anticoagulation will be continued, patient is expected to be here in the facility for short duration of time, previously he used to be on gel nurse, there is no biological product has been given to him.  Discussed with patient and nursing staff, care plan was reviewed, low risk for hospitalization and short-term stay is expected here at the facility.     Goals    None

## 2023-08-16 NOTE — LETTER
Patient: Anjum Hernandez  : 1950    Encounter Date: 2023    Subjective  Patient ID: Anjum Hernandez is a 73 y.o. male who is acute skilled care being seen and evaluated for multiple medical problems.    Patient was reassessed again after initial evaluation by me.  Overnight has been uneventful, it was a E. coli cystitis, sulfa antibiotics has been continued.  Today he was much more awake and alert and he is more mobile and confident.  No new events overnight, baseline medical problems are reviewed, physical therapy assessment was reviewed, patient has history of atrial fibrillation, ulcerative colitis so there is a question about the xeljanz treatment, it is a chronic biological treatment he gets to keep his colitis in remission, there is no urinary symptoms, there is no evidence of any frequency of micturition.         Review of Systems   Constitutional: Negative.    Respiratory: Negative.     Cardiovascular: Negative.    Gastrointestinal: Negative.    Musculoskeletal:  Positive for arthralgias. Negative for back pain.   Skin: Negative.    Neurological:  Negative for dizziness.   Psychiatric/Behavioral:  Negative for agitation and confusion.        Objective  /82   Pulse 69   Wt 127 kg (280 lb)   BMI 37.97 kg/m²     Physical Exam  Constitutional:       Appearance: Normal appearance. He is obese.   HENT:      Head: Normocephalic.      Nose: Nose normal.   Eyes:      Conjunctiva/sclera: Conjunctivae normal.   Cardiovascular:      Rate and Rhythm: Normal rate and regular rhythm.      Pulses: Normal pulses.      Heart sounds: Normal heart sounds.   Pulmonary:      Effort: Pulmonary effort is normal.      Breath sounds: Normal breath sounds.   Abdominal:      Palpations: Abdomen is soft.   Musculoskeletal:         General: Normal range of motion.      Cervical back: Neck supple.   Skin:     General: Skin is warm and dry.   Neurological:      Mental Status: He is oriented to person,  place  Assessment/Plan  Problem List Items Addressed This Visit       Acquired hypothyroidism    Benign essential hypertension - Primary    Cardiac pacemaker    Moderate episode of recurrent major depressive disorder (CMS/HCC)    Ulcerative colitis (CMS/HCC)    Paroxysmal atrial fibrillation (CMS/HCC)   Overnight BP readings are reviewed, medications are reviewed, patient is in a sinus rhythm, patient has a paced rhythm, xeljanz will be started, rest of medications are reviewed, further follow-up will be done by nurse practitioner.  Laboratories will be followed.  Short-term stay is expected here at the facility.  Discussed with nursing staff.     Goals    None           Electronically Signed By: Damon Colon MD   8/22/23  8:36 PM

## 2023-08-17 ENCOUNTER — NURSING HOME VISIT (OUTPATIENT)
Dept: POST ACUTE CARE | Facility: EXTERNAL LOCATION | Age: 73
End: 2023-08-17
Payer: MEDICARE

## 2023-08-17 DIAGNOSIS — Z95.0 CARDIAC PACEMAKER: ICD-10-CM

## 2023-08-17 DIAGNOSIS — G47.33 OBSTRUCTIVE SLEEP APNEA, ADULT: ICD-10-CM

## 2023-08-17 DIAGNOSIS — R53.81 PHYSICAL DECONDITIONING: ICD-10-CM

## 2023-08-17 DIAGNOSIS — F41.9 ANXIETY: ICD-10-CM

## 2023-08-17 DIAGNOSIS — F32.5 MAJOR DEPRESSIVE DISORDER IN FULL REMISSION, UNSPECIFIED WHETHER RECURRENT (CMS-HCC): ICD-10-CM

## 2023-08-17 DIAGNOSIS — N18.31 CHRONIC KIDNEY DISEASE, STAGE 3A (MULTI): ICD-10-CM

## 2023-08-17 DIAGNOSIS — J44.9 COPD, MILD (MULTI): ICD-10-CM

## 2023-08-17 DIAGNOSIS — E66.01 CLASS 3 SEVERE OBESITY DUE TO EXCESS CALORIES WITH SERIOUS COMORBIDITY AND BODY MASS INDEX (BMI) OF 40.0 TO 44.9 IN ADULT (MULTI): ICD-10-CM

## 2023-08-17 DIAGNOSIS — I10 BENIGN ESSENTIAL HYPERTENSION: ICD-10-CM

## 2023-08-17 DIAGNOSIS — N30.00 ACUTE CYSTITIS WITHOUT HEMATURIA: Primary | ICD-10-CM

## 2023-08-17 DIAGNOSIS — I48.0 PAROXYSMAL ATRIAL FIBRILLATION (MULTI): ICD-10-CM

## 2023-08-17 PROCEDURE — 99310 SBSQ NF CARE HIGH MDM 45: CPT | Performed by: NURSE PRACTITIONER

## 2023-08-17 NOTE — LETTER
Patient: Anjum Hernandez  : 1950    Encounter Date: 2023    Name: Anjum Hernandez  YOB: 1950    INITIAL NP FOLLOW UP VISIT: SNF, UTI, AARON on CKD2    Anjum Hernandez is a 73 y.o. male who is here at [facility] after a hospitalization at  from  - 23. Patient presented to ER w/weakness and brought in by EMS. Patient lives alone and was unable to get out of bed. The weakness began 24 hours prior. Patient was found to have UTI and positive for E. Coli. He was treated w/IVAB and IV fluids for acute kidney injury. He was switched to Cipro prior to discharge. Renal ultrasound suspicious for urothelial neoplasm.  Urology reviewed imaging and patient is planned to follow-up with urology in 2 to 3 weeks for a's possible cystoscopy as well as recommending CT urogram for further assessment.  Patient has a PMH of HTN, CKD2, COPD, crohn's, Afib, hypothyroid, sleep apnea with CPAP/BIPAP, Hypertensive heart disease w/out heart failure, and obesity. PSH of right total knee replacement, permanent pacemaker, cholecystectomy, and right wrist surgery. Patient does not smoke or drink alcohol.   At this time, patient is sitting up in his recliner chair in his room and appears comfortable. He is sleeping soundly. Patient awakens and seems disoriented. After my assessment, I did assist the patient w/his CPAP  and explained that during any time he is napping he should be wearing. Patient had no complaints and only stating he was tired today from therapy. He denies CP, SOB, Abd pain, or bladder issues. He did state he has not had a BM in a couple days and felt nauseated earlier today.          REVIEW OF SYSTEMS:  Review of systems are negative except where noted in HPI.    /67   Pulse 68   Temp 36.6 °C (97.8 °F)   Resp 17   Ht 1.829 m (6')   Wt 128 kg (281 lb 1.6 oz)   BMI 38.12 kg/m²      Physical Exam  Constitutional:       Appearance: Normal appearance. He is obese.    HENT:      Head: Normocephalic.      Right Ear: External ear normal.      Left Ear: External ear normal.      Nose: Nose normal.      Mouth/Throat:      Mouth: Mucous membranes are moist.   Eyes:      Extraocular Movements: Extraocular movements intact.      Conjunctiva/sclera: Conjunctivae normal.      Pupils: Pupils are equal, round, and reactive to light.   Cardiovascular:      Rate and Rhythm: Normal rate and regular rhythm.      Pulses: Normal pulses.      Heart sounds: Normal heart sounds.   Pulmonary:      Effort: Pulmonary effort is normal.      Breath sounds: Normal breath sounds.   Abdominal:      General: Bowel sounds are normal. There is no distension.      Palpations: Abdomen is soft.      Tenderness: There is no abdominal tenderness.      Comments: Round large abdomen   Genitourinary:     Comments: Not examined  Musculoskeletal:         General: Normal range of motion.      Cervical back: Normal range of motion and neck supple.      Comments: Generalized weakness   Skin:     General: Skin is warm and dry.      Capillary Refill: Capillary refill takes less than 2 seconds.   Neurological:      General: No focal deficit present.      Mental Status: He is alert. Mental status is at baseline.      Comments: Oriented x2, forgetful of date   Psychiatric:         Mood and Affect: Mood normal.         Behavior: Behavior normal.         Thought Content: Thought content normal.         Judgment: Judgment normal.          Living will related issues reviewed-CODE STATUS: Full code    DISCHARGE PLANNING: no date at this time  Discharge Home when goals are met.   Follow up with PCP in 1-2 weeks after discharge from facility.   [facility] to follow after discharge for continued PT/OT and Nursing.    RECENT HOSPITALIZATION DATES: 8/9 - 8/14/23  All laboratories, diagnostic tests, progress notes, and consultation notes reviewed from recent hospitalization.     LABORATORIES OR DIAGNOSTIC TESTS DONE/REVIEWED IN FACILITY:  CBC  and Differential  REPORTED 08/17/2023 12:54  White Blood Cell Count   8.05 k/uL 3.70-11.00    RBC   4.59 m/uL 4.20-6.00    Hemoglobin L 12.6 g/dL 13.0-17.0    Hematocrit   40.6 % 39.0-51.0    MCV   88.5 fL 80.0-100.0    MCH   27.5 pg 26.0-34.0    MCHC   31.0 g/dL 30.5-36.0    RDW-CV H 16.3 % 11.5-15.0    Platelet Count   193 k/uL 150-400    MPV   10.6 fL 9.0-12.7    Neut%   85.7 %    Abs Neut   6.89 k/uL 1.45-7.50    Lymph%   6.2 %    Abs Lymph L 0.50 k/uL 1.00-4.00    Mono%   4.7 %    Abs Mono   0.38 k/uL <0.87    Eosin%   2.1 %    Abs Eosin   0.17 k/uL <0.46    Baso%   0.1 %    Abs Baso   <0.03 k/uL <0.11    Immature Gran %%   1.2 %    Abs Immature Gran H 0.10 k/uL <0.10    Absolute nRBC   0.0 /100 WBC    Absolute nRBC   <0.01 k/uL <0.01    Diff Type   Auto               Comp Metabolic Panel  REPORTED 08/17/2023 13:21  Protein, Total   6.3 g/dL 6.3-8.0    Albumin L 3.3 g/dL 3.9-4.9    Calcium, Total   9.0 mg/dL 8.5-10.2    Bilirubin, Total   0.4 mg/dL 0.2-1.3    Alkaline Phosphatase H 130 U/L     AST H 71 U/L 14-40    ALT H 97 U/L 10-54    Glucose   84 mg/dL 74-99    BUN   23 mg/dL 9-24    Creatinine H 1.43 mg/dL 0.73-1.22    Sodium L 135 mmol/L 136-144    Potassium   4.2 mmol/L 3.7-5.1    Chloride   97 mmol/L     CO2   26 mmol/L 22-30    Anion Gap   12 mmol/L 9-18    Estimated Glomerular Filtration Rate L 52 mL/min/1.73 meters squared >=60      MEDICATIONS REVIEWED AT THE FACILITY:  Tylenol 650 mg every 6 hours as needed  Atorvastatin 20 mg daily  Bactrim 800-160 mg twice daily for 7 days - last day 8/22  Breztri aerosphere inhalation aerosol 160-9-4.8 mcg/act 2 puffs bid   Culturelle daily til 8/27  Colace 100 mg bid  Losartan 50 mg daily  Metoprolol tartrate 25 mg BID  Mirtazapine 45 mg daily  Rivaroxaban 20 mg daily  Synthroid 25 mcg daily  Ventolin 2 puffs Q6hr PRN  Vit D3 25 mcg 2 tabs daily  Wellbutrin 300 mg daily  Xeljanz 10 mg BID  Zinc 50 mg daily    Assessment/Plan   Problem List Items  Addressed This Visit       Anxiety    Benign essential hypertension    Chronic kidney disease, stage 3a (CMS/[facility])    Cardiac pacemaker    COPD, mild (CMS/[facility])    Major depressive disorder in full remission (CMS/[facility])    Class 3 severe obesity due to excess calories with serious comorbidity and body mass index (BMI) of 40.0 to 44.9 in adult (CMS/[facility])    Obstructive sleep apnea, adult    Paroxysmal atrial fibrillation (CMS/[facility])    UTI (urinary tract infection) - Primary    Physical deconditioning       Skin Integrity:  Nursing to monitor skin integrity as patient is at risk for pressure injuries.    PLAN:   Recent nursing evaluation and notes were reviewed.   Overall, patient is stable despite his/her chronic conditions.    #UTI (ecoli), Weakness and physical deconditioning: antibiotics Bactrim til 8/22, PT/OT/ST to maximize strength, function, and endurance all while maintaining safety. Abnormal Renal US - suspect urothelial neoplasm, f/up Urology in 2-3 weeks for possible cysto and CT urogram. ?Urologist? No note found in hospital or urologist. Will need to f/up on.  #HTN/CKD2: Losartan, BP readings to be followed and labs weekly. Avoid nephrotoxic drugs  #COPD, sleep apnea: cont home meds and CPAP, monitor resp status  #Crohn's disease: cont Xeljanz  #Depression/anxiety: cont home meds - mirtazapine and Wellbutrin  #Hypothyroid: cont synthroid  #Afib: Rivaroxaban maintained and Metoprolol tartrate  Any decline or change in condition needs to be communicated with the physician or myself.    Discussion with nursing staff regarding ongoing care and management.  If needed, would communicate with family who are not present at this time.   There are no concerns at this time.    We will continue with the medications noted above.    We will continue to follow the patient here at the facility.    *Please note that nursing facility, outside laboratory agency, and  [facility] do not interface.     Completion of the note was done  through Dragon voice recognition technology and may include   unintended or grammatical errors which may not have been recognized when finalizing the note.     Time:   I spent 45 minutes or greater with the patient. Greater than 50% of this time was spent in counseling and or coordination of care. The time includes prep time of reviewing vital signs, report from direct nursing staff and or therapists, hospital documentation, reviewing labs, radiographs, diagnostic tests and or consultations, time directly spent with the patient interviewing, examining, and education regarding diagnosis, treatments, and medications, as well as documentation in the electronic medical record, and reviewing the plan of care and any new orders with the patient, nursing staff and other staff directly related to the patients care.     RODDY Pierson       Electronically Signed By: RODDY Pierson   8/22/23  9:14 AM

## 2023-08-21 VITALS
RESPIRATION RATE: 17 BRPM | TEMPERATURE: 97.8 F | DIASTOLIC BLOOD PRESSURE: 67 MMHG | WEIGHT: 281.1 LBS | SYSTOLIC BLOOD PRESSURE: 120 MMHG | HEIGHT: 72 IN | HEART RATE: 68 BPM | BODY MASS INDEX: 38.07 KG/M2

## 2023-08-21 NOTE — PROGRESS NOTES
Name: Anjum Hernandez  YOB: 1950    INITIAL NP FOLLOW UP VISIT: SNF, UTI, AARON on CKD2    Anjum Hernandez is a 73 y.o. male who is here at [facility] after a hospitalization at  from 8/9 - 8/14/23. Patient presented to ER w/weakness and brought in by EMS. Patient lives alone and was unable to get out of bed. The weakness began 24 hours prior. Patient was found to have UTI and positive for E. Coli. He was treated w/IVAB and IV fluids for acute kidney injury. He was switched to Cipro prior to discharge. Renal ultrasound suspicious for urothelial neoplasm.  Urology reviewed imaging and patient is planned to follow-up with urology in 2 to 3 weeks for a's possible cystoscopy as well as recommending CT urogram for further assessment.  Patient has a PMH of HTN, CKD2, COPD, crohn's, Afib, hypothyroid, sleep apnea with CPAP/BIPAP, Hypertensive heart disease w/out heart failure, and obesity. PSH of right total knee replacement, permanent pacemaker, cholecystectomy, and right wrist surgery. Patient does not smoke or drink alcohol.   At this time, patient is sitting up in his recliner chair in his room and appears comfortable. He is sleeping soundly. Patient awakens and seems disoriented. After my assessment, I did assist the patient w/his CPAP  and explained that during any time he is napping he should be wearing. Patient had no complaints and only stating he was tired today from therapy. He denies CP, SOB, Abd pain, or bladder issues. He did state he has not had a BM in a couple days and felt nauseated earlier today.          REVIEW OF SYSTEMS:  Review of systems are negative except where noted in HPI.    /67   Pulse 68   Temp 36.6 °C (97.8 °F)   Resp 17   Ht 1.829 m (6')   Wt 128 kg (281 lb 1.6 oz)   BMI 38.12 kg/m²      Physical Exam  Constitutional:       Appearance: Normal appearance. He is obese.   HENT:      Head: Normocephalic.      Right Ear: External ear normal.      Left  Ear: External ear normal.      Nose: Nose normal.      Mouth/Throat:      Mouth: Mucous membranes are moist.   Eyes:      Extraocular Movements: Extraocular movements intact.      Conjunctiva/sclera: Conjunctivae normal.      Pupils: Pupils are equal, round, and reactive to light.   Cardiovascular:      Rate and Rhythm: Normal rate and regular rhythm.      Pulses: Normal pulses.      Heart sounds: Normal heart sounds.   Pulmonary:      Effort: Pulmonary effort is normal.      Breath sounds: Normal breath sounds.   Abdominal:      General: Bowel sounds are normal. There is no distension.      Palpations: Abdomen is soft.      Tenderness: There is no abdominal tenderness.      Comments: Round large abdomen   Genitourinary:     Comments: Not examined  Musculoskeletal:         General: Normal range of motion.      Cervical back: Normal range of motion and neck supple.      Comments: Generalized weakness   Skin:     General: Skin is warm and dry.      Capillary Refill: Capillary refill takes less than 2 seconds.   Neurological:      General: No focal deficit present.      Mental Status: He is alert. Mental status is at baseline.      Comments: Oriented x2, forgetful of date   Psychiatric:         Mood and Affect: Mood normal.         Behavior: Behavior normal.         Thought Content: Thought content normal.         Judgment: Judgment normal.          Living will related issues reviewed-CODE STATUS: Full code    DISCHARGE PLANNING: no date at this time  Discharge Home when goals are met.   Follow up with PCP in 1-2 weeks after discharge from facility.   [facility] to follow after discharge for continued PT/OT and Nursing.    RECENT HOSPITALIZATION DATES: 8/9 - 8/14/23  All laboratories, diagnostic tests, progress notes, and consultation notes reviewed from recent hospitalization.     LABORATORIES OR DIAGNOSTIC TESTS DONE/REVIEWED IN FACILITY:  CBC and Differential  REPORTED 08/17/2023 12:54  White Blood Cell Count   8.05 k/uL  3.70-11.00    RBC   4.59 m/uL 4.20-6.00    Hemoglobin L 12.6 g/dL 13.0-17.0    Hematocrit   40.6 % 39.0-51.0    MCV   88.5 fL 80.0-100.0    MCH   27.5 pg 26.0-34.0    MCHC   31.0 g/dL 30.5-36.0    RDW-CV H 16.3 % 11.5-15.0    Platelet Count   193 k/uL 150-400    MPV   10.6 fL 9.0-12.7    Neut%   85.7 %    Abs Neut   6.89 k/uL 1.45-7.50    Lymph%   6.2 %    Abs Lymph L 0.50 k/uL 1.00-4.00    Mono%   4.7 %    Abs Mono   0.38 k/uL <0.87    Eosin%   2.1 %    Abs Eosin   0.17 k/uL <0.46    Baso%   0.1 %    Abs Baso   <0.03 k/uL <0.11    Immature Gran %%   1.2 %    Abs Immature Gran H 0.10 k/uL <0.10    Absolute nRBC   0.0 /100 WBC    Absolute nRBC   <0.01 k/uL <0.01    Diff Type   Auto               Comp Metabolic Panel  REPORTED 08/17/2023 13:21  Protein, Total   6.3 g/dL 6.3-8.0    Albumin L 3.3 g/dL 3.9-4.9    Calcium, Total   9.0 mg/dL 8.5-10.2    Bilirubin, Total   0.4 mg/dL 0.2-1.3    Alkaline Phosphatase H 130 U/L     AST H 71 U/L 14-40    ALT H 97 U/L 10-54    Glucose   84 mg/dL 74-99    BUN   23 mg/dL 9-24    Creatinine H 1.43 mg/dL 0.73-1.22    Sodium L 135 mmol/L 136-144    Potassium   4.2 mmol/L 3.7-5.1    Chloride   97 mmol/L     CO2   26 mmol/L 22-30    Anion Gap   12 mmol/L 9-18    Estimated Glomerular Filtration Rate L 52 mL/min/1.73 meters squared >=60      MEDICATIONS REVIEWED AT THE FACILITY:  Tylenol 650 mg every 6 hours as needed  Atorvastatin 20 mg daily  Bactrim 800-160 mg twice daily for 7 days - last day 8/22  Breztri aerosphere inhalation aerosol 160-9-4.8 mcg/act 2 puffs bid   Culturelle daily til 8/27  Colace 100 mg bid  Losartan 50 mg daily  Metoprolol tartrate 25 mg BID  Mirtazapine 45 mg daily  Rivaroxaban 20 mg daily  Synthroid 25 mcg daily  Ventolin 2 puffs Q6hr PRN  Vit D3 25 mcg 2 tabs daily  Wellbutrin 300 mg daily  Xeljanz 10 mg BID  Zinc 50 mg daily    Assessment/Plan    Problem List Items Addressed This Visit       Anxiety    Benign essential hypertension    Chronic  kidney disease, stage 3a (CMS/[facility])    Cardiac pacemaker    COPD, mild (CMS/[facility])    Major depressive disorder in full remission (CMS/[facility])    Class 3 severe obesity due to excess calories with serious comorbidity and body mass index (BMI) of 40.0 to 44.9 in adult (CMS/[facility])    Obstructive sleep apnea, adult    Paroxysmal atrial fibrillation (CMS/[facility])    UTI (urinary tract infection) - Primary    Physical deconditioning       Skin Integrity:  Nursing to monitor skin integrity as patient is at risk for pressure injuries.    PLAN:   Recent nursing evaluation and notes were reviewed.   Overall, patient is stable despite his/her chronic conditions.    #UTI (ecoli), Weakness and physical deconditioning: antibiotics Bactrim til 8/22, PT/OT/ST to maximize strength, function, and endurance all while maintaining safety. Abnormal Renal US - suspect urothelial neoplasm, f/up Urology in 2-3 weeks for possible cysto and CT urogram. ?Urologist? No note found in hospital or urologist. Will need to f/up on.  #HTN/CKD2: Losartan, BP readings to be followed and labs weekly. Avoid nephrotoxic drugs  #COPD, sleep apnea: cont home meds and CPAP, monitor resp status  #Crohn's disease: cont Xeljanz  #Depression/anxiety: cont home meds - mirtazapine and Wellbutrin  #Hypothyroid: cont synthroid  #Afib: Rivaroxaban maintained and Metoprolol tartrate  Any decline or change in condition needs to be communicated with the physician or myself.    Discussion with nursing staff regarding ongoing care and management.  If needed, would communicate with family who are not present at this time.   There are no concerns at this time.    We will continue with the medications noted above.    We will continue to follow the patient here at the facility.    *Please note that nursing facility, outside laboratory agency, and  [facility] do not interface.     Completion of the note was done through Dragon voice recognition technology and may include   unintended or  grammatical errors which may not have been recognized when finalizing the note.     Time:   I spent 45 minutes or greater with the patient. Greater than 50% of this time was spent in counseling and or coordination of care. The time includes prep time of reviewing vital signs, report from direct nursing staff and or therapists, hospital documentation, reviewing labs, radiographs, diagnostic tests and or consultations, time directly spent with the patient interviewing, examining, and education regarding diagnosis, treatments, and medications, as well as documentation in the electronic medical record, and reviewing the plan of care and any new orders with the patient, nursing staff and other staff directly related to the patients care.     Fiona Portillo, APRN-CNP

## 2023-08-22 VITALS
HEART RATE: 69 BPM | BODY MASS INDEX: 37.97 KG/M2 | WEIGHT: 280 LBS | DIASTOLIC BLOOD PRESSURE: 82 MMHG | SYSTOLIC BLOOD PRESSURE: 145 MMHG

## 2023-08-22 PROBLEM — R53.81 PHYSICAL DECONDITIONING: Status: ACTIVE | Noted: 2023-08-22

## 2023-08-22 PROBLEM — N39.0 UTI (URINARY TRACT INFECTION): Status: ACTIVE | Noted: 2023-08-22

## 2023-08-22 ASSESSMENT — ENCOUNTER SYMPTOMS
BACK PAIN: 0
GASTROINTESTINAL NEGATIVE: 1
RESPIRATORY NEGATIVE: 1
CONFUSION: 0
AGITATION: 0
CONSTITUTIONAL NEGATIVE: 1
ARTHRALGIAS: 1
CARDIOVASCULAR NEGATIVE: 1
DIZZINESS: 0

## 2023-08-23 NOTE — PROGRESS NOTES
Subjective   Patient ID: Anjum Hernandez is a 73 y.o. male who is acute skilled care being seen and evaluated for multiple medical problems.    Patient was reassessed again after initial evaluation by me.  Overnight has been uneventful, it was a E. coli cystitis, sulfa antibiotics has been continued.  Today he was much more awake and alert and he is more mobile and confident.  No new events overnight, baseline medical problems are reviewed, physical therapy assessment was reviewed, patient has history of atrial fibrillation, ulcerative colitis so there is a question about the xeljanz treatment, it is a chronic biological treatment he gets to keep his colitis in remission, there is no urinary symptoms, there is no evidence of any frequency of micturition.         Review of Systems   Constitutional: Negative.    Respiratory: Negative.     Cardiovascular: Negative.    Gastrointestinal: Negative.    Musculoskeletal:  Positive for arthralgias. Negative for back pain.   Skin: Negative.    Neurological:  Negative for dizziness.   Psychiatric/Behavioral:  Negative for agitation and confusion.        Objective   /82   Pulse 69   Wt 127 kg (280 lb)   BMI 37.97 kg/m²     Physical Exam  Constitutional:       Appearance: Normal appearance. He is obese.   HENT:      Head: Normocephalic.      Nose: Nose normal.   Eyes:      Conjunctiva/sclera: Conjunctivae normal.   Cardiovascular:      Rate and Rhythm: Normal rate and regular rhythm.      Pulses: Normal pulses.      Heart sounds: Normal heart sounds.   Pulmonary:      Effort: Pulmonary effort is normal.      Breath sounds: Normal breath sounds.   Abdominal:      Palpations: Abdomen is soft.   Musculoskeletal:         General: Normal range of motion.      Cervical back: Neck supple.   Skin:     General: Skin is warm and dry.   Neurological:      Mental Status: He is oriented to person, place  Assessment/Plan   Problem List Items Addressed This Visit       Acquired  hypothyroidism    Benign essential hypertension - Primary    Cardiac pacemaker    Moderate episode of recurrent major depressive disorder (CMS/[facility])    Ulcerative colitis (CMS/[facility])    Paroxysmal atrial fibrillation (CMS/[facility])   Overnight BP readings are reviewed, medications are reviewed, patient is in a sinus rhythm, patient has a paced rhythm, xeljanz will be started, rest of medications are reviewed, further follow-up will be done by nurse practitioner.  Laboratories will be followed.  Short-term stay is expected here at the facility.  Discussed with nursing staff.     Goals    None

## 2023-08-28 ENCOUNTER — NURSING HOME VISIT (OUTPATIENT)
Dept: POST ACUTE CARE | Facility: EXTERNAL LOCATION | Age: 73
End: 2023-08-28
Payer: MEDICARE

## 2023-08-28 DIAGNOSIS — E78.00 ELEVATED LDL CHOLESTEROL LEVEL: ICD-10-CM

## 2023-08-28 DIAGNOSIS — R41.89 COGNITIVE DECLINE: ICD-10-CM

## 2023-08-28 DIAGNOSIS — F41.9 ANXIETY: ICD-10-CM

## 2023-08-28 DIAGNOSIS — E03.9 ACQUIRED HYPOTHYROIDISM: ICD-10-CM

## 2023-08-28 DIAGNOSIS — I48.0 PAROXYSMAL ATRIAL FIBRILLATION (MULTI): ICD-10-CM

## 2023-08-28 DIAGNOSIS — G93.40 ENCEPHALOPATHY: Primary | ICD-10-CM

## 2023-08-28 DIAGNOSIS — E66.01 CLASS 3 SEVERE OBESITY DUE TO EXCESS CALORIES WITH SERIOUS COMORBIDITY AND BODY MASS INDEX (BMI) OF 40.0 TO 44.9 IN ADULT (MULTI): ICD-10-CM

## 2023-08-28 DIAGNOSIS — F32.5 MAJOR DEPRESSIVE DISORDER IN FULL REMISSION, UNSPECIFIED WHETHER RECURRENT (CMS-HCC): ICD-10-CM

## 2023-08-28 DIAGNOSIS — Z95.0 CARDIAC PACEMAKER: ICD-10-CM

## 2023-08-28 DIAGNOSIS — R53.81 PHYSICAL DECONDITIONING: ICD-10-CM

## 2023-08-28 DIAGNOSIS — I10 BENIGN ESSENTIAL HYPERTENSION: ICD-10-CM

## 2023-08-28 DIAGNOSIS — N18.31 CHRONIC KIDNEY DISEASE, STAGE 3A (MULTI): ICD-10-CM

## 2023-08-28 DIAGNOSIS — J44.9 COPD, MILD (MULTI): ICD-10-CM

## 2023-08-28 DIAGNOSIS — K51.919 ULCERATIVE COLITIS WITH COMPLICATION, UNSPECIFIED LOCATION (MULTI): ICD-10-CM

## 2023-08-28 PROCEDURE — 99310 SBSQ NF CARE HIGH MDM 45: CPT | Performed by: NURSE PRACTITIONER

## 2023-08-28 NOTE — LETTER
Patient: Anjum Hernandez  : 1950    Encounter Date: 2023    Name: Anjum Hernandez  YOB: 1950    INITIAL NP FOLLOW UP VISIT from recent hospitalization  - 23: Acute Encephalopathy, AARON, dehydration, and early onset dementia    Anjum Hernandez is a 73 y.o. male who is here at [facility] after hospitalization from  through 2023.  At that time the patient had been brought in from home by EMS for weakness.  Patient was unable to get out of bed. Pt lives alone.  He was found to have a UTI positive for E. coli and was treated with IV antibiotics and IV fluids.  While hospitalized a renal ultrasound was done showing a suspicion for urethral neoplasm.  Urology reviewed the imaging and plan for follow-up outpatient in 2 to 3 weeks for possible cystoscopy for further assessment.  The patient has been here now at [facility] doing therapy and rehab however patient has had poor participation and complaining of feeling fatigue.  Patient's family requesting that patient be sent back to the hospital as patient continues to have intermittent confusion.  The patient was sent out ot ER on  and hospitalized til 23. Pt admitted with confusion and encephalopathy just recently treated for UTI previous admission. No systemic signs of infections other than leukocytosis but patient appearing to be dehydrated and weak.  Labs did improve with IV fluid.  Blood cultures were negative.  UTI treatment was completed.  Procalcitonin low at 0.25.  Chest x-ray and CT of abdomen/pelvis negative, no acute findings.  TSH, vitamin D, ammonia, and vitamin B12 all within normal limits.  Diagnosed with acute encephalopathy. MRI showed no acute abnormalities. EEG was unremarkable. Neuro felt likely early dementia and to follow-up with in 6 to 8 weeks. Acute kidney injury on CKD improved with IV hydration and holding losartan.  Right knee pain and x-rays were done and negative.  History of Crohn's and transaminitis noted on labs, therefore Xeljanz was held and to follow-up outpatient with GI.  Patient has a PMH of HTN, CKD2, COPD, crohn's, Afib, hypothyroid, sleep apnea with CPAP/BIPAP, Hypertensive heart disease w/out heart failure, and obesity. PSH of right total knee replacement, permanent pacemaker, cholecystectomy, and right wrist surgery. Patient does not smoke or drink alcohol.   Patient is resting in bed at this time. He does not appear to be in any acute distress. His sister is at the bedside. Discussion done regarding his hospitalization and all questions answered. Mr. Hernandez denies CP, SOB, Abd pain, N/V, bowel/bladder symptoms, or pain. He states he feels his mentation is better and able to think more clearly.          REVIEW OF SYSTEMS:  Review of systems are negative except where noted in HPI.    /64   Pulse 67   Temp 36.2 °C (97.1 °F)   Resp 16   Ht 1.829 m (6')   Wt 132 kg (290 lb 4.8 oz)   BMI 39.37 kg/m²       Physical Exam  Constitutional:       Appearance: Normal appearance. He is obese.   HENT:      Head: Normocephalic.      Right Ear: External ear normal.      Left Ear: External ear normal.      Nose: Nose normal.      Mouth/Throat:      Mouth: Mucous membranes are moist.   Eyes:      Extraocular Movements: Extraocular movements intact.      Conjunctiva/sclera: Conjunctivae normal.      Pupils: Pupils are equal, round, and reactive to light.   Cardiovascular:      Rate and Rhythm: Normal rate and regular rhythm.      Pulses: Normal pulses.      Heart sounds: Normal heart sounds.   Pulmonary:      Effort: Pulmonary effort is normal.      Breath sounds: Normal breath sounds.   Abdominal:      General: Bowel sounds are normal. There is no distension.      Palpations: Abdomen is soft.      Tenderness: There is no abdominal tenderness.      Comments: Round large abdomen   Genitourinary:     Comments: Not examined  Musculoskeletal:         General: Normal range  of motion.      Cervical back: Normal range of motion and neck supple.      Comments: Generalized weakness   Skin:     General: Skin is warm and dry.      Capillary Refill: Capillary refill takes less than 2 seconds.   Neurological:      General: No focal deficit present.      Mental Status: He is alert. Mental status is at baseline.      Comments: Oriented x2, forgetful of date   Psychiatric:         Mood and Affect: Mood normal.         Behavior: Behavior normal.         Thought Content: Thought content normal.         Judgment: Judgment normal.     Living will related issues reviewed-CODE STATUS: Full code    DISCHARGE PLANNING: no date at this time  Discharge Home when goals are met.   Follow up with PCP in 1-2 weeks after discharge from facility.   [facility] to follow after discharge for continued PT/OT and Nursing.    RECENT HOSPITALIZATION DATES:   All laboratories, diagnostic tests, progress notes, and consultation notes reviewed from recent hospitalization.     LABORATORIES OR DIAGNOSTIC TESTS DONE/REVIEWED IN FACILITY:  Pending 8/31/23    MEDICATIONS REVIEWED AT THE FACILITY:  Synthroid 25 mcg (0.025 mg) oral tablet - 1 tab(s) orally once a day   Metoprolol Tartrate 25 mg oral tablet - 1 tab(s) orally 2 times a day   Breztri Aerosphere inhalation aerosol - 2 puff(s) inhaled 2 times a day   Zinc 140 mg (as elemental zinc 50 mg) oral tablet - 1 tab(s) orally once a day   docusate sodium 100 mg oral capsule - 1 cap(s) orally 2 times a day   rivaroxaban 20 mg oral tablet - 1 tab(s) orally once a day   atorvastatin 20 mg oral tablet - 1 tab(s) orally once a day (at bedtime)   lactobacillus rhamnosus GG oral capsule - 1 cap(s) orally once a day   polyethylene glycol 3350 oral powder for reconstitution - 17 gram(s) orally   once a day (at bedtime). Hold for loose stools.   mirtazapine 45 mg oral tablet - 1 tab(s) orally once a day (at bedtime)   Vitamin D3 25 mcg (1000 intl units) oral tablet - 2 tab(s) orally once a  day   buPROPion 300 mg/24 hours (XL) oral tablet, extended release - 1 tab(s) orally   once a day        PRN Medication   ondansetron 4 mg oral tablet - 1 tab(s) orally every 8 hours, As Needed - for   nausea   ipratropium-albuterol 0.5 mg-2.5 mg/3 mL inhalation solution - 3 milliliter(s)   by nebulizer every 6 hours, As Needed - for shortness of breath   acetaminophen 325 mg oral tablet - 2 tab(s) orally every 6 hours, As Needed   Ventolin HFA 90 mcg/inh inhalation aerosol - 2 puff(s) inhaled every 6 hours,   As Needed     Assessment/Plan   Problem List Items Addressed This Visit       Acquired hypothyroidism    Anxiety    Benign essential hypertension    Chronic kidney disease, stage 3a (CMS/[facility])    Cardiac pacemaker    COPD, mild (CMS/[facility])    Elevated LDL cholesterol level    Major depressive disorder in full remission (CMS/[facility])    Class 3 severe obesity due to excess calories with serious comorbidity and body mass index (BMI) of 40.0 to 44.9 in adult (CMS/[facility])    Ulcerative colitis (CMS/[facility])    Paroxysmal atrial fibrillation (CMS/[facility])    Physical deconditioning    Cognitive decline    Encephalopathy - Primary       Skin Integrity:  Nursing to monitor skin integrity as patient is at risk for pressure injuries.    PLAN:   Recent nursing evaluation and notes were reviewed.   Overall, patient is stable despite his/her chronic conditions.    #Encephalopathy: UTI treatment completed, IV fluids for dehydration  #Early Dementia: f/up Neuro  #COPD/sleep apnea: cont home meds, CPAP at HS or nap time, monitor resp status  #Crohn's disease: HOLD Xelganz  #Depression/Anxiety: cont home meds  #Hypothoid: cont synthroid, TSH within normal  #Afib: Xarelto and Metoprolol tartrate maintained  #Freq UTI's and abnormal US on 8/10/23: renal cyst including a right central renal large parapelvic cyst also evident in previous ultrasound and CT.  Indeterminant irregular urinary bladder wall thickening. Cystitis and urethral neoplasm  cannot be excluded.  Any decline or change in condition needs to be communicated with the physician or myself.    Discussion with nursing staff regarding ongoing care and management.  If needed, would communicate with family who are not present at this time.   There are no concerns at this time.    We will continue with the medications noted above.    We will continue to follow the patient here at the facility.    *Please note that nursing facility, outside laboratory agency, and [facility] do not interface.     Completion of the note was done through Dragon voice recognition technology and may include   unintended or grammatical errors which may not have been recognized when finalizing the note.     Time: I spent 45 minutes or greater with the patient. Greater than 50% of this time was spent in counseling and or coordination of care. The time includes prep time of reviewing vital signs, report from direct nursing staff and or therapists, hospital documentation, reviewing labs, radiographs, diagnostic tests and or consultations, time directly spent with the patient interviewing, examining, and education regarding diagnosis, treatments, and medications, as well as documentation in the electronic medical record, and reviewing the plan of care and any new orders with the patient, nursing staff and other staff directly related to the patients care.       RODDY Pierson       Electronically Signed By: RODDY Pierson   8/31/23  4:07 PM

## 2023-08-31 VITALS
DIASTOLIC BLOOD PRESSURE: 64 MMHG | HEART RATE: 67 BPM | TEMPERATURE: 97.1 F | WEIGHT: 290.3 LBS | HEIGHT: 72 IN | BODY MASS INDEX: 39.32 KG/M2 | SYSTOLIC BLOOD PRESSURE: 108 MMHG | RESPIRATION RATE: 16 BRPM

## 2023-08-31 PROBLEM — N39.0 UTI (URINARY TRACT INFECTION): Status: RESOLVED | Noted: 2023-08-22 | Resolved: 2023-08-31

## 2023-08-31 PROBLEM — G93.40 ENCEPHALOPATHY: Status: ACTIVE | Noted: 2023-08-31

## 2023-08-31 PROBLEM — R41.89 COGNITIVE DECLINE: Status: ACTIVE | Noted: 2023-08-31

## 2023-08-31 NOTE — PROGRESS NOTES
Name: Anjum Hernandez  YOB: 1950    INITIAL NP FOLLOW UP VISIT from recent hospitalization 8/23 - 8/26/23: Acute Encephalopathy, AARON, dehydration, and early onset dementia    Anjum Hernandez is a 73 y.o. male who is here at [facility] after hospitalization from August 9 through August 14, 2023.  At that time the patient had been brought in from home by EMS for weakness.  Patient was unable to get out of bed. Pt lives alone.  He was found to have a UTI positive for E. coli and was treated with IV antibiotics and IV fluids.  While hospitalized a renal ultrasound was done showing a suspicion for urethral neoplasm.  Urology reviewed the imaging and plan for follow-up outpatient in 2 to 3 weeks for possible cystoscopy for further assessment.  The patient has been here now at [facility] doing therapy and rehab however patient has had poor participation and complaining of feeling fatigue.  Patient's family requesting that patient be sent back to the hospital as patient continues to have intermittent confusion.  The patient was sent out ot ER on 8/23 and hospitalized til 8/26/23. Pt admitted with confusion and encephalopathy just recently treated for UTI previous admission. No systemic signs of infections other than leukocytosis but patient appearing to be dehydrated and weak.  Labs did improve with IV fluid.  Blood cultures were negative.  UTI treatment was completed.  Procalcitonin low at 0.25.  Chest x-ray and CT of abdomen/pelvis negative, no acute findings.  TSH, vitamin D, ammonia, and vitamin B12 all within normal limits.  Diagnosed with acute encephalopathy. MRI showed no acute abnormalities. EEG was unremarkable. Neuro felt likely early dementia and to follow-up with in 6 to 8 weeks. Acute kidney injury on CKD improved with IV hydration and holding losartan.  Right knee pain and x-rays were done and negative. History of Crohn's and transaminitis noted on labs, therefore Xeljanz was  held and to follow-up outpatient with GI.  Patient has a PMH of HTN, CKD2, COPD, crohn's, Afib, hypothyroid, sleep apnea with CPAP/BIPAP, Hypertensive heart disease w/out heart failure, and obesity. PSH of right total knee replacement, permanent pacemaker, cholecystectomy, and right wrist surgery. Patient does not smoke or drink alcohol.   Patient is resting in bed at this time. He does not appear to be in any acute distress. His sister is at the bedside. Discussion done regarding his hospitalization and all questions answered. Mr. Hernandez denies CP, SOB, Abd pain, N/V, bowel/bladder symptoms, or pain. He states he feels his mentation is better and able to think more clearly.          REVIEW OF SYSTEMS:  Review of systems are negative except where noted in HPI.    /64   Pulse 67   Temp 36.2 °C (97.1 °F)   Resp 16   Ht 1.829 m (6')   Wt 132 kg (290 lb 4.8 oz)   BMI 39.37 kg/m²       Physical Exam  Constitutional:       Appearance: Normal appearance. He is obese.   HENT:      Head: Normocephalic.      Right Ear: External ear normal.      Left Ear: External ear normal.      Nose: Nose normal.      Mouth/Throat:      Mouth: Mucous membranes are moist.   Eyes:      Extraocular Movements: Extraocular movements intact.      Conjunctiva/sclera: Conjunctivae normal.      Pupils: Pupils are equal, round, and reactive to light.   Cardiovascular:      Rate and Rhythm: Normal rate and regular rhythm.      Pulses: Normal pulses.      Heart sounds: Normal heart sounds.   Pulmonary:      Effort: Pulmonary effort is normal.      Breath sounds: Normal breath sounds.   Abdominal:      General: Bowel sounds are normal. There is no distension.      Palpations: Abdomen is soft.      Tenderness: There is no abdominal tenderness.      Comments: Round large abdomen   Genitourinary:     Comments: Not examined  Musculoskeletal:         General: Normal range of motion.      Cervical back: Normal range of motion and neck supple.       Comments: Generalized weakness   Skin:     General: Skin is warm and dry.      Capillary Refill: Capillary refill takes less than 2 seconds.   Neurological:      General: No focal deficit present.      Mental Status: He is alert. Mental status is at baseline.      Comments: Oriented x2, forgetful of date   Psychiatric:         Mood and Affect: Mood normal.         Behavior: Behavior normal.         Thought Content: Thought content normal.         Judgment: Judgment normal.     Living will related issues reviewed-CODE STATUS: Full code    DISCHARGE PLANNING: no date at this time  Discharge Home when goals are met.   Follow up with PCP in 1-2 weeks after discharge from facility.   [facility] to follow after discharge for continued PT/OT and Nursing.    RECENT HOSPITALIZATION DATES:   All laboratories, diagnostic tests, progress notes, and consultation notes reviewed from recent hospitalization.     LABORATORIES OR DIAGNOSTIC TESTS DONE/REVIEWED IN FACILITY:  Pending 8/31/23    MEDICATIONS REVIEWED AT THE FACILITY:  Synthroid 25 mcg (0.025 mg) oral tablet - 1 tab(s) orally once a day   Metoprolol Tartrate 25 mg oral tablet - 1 tab(s) orally 2 times a day   Breztri Aerosphere inhalation aerosol - 2 puff(s) inhaled 2 times a day   Zinc 140 mg (as elemental zinc 50 mg) oral tablet - 1 tab(s) orally once a day   docusate sodium 100 mg oral capsule - 1 cap(s) orally 2 times a day   rivaroxaban 20 mg oral tablet - 1 tab(s) orally once a day   atorvastatin 20 mg oral tablet - 1 tab(s) orally once a day (at bedtime)   lactobacillus rhamnosus GG oral capsule - 1 cap(s) orally once a day   polyethylene glycol 3350 oral powder for reconstitution - 17 gram(s) orally   once a day (at bedtime). Hold for loose stools.   mirtazapine 45 mg oral tablet - 1 tab(s) orally once a day (at bedtime)   Vitamin D3 25 mcg (1000 intl units) oral tablet - 2 tab(s) orally once a day   buPROPion 300 mg/24 hours (XL) oral tablet, extended release - 1  tab(s) orally   once a day        PRN Medication   ondansetron 4 mg oral tablet - 1 tab(s) orally every 8 hours, As Needed - for   nausea   ipratropium-albuterol 0.5 mg-2.5 mg/3 mL inhalation solution - 3 milliliter(s)   by nebulizer every 6 hours, As Needed - for shortness of breath   acetaminophen 325 mg oral tablet - 2 tab(s) orally every 6 hours, As Needed   Ventolin HFA 90 mcg/inh inhalation aerosol - 2 puff(s) inhaled every 6 hours,   As Needed     Assessment/Plan    Problem List Items Addressed This Visit       Acquired hypothyroidism    Anxiety    Benign essential hypertension    Chronic kidney disease, stage 3a (CMS/[facility])    Cardiac pacemaker    COPD, mild (CMS/[facility])    Elevated LDL cholesterol level    Major depressive disorder in full remission (CMS/[facility])    Class 3 severe obesity due to excess calories with serious comorbidity and body mass index (BMI) of 40.0 to 44.9 in adult (CMS/[facility])    Ulcerative colitis (CMS/[facility])    Paroxysmal atrial fibrillation (CMS/[facility])    Physical deconditioning    Cognitive decline    Encephalopathy - Primary       Skin Integrity:  Nursing to monitor skin integrity as patient is at risk for pressure injuries.    PLAN:   Recent nursing evaluation and notes were reviewed.   Overall, patient is stable despite his/her chronic conditions.    #Encephalopathy: UTI treatment completed, IV fluids for dehydration  #Early Dementia: f/up Neuro  #COPD/sleep apnea: cont home meds, CPAP at HS or nap time, monitor resp status  #Crohn's disease: HOLD Xelganz  #Depression/Anxiety: cont home meds  #Hypothoid: cont synthroid, TSH within normal  #Afib: Xarelto and Metoprolol tartrate maintained  #Freq UTI's and abnormal US on 8/10/23: renal cyst including a right central renal large parapelvic cyst also evident in previous ultrasound and CT.  Indeterminant irregular urinary bladder wall thickening. Cystitis and urethral neoplasm cannot be excluded.  Any decline or change in condition needs to be  communicated with the physician or myself.    Discussion with nursing staff regarding ongoing care and management.  If needed, would communicate with family who are not present at this time.   There are no concerns at this time.    We will continue with the medications noted above.    We will continue to follow the patient here at the facility.    *Please note that nursing facility, outside laboratory agency, and  [facility] do not interface.     Completion of the note was done through Dragon voice recognition technology and may include   unintended or grammatical errors which may not have been recognized when finalizing the note.     Time: I spent 45 minutes or greater with the patient. Greater than 50% of this time was spent in counseling and or coordination of care. The time includes prep time of reviewing vital signs, report from direct nursing staff and or therapists, hospital documentation, reviewing labs, radiographs, diagnostic tests and or consultations, time directly spent with the patient interviewing, examining, and education regarding diagnosis, treatments, and medications, as well as documentation in the electronic medical record, and reviewing the plan of care and any new orders with the patient, nursing staff and other staff directly related to the patients care.       Fiona Portillo, APRN-CNP

## 2023-09-07 ENCOUNTER — NURSING HOME VISIT (OUTPATIENT)
Dept: POST ACUTE CARE | Facility: EXTERNAL LOCATION | Age: 73
End: 2023-09-07
Payer: MEDICARE

## 2023-09-07 VITALS — DIASTOLIC BLOOD PRESSURE: 82 MMHG | SYSTOLIC BLOOD PRESSURE: 141 MMHG | HEART RATE: 77 BPM

## 2023-09-07 DIAGNOSIS — G93.40 ENCEPHALOPATHY: Primary | ICD-10-CM

## 2023-09-07 DIAGNOSIS — R41.89 COGNITIVE DECLINE: ICD-10-CM

## 2023-09-07 DIAGNOSIS — I10 BENIGN ESSENTIAL HYPERTENSION: ICD-10-CM

## 2023-09-07 DIAGNOSIS — Z95.0 CARDIAC PACEMAKER: ICD-10-CM

## 2023-09-07 DIAGNOSIS — F41.9 ANXIETY: ICD-10-CM

## 2023-09-07 DIAGNOSIS — K51.919 ULCERATIVE COLITIS WITH COMPLICATION, UNSPECIFIED LOCATION (MULTI): ICD-10-CM

## 2023-09-07 DIAGNOSIS — J44.9 COPD, MILD (MULTI): ICD-10-CM

## 2023-09-07 DIAGNOSIS — E03.9 ACQUIRED HYPOTHYROIDISM: ICD-10-CM

## 2023-09-07 PROCEDURE — 99308 SBSQ NF CARE LOW MDM 20: CPT | Performed by: INTERNAL MEDICINE

## 2023-09-07 NOTE — LETTER
Patient: Anjum Hernandez  : 1950    Encounter Date: 2023    Subjective  Patient ID: Anjum Hernandez is a 73 y.o. male who is acute skilled care being seen and evaluated for multiple medical problems.    There is another hospitalization on this patient where nothing was detected, lot of testing was done for some unknown reason, for example MRI was done it was negative, patient has a CT scan of abdomen and pelvis done, there was a unspecified renal cyst were seen, there was a small calculus seen, urine culture was negative, patient was sent to the hospital at the request of family member but nothing showed up, previously patient has a cystitis, patient is inquiring why he has urinary tract infection and I told that in May and UTIs are not that common and less patient has obstructive uropathy for urethral stricture or chronic prostatitis, there was no significant leukocyturia, patient has a history of ulcerative colitis which is not a problem, patient has history of atrial fibrillation and sick sinus syndrome which is not a problem, patient remains on anticoagulation, today he feels better, he is a heavyset male patient, he does not have any impaired renal functions this time.  Nursing staff says that patient has been doing better,  says that patient is doing better to the point that the next few days we can consider discharge to her domiciliary setting, last times hospitalization data and information were reviewed, patient was able to sleep by himself, he is ambulating with the help of assist device.  He is voiding properly, he does not have any lower urinary tract symptoms, he does not have any confusion or disorientation.         Review of Systems  Constitutional: Negative.    Respiratory: Negative.     Cardiovascular: Negative.    Gastrointestinal: Negative.    Musculoskeletal:  Positive for arthralgias. Negative for back pain.   Skin: Negative.    Neurological:  Negative for dizziness.    Psychiatric/Behavioral:  Negative for agitation and confusion.    Objective  /82   Pulse 77     Physical Exam  Constitutional:       Appearance: Normal appearance. He is obese.   HENT:      Head: Normocephalic.      Nose: Nose normal.   Eyes:      Conjunctiva/sclera: Conjunctivae normal.   Cardiovascular:      Rate and Rhythm: Normal rate and regular rhythm.      Pulses: Normal pulses.      Heart sounds: Normal heart sounds.   Pulmonary:      Effort: Pulmonary effort is normal.      Breath sounds: Normal breath sounds.   Abdominal:      Palpations: Abdomen is soft.   Musculoskeletal:         General: Normal range of motion.      Cervical back: Neck supple.   Skin:     General: Skin is warm and dry.   Neurological:      Mental Status: He is oriented to person, place    Assessment/Plan  Problem List Items Addressed This Visit       Acquired hypothyroidism    Anxiety    Benign essential hypertension    Cardiac pacemaker    COPD, mild (CMS/HCC)    Ulcerative colitis (CMS/HCC)    Cognitive decline    Encephalopathy - Primary   Patient remains on Xarelto, Xeljanz has not been given, patient remains on Wellbutrin, appetite has been better, cognitive decline is present, encephalopathy has been resolved, BP readings are stable, does not require any nasal oxygen, pacemaker device interrogation has been done periodically, there is no flareup of colitis, there is no voiding symptoms, plenty of water consumption can be helpful to prevent cystitis although voiding has to be appropriate, obviously I do not see any urological reason for him to have a recurrent cystitis unless he has a chronic prostatitis.  At this time as his condition is improving we will hold off any other test or investigations, recent laboratories were reviewed, plan of care was reviewed with the nursing staff, chronic anticoagulation to be continued, no chest pains, no confusion, no lethargy, no falls.     Goals    None           Electronically Signed  By: Damon Colon MD   9/7/23  9:09 PM

## 2023-09-08 NOTE — PROGRESS NOTES
Subjective   Patient ID: Anjum Hernandez is a 73 y.o. male who is acute skilled care being seen and evaluated for multiple medical problems.    There is another hospitalization on this patient where nothing was detected, lot of testing was done for some unknown reason, for example MRI was done it was negative, patient has a CT scan of abdomen and pelvis done, there was a unspecified renal cyst were seen, there was a small calculus seen, urine culture was negative, patient was sent to the hospital at the request of family member but nothing showed up, previously patient has a cystitis, patient is inquiring why he has urinary tract infection and I told that in May and UTIs are not that common and less patient has obstructive uropathy for urethral stricture or chronic prostatitis, there was no significant leukocyturia, patient has a history of ulcerative colitis which is not a problem, patient has history of atrial fibrillation and sick sinus syndrome which is not a problem, patient remains on anticoagulation, today he feels better, he is a heavyset male patient, he does not have any impaired renal functions this time.  Nursing staff says that patient has been doing better,  says that patient is doing better to the point that the next few days we can consider discharge to her domiciliary setting, last times hospitalization data and information were reviewed, patient was able to sleep by himself, he is ambulating with the help of assist device.  He is voiding properly, he does not have any lower urinary tract symptoms, he does not have any confusion or disorientation.         Review of Systems  Constitutional: Negative.    Respiratory: Negative.     Cardiovascular: Negative.    Gastrointestinal: Negative.    Musculoskeletal:  Positive for arthralgias. Negative for back pain.   Skin: Negative.    Neurological:  Negative for dizziness.   Psychiatric/Behavioral:  Negative for agitation and confusion.     Objective   /82   Pulse 77     Physical Exam  Constitutional:       Appearance: Normal appearance. He is obese.   HENT:      Head: Normocephalic.      Nose: Nose normal.   Eyes:      Conjunctiva/sclera: Conjunctivae normal.   Cardiovascular:      Rate and Rhythm: Normal rate and regular rhythm.      Pulses: Normal pulses.      Heart sounds: Normal heart sounds.   Pulmonary:      Effort: Pulmonary effort is normal.      Breath sounds: Normal breath sounds.   Abdominal:      Palpations: Abdomen is soft.   Musculoskeletal:         General: Normal range of motion.      Cervical back: Neck supple.   Skin:     General: Skin is warm and dry.   Neurological:      Mental Status: He is oriented to person, place    Assessment/Plan   Problem List Items Addressed This Visit       Acquired hypothyroidism    Anxiety    Benign essential hypertension    Cardiac pacemaker    COPD, mild (CMS/HCC)    Ulcerative colitis (CMS/HCC)    Cognitive decline    Encephalopathy - Primary   Patient remains on Xarelto, Xeljanz has not been given, patient remains on Wellbutrin, appetite has been better, cognitive decline is present, encephalopathy has been resolved, BP readings are stable, does not require any nasal oxygen, pacemaker device interrogation has been done periodically, there is no flareup of colitis, there is no voiding symptoms, plenty of water consumption can be helpful to prevent cystitis although voiding has to be appropriate, obviously I do not see any urological reason for him to have a recurrent cystitis unless he has a chronic prostatitis.  At this time as his condition is improving we will hold off any other test or investigations, recent laboratories were reviewed, plan of care was reviewed with the nursing staff, chronic anticoagulation to be continued, no chest pains, no confusion, no lethargy, no falls.     Goals    None

## 2023-09-13 DIAGNOSIS — F33.1 MODERATE EPISODE OF RECURRENT MAJOR DEPRESSIVE DISORDER (MULTI): ICD-10-CM

## 2023-09-13 DIAGNOSIS — I10 BENIGN ESSENTIAL HYPERTENSION: ICD-10-CM

## 2023-09-13 RX ORDER — CANDESARTAN CILEXETIL AND HYDROCHLOROTHIAZIDE 16; 12.5 MG/1; MG/1
1 TABLET ORAL
Qty: 30 TABLET | Refills: 5 | Status: SHIPPED | OUTPATIENT
Start: 2023-09-13 | End: 2023-10-31 | Stop reason: ALTCHOICE

## 2023-09-13 RX ORDER — CANDESARTAN CILEXETIL AND HYDROCHLOROTHIAZIDE 16; 12.5 MG/1; MG/1
1 TABLET ORAL
COMMUNITY
End: 2023-09-13 | Stop reason: SDUPTHER

## 2023-09-13 RX ORDER — MIRTAZAPINE 45 MG/1
45 TABLET, FILM COATED ORAL NIGHTLY
Qty: 90 TABLET | Refills: 3 | Status: SHIPPED | OUTPATIENT
Start: 2023-09-13

## 2023-09-13 NOTE — TELEPHONE ENCOUNTER
Rx Refill Request     Name: Anjum Hernandez  :  1950   Medication Name:  Candesartan, mirtazapine   Specific Pharmacy location:  1950  Date of last appointment:  06/15/2023  Date of next appointment:  2023  Best number to reach patient:  [phone]

## 2023-09-14 ENCOUNTER — NURSING HOME VISIT (OUTPATIENT)
Dept: POST ACUTE CARE | Facility: EXTERNAL LOCATION | Age: 73
End: 2023-09-14
Payer: MEDICARE

## 2023-09-14 DIAGNOSIS — R41.89 COGNITIVE DECLINE: Primary | ICD-10-CM

## 2023-09-14 DIAGNOSIS — Z95.0 CARDIAC PACEMAKER: ICD-10-CM

## 2023-09-14 DIAGNOSIS — J44.9 COPD, MILD (MULTI): ICD-10-CM

## 2023-09-14 DIAGNOSIS — I48.0 PAROXYSMAL ATRIAL FIBRILLATION (MULTI): ICD-10-CM

## 2023-09-14 DIAGNOSIS — G47.33 OBSTRUCTIVE SLEEP APNEA, ADULT: ICD-10-CM

## 2023-09-14 DIAGNOSIS — I10 BENIGN ESSENTIAL HYPERTENSION: ICD-10-CM

## 2023-09-14 PROCEDURE — 99308 SBSQ NF CARE LOW MDM 20: CPT | Performed by: NURSE PRACTITIONER

## 2023-09-14 NOTE — LETTER
Patient: Anjum Hernandez  : 1950    Encounter Date: 2023    Name: Anjum Hernandez  YOB: 1950    FOLLOW UP VISIT: SNF, Acute Encephalopathy, UTI, Rm, dehydration and early onset Dementia    SUBJECTIVE:  Mr. Hernandez is a 73 yr old male who is here at [facility] for skilled care after a recent hospitalization for a UTI.  The patient is currently up in his WC w/family at the bedside. He appears to be doing well. He has no complaints.    REVIEW OF SYSTEMS:   All review of systems are negative unless otherwise stated above under subjective.    LABS REVIEWED AT FACILITY:  23   H/H 11.2/37.7    MEDICATIONS REVIEWED AT FACILITY:  No changes or initiation of new meds    Living will related issues reviewed-Code status: Full code    OBJECTIVE:  BP (!) 156/98   Pulse 92   Temp 36.1 °C (97 °F)   Resp 17   Ht 1.829 m (6')   Wt 127 kg (280 lb 12.8 oz)   SpO2 92%   BMI 38.08 kg/m²     Physical Exam  Constitutional:       Appearance: Normal appearance. He is obese.   HENT:      Head: Normocephalic.      Right Ear: External ear normal.      Left Ear: External ear normal.      Nose: Nose normal.      Mouth/Throat:      Mouth: Mucous membranes are moist.   Eyes:      Extraocular Movements: Extraocular movements intact.      Conjunctiva/sclera: Conjunctivae normal.      Pupils: Pupils are equal, round, and reactive to light.   Cardiovascular:      Rate and Rhythm: Normal rate and regular rhythm.      Pulses: Normal pulses.      Heart sounds: Normal heart sounds.   Pulmonary:      Effort: Pulmonary effort is normal.      Breath sounds: Normal breath sounds.   Abdominal:      General: Bowel sounds are normal. There is no distension.      Palpations: Abdomen is soft.      Tenderness: There is no abdominal tenderness.      Comments: Round large abdomen   Genitourinary:     Comments: Not examined  Musculoskeletal:         General: Normal range of motion.      Cervical back: Normal range of motion and  neck supple.      Comments: Generalized weakness   Skin:     General: Skin is warm and dry.      Capillary Refill: Capillary refill takes less than 2 seconds.   Neurological:      General: No focal deficit present.      Mental Status: He is alert. Mental status is at baseline.      Comments: Oriented x2, forgetful of date   Psychiatric:         Mood and Affect: Mood normal.         Behavior: Behavior normal.         Thought Content: Thought content normal.         Judgment: Judgment normal.     Assessment/Plan  Problem List Items Addressed This Visit       Benign essential hypertension    Cardiac pacemaker    COPD, mild (CMS/HCC)    Obstructive sleep apnea, adult    Paroxysmal atrial fibrillation (CMS/HCC)    Cognitive decline - Primary       Skin integrity:  Nursing to monitor skin integrity as patient is at risk for pressure injuries.    PLAN:  Pt has been seen for follow up visit.  The patient is here at facility for PT/OT/ST to maximize strength, function, endurance and safety.  The patient is participating in therapy. Anjum Hernandez is making progress to the best of his/her ability.   Please see PT/OT/ST notes in the facility for detailed information regarding progression of patients progress.   Recent nursing evaluation and notes were reviewed.   Overall, patient is stable despite his/her chronic conditions.    #Early Dementia: f/up Neuro 10/10/23  #COPD/sleep apnea: cont home meds, CPAP at HS or nap time, stable resp status, cont to monitor resp status  #Crohn's disease: HOLD Xelganz  #Depression/Anxiety: cont home meds  #Hypothoid: cont synthroid, TSH within normal  #Afib: Xarelto and Metoprolol tartrate maintained  #Freq UTI's and abnormal US on 8/10/23: renal cyst including a right central renal large parapelvic cyst also evident in previous ultrasound and CT.  Indeterminant irregular urinary bladder wall thickening. Cystitis and urethral neoplasm cannot be excluded.  Any decline or change in condition  needs to be communicated with the physician or myself.    Discussion with nursing staff regarding ongoing care and management.  Communication done with family (sister) at bedside at this time.   There are no concerns at this time.  We will continue with the medications noted above.    We will continue to follow the patient here at the facility.    Discharge planning: no date at this time  Discharge Home when goals are met.   Follow up with PCP in 1-2 weeks after discharge from facility.   C to follow after discharge for continued PT/OT and Nursing.    *Please note that nursing facility, outside laboratory agency, and  [facility] do not interface.     Completion of the note was done through Dragon voice recognition technology and may include   unintended or grammatical errors which may not have been recognized when finalizing the note.     Time:    RODDY Pierson      Electronically Signed By: RODDY Pierson   9/18/23  4:33 PM

## 2023-09-15 ENCOUNTER — APPOINTMENT (OUTPATIENT)
Dept: PRIMARY CARE | Facility: CLINIC | Age: 73
End: 2023-09-15
Payer: MEDICARE

## 2023-09-18 VITALS
WEIGHT: 280.8 LBS | OXYGEN SATURATION: 92 % | HEIGHT: 72 IN | SYSTOLIC BLOOD PRESSURE: 156 MMHG | DIASTOLIC BLOOD PRESSURE: 98 MMHG | TEMPERATURE: 97 F | BODY MASS INDEX: 38.03 KG/M2 | RESPIRATION RATE: 17 BRPM | HEART RATE: 92 BPM

## 2023-09-18 NOTE — PROGRESS NOTES
Name: Anjum Hernandez  YOB: 1950    FOLLOW UP VISIT: SNF, Acute Encephalopathy, UTI, Rm, dehydration and early onset Dementia    SUBJECTIVE:  Mr. Hernandez is a 73 yr old male who is here at [facility] care for skilled care after a recent hospitalization for a UTI.  The patient is currently up in his WC w/family at the bedside. He appears to be doing well. He has no complaints.    REVIEW OF SYSTEMS:   All review of systems are negative unless otherwise stated above under subjective.    LABS REVIEWED AT FACILITY:  9/14/23   H/H 11.2/37.7    MEDICATIONS REVIEWED AT FACILITY:  No changes or initiation of new meds    Living will related issues reviewed-Code status: Full code    OBJECTIVE:  BP (!) 156/98   Pulse 92   Temp 36.1 °C (97 °F)   Resp 17   Ht 1.829 m (6')   Wt 127 kg (280 lb 12.8 oz)   SpO2 92%   BMI 38.08 kg/m²     Physical Exam  Constitutional:       Appearance: Normal appearance. He is obese.   HENT:      Head: Normocephalic.      Right Ear: External ear normal.      Left Ear: External ear normal.      Nose: Nose normal.      Mouth/Throat:      Mouth: Mucous membranes are moist.   Eyes:      Extraocular Movements: Extraocular movements intact.      Conjunctiva/sclera: Conjunctivae normal.      Pupils: Pupils are equal, round, and reactive to light.   Cardiovascular:      Rate and Rhythm: Normal rate and regular rhythm.      Pulses: Normal pulses.      Heart sounds: Normal heart sounds.   Pulmonary:      Effort: Pulmonary effort is normal.      Breath sounds: Normal breath sounds.   Abdominal:      General: Bowel sounds are normal. There is no distension.      Palpations: Abdomen is soft.      Tenderness: There is no abdominal tenderness.      Comments: Round large abdomen   Genitourinary:     Comments: Not examined  Musculoskeletal:         General: Normal range of motion.      Cervical back: Normal range of motion and neck supple.      Comments: Generalized weakness   Skin:     General: Skin  is warm and dry.      Capillary Refill: Capillary refill takes less than 2 seconds.   Neurological:      General: No focal deficit present.      Mental Status: He is alert. Mental status is at baseline.      Comments: Oriented x2, forgetful of date   Psychiatric:         Mood and Affect: Mood normal.         Behavior: Behavior normal.         Thought Content: Thought content normal.         Judgment: Judgment normal.     Assessment/Plan   Problem List Items Addressed This Visit       Benign essential hypertension    Cardiac pacemaker    COPD, mild (CMS/HCC)    Obstructive sleep apnea, adult    Paroxysmal atrial fibrillation (CMS/HCC)    Cognitive decline - Primary       Skin integrity:  Nursing to monitor skin integrity as patient is at risk for pressure injuries.    PLAN:  Pt has been seen for follow up visit.  The patient is here at facility for PT/OT/ST to maximize strength, function, endurance and safety.  The patient is participating in therapy. Anjum Hernandez is making progress to the best of his/her ability.   Please see PT/OT/ST notes in the facility for detailed information regarding progression of patients progress.   Recent nursing evaluation and notes were reviewed.   Overall, patient is stable despite his/her chronic conditions.    #Early Dementia: f/up Neuro 10/10/23  #COPD/sleep apnea: cont home meds, CPAP at HS or nap time, stable resp status, cont to monitor resp status  #Crohn's disease: HOLD Xelganz  #Depression/Anxiety: cont home meds  #Hypothoid: cont synthroid, TSH within normal  #Afib: Xarelto and Metoprolol tartrate maintained  #Freq UTI's and abnormal US on 8/10/23: renal cyst including a right central renal large parapelvic cyst also evident in previous ultrasound and CT.  Indeterminant irregular urinary bladder wall thickening. Cystitis and urethral neoplasm cannot be excluded.  Any decline or change in condition needs to be communicated with the physician or myself.    Discussion with  nursing staff regarding ongoing care and management.  Communication done with family (sister) at bedside at this time.   There are no concerns at this time.  We will continue with the medications noted above.    We will continue to follow the patient here at the facility.    Discharge planning: no date at this time  Discharge Home when goals are met.   Follow up with PCP in 1-2 weeks after discharge from facility.   C to follow after discharge for continued PT/OT and Nursing.    *Please note that nursing facility, outside laboratory agency, and  [facility] do not interface.     Completion of the note was done through Dragon voice recognition technology and may include   unintended or grammatical errors which may not have been recognized when finalizing the note.     Time:    Fiona Portillo, MAHAMED-CNP

## 2023-09-20 ENCOUNTER — NURSING HOME VISIT (OUTPATIENT)
Dept: POST ACUTE CARE | Facility: EXTERNAL LOCATION | Age: 73
End: 2023-09-20
Payer: MEDICARE

## 2023-09-20 DIAGNOSIS — G47.33 OBSTRUCTIVE SLEEP APNEA, ADULT: ICD-10-CM

## 2023-09-20 DIAGNOSIS — J44.9 COPD, MILD (MULTI): Primary | ICD-10-CM

## 2023-09-20 DIAGNOSIS — Z95.0 CARDIAC PACEMAKER: ICD-10-CM

## 2023-09-20 DIAGNOSIS — I48.0 PAROXYSMAL ATRIAL FIBRILLATION (MULTI): ICD-10-CM

## 2023-09-20 DIAGNOSIS — R41.89 COGNITIVE DECLINE: ICD-10-CM

## 2023-09-20 DIAGNOSIS — I10 BENIGN ESSENTIAL HYPERTENSION: ICD-10-CM

## 2023-09-20 DIAGNOSIS — E03.9 ACQUIRED HYPOTHYROIDISM: ICD-10-CM

## 2023-09-20 PROCEDURE — 99308 SBSQ NF CARE LOW MDM 20: CPT | Performed by: INTERNAL MEDICINE

## 2023-09-20 NOTE — LETTER
Patient: Anjum Hernandez  : 1950    Encounter Date: 2023    Subjective  Patient ID: Anjum Hernandez is a 73 y.o. male who is acute skilled care being seen and evaluated for multiple medical problems.    As expected patient's condition is not improving, thought that patient could be here for short-term stay but it has been 6 weeks now.  Still not able to ambulate and perform ADLs as good as she can be.  After having last 2 ER visits and hospitalization he has been somewhat better, patient has history of atrial fibrillation, patient remains on bupropion, metoprolol, rivaroxaban, mirtazapine.  There is no significant weight gain or weight loss, there is no evidence of any colitis, patient is not on a Xeljanz, does not have any significant spell of confusion or disorientation, 1 minor fall is present.  It does not appear that the patient will be going home in the near future time.         Review of Systems   Constitutional: Negative.  Negative for activity change and fatigue.   Respiratory: Negative.     Cardiovascular:  Positive for leg swelling. Negative for chest pain.   Gastrointestinal: Negative.  Negative for abdominal distention, constipation, diarrhea and nausea.   Musculoskeletal:  Positive for arthralgias and back pain.   Skin: Negative.  Negative for wound.   Neurological:  Positive for numbness. Negative for dizziness and light-headedness.   Psychiatric/Behavioral:  Negative for behavioral problems and confusion. The patient is not nervous/anxious.        Objective  /88   Pulse 63   Wt 125 kg (276 lb)   BMI 37.43 kg/m²     Physical Exam  Constitutional:       Appearance: Normal appearance. He is obese.   HENT:      Head: Normocephalic.      Nose: Nose normal.   Eyes:      Conjunctiva/sclera: Conjunctivae normal.   Cardiovascular:      Rate and Rhythm: Normal rate and regular rhythm.      Pulses: Normal pulses.      Heart sounds: Normal heart sounds.   Pulmonary:      Effort:  Pulmonary effort is normal.      Breath sounds: Normal breath sounds.   Abdominal:      Palpations: Abdomen is soft.   Musculoskeletal:         General: Normal range of motion.      Cervical back: Neck supple.   Skin:     General: Skin is warm and dry.   Neurological:      Mental Status: He is oriented to person, place  Assessment/Plan  Problem List Items Addressed This Visit             ICD-10-CM    Acquired hypothyroidism E03.9    Benign essential hypertension I10    Cardiac pacemaker Z95.0    COPD, mild (CMS/HCC) - Primary J44.9    Obstructive sleep apnea, adult G47.33    Paroxysmal atrial fibrillation (CMS/HCC) I48.0    Cognitive decline R41.89   Definitely there is a cognitive decline, there is a functional decline, he is a 73-year-old male patient.  Atrial fibrillation persistent anticoagulation is properly maintained, mood affect not out of ordinary.  Patient is on chronic mirtazapine treatment.  Medications are reviewed, problem list were reviewed, laboratories were reviewed.  Patient has a mild COPD no breathing compromise and I see that there is a CPAP mask at bedside and I encourage patient to use it on a daily basis, pacemaker interrogation is done in a timely manner, there is no anxiety or encephalopathy, patient remains euthyroid clinically.  Continue to receive skilled nursing and rehabilitation until stabilize and then  will inform me whenever he is ready to go home but does not appear to be at this time, COVID-19 related checkup and protocols has been maintained because of surge in the COVID-19 in last few days.  There was no family member at bedside.     Goals    None           Electronically Signed By: Damon Colon MD   9/26/23  9:31 PM

## 2023-09-22 ENCOUNTER — NURSING HOME VISIT (OUTPATIENT)
Dept: POST ACUTE CARE | Facility: EXTERNAL LOCATION | Age: 73
End: 2023-09-22
Payer: MEDICARE

## 2023-09-22 DIAGNOSIS — I10 BENIGN ESSENTIAL HYPERTENSION: ICD-10-CM

## 2023-09-22 DIAGNOSIS — J44.9 COPD, MILD (MULTI): Primary | ICD-10-CM

## 2023-09-22 DIAGNOSIS — R41.89 COGNITIVE DECLINE: ICD-10-CM

## 2023-09-22 DIAGNOSIS — N18.31 CHRONIC KIDNEY DISEASE, STAGE 3A (MULTI): ICD-10-CM

## 2023-09-22 DIAGNOSIS — R53.81 PHYSICAL DECONDITIONING: ICD-10-CM

## 2023-09-22 DIAGNOSIS — I48.0 PAROXYSMAL ATRIAL FIBRILLATION (MULTI): ICD-10-CM

## 2023-09-22 PROCEDURE — 99309 SBSQ NF CARE MODERATE MDM 30: CPT | Performed by: NURSE PRACTITIONER

## 2023-09-22 NOTE — LETTER
Patient: Anjum Hernandez  : 1950    Encounter Date: 2023    Name: Anjum Hernandez  YOB: 1950    FOLLOW UP VISIT: SNF, UTI, encephalopathy, AARON and early onset Dementia    SUBJECTIVE:  Mr. Hernandez is up in his room ambulating w/his walker. Therapy reported that patient is declining. However, I do not observe a decline today during my visit. The patient c/o cold like symptoms and feeling under the weather yesterday. Today, he states he feels better. He denies CP, SOB, urinary symptoms, and bowel issues.     REVIEW OF SYSTEMS:   All review of systems are negative unless otherwise stated above under subjective.    LABS REVIEWED AT FACILITY:  CBC and Differential  REPORTED 2023 10:37  White Blood Cell Count   5.97 k/uL 3.70-11.00    RBC L 4.02 m/uL 4.20-6.00    Hemoglobin L 11.3 g/dL 13.0-17.0    Hematocrit L 36.8 % 39.0-51.0    MCV   91.5 fL 80.0-100.0    MCH   28.1 pg 26.0-34.0    MCHC   30.7 g/dL 30.5-36.0    RDW-CV H 16.9 % 11.5-15.0    Platelet Count L 140 k/uL 150-400    MPV   10.7 fL 9.0-12.7    Neut%   80.6 %    Abs Neut   4.80 k/uL 1.45-7.50    Lymph%   6.0 %    Abs Lymph L 0.36 k/uL 1.00-4.00    Mono%   9.5 %    Abs Mono   0.57 k/uL <0.87    Eosin%   2.8 %    Abs Eosin   0.17 k/uL <0.46    Baso%   0.3 %    Abs Baso   <0.03 k/uL <0.11    Immature Gran %%   0.8 %    Abs Immature Gran   0.05 k/uL <0.10    Absolute nRBC   0.0 /100 WBC    Absolute nRBC   <0.01 k/uL <0.01    Diff Type   Auto               Comp Metabolic Panel  REPORTED 2023 10:57  Protein, Total L 6.2 g/dL 6.3-8.0    Albumin L 3.6 g/dL 3.9-4.9    Calcium, Total   9.5 mg/dL 8.5-10.2    Bilirubin, Total   0.5 mg/dL 0.2-1.3    Alkaline Phosphatase   100 U/L     AST   15 U/L 14-40    ALT   16 U/L 10-54    Glucose H 113 mg/dL 74-99  BUN   12 mg/dL 9-24    Creatinine   1.05 mg/dL 0.73-1.22    Sodium   140 mmol/L 136-144    Potassium   4.1 mmol/L 3.7-5.1    Chloride   103 mmol/L     CO2   25 mmol/L  22-30    Anion Gap   12 mmol/L 9-18    Estimated Glomerular Filtration Rate   75 mL/min/1.73 meters squared >=60      MEDICATIONS REVIEWED AT FACILITY:  No changes w/meds or initiation of new meds    Living will related issues reviewed-Code status: Full code    OBJECTIVE:  /72   Pulse 64   Temp 36.6 °C (97.9 °F)   Resp 16   Ht 1.829 m (6')   Wt 126 kg (278 lb 3.2 oz)   SpO2 95%   BMI 37.73 kg/m²     Physical Exam  Constitutional:       Appearance: Normal appearance. He is obese.   HENT:      Head: Normocephalic.      Right Ear: External ear normal.      Left Ear: External ear normal.      Nose: Nose normal.      Mouth/Throat:      Mouth: Mucous membranes are moist.   Eyes:      Extraocular Movements: Extraocular movements intact.      Conjunctiva/sclera: Conjunctivae normal.      Pupils: Pupils are equal, round, and reactive to light.   Cardiovascular:      Rate and Rhythm: Normal rate and regular rhythm.      Pulses: Normal pulses.      Heart sounds: Normal heart sounds.   Pulmonary:      Effort: Pulmonary effort is normal.      Breath sounds: Normal breath sounds.   Abdominal:      General: Bowel sounds are normal. There is no distension.      Palpations: Abdomen is soft.      Tenderness: There is no abdominal tenderness.      Comments: Round large abdomen   Genitourinary:     Comments: Not examined  Musculoskeletal:         General: Normal range of motion.      Cervical back: Normal range of motion and neck supple.      Comments: Generalized weakness   Skin:     General: Skin is warm and dry.      Capillary Refill: Capillary refill takes less than 2 seconds.   Neurological:      General: No focal deficit present.      Mental Status: He is alert. Mental status is at baseline.      Comments: Oriented x2, forgetful of date   Psychiatric:         Mood and Affect: Mood normal.         Behavior: Behavior normal.         Thought Content: Thought content normal.         Judgment: Judgment normal.      Assessment/Plan  Problem List Items Addressed This Visit       Benign essential hypertension    Chronic kidney disease, stage 3a (CMS/HCC)    COPD, mild (CMS/HCC) - Primary    Paroxysmal atrial fibrillation (CMS/HCC)    Physical deconditioning    Cognitive decline       Skin integrity:  Nursing to monitor skin integrity as patient is at risk for pressure injuries.    PLAN:  Pt has been seen for follow up visit.  The patient is here at facility for PT/OT/ST to maximize strength, function, endurance and safety.  The patient is participating in therapy. Anjum Hernandez is making progress to the best of his/her ability.   Please see PT/OT/ST notes in the facility for detailed information regarding progression of patients progress.   Recent nursing evaluation and notes were reviewed.   Overall, patient is stable despite his/her chronic conditions.    #Early Dementia: f/up Neuro 10/10/23  #COPD/sleep apnea: cont home meds, CPAP at HS or nap time, stable resp status, cont to monitor resp status  #Crohn's disease: HOLD Xelganz  #Depression/Anxiety: cont home meds  #Hypothoid: cont synthroid, TSH within normal  #Afib: Xarelto and Metoprolol tartrate maintained, BP readings reviewed, slightly elevated 150/70's today.  #Freq UTI's and abnormal US on 8/10/23: renal cyst including a right central renal large parapelvic cyst also evident in previous ultrasound and CT.  Indeterminant irregular urinary bladder wall thickening. Cystitis and urethral neoplasm cannot be excluded. F/up appt needed w/Dr. Chávez - nursing arranging  Any decline or change in condition needs to be communicated with the physician or myself.    Discussion with nursing staff regarding ongoing care and management.  If needed, would communicate with family who are not present at this time.   There are no concerns at this time.  We will continue with the medications noted above.    We will continue to follow the patient here at the facility.    Discharge  planning: no date at this time  Discharge Home when goals are met.   Follow up with PCP in 1-2 weeks after discharge from facility.   HHC to follow after discharge for continued PT/OT and Nursing.    *Please note that nursing facility, outside laboratory agency, and  [facility] do not interface.     Completion of the note was done through Dragon voice recognition technology and may include   unintended or grammatical errors which may not have been recognized when finalizing the note.     Time:    RODDY Pierson      Electronically Signed By: RODDY Pierson   9/24/23  6:30 PM

## 2023-09-23 VITALS
TEMPERATURE: 97.9 F | RESPIRATION RATE: 16 BRPM | HEIGHT: 72 IN | SYSTOLIC BLOOD PRESSURE: 152 MMHG | HEART RATE: 64 BPM | WEIGHT: 278.2 LBS | BODY MASS INDEX: 37.68 KG/M2 | OXYGEN SATURATION: 95 % | DIASTOLIC BLOOD PRESSURE: 72 MMHG

## 2023-09-23 NOTE — PROGRESS NOTES
Name: Anjum Hernandez  YOB: 1950    FOLLOW UP VISIT: SNF, UTI, encephalopathy, AARON and early onset Dementia    SUBJECTIVE:  Mr. Hernandez is up in his room ambulating w/his walker. Therapy reported that patient is declining. However, I do not observe a decline today during my visit. The patient c/o cold like symptoms and feeling under the weather yesterday. Today, he states he feels better. He denies CP, SOB, urinary symptoms, and bowel issues.     REVIEW OF SYSTEMS:   All review of systems are negative unless otherwise stated above under subjective.    LABS REVIEWED AT FACILITY:  CBC and Differential  REPORTED 09/21/2023 10:37  White Blood Cell Count   5.97 k/uL 3.70-11.00    RBC L 4.02 m/uL 4.20-6.00    Hemoglobin L 11.3 g/dL 13.0-17.0    Hematocrit L 36.8 % 39.0-51.0    MCV   91.5 fL 80.0-100.0    MCH   28.1 pg 26.0-34.0    MCHC   30.7 g/dL 30.5-36.0    RDW-CV H 16.9 % 11.5-15.0    Platelet Count L 140 k/uL 150-400    MPV   10.7 fL 9.0-12.7    Neut%   80.6 %    Abs Neut   4.80 k/uL 1.45-7.50    Lymph%   6.0 %    Abs Lymph L 0.36 k/uL 1.00-4.00    Mono%   9.5 %    Abs Mono   0.57 k/uL <0.87    Eosin%   2.8 %    Abs Eosin   0.17 k/uL <0.46    Baso%   0.3 %    Abs Baso   <0.03 k/uL <0.11    Immature Gran %%   0.8 %    Abs Immature Gran   0.05 k/uL <0.10    Absolute nRBC   0.0 /100 WBC    Absolute nRBC   <0.01 k/uL <0.01    Diff Type   Auto               Comp Metabolic Panel  REPORTED 09/21/2023 10:57  Protein, Total L 6.2 g/dL 6.3-8.0    Albumin L 3.6 g/dL 3.9-4.9    Calcium, Total   9.5 mg/dL 8.5-10.2    Bilirubin, Total   0.5 mg/dL 0.2-1.3    Alkaline Phosphatase   100 U/L     AST   15 U/L 14-40    ALT   16 U/L 10-54    Glucose H 113 mg/dL 74-99  BUN   12 mg/dL 9-24    Creatinine   1.05 mg/dL 0.73-1.22    Sodium   140 mmol/L 136-144    Potassium   4.1 mmol/L 3.7-5.1    Chloride   103 mmol/L     CO2   25 mmol/L 22-30    Anion Gap   12 mmol/L 9-18    Estimated Glomerular Filtration Rate    75 mL/min/1.73 meters squared >=60      MEDICATIONS REVIEWED AT FACILITY:  No changes w/meds or initiation of new meds    Living will related issues reviewed-Code status: Full code    OBJECTIVE:  /72   Pulse 64   Temp 36.6 °C (97.9 °F)   Resp 16   Ht 1.829 m (6')   Wt 126 kg (278 lb 3.2 oz)   SpO2 95%   BMI 37.73 kg/m²     Physical Exam  Constitutional:       Appearance: Normal appearance. He is obese.   HENT:      Head: Normocephalic.      Right Ear: External ear normal.      Left Ear: External ear normal.      Nose: Nose normal.      Mouth/Throat:      Mouth: Mucous membranes are moist.   Eyes:      Extraocular Movements: Extraocular movements intact.      Conjunctiva/sclera: Conjunctivae normal.      Pupils: Pupils are equal, round, and reactive to light.   Cardiovascular:      Rate and Rhythm: Normal rate and regular rhythm.      Pulses: Normal pulses.      Heart sounds: Normal heart sounds.   Pulmonary:      Effort: Pulmonary effort is normal.      Breath sounds: Normal breath sounds.   Abdominal:      General: Bowel sounds are normal. There is no distension.      Palpations: Abdomen is soft.      Tenderness: There is no abdominal tenderness.      Comments: Round large abdomen   Genitourinary:     Comments: Not examined  Musculoskeletal:         General: Normal range of motion.      Cervical back: Normal range of motion and neck supple.      Comments: Generalized weakness   Skin:     General: Skin is warm and dry.      Capillary Refill: Capillary refill takes less than 2 seconds.   Neurological:      General: No focal deficit present.      Mental Status: He is alert. Mental status is at baseline.      Comments: Oriented x2, forgetful of date   Psychiatric:         Mood and Affect: Mood normal.         Behavior: Behavior normal.         Thought Content: Thought content normal.         Judgment: Judgment normal.     Assessment/Plan   Problem List Items Addressed This Visit       Benign essential  hypertension    Chronic kidney disease, stage 3a (CMS/HCC)    COPD, mild (CMS/HCC) - Primary    Paroxysmal atrial fibrillation (CMS/HCC)    Physical deconditioning    Cognitive decline       Skin integrity:  Nursing to monitor skin integrity as patient is at risk for pressure injuries.    PLAN:  Pt has been seen for follow up visit.  The patient is here at facility for PT/OT/ST to maximize strength, function, endurance and safety.  The patient is participating in therapy. Anjum Hernandez is making progress to the best of his/her ability.   Please see PT/OT/ST notes in the facility for detailed information regarding progression of patients progress.   Recent nursing evaluation and notes were reviewed.   Overall, patient is stable despite his/her chronic conditions.    #Early Dementia: f/up Neuro 10/10/23  #COPD/sleep apnea: cont home meds, CPAP at HS or nap time, stable resp status, cont to monitor resp status  #Crohn's disease: HOLD Xelganz  #Depression/Anxiety: cont home meds  #Hypothoid: cont synthroid, TSH within normal  #Afib: Xarelto and Metoprolol tartrate maintained, BP readings reviewed, slightly elevated 150/70's today.  #Freq UTI's and abnormal US on 8/10/23: renal cyst including a right central renal large parapelvic cyst also evident in previous ultrasound and CT.  Indeterminant irregular urinary bladder wall thickening. Cystitis and urethral neoplasm cannot be excluded. F/up appt needed w/Dr. Chávez - nursing arranging  Any decline or change in condition needs to be communicated with the physician or myself.    Discussion with nursing staff regarding ongoing care and management.  If needed, would communicate with family who are not present at this time.   There are no concerns at this time.  We will continue with the medications noted above.    We will continue to follow the patient here at the facility.    Discharge planning: no date at this time  Discharge Home when goals are met.   Follow up with  PCP in 1-2 weeks after discharge from facility.   HHC to follow after discharge for continued PT/OT and Nursing.    *Please note that nursing facility, outside laboratory agency, and  [facility] do not interface.     Completion of the note was done through Dragon voice recognition technology and may include   unintended or grammatical errors which may not have been recognized when finalizing the note.     Time:    Fiona Portillo, APRN-CNP

## 2023-09-26 VITALS
DIASTOLIC BLOOD PRESSURE: 88 MMHG | BODY MASS INDEX: 37.43 KG/M2 | WEIGHT: 276 LBS | SYSTOLIC BLOOD PRESSURE: 141 MMHG | HEART RATE: 63 BPM

## 2023-09-26 ASSESSMENT — ENCOUNTER SYMPTOMS
DIZZINESS: 0
FATIGUE: 0
LIGHT-HEADEDNESS: 0
NUMBNESS: 1
RESPIRATORY NEGATIVE: 1
CONSTITUTIONAL NEGATIVE: 1
ABDOMINAL DISTENTION: 0
NERVOUS/ANXIOUS: 0
BACK PAIN: 1
NAUSEA: 0
ACTIVITY CHANGE: 0
WOUND: 0
GASTROINTESTINAL NEGATIVE: 1
ARTHRALGIAS: 1
CONSTIPATION: 0
CONFUSION: 0
DIARRHEA: 0

## 2023-09-27 NOTE — PROGRESS NOTES
Subjective   Patient ID: Anjum Hernandez is a 73 y.o. male who is acute skilled care being seen and evaluated for multiple medical problems.    As expected patient's condition is not improving, thought that patient could be here for short-term stay but it has been 6 weeks now.  Still not able to ambulate and perform ADLs as good as she can be.  After having last 2 ER visits and hospitalization he has been somewhat better, patient has history of atrial fibrillation, patient remains on bupropion, metoprolol, rivaroxaban, mirtazapine.  There is no significant weight gain or weight loss, there is no evidence of any colitis, patient is not on a Xeljanz, does not have any significant spell of confusion or disorientation, 1 minor fall is present.  It does not appear that the patient will be going home in the near future time.         Review of Systems   Constitutional: Negative.  Negative for activity change and fatigue.   Respiratory: Negative.     Cardiovascular:  Positive for leg swelling. Negative for chest pain.   Gastrointestinal: Negative.  Negative for abdominal distention, constipation, diarrhea and nausea.   Musculoskeletal:  Positive for arthralgias and back pain.   Skin: Negative.  Negative for wound.   Neurological:  Positive for numbness. Negative for dizziness and light-headedness.   Psychiatric/Behavioral:  Negative for behavioral problems and confusion. The patient is not nervous/anxious.        Objective   /88   Pulse 63   Wt 125 kg (276 lb)   BMI 37.43 kg/m²     Physical Exam  Constitutional:       Appearance: Normal appearance. He is obese.   HENT:      Head: Normocephalic.      Nose: Nose normal.   Eyes:      Conjunctiva/sclera: Conjunctivae normal.   Cardiovascular:      Rate and Rhythm: Normal rate and regular rhythm.      Pulses: Normal pulses.      Heart sounds: Normal heart sounds.   Pulmonary:      Effort: Pulmonary effort is normal.      Breath sounds: Normal breath sounds.    Abdominal:      Palpations: Abdomen is soft.   Musculoskeletal:         General: Normal range of motion.      Cervical back: Neck supple.   Skin:     General: Skin is warm and dry.   Neurological:      Mental Status: He is oriented to person, place  Assessment/Plan   Problem List Items Addressed This Visit             ICD-10-CM    Acquired hypothyroidism E03.9    Benign essential hypertension I10    Cardiac pacemaker Z95.0    COPD, mild (CMS/HCC) - Primary J44.9    Obstructive sleep apnea, adult G47.33    Paroxysmal atrial fibrillation (CMS/HCC) I48.0    Cognitive decline R41.89   Definitely there is a cognitive decline, there is a functional decline, he is a 73-year-old male patient.  Atrial fibrillation persistent anticoagulation is properly maintained, mood affect not out of ordinary.  Patient is on chronic mirtazapine treatment.  Medications are reviewed, problem list were reviewed, laboratories were reviewed.  Patient has a mild COPD no breathing compromise and I see that there is a CPAP mask at bedside and I encourage patient to use it on a daily basis, pacemaker interrogation is done in a timely manner, there is no anxiety or encephalopathy, patient remains euthyroid clinically.  Continue to receive skilled nursing and rehabilitation until stabilize and then  will inform me whenever he is ready to go home but does not appear to be at this time, COVID-19 related checkup and protocols has been maintained because of surge in the COVID-19 in last few days.  There was no family member at bedside.     Goals    None

## 2023-10-09 ENCOUNTER — NURSING HOME VISIT (OUTPATIENT)
Dept: POST ACUTE CARE | Facility: EXTERNAL LOCATION | Age: 73
End: 2023-10-09
Payer: MEDICARE

## 2023-10-09 VITALS
SYSTOLIC BLOOD PRESSURE: 171 MMHG | OXYGEN SATURATION: 95 % | BODY MASS INDEX: 37.4 KG/M2 | TEMPERATURE: 97 F | DIASTOLIC BLOOD PRESSURE: 96 MMHG | WEIGHT: 276.1 LBS | HEART RATE: 64 BPM | RESPIRATION RATE: 16 BRPM | HEIGHT: 72 IN

## 2023-10-09 DIAGNOSIS — I10 BENIGN ESSENTIAL HYPERTENSION: Primary | ICD-10-CM

## 2023-10-09 DIAGNOSIS — R41.89 COGNITIVE DECLINE: ICD-10-CM

## 2023-10-09 DIAGNOSIS — G47.33 OBSTRUCTIVE SLEEP APNEA, ADULT: ICD-10-CM

## 2023-10-09 DIAGNOSIS — J44.9 COPD, MILD (MULTI): ICD-10-CM

## 2023-10-09 DIAGNOSIS — I48.0 PAROXYSMAL ATRIAL FIBRILLATION (MULTI): ICD-10-CM

## 2023-10-09 DIAGNOSIS — R53.81 PHYSICAL DECONDITIONING: ICD-10-CM

## 2023-10-09 DIAGNOSIS — Z95.0 CARDIAC PACEMAKER: ICD-10-CM

## 2023-10-09 PROCEDURE — 99309 SBSQ NF CARE MODERATE MDM 30: CPT | Performed by: NURSE PRACTITIONER

## 2023-10-09 NOTE — LETTER
Patient: Anjum Hernandez  : 1950    Encounter Date: 10/09/2023    Name: Anjum Hernandez  YOB: 1950    FOLLOW UP VISIT: SNF, recovered Covid 19    SUBJECTIVE:  The patient is up in his room and appears to be comfortable and in no acute distress. He is now out of isolation and recovered from Covid. The patient reported that he really did not have any symptoms. He is doing better - Mentation is clearer and he is participating in therapy. He has an appt later this week w/Neurology. The patient denies SOB or CP.     REVIEW OF SYSTEMS:   All review of systems are negative unless otherwise stated above under subjective.    LABS REVIEWED AT FACILITY:  CBC and Differential  REPORTED 10/05/2023 11:31  White Blood Cell Count   7.20 k/uL 3.70-11.00    RBC   4.38 m/uL 4.20-6.00    Hemoglobin L 12.0 g/dL 13.0-17.0    Hematocrit L 38.9 % 39.0-51.0    MCV   88.8 fL 80.0-100.0    MCH   27.4 pg 26.0-34.0    MCHC   30.8 g/dL 30.5-36.0    RDW-CV H 15.9 % 11.5-15.0    Platelet Count   213 k/uL 150-400    MPV   10.3 fL 9.0-12.7    Neut%   75.4 %    Abs Neut   5.43 k/uL 1.45-7.50    Lymph%   13.1 %    Abs Lymph L 0.94 k/uL 1.00-4.00    Mono%   8.2 %    Abs Mono   0.59 k/uL <0.87    Eosin%   2.2 %    Abs Eosin   0.16 k/uL <0.46    Baso%   0.4 %    Abs Baso   0.03 k/uL <0.11    Immature Gran %%   0.7 %    Abs Immature Gran   0.05 k/uL <0.10    Absolute nRBC   0.0 /100 WBC    Absolute nRBC   <0.01 k/uL <0.01    Diff Type   Auto               Comp Metabolic Panel  REPORTED 10/05/2023 11:43  Protein, Total L 5.9 g/dL 6.3-8.0    Albumin L 3.4 g/dL 3.9-4.9    Calcium, Total   9.6 mg/dL 8.5-10.2    Bilirubin, Total   0.3 mg/dL 0.2-1.3    Alkaline Phosphatase   97 U/L     AST L 12 U/L 14-40    ALT   14 U/L 10-54    Glucose   89 mg/dL 74-99  BUN   14 mg/dL 9-24    Creatinine   1.00 mg/dL 0.73-1.22    Sodium   140 mmol/L 136-144    Potassium   4.0 mmol/L 3.7-5.1    Chloride   103 mmol/L     CO2   27 mmol/L 22-30     Anion Gap   10 mmol/L 9-18    Estimated Glomerular Filtration Rate   80 mL/min/1.73 meters squared >=60      MEDICATIONS REVIEWED AT FACILITY:  NO changes to meds or initiation of new meds    Living will related issues reviewed-Code status: Full code    OBJECTIVE:  BP (!) 171/96   Pulse 64   Temp 36.1 °C (97 °F)   Resp 16   Ht 1.829 m (6')   Wt 125 kg (276 lb 1.6 oz)   SpO2 95%   BMI 37.45 kg/m²     Physical Exam  Constitutional:       Appearance: Normal appearance. He is obese.   HENT:      Head: Normocephalic.      Right Ear: External ear normal.      Left Ear: External ear normal.      Nose: Nose normal.      Mouth/Throat:      Mouth: Mucous membranes are moist.   Eyes:      Extraocular Movements: Extraocular movements intact.      Conjunctiva/sclera: Conjunctivae normal.      Pupils: Pupils are equal, round, and reactive to light.   Cardiovascular:      Rate and Rhythm: Normal rate and regular rhythm.      Pulses: Normal pulses.      Heart sounds: Normal heart sounds.   Pulmonary:      Effort: Pulmonary effort is normal.      Breath sounds: Normal breath sounds.   Abdominal:      General: Bowel sounds are normal. There is no distension.      Palpations: Abdomen is soft.      Tenderness: There is no abdominal tenderness.      Comments: Round large abdomen   Genitourinary:     Comments: Not examined  Musculoskeletal:         General: Normal range of motion.      Cervical back: Normal range of motion and neck supple.      Comments: Generalized weakness   Skin:     General: Skin is warm and dry.      Capillary Refill: Capillary refill takes less than 2 seconds.   Neurological:      General: No focal deficit present.      Mental Status: He is alert. Mental status is at baseline.      Comments: Oriented x3  Psychiatric:         Mood and Affect: Mood normal.         Behavior: Behavior normal.         Thought Content: Thought content normal.         Judgment: Judgment normal.     Assessment/Plan  Problem List  Items Addressed This Visit       Benign essential hypertension - Primary    Cardiac pacemaker    COPD, mild (CMS/HCC)    Obstructive sleep apnea, adult    Paroxysmal atrial fibrillation (CMS/HCC)    Physical deconditioning    Cognitive decline       Skin integrity:  Nursing to monitor skin integrity as patient is at risk for pressure injuries.    PLAN:  Pt has been seen for follow up visit.  The patient is here at facility for PT/OT/ST to maximize strength, function, endurance and safety.  The patient is participating in therapy. Anjum Hernandez is making progress to the best of his/her ability. He needs frequent ques to stand up straight while ambulating w/FWW.   Please see PT/OT/ST notes in the facility for detailed information regarding progression of patients progress.   Recent nursing evaluation and notes were reviewed.   Overall, patient is stable despite his/her chronic conditions.    #Cognitive decline ?Early Dementia: f/up Neuro 10/10/23 - patients mentation is clear. He is A/Ox3.  #COPD/sleep apnea: cont home meds, CPAP at HS or nap time, stable resp status, cont to monitor resp status  #Crohn's disease: HOLD Xelganz  #Depression/Anxiety: cont home meds  #Hypothoid: cont synthroid, TSH within normal  #Afib: Xarelto and Metoprolol tartrate maintained, BP readings reviewed, slightly elevated 170/90's today - recheck 150/84.  #Freq UTI's and abnormal US on 8/10/23: renal cyst including a right central renal large parapelvic cyst also evident in previous ultrasound and CT.  Indeterminant irregular urinary bladder wall thickening. Cystitis and urethral neoplasm cannot be excluded. F/up appt needed w/Dr. Chávez 10/31/23 at 8AM.  Any decline or change in condition needs to be communicated with the physician or myself.    Discussion with nursing staff regarding ongoing care and management.  If needed, would communicate with family who are not present at this time.   There are no concerns at this time.  We will  continue with the medications noted above.    We will continue to follow the patient here at the facility.    Discharge planning: Possibly 10/16/23  Discharge Home when goals are met.   Follow up with PCP in 1-2 weeks after discharge from facility.   C to follow after discharge for continued PT/OT and Nursing.  *The patient will require a wheelchair as he cannot safely stand at walker level and perform ADL's. His home provides adequate space for the wheelchair. He is able to safely maneuver the wheelchair. Pt will also need a BSC as he will be room confined.   *Therapy and Discharge Nurse planner is recommending assistance in the home due to his unsteadiness.   *Please note that nursing facility, outside laboratory agency, and  [facility] do not interface.     Completion of the note was done through Dragon voice recognition technology and may include   unintended or grammatical errors which may not have been recognized when finalizing the note.     Time:    RODDY Pierson      Electronically Signed By: RODDY Pierson   10/12/23  2:37 PM   Itraconazole Pregnancy And Lactation Text: This medication is Pregnancy Category C and it isn't know if it is safe during pregnancy. It is also excreted in breast milk.

## 2023-10-09 NOTE — PROGRESS NOTES
Subjective   Anjum Hernandez is a 73 y.o.   male.  [facility]  Anjum Hernandez PMI of is being seen s/p hospital stay on 8- for confusion and encephalopathy, recent UTI. He was at rehab s/p right knee surgery when his family noticed he was not acting like himself. He was brought to the ED. His MRI did not show acute process and his EEG was normal. It was thought to likely be early dementia. He was not started on any dementia medications and discharged to [facility] for acute rehab.   Today, the patient remains at [facility] for acute rehab, he states that he is actively participating in PT and he ambulates with a walker. He states he is participating in ADL's and the plan is for him to be discharged home on Monday. Because there was a concern of cognitive decline in the hospital, he was referred to neurology for cognitive screening. Today he is alert and oriented and is answering questions appropriately. He completed the Mini-Cog and with a score of 3 and he does not meet DSM-5 criteria at this time.   The patient has complaints of balance issues, for example when he is walking, he feels that he leans to the left side. He has had 3 falls within the last 3 months. He denies dizziness and vertigo. Denies numbness, tingling, and pain in his extremities. He also states that his right knee freezes up to where he can not walk and his left foot is difficult to lift when ambulating. Another concern for him is that he has developed a tremor in his right hand. He also states that his handwriting has changed recently, in that it has become smaller and harder to read. I have reviewed the patient's chart and medications.   Review of systems are negative unless otherwise specified in HPI.   Objective   Neurological Exam  Physical Exam  Mental status: Awake & alert, no acute distress. Oriented to person, place, and time. Speech fluent, clear. Naming, repetition, and comprehension intact. Short term memory semi intact and tested by  "word recall (1/3). Attention intact with naming months of year backwards.  Cardiopulmonary: HR and rhythm regular (PPM), normal S1, S2, no murmur auscultated, no bruits heard bilateral carotid arteries. CTAB, no evidence of respiratory distress. Abdomen is soft, non distended, no organ megaly. No edema present in extremities, pulses are +2 throughout.   CN: PERRLA, pupils were 3/3mm to 2/2mm, Visual field intact x 4 with finger counting. Facial sensation intact to light touch. No facial asymmetry. Palate elevated symmetrically with \"ahh\". Shoulder shrug symmetric. Tongue protruded midline.   Motor: Normal muscle bulk & tone, strength 5/5 BUE/BLE  Sensory: Intact to light touch, no issue with differentiation. No tactile extinction.  Coordination: Finger to nose without overshoot.  Reflexes: 2+ bilaterally in biceps, triceps, brachialradialis, patella, and achilles.  Gait: nonataxic    Expanded:  Facial muscles are symmetric but with masked facies     Ocular versions not intact     Speech? fluent to history and without hypophonia     Muscle tone and bulk normal and without cogwheel rigidity     No bradykinesia in the upper and lower extremities assessed by finger tapping, foot tapping, and rapid alternating movements     Fine tremor right hand, no dyskinesia     Stooped posture     Abnormal gait with left foot drag      Assessment/Plan   I have reviewed the patient's labs and medications. Because the patient has some concerning signs and symptoms of Parkinson's, I would like him to see Dr. Garrett for a more in depth neuromuscular exam. I have advised the patient to not drive until he is cleared at next neurology appt. He is to continue PT/OT outpatient.   "

## 2023-10-09 NOTE — PROGRESS NOTES
Name: Anjum Hernandez  YOB: 1950    FOLLOW UP VISIT: SNF, recovered Covid 19    SUBJECTIVE:  The patient is up in his room and appears to be comfortable and in no acute distress. He is now out of isolation and recovered from Covid. The patient reported that he really did not have any symptoms. He is doing better - Mentation is clearer and he is participating in therapy. He has an appt later this week w/Neurology. The patient denies SOB or CP.     REVIEW OF SYSTEMS:   All review of systems are negative unless otherwise stated above under subjective.    LABS REVIEWED AT FACILITY:  CBC and Differential  REPORTED 10/05/2023 11:31  White Blood Cell Count   7.20 k/uL 3.70-11.00    RBC   4.38 m/uL 4.20-6.00    Hemoglobin L 12.0 g/dL 13.0-17.0    Hematocrit L 38.9 % 39.0-51.0    MCV   88.8 fL 80.0-100.0    MCH   27.4 pg 26.0-34.0    MCHC   30.8 g/dL 30.5-36.0    RDW-CV H 15.9 % 11.5-15.0    Platelet Count   213 k/uL 150-400    MPV   10.3 fL 9.0-12.7    Neut%   75.4 %    Abs Neut   5.43 k/uL 1.45-7.50    Lymph%   13.1 %    Abs Lymph L 0.94 k/uL 1.00-4.00    Mono%   8.2 %    Abs Mono   0.59 k/uL <0.87    Eosin%   2.2 %    Abs Eosin   0.16 k/uL <0.46    Baso%   0.4 %    Abs Baso   0.03 k/uL <0.11    Immature Gran %%   0.7 %    Abs Immature Gran   0.05 k/uL <0.10    Absolute nRBC   0.0 /100 WBC    Absolute nRBC   <0.01 k/uL <0.01    Diff Type   Auto               Comp Metabolic Panel  REPORTED 10/05/2023 11:43  Protein, Total L 5.9 g/dL 6.3-8.0    Albumin L 3.4 g/dL 3.9-4.9    Calcium, Total   9.6 mg/dL 8.5-10.2    Bilirubin, Total   0.3 mg/dL 0.2-1.3    Alkaline Phosphatase   97 U/L     AST L 12 U/L 14-40    ALT   14 U/L 10-54    Glucose   89 mg/dL 74-99  BUN   14 mg/dL 9-24    Creatinine   1.00 mg/dL 0.73-1.22    Sodium   140 mmol/L 136-144    Potassium   4.0 mmol/L 3.7-5.1    Chloride   103 mmol/L     CO2   27 mmol/L 22-30    Anion Gap   10 mmol/L 9-18    Estimated Glomerular Filtration Rate    80 mL/min/1.73 meters squared >=60      MEDICATIONS REVIEWED AT FACILITY:  NO changes to meds or initiation of new meds    Living will related issues reviewed-Code status: Full code    OBJECTIVE:  BP (!) 171/96   Pulse 64   Temp 36.1 °C (97 °F)   Resp 16   Ht 1.829 m (6')   Wt 125 kg (276 lb 1.6 oz)   SpO2 95%   BMI 37.45 kg/m²     Physical Exam  Constitutional:       Appearance: Normal appearance. He is obese.   HENT:      Head: Normocephalic.      Right Ear: External ear normal.      Left Ear: External ear normal.      Nose: Nose normal.      Mouth/Throat:      Mouth: Mucous membranes are moist.   Eyes:      Extraocular Movements: Extraocular movements intact.      Conjunctiva/sclera: Conjunctivae normal.      Pupils: Pupils are equal, round, and reactive to light.   Cardiovascular:      Rate and Rhythm: Normal rate and regular rhythm.      Pulses: Normal pulses.      Heart sounds: Normal heart sounds.   Pulmonary:      Effort: Pulmonary effort is normal.      Breath sounds: Normal breath sounds.   Abdominal:      General: Bowel sounds are normal. There is no distension.      Palpations: Abdomen is soft.      Tenderness: There is no abdominal tenderness.      Comments: Round large abdomen   Genitourinary:     Comments: Not examined  Musculoskeletal:         General: Normal range of motion.      Cervical back: Normal range of motion and neck supple.      Comments: Generalized weakness   Skin:     General: Skin is warm and dry.      Capillary Refill: Capillary refill takes less than 2 seconds.   Neurological:      General: No focal deficit present.      Mental Status: He is alert. Mental status is at baseline.      Comments: Oriented x3  Psychiatric:         Mood and Affect: Mood normal.         Behavior: Behavior normal.         Thought Content: Thought content normal.         Judgment: Judgment normal.     Assessment/Plan   Problem List Items Addressed This Visit       Benign essential hypertension -  Primary    Cardiac pacemaker    COPD, mild (CMS/HCC)    Obstructive sleep apnea, adult    Paroxysmal atrial fibrillation (CMS/HCC)    Physical deconditioning    Cognitive decline       Skin integrity:  Nursing to monitor skin integrity as patient is at risk for pressure injuries.    PLAN:  Pt has been seen for follow up visit.  The patient is here at facility for PT/OT/ST to maximize strength, function, endurance and safety.  The patient is participating in therapy. Anjum Hernandez is making progress to the best of his/her ability. He needs frequent ques to stand up straight while ambulating w/FWW.   Please see PT/OT/ST notes in the facility for detailed information regarding progression of patients progress.   Recent nursing evaluation and notes were reviewed.   Overall, patient is stable despite his/her chronic conditions.    #Cognitive decline ?Early Dementia: f/up Neuro 10/10/23 - patients mentation is clear. He is A/Ox3.  #COPD/sleep apnea: cont home meds, CPAP at HS or nap time, stable resp status, cont to monitor resp status  #Crohn's disease: HOLD Xelganz  #Depression/Anxiety: cont home meds  #Hypothoid: cont synthroid, TSH within normal  #Afib: Xarelto and Metoprolol tartrate maintained, BP readings reviewed, slightly elevated 170/90's today - recheck 150/84.  #Freq UTI's and abnormal US on 8/10/23: renal cyst including a right central renal large parapelvic cyst also evident in previous ultrasound and CT.  Indeterminant irregular urinary bladder wall thickening. Cystitis and urethral neoplasm cannot be excluded. F/up appt needed w/Dr. Chávez 10/31/23 at 8AM.  Any decline or change in condition needs to be communicated with the physician or myself.    Discussion with nursing staff regarding ongoing care and management.  If needed, would communicate with family who are not present at this time.   There are no concerns at this time.  We will continue with the medications noted above.    We will continue  to follow the patient here at the facility.    Discharge planning: Possibly 10/16/23  Discharge Home when goals are met.   Follow up with PCP in 1-2 weeks after discharge from facility.   HHC to follow after discharge for continued PT/OT and Nursing.  *The patient will require a wheelchair as he cannot safely stand at walker level and perform ADL's. His home provides adequate space for the wheelchair. He is able to safely maneuver the wheelchair. Pt will also need a BSC as he will be room confined.   *Therapy and Discharge Nurse planner is recommending assistance in the home due to his unsteadiness.   *Please note that nursing facility, outside laboratory agency, and  [facility] do not interface.     Completion of the note was done through Dragon voice recognition technology and may include   unintended or grammatical errors which may not have been recognized when finalizing the note.     Time:    Fiona Portillo, APRN-CNP

## 2023-10-10 ENCOUNTER — OFFICE VISIT (OUTPATIENT)
Dept: NEUROLOGY | Facility: CLINIC | Age: 73
End: 2023-10-10
Payer: MEDICARE

## 2023-10-10 VITALS
HEART RATE: 86 BPM | SYSTOLIC BLOOD PRESSURE: 140 MMHG | WEIGHT: 275 LBS | HEIGHT: 72 IN | DIASTOLIC BLOOD PRESSURE: 87 MMHG | BODY MASS INDEX: 37.25 KG/M2

## 2023-10-10 DIAGNOSIS — R41.89 COGNITIVE DECLINE: Primary | ICD-10-CM

## 2023-10-10 DIAGNOSIS — G93.40 ENCEPHALOPATHY: ICD-10-CM

## 2023-10-10 DIAGNOSIS — R26.9 ABNORMAL GAIT: ICD-10-CM

## 2023-10-10 DIAGNOSIS — R25.1 TREMOR: ICD-10-CM

## 2023-10-10 PROCEDURE — 1160F RVW MEDS BY RX/DR IN RCRD: CPT

## 2023-10-10 PROCEDURE — 99214 OFFICE O/P EST MOD 30 MIN: CPT

## 2023-10-10 PROCEDURE — 3079F DIAST BP 80-89 MM HG: CPT

## 2023-10-10 PROCEDURE — 1126F AMNT PAIN NOTED NONE PRSNT: CPT

## 2023-10-10 PROCEDURE — 1159F MED LIST DOCD IN RCRD: CPT

## 2023-10-10 PROCEDURE — 1036F TOBACCO NON-USER: CPT

## 2023-10-10 PROCEDURE — 3008F BODY MASS INDEX DOCD: CPT

## 2023-10-10 PROCEDURE — 3077F SYST BP >= 140 MM HG: CPT

## 2023-10-10 ASSESSMENT — PATIENT HEALTH QUESTIONNAIRE - PHQ9
1. LITTLE INTEREST OR PLEASURE IN DOING THINGS: NOT AT ALL
2. FEELING DOWN, DEPRESSED OR HOPELESS: NOT AT ALL
SUM OF ALL RESPONSES TO PHQ9 QUESTIONS 1 & 2: 0

## 2023-10-10 NOTE — PATIENT INSTRUCTIONS
You have passes your Mini-Cog screening. I do not believe that you have dementia at this time. I would like you to follow up with our movement disorder specialist, Dr. Garrett, for your other symptoms: gait difficulty, stiffness, frequent falls, tremor, changes in handwriting.

## 2023-10-16 ENCOUNTER — TELEPHONE (OUTPATIENT)
Dept: PRIMARY CARE | Facility: CLINIC | Age: 73
End: 2023-10-16
Payer: COMMERCIAL

## 2023-10-16 NOTE — TELEPHONE ENCOUNTER
Valero was just discharged from [facility] and they wanted to know if danni lira will follow home healthcare. Their # is [phone]

## 2023-10-17 ENCOUNTER — OFFICE VISIT (OUTPATIENT)
Dept: CARDIOLOGY CLINIC | Age: 73
End: 2023-10-17
Payer: MEDICARE

## 2023-10-17 ENCOUNTER — PATIENT OUTREACH (OUTPATIENT)
Dept: PRIMARY CARE | Facility: CLINIC | Age: 73
End: 2023-10-17
Payer: COMMERCIAL

## 2023-10-17 VITALS
HEART RATE: 77 BPM | OXYGEN SATURATION: 94 % | HEIGHT: 72 IN | DIASTOLIC BLOOD PRESSURE: 64 MMHG | BODY MASS INDEX: 38.25 KG/M2 | SYSTOLIC BLOOD PRESSURE: 90 MMHG

## 2023-10-17 DIAGNOSIS — I10 PRIMARY HYPERTENSION: ICD-10-CM

## 2023-10-17 DIAGNOSIS — E78.00 PURE HYPERCHOLESTEROLEMIA: ICD-10-CM

## 2023-10-17 DIAGNOSIS — I48.0 PAROXYSMAL ATRIAL FIBRILLATION (HCC): Primary | ICD-10-CM

## 2023-10-17 DIAGNOSIS — I49.5 SSS (SICK SINUS SYNDROME) (HCC): ICD-10-CM

## 2023-10-17 DIAGNOSIS — Z95.0 PRESENCE OF PERMANENT CARDIAC PACEMAKER: ICD-10-CM

## 2023-10-17 PROCEDURE — G8484 FLU IMMUNIZE NO ADMIN: HCPCS | Performed by: INTERNAL MEDICINE

## 2023-10-17 PROCEDURE — 3017F COLORECTAL CA SCREEN DOC REV: CPT | Performed by: INTERNAL MEDICINE

## 2023-10-17 PROCEDURE — 99214 OFFICE O/P EST MOD 30 MIN: CPT | Performed by: INTERNAL MEDICINE

## 2023-10-17 PROCEDURE — 1123F ACP DISCUSS/DSCN MKR DOCD: CPT | Performed by: INTERNAL MEDICINE

## 2023-10-17 PROCEDURE — 3074F SYST BP LT 130 MM HG: CPT | Performed by: INTERNAL MEDICINE

## 2023-10-17 PROCEDURE — 3078F DIAST BP <80 MM HG: CPT | Performed by: INTERNAL MEDICINE

## 2023-10-17 PROCEDURE — 93000 ELECTROCARDIOGRAM COMPLETE: CPT | Performed by: INTERNAL MEDICINE

## 2023-10-17 PROCEDURE — G8417 CALC BMI ABV UP PARAM F/U: HCPCS | Performed by: INTERNAL MEDICINE

## 2023-10-17 PROCEDURE — 1036F TOBACCO NON-USER: CPT | Performed by: INTERNAL MEDICINE

## 2023-10-17 PROCEDURE — G8427 DOCREV CUR MEDS BY ELIG CLIN: HCPCS | Performed by: INTERNAL MEDICINE

## 2023-10-17 NOTE — TELEPHONE ENCOUNTER
Tammi ARELLANO from [facility] called back I let her know that Dr Lindsey will follow home healthcare. Any questions call [phone].

## 2023-10-17 NOTE — PROGRESS NOTES
Discharge Facility: [facility]  Discharge Diagnosis:   Benign essential hypertension - Primary   Cardiac pacemaker  COPD, mild (CMS/HCC)  Obstructive sleep apnea, adult  Paroxysmal atrial fibrillation (CMS/HCC)  Physical deconditioning  Cognitive decline    Admission Date: 8/26/23  Discharge Date: 10/16/23    PCP Appointment Date: 10/31/23 - task to office for sooner appt.   Specialist Appointment Date:   - Dr. Chávez (urology): 11/1/23  Hospital Encounter and Summary: Linked   See discharge assessment below for further details    Medications  Medications reviewed with patient/caregiver?: Yes (see med list) (10/18/2023  8:44 AM)  Is the patient having any side effects they believe may be caused by any medication additions or changes?: No (10/18/2023  8:44 AM)  Does the patient have all medications ordered at discharge?: No (10/18/2023  8:44 AM)  What is keeping the patient from filling the prescriptions?: Lost script/didn't receive (10/18/2023  8:44 AM)  Care Management Interventions: Advised patient to call provider (10/18/2023  8:44 AM)  Prescription Comments: Patient does not have xeljans for his crohns in med list. Patient advised to call prescriber and states understanding. (10/18/2023  8:44 AM)  Is the patient taking all medications as directed (includes completed medication regime)?: Yes (10/18/2023  8:44 AM)  Care Management Interventions: Provided patient education (10/18/2023  8:44 AM)    Appointments  Does the patient have a primary care provider?: Yes (10/18/2023  8:44 AM)  Care Management Interventions: Verified appointment date/time/provider (10/18/2023  8:44 AM)  Care Management Interventions: Advised patient to keep appointment (10/18/2023  8:44 AM)    Self Management  What is the home health agency?: [facility] [phone] (10/18/2023  8:44 AM)  Has home health visited the patient within 72 hours of discharge?: Call prior to 72 hours (scheduled for visit 10/18/23)  (10/18/2023  8:44 AM)  What Durable Medical Equipment (DME) was ordered?: n/a (10/18/2023  8:44 AM)    Patient Teaching  Does the patient have access to their discharge instructions?: Yes (10/18/2023  8:44 AM)  Care Management Interventions: Reviewed instructions with patient (10/18/2023  8:44 AM)  What is the patient's perception of their health status since discharge?: Improving (10/18/2023  8:44 AM)  Is the patient/caregiver able to teach back the hierarchy of who to call/visit for symptoms/problems? PCP, Specialist, Home Health nurse, Urgent Care, ED, 911: Yes (10/18/2023  8:44 AM)  Patient/Caregiver Education Comments: Spoke with patient who states he is doing okay. States his balance is off but home care is coming out today. Denies additonal concerns at this time. (10/18/2023  8:44 AM)

## 2023-10-17 NOTE — PROGRESS NOTES
(3/9/2020)    Hunger Vital Sign     Worried About Running Out of Food in the Last Year: Never true     Ran Out of Food in the Last Year: Never true       Family History   Problem Relation Age of Onset    Cancer Mother     Diabetes Father     Cancer Paternal Uncle        Current Outpatient Medications   Medication Sig Dispense Refill    rivaroxaban (XARELTO) 20 MG TABS tablet Take 1 tablet by mouth daily (with breakfast) 90 tablet 3    VENTOLIN  (90 Base) MCG/ACT inhaler Inhale 2 puffs every 4 hours if needed for shortness of breath. CPAP Machine MISC by Does not apply route Change CPAP pressure to Auto 5-10 cm 1 each 0    metoprolol tartrate (LOPRESSOR) 25 MG tablet TAKE 1 TABLET TWICE DAILY 180 tablet 3    buPROPion (WELLBUTRIN XL) 300 MG extended release tablet Take 1 tablet by mouth every morning      chlorthalidone (HYGROTON) 25 MG tablet Take 1 tablet daily      rosuvastatin (CRESTOR) 10 MG tablet Take 1 tablet by mouth daily      Respiratory Therapy Supplies ROSAURA New C pap 5cm H2O and supplies  DX G47.33  Z99.9 1 each 0    telmisartan (MICARDIS) 40 MG tablet Take 1 tablet by mouth daily      mirtazapine (REMERON) 45 MG tablet Take 1 tablet by mouth nightly      levothyroxine (SYNTHROID) 25 MCG tablet Take 1 tablet by mouth Daily      blood glucose monitor kit and supplies Test 2-3 times a day & as needed for symptoms of irregular blood glucose. 1 kit 0    Cholecalciferol (VITAMIN D) 2000 units CAPS capsule Take 1 capsule by mouth daily      Tofacitinib Citrate (XELJANZ) 5 MG TABS Take by mouth      chlorthalidone (HYGROTON) 25 MG tablet Take 1 tablet by mouth daily       No current facility-administered medications for this visit. Azathioprine and Clindamycin    Review of Systems:  General ROS: negative  Psychological ROS: negative  Hematological and Lymphatic ROS: No history of blood clots or bleeding disorder.    Respiratory ROS: no cough, shortness of breath, or wheezing  Cardiovascular

## 2023-10-19 ENCOUNTER — APPOINTMENT (OUTPATIENT)
Dept: UROLOGY | Facility: CLINIC | Age: 73
End: 2023-10-19
Payer: COMMERCIAL

## 2023-10-23 NOTE — PROGRESS NOTES
Subjective:     Deanna Cordova is a 71 y.o. male who complains today of:     Chief Complaint   Patient presents with    Follow-up     4m f/u on ETIENNE       HPI  He is using CPAP with  5  centimeters of H2O with heated humidity. He is using CPAP for about  5 hours every night. He is using CPAP with  full face  Mask. He said  sleep is restful with the CPAP use. He is compliant with CPAP therapy and benefiting with CPAP use. No snoring with CPAP use. No complaint of daytime sleepiness or tiredness with CPAP use. He denies taking naps. No sleepiness with driving. He denies difficulty falling asleep or staying asleep. I reviewed compliance report with patient regarding CPAP therapy. He is using  CPAP for 30 days out of 30 days. Average usage of days used is 4 hours and 50 min , average AHI 7.5  with CPAP use. Occasional mild shortness of breath  with exertion and having   Wheezing. No Cough with  Sputum. No Chest tightness. He use albuterol HFA prn if wheezing or SOB    Allergies:  Azathioprine and Clindamycin  Past Medical History:   Diagnosis Date    Colitis     Hypertension     Paroxysmal atrial fibrillation ([address] 11/1/2018    Persistent atrial fibrillation ([address] 11/1/2018    Type II or unspecified type diabetes mellitus without mention of complication, not stated as uncontrolled      Past Surgical History:   Procedure Laterality Date    ANTERIOR CRUCIATE LIGAMENT REPAIR      CARDIAC PACEMAKER PLACEMENT      COLONOSCOPY  05/22/2019    DR BONILLA    GALLBLADDER SURGERY      PACEMAKER PLACEMENT  07/10/2018    [facility] W [facility] DO    SIGMOIDOSCOPY  11/03/2017    DR. BONILLA    SIGMOIDOSCOPY  05/28/2019    DR BONILLA    TOE SURGERY      TONSILLECTOMY      WRIST SURGERY  2013     Family History   Problem Relation Age of Onset    Cancer Mother     Diabetes Father     Cancer Paternal Uncle      Social History     Socioeconomic History    Marital status: Single     Spouse name: Not on file    Number yes

## 2023-10-30 DIAGNOSIS — E78.00 PURE HYPERCHOLESTEROLEMIA: Primary | ICD-10-CM

## 2023-10-30 RX ORDER — ROSUVASTATIN CALCIUM 10 MG/1
10 TABLET, COATED ORAL NIGHTLY
Qty: 90 TABLET | Refills: 3 | Status: SHIPPED | OUTPATIENT
Start: 2023-10-30

## 2023-10-30 NOTE — PROGRESS NOTES
"Subjective   Patient ID: Anjum Hernandez is a 73 y.o. male who presents for Hospital Follow-up and Medicare Annual Wellness Visit Subsequent. I last saw patient on 6/15/2023. Patient's sister is with him today.     HPI   Anjum Hernandez is a 73 y.o. male presenting today for follow-up after being discharged from the hospital 15 days ago. The main problem requiring admission was UTI. The discharge summary and/or Transitional Care Management documentation was reviewed. Medication reconciliation was performed as indicated via the \"Ricardo as Reviewed\" timestamp.     Anjum Hernandez was contacted by Transitional Care Management services two days after his discharge. This encounter and supporting documentation was reviewed.     He states that he was having cognitive issues and unable to get up when he was admitted into the hospital 8/8/23. He was told he had a UTI.     He states that he can walk, but not long distances. He is currently in PT and has a [facility] nurse.    He has noticed that his vision is changing, things on TV seem too small to see.     He admits that his writing is not as it used to be, it tends to be slanted upwards.      Review of Systems  Except positives as noted in the CC & HPI      Constitutional: Denies fevers, chills, night sweats, fatigue, weight changes, change in appetite    Eyes: Denies blurry vision, double vision    ENT: Denies otalgia, trouble hearing, tinnitus, vertigo, nasal congestion, rhinorrhea, sore throat    Neck: Denies swelling, masses    Cardiovascular: Denies chest pain, palpitations, edema, orthopnea, syncope    Respiratory: Denies dyspnea, cough, wheezing, postural nocturnal dyspnea    Gastrointestinal: Denies abdominal pain, nausea, vomiting, diarrhea, constipation, melena, hematochezia    Genitourinary: Denies dysuria, hematuria, frequency, urgency    Musculoskeletal: Denies back pain, neck pain, arthralgias, myalgias    Integumentary: Denies skin lesions, rashes, masses  " "  Neurological: Denies dizziness, headaches, confusion, limb weakness, paresthesias, syncope, convulsions    Psychiatric: Denies depression, anxiety, homicidal ideations, suicidal ideations, sleep disturbances    Endocrine: Denies polyphagia, polydipsia, polyuria, weakness, hair thinning, heat intolerance, cold intolerance, weight changes    Heme/Lymph: Denies easy bruising, easy bleeding, swollen glands     Objective   /76 (BP Location: Right arm, Patient Position: Sitting)   Pulse 68   Temp 36.4 °C (97.5 °F)   Resp 16   Ht 1.753 m (5' 9\")   Wt 125 kg (275 lb)   SpO2 94%   BMI 40.61 kg/m²     Physical Exam  122/76 on recheck of BP in the right arm.     General Appearance - well-developed, obese, 73 y.o., White male in no acute distress. Patient is in a wheelchair.     Skin - warm, pink and dry without rash or concerning lesions. Dryness of the skin involving the LEs.     Mental Status - alert and oriented x 3. Normal mood and affect appropriate to mood.     Neck - supple without lymphadenopathy. Carotid pulses are normal without bruits. Thyroid is normal in midline without nodules.     Chest - lungs are clear to auscultation without rales, rhonchi or wheezes. Mildly diminished breath sounds at the bases bilaterally.     Heart - regular, rate and rhythm without murmurs, rubs or gallops.    Extremities - no cyanosis, clubbing. Trace edema of bilateral ankles. Pedal pulses are 2+ normal at the dorsalis pedis and posterior pulses bilaterally.     Neurological - cranial nerves II through XII are grossly intact. Motor strength 5/5 at all fours.     Results  Reviewed lab work results from 8/9/23-10/12/23, brain MRI results from 8/24/23, CT abdomen/pelvis from 8/23/23.     Assessment/Plan   1. Routine general medical examination at health care facility  1 Year Follow Up In [facility] - PCP - Wellness Exam      2. Hospital discharge follow-up        3. Physical deconditioning        4. Benign " essential hypertension  CBC and Auto Differential    Comprehensive Metabolic Panel      5. Cardiac pacemaker        6. Paroxysmal atrial fibrillation (CMS/HCC)        7. Acquired hypothyroidism  TSH with reflex to Free T4 if abnormal      8. Cognitive decline        9. COPD, mild (CMS/HCC)        10. Hyperglycemia  Hemoglobin A1C      11. Class 3 severe obesity due to excess calories with serious comorbidity and body mass index (BMI) of 40.0 to 44.9 in adult (CMS/HCC)        12. Need for hepatitis C screening test        Patient to continue current medications (with any exceptions as noted) and diet. Follow-up in 1 month(s) otherwise as needed.        Will obtain CBC, CMP, A1c, TSH today. Will call patient with results when available.     Patient is to follow up with PT, [facility], Dr. Chávez (11/1), Dr. Goodwin (12/6), Dr. Garrett (1/18/2024) as scheduled.     He will need follow up on the right kidney density.     Advised patient that he should not be driving, until he is walking and moving around normally.     Encouraged patient to adhere to recommendations from PT. In order to improve his physical abilities, he needs to be motivated and determined to make progress.       Scribe Attestation  By signing my name below, IAriana Scribe   attest that this documentation has been prepared under the direction and in the presence of Rian Lindsey MD.

## 2023-10-30 NOTE — TELEPHONE ENCOUNTER
Rx Refill Request Telephone Encounter    Name:  Anjum Hernandez  :  [id number]  Medication Name:  ROSUVASTATIN 10MG   Specific Pharmacy location:  [facility]   Date of last appointment:  6/15/23  Date of next appointment:  10/31/23  Best number to reach patient:  [phone]

## 2023-10-31 ENCOUNTER — LAB (OUTPATIENT)
Dept: LAB | Facility: LAB | Age: 73
End: 2023-10-31
Payer: MEDICARE

## 2023-10-31 ENCOUNTER — APPOINTMENT (OUTPATIENT)
Dept: UROLOGY | Facility: CLINIC | Age: 73
End: 2023-10-31
Payer: COMMERCIAL

## 2023-10-31 ENCOUNTER — OFFICE VISIT (OUTPATIENT)
Dept: PRIMARY CARE | Facility: CLINIC | Age: 73
End: 2023-10-31
Payer: MEDICARE

## 2023-10-31 VITALS
TEMPERATURE: 97.5 F | WEIGHT: 275 LBS | DIASTOLIC BLOOD PRESSURE: 76 MMHG | SYSTOLIC BLOOD PRESSURE: 122 MMHG | HEART RATE: 68 BPM | HEIGHT: 69 IN | BODY MASS INDEX: 40.73 KG/M2 | OXYGEN SATURATION: 94 % | RESPIRATION RATE: 16 BRPM

## 2023-10-31 DIAGNOSIS — E03.9 ACQUIRED HYPOTHYROIDISM: ICD-10-CM

## 2023-10-31 DIAGNOSIS — R73.9 HYPERGLYCEMIA: ICD-10-CM

## 2023-10-31 DIAGNOSIS — I10 BENIGN ESSENTIAL HYPERTENSION: ICD-10-CM

## 2023-10-31 DIAGNOSIS — E66.01 CLASS 3 SEVERE OBESITY DUE TO EXCESS CALORIES WITH SERIOUS COMORBIDITY AND BODY MASS INDEX (BMI) OF 40.0 TO 44.9 IN ADULT (MULTI): ICD-10-CM

## 2023-10-31 DIAGNOSIS — Z09 HOSPITAL DISCHARGE FOLLOW-UP: ICD-10-CM

## 2023-10-31 DIAGNOSIS — R53.81 PHYSICAL DECONDITIONING: ICD-10-CM

## 2023-10-31 DIAGNOSIS — Z00.00 ROUTINE GENERAL MEDICAL EXAMINATION AT HEALTH CARE FACILITY: Primary | ICD-10-CM

## 2023-10-31 DIAGNOSIS — J44.9 COPD, MILD (MULTI): ICD-10-CM

## 2023-10-31 DIAGNOSIS — Z11.59 NEED FOR HEPATITIS C SCREENING TEST: ICD-10-CM

## 2023-10-31 DIAGNOSIS — Z95.0 CARDIAC PACEMAKER: ICD-10-CM

## 2023-10-31 DIAGNOSIS — R41.89 COGNITIVE DECLINE: ICD-10-CM

## 2023-10-31 DIAGNOSIS — I48.0 PAROXYSMAL ATRIAL FIBRILLATION (MULTI): ICD-10-CM

## 2023-10-31 LAB
ALBUMIN SERPL BCP-MCNC: 4.1 G/DL (ref 3.4–5)
ALP SERPL-CCNC: 90 U/L (ref 33–136)
ALT SERPL W P-5'-P-CCNC: 19 U/L (ref 10–52)
ANION GAP SERPL CALC-SCNC: 11 MMOL/L (ref 10–20)
AST SERPL W P-5'-P-CCNC: 19 U/L (ref 9–39)
BASOPHILS # BLD AUTO: 0.04 X10*3/UL (ref 0–0.1)
BASOPHILS NFR BLD AUTO: 0.5 %
BILIRUB SERPL-MCNC: 0.4 MG/DL (ref 0–1.2)
BUN SERPL-MCNC: 20 MG/DL (ref 6–23)
CALCIUM SERPL-MCNC: 9.8 MG/DL (ref 8.6–10.3)
CHLORIDE SERPL-SCNC: 105 MMOL/L (ref 98–107)
CO2 SERPL-SCNC: 30 MMOL/L (ref 21–32)
CREAT SERPL-MCNC: 1.08 MG/DL (ref 0.5–1.3)
EOSINOPHIL # BLD AUTO: 0.12 X10*3/UL (ref 0–0.4)
EOSINOPHIL NFR BLD AUTO: 1.5 %
ERYTHROCYTE [DISTWIDTH] IN BLOOD BY AUTOMATED COUNT: 15.4 % (ref 11.5–14.5)
EST. AVERAGE GLUCOSE BLD GHB EST-MCNC: 126 MG/DL
GFR SERPL CREATININE-BSD FRML MDRD: 73 ML/MIN/1.73M*2
GLUCOSE SERPL-MCNC: 94 MG/DL (ref 74–99)
HBA1C MFR BLD: 6 %
HCT VFR BLD AUTO: 44.9 % (ref 41–52)
HGB BLD-MCNC: 13.8 G/DL (ref 13.5–17.5)
IMM GRANULOCYTES # BLD AUTO: 0.03 X10*3/UL (ref 0–0.5)
IMM GRANULOCYTES NFR BLD AUTO: 0.4 % (ref 0–0.9)
LYMPHOCYTES # BLD AUTO: 1.19 X10*3/UL (ref 0.8–3)
LYMPHOCYTES NFR BLD AUTO: 15.1 %
MCH RBC QN AUTO: 27.4 PG (ref 26–34)
MCHC RBC AUTO-ENTMCNC: 30.7 G/DL (ref 32–36)
MCV RBC AUTO: 89 FL (ref 80–100)
MONOCYTES # BLD AUTO: 0.51 X10*3/UL (ref 0.05–0.8)
MONOCYTES NFR BLD AUTO: 6.5 %
NEUTROPHILS # BLD AUTO: 6 X10*3/UL (ref 1.6–5.5)
NEUTROPHILS NFR BLD AUTO: 76 %
NRBC BLD-RTO: 0 /100 WBCS (ref 0–0)
PLATELET # BLD AUTO: 239 X10*3/UL (ref 150–450)
PMV BLD AUTO: 10.9 FL (ref 7.5–11.5)
POTASSIUM SERPL-SCNC: 4.5 MMOL/L (ref 3.5–5.3)
PROT SERPL-MCNC: 6.7 G/DL (ref 6.4–8.2)
RBC # BLD AUTO: 5.03 X10*6/UL (ref 4.5–5.9)
SODIUM SERPL-SCNC: 141 MMOL/L (ref 136–145)
TSH SERPL-ACNC: 1.63 MIU/L (ref 0.44–3.98)
WBC # BLD AUTO: 7.9 X10*3/UL (ref 4.4–11.3)

## 2023-10-31 PROCEDURE — 36415 COLL VENOUS BLD VENIPUNCTURE: CPT

## 2023-10-31 PROCEDURE — 80053 COMPREHEN METABOLIC PANEL: CPT

## 2023-10-31 PROCEDURE — 84443 ASSAY THYROID STIM HORMONE: CPT

## 2023-10-31 PROCEDURE — 1170F FXNL STATUS ASSESSED: CPT | Performed by: FAMILY MEDICINE

## 2023-10-31 PROCEDURE — 83036 HEMOGLOBIN GLYCOSYLATED A1C: CPT

## 2023-10-31 PROCEDURE — 99214 OFFICE O/P EST MOD 30 MIN: CPT | Performed by: FAMILY MEDICINE

## 2023-10-31 PROCEDURE — 1159F MED LIST DOCD IN RCRD: CPT | Performed by: FAMILY MEDICINE

## 2023-10-31 PROCEDURE — 3074F SYST BP LT 130 MM HG: CPT | Performed by: FAMILY MEDICINE

## 2023-10-31 PROCEDURE — 3078F DIAST BP <80 MM HG: CPT | Performed by: FAMILY MEDICINE

## 2023-10-31 PROCEDURE — 1036F TOBACCO NON-USER: CPT | Performed by: FAMILY MEDICINE

## 2023-10-31 PROCEDURE — 1126F AMNT PAIN NOTED NONE PRSNT: CPT | Performed by: FAMILY MEDICINE

## 2023-10-31 PROCEDURE — 3008F BODY MASS INDEX DOCD: CPT | Performed by: FAMILY MEDICINE

## 2023-10-31 PROCEDURE — G0439 PPPS, SUBSEQ VISIT: HCPCS | Performed by: FAMILY MEDICINE

## 2023-10-31 PROCEDURE — 85025 COMPLETE CBC W/AUTO DIFF WBC: CPT

## 2023-10-31 PROCEDURE — 1160F RVW MEDS BY RX/DR IN RCRD: CPT | Performed by: FAMILY MEDICINE

## 2023-10-31 RX ORDER — BUDESONIDE, GLYCOPYRROLATE, AND FORMOTEROL FUMARATE 160; 9; 4.8 UG/1; UG/1; UG/1
AEROSOL, METERED RESPIRATORY (INHALATION)
COMMUNITY
End: 2023-12-27

## 2023-10-31 RX ORDER — METOPROLOL TARTRATE 25 MG/1
25 TABLET, FILM COATED ORAL 2 TIMES DAILY
Qty: 180 TABLET | Refills: 3 | Status: CANCELLED | OUTPATIENT
Start: 2023-10-31

## 2023-10-31 RX ORDER — LEVOTHYROXINE SODIUM 25 UG/1
25 TABLET ORAL DAILY
Qty: 90 TABLET | Refills: 3 | Status: CANCELLED | OUTPATIENT
Start: 2023-10-31

## 2023-10-31 RX ORDER — ATORVASTATIN CALCIUM 20 MG/1
20 TABLET, FILM COATED ORAL NIGHTLY
COMMUNITY
End: 2023-10-31 | Stop reason: ALTCHOICE

## 2023-10-31 ASSESSMENT — ENCOUNTER SYMPTOMS
DEPRESSION: 0
OCCASIONAL FEELINGS OF UNSTEADINESS: 1
LOSS OF SENSATION IN FEET: 0

## 2023-10-31 ASSESSMENT — ACTIVITIES OF DAILY LIVING (ADL)
DRESSING: INDEPENDENT
BATHING: NEEDS ASSISTANCE
MANAGING_FINANCES: INDEPENDENT
GROCERY_SHOPPING: NEEDS ASSISTANCE
DOING_HOUSEWORK: NEEDS ASSISTANCE
TAKING_MEDICATION: INDEPENDENT

## 2023-10-31 ASSESSMENT — PATIENT HEALTH QUESTIONNAIRE - PHQ9
2. FEELING DOWN, DEPRESSED OR HOPELESS: NOT AT ALL
SUM OF ALL RESPONSES TO PHQ9 QUESTIONS 1 AND 2: 0
1. LITTLE INTEREST OR PLEASURE IN DOING THINGS: NOT AT ALL

## 2023-10-31 NOTE — ASSESSMENT & PLAN NOTE
Condition is stable. Patient is to continue current medications and regimen. Patient is to follow up to monitor his/her condition at least once annually.     hard copy, drawn during this pregnancy

## 2023-10-31 NOTE — PROGRESS NOTES
"Subjective   Reason for Visit: Anjum Hernandez is an 73 y.o. male here for a Medicare Wellness visit.          Reviewed all medications by prescribing practitioner or clinical pharmacist (such as prescriptions, OTCs, herbal therapies and supplements) and documented in the medical record.    HPI    Patient Care Team:  Rian Lindsey MD as PCP - General  Rian Lindsey MD as PCP - [facility]ributed [facility]  MAHAMED Roland-CNP as Nurse Practitioner (Neurology)  Nunu Herrera LPN as Care Manager (Case Management)     Review of Systems    Objective   Vitals:  /76 (BP Location: Right arm, Patient Position: Sitting)   Pulse 68   Temp 36.4 °C (97.5 °F)   Resp 16   Ht 1.753 m (5' 9\")   Wt 125 kg (275 lb)   SpO2 94%   BMI 40.61 kg/m²       Physical Exam    Assessment/Plan   Problem List Items Addressed This Visit       Acquired hypothyroidism    Relevant Orders    TSH with reflex to Free T4 if abnormal (Completed)    Benign essential hypertension    Relevant Orders    CBC and Auto Differential (Completed)    Comprehensive Metabolic Panel (Completed)    Cardiac pacemaker    COPD, mild (CMS/HCC)    Current Assessment & Plan     Condition is stable. Patient is to continue current medications and regimen. Patient is to follow up to monitor his/her condition at least once annually.           Class 3 severe obesity due to excess calories with serious comorbidity and body mass index (BMI) of 40.0 to 44.9 in adult (CMS/HCC)    Current Assessment & Plan     Condition is stable. Patient is to continue current medications and regimen. Patient is to follow up to monitor his/her condition at least once annually.           Paroxysmal atrial fibrillation (CMS/HCC)    Current Assessment & Plan     Condition is stable. Patient is to continue current medications and regimen. Patient is to follow up with cardiology to monitor his/her condition at least once annually.           Physical deconditioning    Cognitive " decline     Other Visit Diagnoses       Routine general medical examination at health care facility    -  Primary    Relevant Orders    1 Year Follow Up In [facility] - PCP - Wellness Exam    Hospital discharge follow-up        Hyperglycemia        Relevant Orders    Hemoglobin A1C (Completed)    Need for hepatitis C screening test

## 2023-10-31 NOTE — ASSESSMENT & PLAN NOTE
Condition is stable. Patient is to continue current medications and regimen. Patient is to follow up with cardiology to monitor his/her condition at least once annually.

## 2023-10-31 NOTE — PATIENT INSTRUCTIONS
Patient to continue current medications (with any exceptions as noted) and diet. Follow-up in 1 month(s) otherwise as needed.        Will obtain CBC, CMP, A1c, TSH today. Will call patient with results when available.     Patient is to follow up with PT, [facility], Dr. Chávez (11/1), Dr. Goodwin (12/6), Dr. Garrett (1/18/2024) as scheduled.     Advised patient that he should not be driving, until he is walking and moving around normally.

## 2023-11-01 ENCOUNTER — PATIENT OUTREACH (OUTPATIENT)
Dept: PRIMARY CARE | Facility: CLINIC | Age: 73
End: 2023-11-01

## 2023-11-01 ENCOUNTER — OFFICE VISIT (OUTPATIENT)
Dept: UROLOGY | Facility: CLINIC | Age: 73
End: 2023-11-01
Payer: MEDICARE

## 2023-11-01 VITALS
DIASTOLIC BLOOD PRESSURE: 90 MMHG | HEIGHT: 72 IN | TEMPERATURE: 96.8 F | SYSTOLIC BLOOD PRESSURE: 147 MMHG | BODY MASS INDEX: 37.3 KG/M2 | HEART RATE: 74 BPM

## 2023-11-01 DIAGNOSIS — N30.00 ACUTE CYSTITIS WITHOUT HEMATURIA: Primary | ICD-10-CM

## 2023-11-01 DIAGNOSIS — N28.89 RIGHT RENAL MASS: ICD-10-CM

## 2023-11-01 PROCEDURE — 3080F DIAST BP >= 90 MM HG: CPT | Performed by: UROLOGY

## 2023-11-01 PROCEDURE — 1160F RVW MEDS BY RX/DR IN RCRD: CPT | Performed by: UROLOGY

## 2023-11-01 PROCEDURE — 3008F BODY MASS INDEX DOCD: CPT | Performed by: UROLOGY

## 2023-11-01 PROCEDURE — 99222 1ST HOSP IP/OBS MODERATE 55: CPT | Performed by: UROLOGY

## 2023-11-01 PROCEDURE — 3077F SYST BP >= 140 MM HG: CPT | Performed by: UROLOGY

## 2023-11-01 PROCEDURE — 1159F MED LIST DOCD IN RCRD: CPT | Performed by: UROLOGY

## 2023-11-01 PROCEDURE — 1126F AMNT PAIN NOTED NONE PRSNT: CPT | Performed by: UROLOGY

## 2023-11-01 PROCEDURE — 1036F TOBACCO NON-USER: CPT | Performed by: UROLOGY

## 2023-11-01 NOTE — PROGRESS NOTES
Unable to reach patient for call back after patient's follow up appointment with PCP.   NATIM with call back number for patient to call if needed   If no voicemail available call attempts x 2 were made to contact the patient to assist with any questions or concerns patient may have.

## 2023-11-01 NOTE — PROGRESS NOTES
Subjective   Patient ID: Anjum Hernandez is a 73 y.o. male new patient kindly referred by Dom LUCAS who presents for New Patient Visit (Patient stated was in   hospital  in /([place]) for IBS AUG. 9TH-2023-OCT 16 TH 2023).    HPI  Patient had a UTI and was in hospital from August until October. He states his stream is fine and he denies hematuria and dysuria. He denies flank pain.  Patient denies other UTIs. He is not on any medications for his prostate.     CT a/p w/ contrast:  IMPRESSION:  1.  Nonobstructing calcification of the right kidney measuring up to  5 mm. No obstructing urolithiasis is detected.  2. 1.9 cm right renal lower pole hypodensity which is too high in  attenuation represent a simple cyst. This is increased in size since  prior comparison CT in 2018 when it measured 1.3 cm. Renal mass  protocol MRI advised on a nonemergent basis for follow-up  surveillance.    Renal US  IMPRESSION:  No hydronephrosis bilaterally.  Bilateral renal cysts including a right central renal large  parapelvic cyst, also evident on previous ultrasound and CT.  Indeterminate irregular urinary bladder wall thickening. Cystitis and  urothelial neoplasm can not be excluded.    Past Medical History  Past Medical History:   Diagnosis Date    Arthritis     COPD (chronic obstructive pulmonary disease) (CMS/[facility]ly [facility])     Crohn's disease, unspecified, with rectal bleeding (CMS/[facility]ly [facility]) 08/16/2022    Crohn's disease with rectal bleeding, unspecified gastrointestinal tract location    Diarrhea, unspecified     Hemorrhagic diarrhea    Hematuria of undiagnosed cause 02/01/2023    History of falling     History of fall    HL (hearing loss)     Hypertension     Iron deficiency anemia secondary to blood loss (chronic)     Anemia due to gastrointestinal blood loss    Obesity, unspecified 03/02/2022    Class 2 obesity with body mass index (BMI) of 38.0 to 38.9 in adult    Other general symptoms and signs 04/14/2021    Eye, ear, nose, and  throat symptom    Personal history of diseases of the blood and blood-forming organs and certain disorders involving the immune mechanism     History of leukocytosis    Personal history of diseases of the skin and subcutaneous tissue     History of pyoderma gangrenosum    Personal history of other diseases of the circulatory system 04/14/2021    History of hypertension    Personal history of other diseases of the circulatory system 04/14/2021    History of cardiac disorder    Personal history of other diseases of the musculoskeletal system and connective tissue 04/14/2021    History of arthritis    Personal history of other diseases of urinary system     History of acute renal failure    Personal history of other endocrine, nutritional and metabolic disease     History of hypovolemia    UTI (urinary tract infection) 08/22/2023        Surgical History  Past Surgical History:   Procedure Laterality Date    JOINT REPLACEMENT      OTHER SURGICAL HISTORY  02/11/2021    Gallbladder surgery    OTHER SURGICAL HISTORY  02/11/2021    Knee surgery    OTHER SURGICAL HISTORY  02/11/2021    Colonoscopy    OTHER SURGICAL HISTORY  02/11/2021    Tonsillectomy         Social History  He reports that he has quit smoking. His smoking use included cigars. He has never been exposed to tobacco smoke. He has never used smokeless tobacco. He reports that he does not drink alcohol and does not use drugs.    Family History  Family History   Problem Relation Name Age of Onset    Arthritis Mother Amalia     Cancer Mother Amalia     Heart disease Mother Amalia        Medications    Current Outpatient Medications:     albuterol (Ventolin HFA) 90 mcg/actuation inhaler, Inhale 2 puffs every 4 hours if needed for shortness of breath., Disp: 18 g, Rfl: 5    buPROPion XL (Wellbutrin XL) 300 mg 24 hr tablet, Take 1 tablet (300 mg) by mouth 1 (one) time each day., Disp: 90 tablet, Rfl: 3    cholecalciferol (Vitamin D-3) 50 mcg (2,000 unit)  capsule, Take 1 tablet by mouth 1 (one) time each day., Disp: , Rfl:     levothyroxine (Synthroid, Levoxyl) 25 mcg tablet, Take 1 tablet (25 mcg) by mouth once daily., Disp: 90 tablet, Rfl: 1    metoprolol tartrate (Lopressor) 25 mg tablet, Take 1 tablet (25 mg) by mouth 2 times a day., Disp: , Rfl:     mirtazapine (Remeron) 45 mg tablet, Take 1 tablet (45 mg) by mouth once daily at bedtime., Disp: 90 tablet, Rfl: 3    rivaroxaban (Xarelto) 20 mg tablet, Take 1 tablet (20 mg) by mouth once daily., Disp: , Rfl:     tofacitinib (Xeljanz) 5 mg tablet, Take 1 tablet (5 mg) by mouth 2 times a day., Disp: , Rfl:     zinc gluconate 50 mg tablet, Take 1 tablet (50 mg) by mouth once daily., Disp: , Rfl:     Breztri Aerosphere 160-9-4.8 mcg/actuation HFA aerosol inhaler, INHALE 2 PUFFS TWICE DAILY FOR COPD, Disp: , Rfl:     rosuvastatin (Crestor) 10 mg tablet, Take 1 tablet (10 mg) by mouth once daily at bedtime., Disp: 90 tablet, Rfl: 3     Allergies  Azathioprine and Clindamycin     Review of Systems  A 12 system review was completed and is negative with the exception of those signs and symptoms noted in the history of present illness.    Objective   Physical Exam  General: in NAD, appears stated age  Head: normocephalic, atraumatic  Neck: supple; trachea is midline  Respiratory: normal effort, no use of accessory muscles  Cardiovascular: no peripheral edema  Abdomen: soft, nondistended, nontender, no rebound or guarding, no organomegaly, no CVA tenderness, no hernia  Lymphatic: no lymphadenopathy noted  Skin: normal turgor, no rashes  Neurologic: grossly intact, oriented to person/place/time  Psychiatric: mode and affect appropriate  : normal phallus, normal meatus, scrotum normal, testicles normal bilaterally, epididymis normal bilaterally  BENIGNO: normal tone, no hemorrhoids, prostate smooth/nontender/no nodules    Assessment/Plan   Problem List Items Addressed This Visit             ICD-10-CM       Genitourinary and  Reproductive    Right renal mass N28.89    Relevant Orders    CT kidney w and wo IV contrast    Creatinine    Acute cystitis without hematuria - Primary N30.00     We discussed the CT findings of renal cysts. I suggest that we continue with surveillance. We discussed his enlarged prostate but he denies symptoms. We discussed the renal US results and we will schedule a cystoscopy. Order CT scan for right renal mass in 4 months from now. We will see the patient back in 6 months with CT results.     Scribe Attestation  By signing my name below, I, Rupert Carmona   attest that this documentation has been prepared under the direction and in the presence of Zhang Chávez MD.

## 2023-11-07 ENCOUNTER — APPOINTMENT (OUTPATIENT)
Dept: PRIMARY CARE | Facility: CLINIC | Age: 73
End: 2023-11-07
Payer: MEDICARE

## 2023-11-20 ENCOUNTER — TELEPHONE (OUTPATIENT)
Dept: PRIMARY CARE | Facility: CLINIC | Age: 73
End: 2023-11-20
Payer: COMMERCIAL

## 2023-11-20 DIAGNOSIS — I10 BENIGN ESSENTIAL HYPERTENSION: Primary | ICD-10-CM

## 2023-11-20 RX ORDER — VALSARTAN 160 MG/1
160 TABLET ORAL DAILY
Qty: 30 TABLET | Refills: 5 | Status: SHIPPED | OUTPATIENT
Start: 2023-11-20 | End: 2024-04-29

## 2023-11-20 NOTE — TELEPHONE ENCOUNTER
Kellee from [facility] called in regarding the patient. She stated on both Friday 11/17 and today, the patients bp was elevated. She stated its been ranging around 140-150/100-120. Kellee stated the patient is complaining of blurry vision but it refusing to go to the ER. Patient has stated the blurry vision has been going on for about 1 month now an does not believe it is related. Would Dr. Lindsey want to call him in a medication for this? Should the patient be seen?  Please Advise

## 2023-11-24 ENCOUNTER — PATIENT OUTREACH (OUTPATIENT)
Dept: PRIMARY CARE | Facility: CLINIC | Age: 73
End: 2023-11-24
Payer: COMMERCIAL

## 2023-11-29 ENCOUNTER — OFFICE VISIT (OUTPATIENT)
Dept: PRIMARY CARE | Facility: CLINIC | Age: 73
End: 2023-11-29
Payer: MEDICARE

## 2023-11-29 VITALS
HEART RATE: 81 BPM | SYSTOLIC BLOOD PRESSURE: 132 MMHG | DIASTOLIC BLOOD PRESSURE: 80 MMHG | OXYGEN SATURATION: 90 % | WEIGHT: 276.4 LBS | TEMPERATURE: 97.2 F | HEIGHT: 72 IN | BODY MASS INDEX: 37.44 KG/M2

## 2023-11-29 DIAGNOSIS — I48.0 PAROXYSMAL ATRIAL FIBRILLATION (MULTI): ICD-10-CM

## 2023-11-29 DIAGNOSIS — I10 BENIGN ESSENTIAL HYPERTENSION: Primary | ICD-10-CM

## 2023-11-29 DIAGNOSIS — J44.9 COPD, MILD (MULTI): ICD-10-CM

## 2023-11-29 DIAGNOSIS — R53.81 PHYSICAL DECONDITIONING: ICD-10-CM

## 2023-11-29 DIAGNOSIS — G81.92 HEMIPARESIS OF LEFT DOMINANT SIDE, UNSPECIFIED HEMIPARESIS ETIOLOGY (MULTI): ICD-10-CM

## 2023-11-29 DIAGNOSIS — Z95.0 CARDIAC PACEMAKER: ICD-10-CM

## 2023-11-29 DIAGNOSIS — N28.89 RIGHT RENAL MASS: ICD-10-CM

## 2023-11-29 DIAGNOSIS — E03.9 ACQUIRED HYPOTHYROIDISM: ICD-10-CM

## 2023-11-29 DIAGNOSIS — R35.0 FREQUENT URINATION: ICD-10-CM

## 2023-11-29 DIAGNOSIS — R41.89 COGNITIVE DECLINE: ICD-10-CM

## 2023-11-29 DIAGNOSIS — E66.01 CLASS 2 SEVERE OBESITY DUE TO EXCESS CALORIES WITH SERIOUS COMORBIDITY AND BODY MASS INDEX (BMI) OF 37.0 TO 37.9 IN ADULT (MULTI): ICD-10-CM

## 2023-11-29 PROCEDURE — 99214 OFFICE O/P EST MOD 30 MIN: CPT | Performed by: FAMILY MEDICINE

## 2023-11-29 PROCEDURE — 1160F RVW MEDS BY RX/DR IN RCRD: CPT | Performed by: FAMILY MEDICINE

## 2023-11-29 PROCEDURE — 3079F DIAST BP 80-89 MM HG: CPT | Performed by: FAMILY MEDICINE

## 2023-11-29 PROCEDURE — 3075F SYST BP GE 130 - 139MM HG: CPT | Performed by: FAMILY MEDICINE

## 2023-11-29 PROCEDURE — 1159F MED LIST DOCD IN RCRD: CPT | Performed by: FAMILY MEDICINE

## 2023-11-29 PROCEDURE — 1036F TOBACCO NON-USER: CPT | Performed by: FAMILY MEDICINE

## 2023-11-29 PROCEDURE — 1126F AMNT PAIN NOTED NONE PRSNT: CPT | Performed by: FAMILY MEDICINE

## 2023-11-29 PROCEDURE — 3008F BODY MASS INDEX DOCD: CPT | Performed by: FAMILY MEDICINE

## 2023-11-29 NOTE — PROGRESS NOTES
Subjective   Patient ID: Anjum Hernandez is a 73 y.o. male who presents for Hypertension, Hyperglycemia, and Hypothyroidism.  I last saw the patient 10/31/2023. Patient's sister is with him today.     HPI   No improvement since last visit with PT, still having problems walking.     Thinks maybe a mini stoke, problems seem to be on his left side. He has a hard time picking up his left foot to walk. His hand writing has gotten worse. He is left handed. He has noticed a slight tremor in his left hand. The vision in his left eye has gotten worse as well, he describes it as blurry.     Review of Systems  Except positives as noted in the CC & HPI      Constitutional: Denies fevers, chills, night sweats, fatigue, weight changes, change in appetite    Eyes: Denies double vision    ENT: Denies otalgia, trouble hearing, tinnitus, vertigo, nasal congestion, rhinorrhea, sore throat    Neck: Denies swelling, masses    Cardiovascular: Denies chest pain, palpitations, edema, orthopnea, syncope    Respiratory: Denies dyspnea, cough, wheezing, postural nocturnal dyspnea    Gastrointestinal: Denies abdominal pain, nausea, vomiting, diarrhea, constipation, melena, hematochezia    Genitourinary: Denies dysuria, hematuria, frequency, urgency    Musculoskeletal: Denies back pain, neck pain, arthralgias, myalgias    Integumentary: Denies skin lesions, rashes, masses    Neurological: Denies dizziness, headaches, confusion, paresthesias, syncope, convulsions    Psychiatric: Denies depression, anxiety, homicidal ideations, suicidal ideations, sleep disturbances    Endocrine: Denies polyphagia, polydipsia, polyuria, hair thinning, heat intolerance, cold intolerance, weight changes    Heme/Lymph: Denies easy bruising, easy bleeding, swollen glands    Objective   /80 (BP Location: Right arm, Patient Position: Sitting)   Pulse 81   Temp 36.2 °C (97.2 °F) (Temporal)   Ht 1.829 m (6')   Wt 125 kg (276 lb 6.4 oz)   SpO2 90%   BMI 37.49  kg/m²     Physical Exam  132/80 on recheck of BP in the right arm.     General Appearance - well-developed, well-nourished, 73 y.o., White male in no acute distress. Patient is in a wheelchair.     Skin - warm, pink and dry without rash or concerning lesions. Dryness of the skin involving the LEs.     Mental Status - alert and oriented x 3. Normal mood and affect appropriate to mood.     Neck - supple without lymphadenopathy. Carotid pulses are normal without bruits. Thyroid is normal in midline without nodules.     Chest - lungs are clear to auscultation without rales, rhonchi or wheezes. Mildly diminished breath sounds at the bases bilaterally. Pacemaker in the left upper chest.     Heart - regular, rate and rhythm without murmurs, rubs or gallops.     Extremities - no cyanosis, clubbing. Trace edema of bilateral ankles. Pedal pulses are 2+ normal at the dorsalis pedis and posterior pulses bilaterally.     Neurological - cranial nerves II through XII grossly intact. Motor strength 5/5 at all fours bilaterally. DTRs 2+ normal at all fours bilaterally and symmetrically. Cerebellar functioning is normal with normal finger to nose touching.     Results  Reviewed brain MRI results from 8/24/23.      Assessment/Plan   1. Benign essential hypertension  Follow Up In [facility] - PCP - Established      2. Hemiparesis of left dominant side, unspecified hemiparesis etiology (CMS/HCC)  MR brain w IV contrast    Follow Up In [facility] - PCP - Established      3. Cardiac pacemaker        4. Paroxysmal atrial fibrillation (CMS/HCC)        5. Acquired hypothyroidism        6. Cognitive decline  Follow Up In [facility] - PCP - Established      7. Physical deconditioning  Follow Up In [facility] - PCP - Established      8. COPD, mild (CMS/HCC)        9. Right renal mass        10. Frequent urination        11. Class 2 severe obesity due to excess calories with serious comorbidity and  body mass index (BMI) of 37.0 to 37.9 in adult (CMS/HCC)  Follow Up In [facility] - PCP - Established      Patient to continue current medications (with any exceptions as noted) and diet. Follow-up in 1 month(s) otherwise as needed.      Ordered brain MRI. Will call patient with results when available.     Refer patient to [facility] for further evaluation and treatment of physical deconditioning.     Patient is to follow up with Dr. Goodwin, Dr. Chávez, Dr. Garrett (neuro), Dr. Salcedo as scheduled.         Scribe Attestation  By signing my name below, I, Ariana Avitia, Rupert   attest that this documentation has been prepared under the direction and in the presence of Rian Lindsey MD.

## 2023-11-29 NOTE — PATIENT INSTRUCTIONS
Patient to continue current medications (with any exceptions as noted) and diet. Follow-up in 1 month(s) otherwise as needed.      Ordered brain MRI. Will call patient with results when available.     Refer patient to [facility] PT for further evaluation and treatment of physical deconditioning.     Patient is to follow up with Dr. Goodwin, Dr. Chávez, Dr. Garrett (neuro), Dr. Salcedo as scheduled.

## 2023-12-08 DIAGNOSIS — I48.0 PAROXYSMAL ATRIAL FIBRILLATION (HCC): ICD-10-CM

## 2023-12-08 RX ORDER — RIVAROXABAN 20 MG/1
20 TABLET, FILM COATED ORAL
Qty: 90 TABLET | Refills: 3 | Status: SHIPPED | OUTPATIENT
Start: 2023-12-08

## 2023-12-08 NOTE — TELEPHONE ENCOUNTER
Requesting medication refill. Please approve or deny this request.    Rx requested:  Requested Prescriptions     Pending Prescriptions Disp Refills    XARELTO 20 MG TABS tablet [Pharmacy Med Name: Xarelto 20 mg tablet] 21 tablet 0     Sig: TAKE 1 TABLET AT BEDTIME         Last Office Visit:   10/17/2023      Next Visit Date:  Future Appointments   Date Time Provider [address]   2/14/2024 11:00 AM Amanda Mohan MD [facility]   10/22/2024 10:30 AM Maryam Reddy DO [address] [facility]               Last refill 06/02/2023. Please approve or deny.

## 2023-12-12 ENCOUNTER — OFFICE VISIT (OUTPATIENT)
Dept: ORTHOPEDIC SURGERY | Facility: CLINIC | Age: 73
End: 2023-12-12
Payer: MEDICARE

## 2023-12-12 ENCOUNTER — ANCILLARY PROCEDURE (OUTPATIENT)
Dept: RADIOLOGY | Facility: CLINIC | Age: 73
End: 2023-12-12
Payer: MEDICARE

## 2023-12-12 DIAGNOSIS — Z47.1 AFTERCARE FOLLOWING RIGHT KNEE JOINT REPLACEMENT SURGERY: Primary | ICD-10-CM

## 2023-12-12 DIAGNOSIS — Z96.651 AFTERCARE FOLLOWING RIGHT KNEE JOINT REPLACEMENT SURGERY: Primary | ICD-10-CM

## 2023-12-12 DIAGNOSIS — Z96.651 STATUS POST TOTAL RIGHT KNEE REPLACEMENT: ICD-10-CM

## 2023-12-12 PROCEDURE — 1126F AMNT PAIN NOTED NONE PRSNT: CPT | Performed by: ORTHOPAEDIC SURGERY

## 2023-12-12 PROCEDURE — 3008F BODY MASS INDEX DOCD: CPT | Performed by: ORTHOPAEDIC SURGERY

## 2023-12-12 PROCEDURE — 73562 X-RAY EXAM OF KNEE 3: CPT | Mod: RIGHT SIDE | Performed by: ORTHOPAEDIC SURGERY

## 2023-12-12 PROCEDURE — 1160F RVW MEDS BY RX/DR IN RCRD: CPT | Performed by: ORTHOPAEDIC SURGERY

## 2023-12-12 PROCEDURE — 1159F MED LIST DOCD IN RCRD: CPT | Performed by: ORTHOPAEDIC SURGERY

## 2023-12-12 PROCEDURE — 73562 X-RAY EXAM OF KNEE 3: CPT | Mod: RT

## 2023-12-12 PROCEDURE — 99213 OFFICE O/P EST LOW 20 MIN: CPT | Mod: PO | Performed by: ORTHOPAEDIC SURGERY

## 2023-12-12 PROCEDURE — 99213 OFFICE O/P EST LOW 20 MIN: CPT | Performed by: ORTHOPAEDIC SURGERY

## 2023-12-12 PROCEDURE — 1036F TOBACCO NON-USER: CPT | Performed by: ORTHOPAEDIC SURGERY

## 2023-12-13 NOTE — PROGRESS NOTES
History of present illness    73-year-old gentleman here for follow-up of his right total knee replacement surgery was November 21 he states his knee has been doing great he has had multiple other issues he had a severe UTI which required a 5-day hospital stay in 2 months in rehab he is using a walker mainly due to muscle weakness.  Far as his right knee goes he has no complaints.      Past medical , Surgical, Family and social history reviewed.      Physical exam  General: No acute distress and breathing comfortably.  Patient is pleasant and cooperative with the examination.    Extremity  Knee incisions well-healed good range of motion with full knee extension flexion 115 degrees no instability brisk cap refill compartments soft calf is nontender    Diagnostics      XR knee right 3 views    Result Date: 12/12/2023  Interpreted By:  Lashae Perry, STUDY: XR KNEE RIGHT 3 VIEWS;  ;  12/12/2023 1:30 pm   INDICATION: Signs/Symptoms:s/p TKR.   ACCESSION NUMBER(S): [id number]   ORDERING CLINICIAN: LASHAE PERRY   FINDINGS: Three views of the knee show status post joint replacement in good alignment.  There are no signs of fracture or dislocation.  No other bony abnormalities       Signed by: Lashae Perry 12/12/2023 1:47 PM Dictation workstation:   [id number]       Procedure  Status post right total knee replacement    Assessment  Status post right total knee replacement    Treatment plan  1.  Continue working on range of motion strengthening continue with his physical therapy follow-up with me as needed.  2. [   ]  3. [   ]  4.  All of the patient's questions were answered.    This note was prepared using voice recognition software.  The details of this note are correct and have been reviewed, and corrected to the best of my ability.  Some grammatical areas may persist related to the Dragon software    Lashae Perry MD  Senior Attending Physician  [employer]  [facility]  [facility] [place]    [phone]

## 2023-12-17 DIAGNOSIS — F32.5 MAJOR DEPRESSIVE DISORDER IN FULL REMISSION, UNSPECIFIED WHETHER RECURRENT (CMS-HCC): ICD-10-CM

## 2023-12-18 RX ORDER — BUPROPION HYDROCHLORIDE 300 MG/1
TABLET ORAL
Qty: 21 TABLET | Refills: 0 | Status: SHIPPED | OUTPATIENT
Start: 2023-12-18 | End: 2024-01-02

## 2023-12-18 NOTE — TELEPHONE ENCOUNTER
Recent Visits  Date Type Provider Dept   11/29/23 Office Visit Rian Lindsey MD Do [id number] [facility]   10/31/23 Office Visit Rian Lindsey MD Do [id number] [facility]   06/15/23 Office Visit Rian Lindsey MD Do [id number] [facility]   03/15/23 Office Visit Rian Lindsey MD Do [id number] [facility]   Showing recent visits within past 540 days and meeting all other requirements  Future Appointments  Date Type Provider Dept   12/27/23 Appointment Rian Lindsey MD Do [id number] [facility]   Showing future appointments within next 180 days and meeting all other requirements

## 2023-12-19 ENCOUNTER — TELEPHONE (OUTPATIENT)
Dept: CARDIOLOGY CLINIC | Age: 73
End: 2023-12-19

## 2023-12-21 ENCOUNTER — HOSPITAL ENCOUNTER (OUTPATIENT)
Dept: CARDIOLOGY | Facility: HOSPITAL | Age: 73
Discharge: HOME | End: 2023-12-21
Payer: MEDICARE

## 2023-12-21 ENCOUNTER — HOSPITAL ENCOUNTER (OUTPATIENT)
Dept: RADIOLOGY | Facility: HOSPITAL | Age: 73
Discharge: HOME | End: 2023-12-21
Payer: MEDICARE

## 2023-12-21 DIAGNOSIS — I44.2 ATRIOVENTRICULAR BLOCK, THIRD DEGREE (MULTI): ICD-10-CM

## 2023-12-21 DIAGNOSIS — I44.2 ATRIOVENTRICULAR BLOCK, COMPLETE (MULTI): ICD-10-CM

## 2023-12-21 DIAGNOSIS — G81.92 HEMIPARESIS OF LEFT DOMINANT SIDE, UNSPECIFIED HEMIPARESIS ETIOLOGY (MULTI): ICD-10-CM

## 2023-12-21 DIAGNOSIS — Z95.0 PACEMAKER: ICD-10-CM

## 2023-12-21 PROCEDURE — 93286 PERI-PX EVAL PM/LDLS PM IP: CPT

## 2023-12-21 PROCEDURE — 70551 MRI BRAIN STEM W/O DYE: CPT | Performed by: RADIOLOGY

## 2023-12-21 PROCEDURE — 93286 PERI-PX EVAL PM/LDLS PM IP: CPT | Performed by: INTERNAL MEDICINE

## 2023-12-21 PROCEDURE — 70551 MRI BRAIN STEM W/O DYE: CPT

## 2023-12-21 NOTE — NURSING NOTE
Pt in the MRI suite for MRI. Pacer clinic put pt in MRI safe mode. Pt monitored during entire MRI. Uneventful MRI. Pacer clinic put the pt back in original mode. Pt torrie well.

## 2023-12-24 ASSESSMENT — ENCOUNTER SYMPTOMS
TINGLING: 0
PARESIS: 0
WEIGHT LOSS: 0
ABDOMINAL PAIN: 0
BACK PAIN: 1
NUMBNESS: 0
LEG PAIN: 0
HEADACHES: 0
FEVER: 0
BOWEL INCONTINENCE: 0
PARESTHESIAS: 0
WEAKNESS: 0
PERIANAL NUMBNESS: 0
DYSURIA: 0

## 2023-12-27 ENCOUNTER — OFFICE VISIT (OUTPATIENT)
Dept: PRIMARY CARE | Facility: CLINIC | Age: 73
End: 2023-12-27
Payer: MEDICARE

## 2023-12-27 VITALS
DIASTOLIC BLOOD PRESSURE: 82 MMHG | SYSTOLIC BLOOD PRESSURE: 138 MMHG | TEMPERATURE: 97.7 F | HEIGHT: 72 IN | OXYGEN SATURATION: 96 % | WEIGHT: 288.2 LBS | RESPIRATION RATE: 16 BRPM | HEART RATE: 71 BPM | BODY MASS INDEX: 39.04 KG/M2

## 2023-12-27 DIAGNOSIS — R53.81 PHYSICAL DECONDITIONING: ICD-10-CM

## 2023-12-27 DIAGNOSIS — E66.01 CLASS 2 SEVERE OBESITY DUE TO EXCESS CALORIES WITH SERIOUS COMORBIDITY AND BODY MASS INDEX (BMI) OF 39.0 TO 39.9 IN ADULT (MULTI): ICD-10-CM

## 2023-12-27 DIAGNOSIS — E03.9 ACQUIRED HYPOTHYROIDISM: ICD-10-CM

## 2023-12-27 DIAGNOSIS — I48.0 PAROXYSMAL ATRIAL FIBRILLATION (MULTI): ICD-10-CM

## 2023-12-27 DIAGNOSIS — Z95.0 CARDIAC PACEMAKER: ICD-10-CM

## 2023-12-27 DIAGNOSIS — J44.9 COPD, MILD (MULTI): ICD-10-CM

## 2023-12-27 DIAGNOSIS — K50.10 CROHN'S DISEASE OF LARGE INTESTINE WITHOUT COMPLICATION (MULTI): ICD-10-CM

## 2023-12-27 DIAGNOSIS — I10 BENIGN ESSENTIAL HYPERTENSION: Primary | ICD-10-CM

## 2023-12-27 DIAGNOSIS — E11.22 TYPE 2 DIABETES MELLITUS WITH CHRONIC KIDNEY DISEASE, WITHOUT LONG-TERM CURRENT USE OF INSULIN, UNSPECIFIED CKD STAGE (MULTI): ICD-10-CM

## 2023-12-27 DIAGNOSIS — M51.36 DDD (DEGENERATIVE DISC DISEASE), LUMBAR: ICD-10-CM

## 2023-12-27 DIAGNOSIS — R41.89 COGNITIVE DECLINE: ICD-10-CM

## 2023-12-27 DIAGNOSIS — G81.92 HEMIPARESIS OF LEFT DOMINANT SIDE, UNSPECIFIED HEMIPARESIS ETIOLOGY (MULTI): ICD-10-CM

## 2023-12-27 DIAGNOSIS — R35.0 FREQUENT URINATION: ICD-10-CM

## 2023-12-27 DIAGNOSIS — E78.00 PURE HYPERCHOLESTEROLEMIA: ICD-10-CM

## 2023-12-27 PROBLEM — M51.369 DDD (DEGENERATIVE DISC DISEASE), LUMBAR: Status: ACTIVE | Noted: 2023-12-27

## 2023-12-27 PROBLEM — R05.9 COUGH IN ADULT PATIENT: Status: RESOLVED | Noted: 2023-02-01 | Resolved: 2023-12-27

## 2023-12-27 PROBLEM — M79.671 RIGHT FOOT PAIN: Status: RESOLVED | Noted: 2023-02-01 | Resolved: 2023-12-27

## 2023-12-27 PROCEDURE — 3075F SYST BP GE 130 - 139MM HG: CPT | Performed by: FAMILY MEDICINE

## 2023-12-27 PROCEDURE — 1036F TOBACCO NON-USER: CPT | Performed by: FAMILY MEDICINE

## 2023-12-27 PROCEDURE — 3044F HG A1C LEVEL LT 7.0%: CPT | Performed by: FAMILY MEDICINE

## 2023-12-27 PROCEDURE — 1159F MED LIST DOCD IN RCRD: CPT | Performed by: FAMILY MEDICINE

## 2023-12-27 PROCEDURE — 1126F AMNT PAIN NOTED NONE PRSNT: CPT | Performed by: FAMILY MEDICINE

## 2023-12-27 PROCEDURE — 4010F ACE/ARB THERAPY RXD/TAKEN: CPT | Performed by: FAMILY MEDICINE

## 2023-12-27 PROCEDURE — 3079F DIAST BP 80-89 MM HG: CPT | Performed by: FAMILY MEDICINE

## 2023-12-27 PROCEDURE — 3008F BODY MASS INDEX DOCD: CPT | Performed by: FAMILY MEDICINE

## 2023-12-27 PROCEDURE — 1160F RVW MEDS BY RX/DR IN RCRD: CPT | Performed by: FAMILY MEDICINE

## 2023-12-27 PROCEDURE — 99214 OFFICE O/P EST MOD 30 MIN: CPT | Performed by: FAMILY MEDICINE

## 2023-12-27 ASSESSMENT — ENCOUNTER SYMPTOMS
TINGLING: 0
HEADACHES: 0
BOWEL INCONTINENCE: 0
ABDOMINAL PAIN: 0
PARESIS: 0
WEIGHT LOSS: 0
LEG PAIN: 0
OCCASIONAL FEELINGS OF UNSTEADINESS: 0
PARESTHESIAS: 0
PERIANAL NUMBNESS: 0
NUMBNESS: 0
BACK PAIN: 1
WEAKNESS: 0
DEPRESSION: 0
FEVER: 0
DYSURIA: 0
LOSS OF SENSATION IN FEET: 0

## 2023-12-27 ASSESSMENT — PATIENT HEALTH QUESTIONNAIRE - PHQ9
SUM OF ALL RESPONSES TO PHQ9 QUESTIONS 1 & 2: 0
1. LITTLE INTEREST OR PLEASURE IN DOING THINGS: NOT AT ALL
2. FEELING DOWN, DEPRESSED OR HOPELESS: NOT AT ALL

## 2023-12-27 NOTE — PROGRESS NOTES
Subjective   Patient ID: Anjum Hernandez is a 73 y.o. male who presents for Back Pain, Hypertension, and Obesity. I last saw the patient on 11/29/2023. Patient's sister is with him today.     Back Pain  This is a recurrent problem. The current episode started more than 1 month ago. The problem occurs daily. The problem has been gradually worsening since onset. The pain is present in the thoracic spine. The quality of the pain is described as aching. The pain does not radiate. The pain is at a severity of 8/10. The pain is Worse during the day. The symptoms are aggravated by bending and position. Stiffness is present In the morning. Pertinent negatives include no abdominal pain, bowel incontinence, chest pain, dysuria, fever, headaches, leg pain, numbness, paresis, paresthesias, perianal numbness, tingling, weakness or weight loss. Risk factors include obesity.      Patient has a brain MRI to be discussed today.     Patient states that he has been having low back pain, especially when going to get up out of a chair. He believes that it could be arthritis. OTC Tylenol with some relief. No heat or ice.     Patient is asking when the best time to take Xarelto is.     He has been doing his exercises as instructed as well.     Review of Systems   Constitutional:  Negative for fever and weight loss.   Cardiovascular:  Negative for chest pain.   Gastrointestinal:  Negative for abdominal pain and bowel incontinence.   Genitourinary:  Negative for dysuria.   Musculoskeletal:  Positive for back pain.   Neurological:  Negative for tingling, weakness, numbness, headaches and paresthesias.     Except positives as noted in the CC & HPI      Constitutional: Denies fevers, chills, night sweats, fatigue, weight changes, change in appetite    Eyes: Denies blurry vision, double vision    ENT: Denies otalgia, trouble hearing, tinnitus, vertigo, nasal congestion, rhinorrhea, sore throat    Neck: Denies swelling, masses     Cardiovascular: Denies chest pain, palpitations, edema, orthopnea, syncope    Respiratory: Denies dyspnea, cough, wheezing, postural nocturnal dyspnea    Gastrointestinal: Denies abdominal pain, nausea, vomiting, diarrhea, constipation, melena, hematochezia    Genitourinary: Denies dysuria, hematuria, frequency, urgency    Musculoskeletal: Denies neck pain, arthralgias, myalgias    Integumentary: Denies skin lesions, rashes, masses    Neurological: Denies dizziness, headaches, confusion, limb weakness, paresthesias, syncope, convulsions    Psychiatric: Denies depression, anxiety, homicidal ideations, suicidal ideations, sleep disturbances    Endocrine: Denies polyphagia, polydipsia, polyuria, weakness, hair thinning, heat intolerance, cold intolerance, weight changes    Heme/Lymph: Denies easy bruising, easy bleeding, swollen glands    Objective   /82 (BP Location: Right arm, Patient Position: Sitting)   Pulse 71   Temp 36.5 °C (97.7 °F)   Resp 16   Ht 1.829 m (6')   Wt 131 kg (288 lb 3.2 oz)   SpO2 96%   BMI 39.09 kg/m²     Physical Exam  138/82 on recheck of BP in the right arm.     General Appearance - well-developed, well-nourished, 73 y.o., White male in no acute distress. Patient is ambulating with a walker.     Skin - warm, pink and dry without rash or concerning lesions. Dryness of the skin involving the LEs.     Mental Status - alert and oriented x 3. Normal mood and affect appropriate to mood.     Neck - supple without lymphadenopathy. Carotid pulses are normal without bruits. Thyroid is normal in midline without nodules.     Chest - lungs are clear to auscultation without rales, rhonchi or wheezes. Mildly diminished breath sounds at the bases bilaterally. Pacemaker in the left upper chest.     Heart - regular, rate and rhythm without murmurs, rubs or gallops.     Extremities - no cyanosis, clubbing or edema. Pedal pulses are 2+ normal at the dorsalis pedis and posterior pulses bilaterally.      Neurological - cranial nerves II through XII grossly intact. Motor strength 5/5 at all fours bilaterally.     Results  Reviewed brain MRI results from 12/21/23.      Assessment/Plan   1. Benign essential hypertension  Follow Up In [facility] - [facility]CP - [facility]      2. DDD (degenerative disc disease), lumbar  Referral to Physical Therapy      3. Cognitive decline  Follow Up In [facility]      4. Physical deconditioning  Follow Up In [facility]      5. Hemiparesis of left dominant side, unspecified hemiparesis etiology (CMS/HCC)  Follow Up In [facility]      6. Paroxysmal atrial fibrillation (CMS/HCC)        7. Pure hypercholesterolemia        8. Cardiac pacemaker        9. Type 2 diabetes mellitus with chronic kidney disease, without long-term current use of insulin, unspecified CKD stage (CMS/HCC)        10. Acquired hypothyroidism        11. COPD, mild (CMS/HCC)        12. Crohn's disease of large intestine without complication (CMS/HCC)        13. Frequent urination        14. Class 2 severe obesity due to excess calories with serious comorbidity and body mass index (BMI) of 39.0 to 39.9 in adult (CMS/HCC)        Patient to continue current medications (with any exceptions as noted) and diet. Follow-up in 3 month(s) otherwise as needed.      Advised patient that he should speak to Dr. Garrett, neurology, about his driving ability and when he can start driving. Discussed with him that I would prefer he gain more strength before he can start driving again.     Recommend patient try warm, moist heat or ice for 10-15 minutes for his back pain.     Refer patient to [facility] for further evaluation and treatment of back pain.     Patient is to follow up with Dr. Chávez, Dr. Garrett (neuro), Dr. Goodwin, Dr. Salcedo, Dr. Sanders (gastro) as scheduled.       Scribe Attestation  By signing my name below, I, Ariana  Rupert Avitia   attest that this documentation has been prepared under the direction and in the presence of Rian Lindsey MD.

## 2023-12-27 NOTE — PATIENT INSTRUCTIONS
Patient to continue current medications (with any exceptions as noted) and diet. Follow-up in 3 month(s) otherwise as needed.      Advised patient that he should speak to Dr. Garrett, neurology, about his driving ability and when he can start driving. Discussed with him that I would prefer he gain more strength before he can start driving again.     Recommend patient try warm, moist heat or ice for 10-15 minutes for his back pain.     Refer patient to PT for further evaluation and treatment of back pain.     Patient is to follow up with Dr. Chávez, Dr. Garrett (neuro), Dr. Denisse Avalos, Dr. Sanders (gastro) as scheduled.

## 2023-12-28 ENCOUNTER — TELEPHONE (OUTPATIENT)
Dept: PRIMARY CARE | Facility: CLINIC | Age: 73
End: 2023-12-28
Payer: COMMERCIAL

## 2023-12-28 NOTE — ASSESSMENT & PLAN NOTE
Diabetes mellitus type 2 with diabetic chronic kidney disease is stable.  Patient to continue with current medications and treatment plan.  Follow-up at least annually.

## 2023-12-29 DIAGNOSIS — F32.5 MAJOR DEPRESSIVE DISORDER IN FULL REMISSION, UNSPECIFIED WHETHER RECURRENT (CMS-HCC): ICD-10-CM

## 2024-01-02 RX ORDER — BUPROPION HYDROCHLORIDE 300 MG/1
TABLET ORAL
Qty: 30 TABLET | Refills: 3 | Status: SHIPPED | OUTPATIENT
Start: 2024-01-02 | End: 2024-02-05

## 2024-01-02 NOTE — TELEPHONE ENCOUNTER
Recent Visits  Date Type Provider Dept   12/27/23 Office Visit Rian Lindsey MD Do [id number] [facility]   11/29/23 Office Visit Rian Lindsey MD Do [id number] [facility]   10/31/23 Office Visit Rian Lindsey MD Do [id number] [facility]   06/15/23 Office Visit Rian Lindsey MD Do [id number] [facility]   03/15/23 Office Visit Rian Lindsey MD Do [id number] [facility]   Showing recent visits within past 540 days and meeting all other requirements  Future Appointments  Date Type Provider Dept   03/26/24 Appointment Rian Lindsey MD Do [id number] [facility]   Showing future appointments within next 180 days and meeting all other requirements

## 2024-01-03 ENCOUNTER — PROCEDURE VISIT (OUTPATIENT)
Dept: UROLOGY | Facility: CLINIC | Age: 74
End: 2024-01-03
Payer: MEDICARE

## 2024-01-03 VITALS — SYSTOLIC BLOOD PRESSURE: 149 MMHG | HEART RATE: 72 BPM | TEMPERATURE: 97.1 F | DIASTOLIC BLOOD PRESSURE: 89 MMHG

## 2024-01-03 DIAGNOSIS — N30.00 ACUTE CYSTITIS WITHOUT HEMATURIA: ICD-10-CM

## 2024-01-03 LAB
POC APPEARANCE, URINE: CLEAR
POC BILIRUBIN, URINE: NEGATIVE
POC BLOOD, URINE: NEGATIVE
POC COLOR, URINE: YELLOW
POC GLUCOSE, URINE: NEGATIVE MG/DL
POC KETONES, URINE: ABNORMAL MG/DL
POC LEUKOCYTES, URINE: NEGATIVE
POC NITRITE,URINE: NEGATIVE
POC PH, URINE: 6 PH
POC PROTEIN, URINE: NEGATIVE MG/DL
POC SPECIFIC GRAVITY, URINE: 1.02
POC UROBILINOGEN, URINE: 0.2 EU/DL

## 2024-01-03 PROCEDURE — 52000 CYSTOURETHROSCOPY: CPT | Performed by: UROLOGY

## 2024-01-03 PROCEDURE — 81003 URINALYSIS AUTO W/O SCOPE: CPT | Performed by: UROLOGY

## 2024-01-03 NOTE — PROGRESS NOTES
Subjective   Patient ID: Anjum Hernandez is a 73 y.o. male with acute cystitis without hematuria who I last saw 11/1/2023. He presents for cystoscopy.     HPI  The patient states he has good urinary flow. He states his stream is fine and he denies hematuria and dysuria. He denies flank pain. Patient denies history of recurrent UTIs.      Patient had a UTI and was in hospital from 8/2023 until 10/2023. He is not on any medications for his prostate.        Review of Systems  A 12 system review was completed and is negative with the exception of those signs and symptoms noted in the history of present illness.    Objective   Physical Exam  General: in NAD, appears stated age  Head: normocephalic, atraumatic  Neck: supple; trachea is midline  Respiratory: normal effort, no use of accessory muscles  Cardiovascular: no peripheral edema  Abdomen: soft, nondistended, nontender, no rebound or guarding, no organomegaly, no CVA tenderness, no hernia  Lymphatic: no lymphadenopathy noted  Skin: normal turgor, no rashes  Neurologic: grossly intact, oriented to person/place/time  Psychiatric: mode and affect appropriate  : normal phallus, normal meatus, scrotum normal, testicles normal bilaterally, epididymis normal bilaterally  BENIGNO: normal tone, no hemorrhoids, prostate smooth/nontender/no nodules    Cystoscopy  Patient's genitalia were prepped and draped in the usual sterile fashion. A 17 Malay flexible cystoscope was passed atraumatically per the urethra. The anterior and posterior urethra were normal. The prostatic urethra was unremarkable.  There was moderate bilobar prostatic hypertrophy.  The bladder was inspected.  There was moderate bladder trabeculation.  No tumors, clots, stones, or foreign bodies were identified. The right and left ureteral orifice were in their normal anatomic position and were effluxing clear urine. The scope was retroflexed and the bladder neck was unremarkable. The cystoscope was removed and  the patient tolerated the procedure well.     Assessment/Plan   Problem List Items Addressed This Visit             ICD-10-CM       Genitourinary and Reproductive    Acute cystitis without hematuria N30.00    Relevant Orders    POCT UA Automated manually resulted (Completed)    Cystourethroscopy       The patient tolerated the cystoscopy well today. We discussed that they may have hematuria after the procedure.   Cystoscopy showed slight thickening of bladder muscle wall. I explained that this is most likely due to obstruction from his prostate. The patient is asymptomatic from this and not bothered. He will follow up in March 2024 with CT Kidney w/wo.               Scribe Attestation  By signing my name below, I, Katelyn Casalla, Scribe   attest that this documentation has been prepared under the direction and in the presence of Zhang Chávez MD.

## 2024-01-05 ENCOUNTER — TELEPHONE (OUTPATIENT)
Dept: PRIMARY CARE | Facility: CLINIC | Age: 74
End: 2024-01-05
Payer: COMMERCIAL

## 2024-01-05 RX ORDER — MUPIROCIN 20 MG/G
22 OINTMENT TOPICAL
COMMUNITY
Start: 2021-02-04

## 2024-01-05 RX ORDER — FENOFIBRATE 145 MG/1
TABLET, FILM COATED ORAL
COMMUNITY
Start: 2020-12-23 | End: 2024-01-22

## 2024-01-05 NOTE — TELEPHONE ENCOUNTER
Patient called in stating he has been very congested for 5 days and has a bad cough. Patient is wondering if Dr. Lindsey could call something in for this? Patient stated he did not take a covid test  [facility]  Please Advise

## 2024-01-05 NOTE — TELEPHONE ENCOUNTER
LOV: 12.27.2023  NOV: 03.26.2024    Allergies   Allergen Reactions    Azathioprine Diarrhea, Nausea And Vomiting, Other, Unknown and Nausea/vomiting     Vomiting and Nausea    severe nausea/vomiting    Clindamycin Diarrhea, Other and Unknown     severe nausea/vomiting    Caused CDiff   Caused CDiff      Pharmacy: [facility]

## 2024-01-06 DIAGNOSIS — J01.90 ACUTE NON-RECURRENT SINUSITIS, UNSPECIFIED LOCATION: Primary | ICD-10-CM

## 2024-01-06 RX ORDER — AZITHROMYCIN 250 MG/1
TABLET, FILM COATED ORAL
Qty: 6 TABLET | Refills: 0 | Status: SHIPPED | OUTPATIENT
Start: 2024-01-06 | End: 2024-01-10

## 2024-01-17 ENCOUNTER — HOSPITAL ENCOUNTER (OUTPATIENT)
Dept: CARDIOLOGY | Facility: HOSPITAL | Age: 74
Discharge: HOME | End: 2024-01-17
Payer: MEDICARE

## 2024-01-17 DIAGNOSIS — Z95.0 PACEMAKER: ICD-10-CM

## 2024-01-17 DIAGNOSIS — I44.2 ATRIOVENTRICULAR BLOCK, THIRD DEGREE (MULTI): ICD-10-CM

## 2024-01-17 DIAGNOSIS — Z95.0 PACEMAKER: Primary | ICD-10-CM

## 2024-01-17 PROCEDURE — 93296 REM INTERROG EVL PM/IDS: CPT

## 2024-01-17 PROCEDURE — 93294 REM INTERROG EVL PM/LDLS PM: CPT | Performed by: INTERNAL MEDICINE

## 2024-01-18 ENCOUNTER — OFFICE VISIT (OUTPATIENT)
Dept: NEUROLOGY | Facility: CLINIC | Age: 74
End: 2024-01-18
Payer: MEDICARE

## 2024-01-18 VITALS
HEIGHT: 72 IN | DIASTOLIC BLOOD PRESSURE: 114 MMHG | BODY MASS INDEX: 37.6 KG/M2 | HEART RATE: 89 BPM | WEIGHT: 277.6 LBS | SYSTOLIC BLOOD PRESSURE: 192 MMHG

## 2024-01-18 DIAGNOSIS — R26.9 ABNORMAL GAIT: ICD-10-CM

## 2024-01-18 DIAGNOSIS — R20.9 SENSORY DISTURBANCE: Primary | ICD-10-CM

## 2024-01-18 PROCEDURE — 4010F ACE/ARB THERAPY RXD/TAKEN: CPT | Performed by: STUDENT IN AN ORGANIZED HEALTH CARE EDUCATION/TRAINING PROGRAM

## 2024-01-18 PROCEDURE — 1126F AMNT PAIN NOTED NONE PRSNT: CPT | Performed by: STUDENT IN AN ORGANIZED HEALTH CARE EDUCATION/TRAINING PROGRAM

## 2024-01-18 PROCEDURE — 3008F BODY MASS INDEX DOCD: CPT | Performed by: STUDENT IN AN ORGANIZED HEALTH CARE EDUCATION/TRAINING PROGRAM

## 2024-01-18 PROCEDURE — 3080F DIAST BP >= 90 MM HG: CPT | Performed by: STUDENT IN AN ORGANIZED HEALTH CARE EDUCATION/TRAINING PROGRAM

## 2024-01-18 PROCEDURE — 1159F MED LIST DOCD IN RCRD: CPT | Performed by: STUDENT IN AN ORGANIZED HEALTH CARE EDUCATION/TRAINING PROGRAM

## 2024-01-18 PROCEDURE — 99214 OFFICE O/P EST MOD 30 MIN: CPT | Performed by: STUDENT IN AN ORGANIZED HEALTH CARE EDUCATION/TRAINING PROGRAM

## 2024-01-18 PROCEDURE — 1036F TOBACCO NON-USER: CPT | Performed by: STUDENT IN AN ORGANIZED HEALTH CARE EDUCATION/TRAINING PROGRAM

## 2024-01-18 PROCEDURE — 1160F RVW MEDS BY RX/DR IN RCRD: CPT | Performed by: STUDENT IN AN ORGANIZED HEALTH CARE EDUCATION/TRAINING PROGRAM

## 2024-01-18 PROCEDURE — 3077F SYST BP >= 140 MM HG: CPT | Performed by: STUDENT IN AN ORGANIZED HEALTH CARE EDUCATION/TRAINING PROGRAM

## 2024-01-18 PROCEDURE — G2211 COMPLEX E/M VISIT ADD ON: HCPCS | Performed by: STUDENT IN AN ORGANIZED HEALTH CARE EDUCATION/TRAINING PROGRAM

## 2024-01-18 NOTE — PATIENT INSTRUCTIONS
You were seen today in clinic by Dr. Garrett for your difficulty walking and tremor.     We want to get an MRI of your neck to see if there is any arthritis or other abnormalities that could be causing your issues. We will also get some labs to look for any other causes of the neuropathy. Based on your exam, it looks less likely that you have Parkinson's disease, but if we don't see anything on the MRI we will consider a trial of medication at your next visit. Please continue working with your physical therapist and we will refer you for a driving evaluation with occupational therapy.

## 2024-01-18 NOTE — PROGRESS NOTES
"Subjective     Anjum Hernandez is a left handed  73 y.o. year old male with HTN, lumbar DDD, hypothyroidism, pAFib on [place], HLD, DM, COPD, Crohn's disease, GEORGE on CPAP, s/p R knee replacement (11/2023) who presents for evaluation for possible PD. Patient is accompanied by: Other sister  Visit type: new patient visit     Pt has not followed with neurology before, was briefly after being in-hospital in October by a neurology team for cognitive screening and was noted to have concerns with tremor, balance, and walking, was then referred to Dr. Garrett for evaluation. Pt's primary concern is his balance/walking.    Pt states that he was in his normal states of health until summer 2023, no issues with gait that he noticed at that time. Has a UTI in August and then was in rehab from Sept-Oct. Since then, he has felt that he had a hard time walking d/t feeling like he was leaning to the left and dragging his L foot. Has had to use a walker/rollator. Denies dizziness and vertigo, though does endorse feeling \"woozy\" and LH for a couple of minutes after standing up that will improve with rest. Does not think his walking improved with rehab. Denies any big falls, though does endorse tripping over his feet when walking every once in a while.     He also endorses a mild tremor in his L > R hand. Notices it primarily with action, such as holding a pencil or eating. States that his handwriting is also smaller and now slanting uphill.     MOTOR SYMPTOMS:  Tremor: Yes, more with activity (L > R)  Rigidity: No  Bradykinesia: Yes, overall feels slower  Gait changes: Yes  Balance difficulty: Yes, has to be cautious  Fall: Yes  Exercise/PT: Yes, for balance (just restarted outpatient next week), home PT had helped  Help with ADL's: Yes; help with laundry, cooking, showering; independ feed, dressing    NON MOTOR SYMPTOMS  Orthostatic lightheadedness: Yes  Cognitive: Yes, asier than before to get confused, has to think more before " decisions. Issues with balancing checkbook, Denies memory issues  Depression/anxiety:  Yes, depression situational (driving), meds helps  Insomnia/RBD: No  Fatigue: No, nap every once in a while  Dysphagia: No  Sialorrhea: No  Constipation: No  Hypo/anosmia: No (covid but sense of smell ok)  Urinary incontinence: no  Abnormal sweating: No  Hallucinations: No    Patient Active Problem List   Diagnosis    Acquired hypothyroidism    Anxiety    Benign essential hypertension    Bilateral sensorineural hearing loss    Cardiac pacemaker    Chronic midline thoracic back pain    COPD, mild (CMS/[facility])    Decreased hearing of left ear    Edema    Elevated LDL cholesterol level    Fall, accidental    Frequent urination    Hematoma of left hip    Hepatic steatosis    Impingement syndrome of right shoulder    Major depressive disorder in full remission (CMS/[facility])    Class 2 severe obesity due to excess calories with serious comorbidity and body mass index (BMI) of 39.0 to 39.9 in adult (CMS/[facility])    Obstructive sleep apnea, adult    Opacity of lung on imaging study    Primary osteoarthritis of right knee    Pure hypercholesterolemia    Rash    Traumatic ecchymosis of hip, left, initial encounter    Vitamin D deficiency    Ulcerative colitis (CMS/[facility])    Abscess of toe of right foot    SOB (shortness of breath) on exertion    Paroxysmal atrial fibrillation (CMS/[facility])    Physical deconditioning    Cognitive decline    Encephalopathy    Right renal mass    Acute cystitis without hematuria    DDD (degenerative disc disease), lumbar    Crohn's disease of large intestine without complication (CMS/HCC)    Type 2 diabetes mellitus with chronic kidney disease, without long-term current use of insulin, unspecified CKD stage (CMS/[facility])      Past Medical History:   Diagnosis Date    Arthritis     COPD (chronic obstructive pulmonary disease) (CMS/[facility])     Crohn's disease, unspecified, with rectal bleeding (CMS/[facility]) 08/16/2022    Crohn's disease  with rectal bleeding, unspecified gastrointestinal tract location    Diarrhea, unspecified     Hemorrhagic diarrhea    Hematuria of undiagnosed cause 02/01/2023    History of falling     History of fall    HL (hearing loss)     Hypertension     Iron deficiency anemia secondary to blood loss (chronic)     Anemia due to gastrointestinal blood loss    Obesity, unspecified 03/02/2022    Class 2 obesity with body mass index (BMI) of 38.0 to 38.9 in adult    Other general symptoms and signs 04/14/2021    Eye, ear, nose, and throat symptom    Personal history of diseases of the blood and blood-forming organs and certain disorders involving the immune mechanism     History of leukocytosis    Personal history of diseases of the skin and subcutaneous tissue     History of pyoderma gangrenosum    Personal history of other diseases of the circulatory system 04/14/2021    History of hypertension    Personal history of other diseases of the circulatory system 04/14/2021    History of cardiac disorder    Personal history of other diseases of the musculoskeletal system and connective tissue 04/14/2021    History of arthritis    Personal history of other diseases of urinary system     History of acute renal failure    Personal history of other endocrine, nutritional and metabolic disease     History of hypovolemia    UTI (urinary tract infection) 08/22/2023      Past Surgical History:   Procedure Laterality Date    JOINT REPLACEMENT      OTHER SURGICAL HISTORY  02/11/2021    Gallbladder surgery    OTHER SURGICAL HISTORY  02/11/2021    Knee surgery    OTHER SURGICAL HISTORY  02/11/2021    Colonoscopy    OTHER SURGICAL HISTORY  02/11/2021    Tonsillectomy      Social History     Socioeconomic History    Marital status: Single     Spouse name: Not on file    Number of children: Not on file    Years of education: Not on file    Highest education level: Not on file   Occupational History    Not on file   Tobacco Use    Smoking status:  Former     Types: Cigars     Passive exposure: Never    Smokeless tobacco: Never    Tobacco comments:     Former light smoker   Substance and Sexual Activity    Alcohol use: Never    Drug use: Never    Sexual activity: Not Currently     Partners: Female   Other Topics Concern    Not on file   Social History Narrative    Not on file     Social Determinants of Health     Financial Resource Strain: Not on file   Food Insecurity: Not on file   Transportation Needs: Not on file   Physical Activity: Not on file   Stress: Not on file   Social Connections: Not on file   Intimate Partner Violence: Not on file   Housing Stability: Not on file      Family History   Problem Relation Name Age of Onset    Arthritis Mother Amalia     Cancer Mother Amalia     Heart disease Mother Amalia           Review of Systems  All other system have been reviewed and are negative for complaint.    Objective   Neurological Exam  Mental Status  Awake, alert and oriented to person, place and time. Speech is normal. Language is fluent with no aphasia. Fund of knowledge is appropriate for level of education.    Cranial Nerves  CN II: Right normal visual field. Left normal visual field.  CN III, IV, VI: Normal saccades. Normal smooth pursuit. Pupils equal round and reactive to light bilaterally.  CN V: Facial sensation is normal.  CN VII: Full and symmetric facial movement.  CN VIII: Hearing is normal.  CN IX, X: Palate elevates symmetrically  CN XI: Shoulder shrug strength is normal.  CN XII: Tongue midline without atrophy or fasciculations.    Motor  Normal muscle bulk throughout. Increased tone in LUE. No pronator drift.                                             Right                     Left   Shoulder abduction               5                          5  Elbow flexion                         5                          5  Elbow extension                    5                          5  Wrist extension                      5                           5  Thumb flexion                        5                          4+  Hip flexion                              4+                          4+  Knee flexion                           5                          5  Knee extension                      5                          5  Ankle inversion                      5                          5  Ankle eversion                       5                          5  Plantarflexion                         5                          5  Dorsiflexion                            5                          5  No rest tremor, very mild rest tremor, mild action tremor L > R  Finger tap, hand opening- worse L  Difficulty turning head to right  Pt leaning to the right while sitting and standing.    Sensory  Light touch is normal in upper and lower extremities. Vibration abnormality: Decreased in b/l feet. Sensation: Decreased sensation to vibration in b/l feet.     Reflexes                                            Right                      Left  Brachioradialis                    3+                         3+  Biceps                                 3+                         3+  Triceps                                3+                         3+  Patellar                                3+                         3+  Achilles                                2+                         Tr  Right Plantar: mute  Left Plantar: mute    Right pathological reflexes: Marianna's present. Suprapatellar present. Crossed adductor absent.  Left pathological reflexes: Marianna's absent. Suprapatellar present. Crossed adductor absent.  Pectorals (+) .    Coordination  Right: Finger-to-nose normal. Heel-to-shin normal.Left: Finger-to-nose normal. Heel-to-shin normal.    Gait   Romberg is present.  Drag L foot on turn, multi step turning. Leans to the left heavily while walking.        Hemoglobin A1C   Date Value Ref Range Status   10/31/2023 6.0 (H) see below % Final     Estimated Average  Glucose   Date Value Ref Range Status   10/31/2023 126 Not Established mg/dL Final     Thyroid Stimulating Hormone   Date Value Ref Range Status   10/31/2023 1.63 0.44 - 3.98 mIU/L Final     Folate   Date Value Ref Range Status   08/25/2023 5.4 >5.0 ng/mL Final     Assessment/Plan   Anjum Hernandez is a 73 y.o. year old male here for concerns about his gait and a tremor. He has a mild action tremor as well as mild slightly asymmetric L>R bradykinesia, as well as truncal tilting to the left. Exam was also significant for diffuse, symmetric hyperreflexia, distal sensory loss and loss of ankle reflexes, and positive Sampson on right, with a c/f an underlying spinal cord pathology. Will get an MRI c-spine and neuropathy labs with a plan to consider a trial of Sinemet at next visit if this other workup is unremarkable.    Plan:  - MRI C-spine w/wo  - Neuropathy labs: SPEP, MMA, SPEP, folate  - Refer to OT for driving evaluation  - FUV in 3 months, will consider trial of Sinemet at this time if other workup is unremarkable

## 2024-01-22 ENCOUNTER — TELEPHONE (OUTPATIENT)
Dept: PRIMARY CARE | Facility: CLINIC | Age: 74
End: 2024-01-22
Payer: COMMERCIAL

## 2024-01-22 NOTE — TELEPHONE ENCOUNTER
Patient called in stating he has fenofibrate (Tricor) 145 mg on his medication list but his pharmacy advised him this was discontinued months ago. Patient is wondering if he should still be taking this?  Please Advise

## 2024-01-26 ENCOUNTER — LAB (OUTPATIENT)
Dept: LAB | Facility: LAB | Age: 74
End: 2024-01-26
Payer: MEDICARE

## 2024-01-26 DIAGNOSIS — R20.9 SENSORY DISTURBANCE: ICD-10-CM

## 2024-01-26 DIAGNOSIS — R26.9 ABNORMAL GAIT: ICD-10-CM

## 2024-01-26 DIAGNOSIS — E03.9 ACQUIRED HYPOTHYROIDISM: Primary | ICD-10-CM

## 2024-01-26 LAB
FOLATE SERPL-MCNC: 6.8 NG/ML
PROT SERPL-MCNC: 6.7 G/DL (ref 6.4–8.2)

## 2024-01-26 PROCEDURE — 82746 ASSAY OF FOLIC ACID SERUM: CPT

## 2024-01-26 PROCEDURE — 36415 COLL VENOUS BLD VENIPUNCTURE: CPT

## 2024-01-26 PROCEDURE — 84165 PROTEIN E-PHORESIS SERUM: CPT

## 2024-01-26 PROCEDURE — 84165 PROTEIN E-PHORESIS SERUM: CPT | Performed by: STUDENT IN AN ORGANIZED HEALTH CARE EDUCATION/TRAINING PROGRAM

## 2024-01-26 PROCEDURE — 84155 ASSAY OF PROTEIN SERUM: CPT

## 2024-01-26 PROCEDURE — 83921 ORGANIC ACID SINGLE QUANT: CPT

## 2024-01-29 LAB
ALBUMIN: 4 G/DL (ref 3.4–5)
ALPHA 1 GLOBULIN: 0.3 G/DL (ref 0.2–0.6)
ALPHA 2 GLOBULIN: 0.7 G/DL (ref 0.4–1.1)
BETA GLOBULIN: 0.7 G/DL (ref 0.5–1.2)
GAMMA GLOBULIN: 1 G/DL (ref 0.5–1.4)
PATH REVIEW-SERUM PROTEIN ELECTROPHORESIS: NORMAL
PROTEIN ELECTROPHORESIS COMMENT: NORMAL

## 2024-01-29 RX ORDER — LEVOTHYROXINE SODIUM 25 UG/1
25 TABLET ORAL
Qty: 90 TABLET | Refills: 3 | Status: SHIPPED | OUTPATIENT
Start: 2024-01-29

## 2024-01-30 LAB — METHYLMALONATE SERPL-SCNC: 0.19 UMOL/L (ref 0–0.4)

## 2024-02-02 DIAGNOSIS — F32.5 MAJOR DEPRESSIVE DISORDER IN FULL REMISSION, UNSPECIFIED WHETHER RECURRENT (CMS-HCC): Primary | ICD-10-CM

## 2024-02-05 RX ORDER — BUPROPION HYDROCHLORIDE 300 MG/1
TABLET ORAL
Qty: 90 TABLET | Refills: 1 | Status: SHIPPED | OUTPATIENT
Start: 2024-02-05

## 2024-02-12 ENCOUNTER — HOSPITAL ENCOUNTER (OUTPATIENT)
Dept: RADIOLOGY | Facility: HOSPITAL | Age: 74
Discharge: HOME | End: 2024-02-12
Payer: MEDICARE

## 2024-02-12 ENCOUNTER — HOSPITAL ENCOUNTER (OUTPATIENT)
Dept: CARDIOLOGY | Facility: HOSPITAL | Age: 74
Discharge: HOME | End: 2024-02-12
Payer: MEDICARE

## 2024-02-12 ENCOUNTER — APPOINTMENT (OUTPATIENT)
Dept: CARDIOLOGY | Facility: HOSPITAL | Age: 74
End: 2024-02-12
Payer: MEDICARE

## 2024-02-12 DIAGNOSIS — Z95.0 PACEMAKER: ICD-10-CM

## 2024-02-12 DIAGNOSIS — I44.2 ATRIOVENTRICULAR BLOCK, THIRD DEGREE (MULTI): ICD-10-CM

## 2024-02-12 DIAGNOSIS — R26.9 ABNORMAL GAIT: ICD-10-CM

## 2024-02-12 DIAGNOSIS — Z95.0 CARDIAC PACEMAKER IN SITU: ICD-10-CM

## 2024-02-12 DIAGNOSIS — I44.2 ATRIOVENTRICULAR BLOCK, COMPLETE (MULTI): ICD-10-CM

## 2024-02-12 PROCEDURE — 93286 PERI-PX EVAL PM/LDLS PM IP: CPT | Performed by: INTERNAL MEDICINE

## 2024-02-12 PROCEDURE — 93286 PERI-PX EVAL PM/LDLS PM IP: CPT

## 2024-02-12 PROCEDURE — 72156 MRI NECK SPINE W/O & W/DYE: CPT | Performed by: RADIOLOGY

## 2024-02-12 PROCEDURE — A9575 INJ GADOTERATE MEGLUMI 0.1ML: HCPCS

## 2024-02-12 PROCEDURE — 72156 MRI NECK SPINE W/O & W/DYE: CPT

## 2024-02-12 PROCEDURE — 2550000001 HC RX 255 CONTRASTS

## 2024-02-12 RX ORDER — GADOTERATE MEGLUMINE 376.9 MG/ML
0.2 INJECTION INTRAVENOUS
Status: COMPLETED | OUTPATIENT
Start: 2024-02-12 | End: 2024-02-12

## 2024-02-12 RX ADMIN — GADOTERATE MEGLUMINE 25 ML: 376.9 INJECTION INTRAVENOUS at 12:26

## 2024-02-13 ENCOUNTER — TELEPHONE (OUTPATIENT)
Dept: NEUROLOGY | Facility: CLINIC | Age: 74
End: 2024-02-13
Payer: COMMERCIAL

## 2024-02-13 DIAGNOSIS — G95.9 CERVICAL MYELOPATHY (MULTI): Primary | ICD-10-CM

## 2024-02-13 NOTE — TELEPHONE ENCOUNTER
Discussed result of MRI with patient - multilevel severe cervical stenosis worst at C4-5 and 5-6 and probable myelopathy. Will refer for elective spine surgery.

## 2024-02-14 ENCOUNTER — OFFICE VISIT (OUTPATIENT)
Dept: PULMONOLOGY | Age: 74
End: 2024-02-14
Payer: MEDICARE

## 2024-02-14 VITALS
WEIGHT: 287 LBS | DIASTOLIC BLOOD PRESSURE: 92 MMHG | HEART RATE: 76 BPM | SYSTOLIC BLOOD PRESSURE: 132 MMHG | OXYGEN SATURATION: 96 % | BODY MASS INDEX: 38.92 KG/M2

## 2024-02-14 DIAGNOSIS — R06.02 SHORTNESS OF BREATH: ICD-10-CM

## 2024-02-14 DIAGNOSIS — E66.9 OBESITY (BMI 30-39.9): ICD-10-CM

## 2024-02-14 DIAGNOSIS — G47.33 OSA (OBSTRUCTIVE SLEEP APNEA): Primary | ICD-10-CM

## 2024-02-14 PROCEDURE — G8417 CALC BMI ABV UP PARAM F/U: HCPCS | Performed by: INTERNAL MEDICINE

## 2024-02-14 PROCEDURE — G8484 FLU IMMUNIZE NO ADMIN: HCPCS | Performed by: INTERNAL MEDICINE

## 2024-02-14 PROCEDURE — G8427 DOCREV CUR MEDS BY ELIG CLIN: HCPCS | Performed by: INTERNAL MEDICINE

## 2024-02-14 PROCEDURE — 3075F SYST BP GE 130 - 139MM HG: CPT | Performed by: INTERNAL MEDICINE

## 2024-02-14 PROCEDURE — 1123F ACP DISCUSS/DSCN MKR DOCD: CPT | Performed by: INTERNAL MEDICINE

## 2024-02-14 PROCEDURE — 99214 OFFICE O/P EST MOD 30 MIN: CPT | Performed by: INTERNAL MEDICINE

## 2024-02-14 PROCEDURE — 3017F COLORECTAL CA SCREEN DOC REV: CPT | Performed by: INTERNAL MEDICINE

## 2024-02-14 PROCEDURE — 1036F TOBACCO NON-USER: CPT | Performed by: INTERNAL MEDICINE

## 2024-02-14 PROCEDURE — 3080F DIAST BP >= 90 MM HG: CPT | Performed by: INTERNAL MEDICINE

## 2024-02-14 NOTE — PROGRESS NOTES
Subjective:             Junito Harris is a 73 y.o. male who complains today of:     Chief Complaint   Patient presents with    Follow-up     4m f/u on ETIENNE       HPI  He is using CPAP with  5-10  centimeters of H2O with heated humidity.  He is using CPAP for about  5 hours every night.  He is using CPAP with  full face  Mask.  He said  sleep is restful with the CPAP use.  He is compliant with CPAP therapy and benefiting with CPAP use.  No snoring with CPAP use.  C/o  daytime tiredness even with CPAP use.  He think he is depress. He denies taking naps.  No sleepiness with driving.   He denies difficulty falling asleep or staying asleep.     I reviewed compliance report with patient regarding CPAP therapy. He is using  CPAP for 29 days out of 30 days.  Average usage of days used is 5 hours and 10 min , average AHI 3.2   with CPAP use.      Occasional mild shortness of breath  with exertion and having   Wheezing.   No Cough with  Sputum.No Chest tightness. He use albuterol HFA prn if wheezing or SOB    Allergies:  Azathioprine and Clindamycin  Past Medical History:   Diagnosis Date    Colitis     Hypertension     Paroxysmal atrial fibrillation (HCC) 11/1/2018    Persistent atrial fibrillation (HCC) 11/1/2018    Type II or unspecified type diabetes mellitus without mention of complication, not stated as uncontrolled      Past Surgical History:   Procedure Laterality Date    ANTERIOR CRUCIATE LIGAMENT REPAIR      CARDIAC PACEMAKER PLACEMENT      COLONOSCOPY  05/22/2019    DR BONILLA    GALLBLADDER SURGERY      PACEMAKER PLACEMENT  07/10/2018    [facility]    SIGMOIDOSCOPY  11/03/2017    DR. BONILLA    SIGMOIDOSCOPY  05/28/2019    DR BONILLA    TOE SURGERY      TONSILLECTOMY      WRIST SURGERY  2013     Family History   Problem Relation Age of Onset    Cancer Mother     Diabetes Father     Cancer Paternal Uncle      Social History     Socioeconomic History    Marital status: Single     Spouse name: Not on

## 2024-02-26 ENCOUNTER — EVALUATION (OUTPATIENT)
Dept: OCCUPATIONAL THERAPY | Facility: CLINIC | Age: 74
End: 2024-02-26
Payer: MEDICARE

## 2024-02-26 DIAGNOSIS — R20.9 SENSORY DISTURBANCE: ICD-10-CM

## 2024-02-26 DIAGNOSIS — R53.1 WEAKNESS: ICD-10-CM

## 2024-02-26 DIAGNOSIS — R26.9 ABNORMAL GAIT: ICD-10-CM

## 2024-02-26 DIAGNOSIS — Z78.9 IMPAIRED DRIVING SKILLS: ICD-10-CM

## 2024-02-26 DIAGNOSIS — R41.844 IMPAIRED EXECUTIVE FUNCTIONING: Primary | ICD-10-CM

## 2024-02-26 PROCEDURE — 97530 THERAPEUTIC ACTIVITIES: CPT | Mod: GO | Performed by: OCCUPATIONAL THERAPIST

## 2024-02-26 PROCEDURE — 97166 OT EVAL MOD COMPLEX 45 MIN: CPT | Mod: GO | Performed by: OCCUPATIONAL THERAPIST

## 2024-02-26 NOTE — PROGRESS NOTES
Occupational Therapy     Evaluation     1. Impaired executive functioning        2. Sensory disturbance  Referral to Occupational Therapy      3. Abnormal gait  Referral to Occupational Therapy      4. Impaired driving skills        5. Weakness            Patient referred to OT by physician to determine independence with living skills and community mobility due to weakness and confusion secondary to UTI.      Summary/Recommendations: After completing this comprehensive assessment, it is recommended that the patient continue to drive to local and familiar destinations only.  Patient should avoid driving in inclement conditions and on highways.  This is due to patient's mild decrease in processing time and slight difficulty adapting to situations of higher complexity as presented on the  simulator.     Past Medical History: See medical history form      Cognitive Assessment (Memory/Attention/Flexibility/Processing):  Patient is fully oriented.  Completed.  Client's score is 21/30 which indicates mild cognitive impairment. Patient with deficits on the following subtests: executive functioning, attention,language, and delayed recall only deficit area was delayed recall (3/5 words recalled).      (Severity ranges for MOCA 26-30 normal, 18-25 Mild Cognitive Impairment, 10-17 Moderate Cognitive Impairment, 10 or less Severe Cognitive Impairment)     Range of Motion/Strength:  Patient demonstrates good UE and LE ROM.  Neck ROM for driving was WFL bilaterally.  Strength as assessed through MMT in UE/LE's is WFL.  Patient has no reports of pain after MMT.     Self Care / ADLs: Independent with all ADLs.       Functional Mobility: Independent with use of cane.      Balance: Patient reports ~1-2 falls in the last six months.      Pain: Patient with no complaints of pain on today's date.      Hearing: Intact in semi-private environment with use of hearing aids.      Vision: Client demonstrates good environmental  "scanning and ocular ROM.  Peripheral vision, pursuits and convergence were within normal limits.     Patient does not wear prescription eyewear while driving      Executive Functioning:  Patient completed Trail Making Tests A and B; on Upper Tract A, patient completed 58 seconds. On Upper Tract B task, patient completed task in 3 minute 43 seconds.  Patient required one verbal cue for error correction.      Upper Tract A and B are DEFICIENT      (Norms: Trail A Average is 29 seconds with deficient if > 54 seconds; Trail B average is 75 seconds with deficient if >150-180 seconds.)     Substitution Test: patient completed 28/28 stimuli correctly without cues in 1 minute 57 seconds.      Driving History/Record: Patient currently holds a valid [place] 's license.  Patient reports he has not driven since UTI in August.  Patient reports he experienced weakness and confusion with UTI.  Patient reports he is now near his baseline of functioning and reports he is ready to return to driving. Patient reports he mostly drives to local destinations and will occasionally drive on the highway during daytime.      Community Mobility Tasks:  On “Am I a Safe  Questionnaire” patient identified one check boxes applicable to self.    -\"I don't like to drive at night\"      Reaction Time Testing: Reaction timing was completed using the reaction time application on the [employer]  simulator; on five attempts, patient's average reaction time between seeing stop sign and pressing brake was 0.647 seconds.  Average for age and gender is approximately 0.53 seconds with range going from .4 to .73 seconds.                 Simulator Test: Patient was provided with instruction on using accelerator and brake pedal just as he would in his own car.  Location of turn signal, rearview mirror, speedometer and RPMs also were reviewed.         Patient had mild difficulty assimilating to the computer based testing model.      All obstacles including " pedestrians and cars turning unexpectedly into  [place] were avoided without incident.  Patient utilized turn signal appropriately and transitioned between lanes well throughout duration of drives. Demonstrated good awareness of surroundings        It should be noted that this is not an on-the-road assessment and this report only accounts for the date and time spent with client.          Thank you for the referral.  Please contact me should you have any questions.        Pearl Jara, OTR/L, CLT  [facility] [place]  [address]  [place] [address] [phone]  [email or web address]

## 2024-02-27 ENCOUNTER — TELEPHONE (OUTPATIENT)
Dept: UROLOGY | Facility: CLINIC | Age: 74
End: 2024-02-27
Payer: COMMERCIAL

## 2024-02-27 DIAGNOSIS — K76.0 HEPATIC STEATOSIS: Primary | ICD-10-CM

## 2024-02-27 DIAGNOSIS — N28.89 RIGHT RENAL MASS: ICD-10-CM

## 2024-02-29 ENCOUNTER — LAB (OUTPATIENT)
Dept: LAB | Facility: LAB | Age: 74
End: 2024-02-29
Payer: MEDICARE

## 2024-02-29 DIAGNOSIS — N28.89 RIGHT RENAL MASS: ICD-10-CM

## 2024-02-29 LAB
CREAT SERPL-MCNC: 1.28 MG/DL (ref 0.5–1.3)
EGFRCR SERPLBLD CKD-EPI 2021: 59 ML/MIN/1.73M*2

## 2024-02-29 PROCEDURE — 36415 COLL VENOUS BLD VENIPUNCTURE: CPT

## 2024-02-29 PROCEDURE — 82565 ASSAY OF CREATININE: CPT

## 2024-03-01 ENCOUNTER — HOSPITAL ENCOUNTER (OUTPATIENT)
Dept: RADIOLOGY | Facility: HOSPITAL | Age: 74
Discharge: HOME | End: 2024-03-01
Payer: MEDICARE

## 2024-03-01 DIAGNOSIS — N28.89 RIGHT RENAL MASS: ICD-10-CM

## 2024-03-01 PROCEDURE — 74170 CT ABD WO CNTRST FLWD CNTRST: CPT

## 2024-03-01 PROCEDURE — 2550000001 HC RX 255 CONTRASTS: Performed by: UROLOGY

## 2024-03-01 RX ADMIN — IOHEXOL 75 ML: 350 INJECTION, SOLUTION INTRAVENOUS at 14:26

## 2024-03-05 ENCOUNTER — OFFICE VISIT (OUTPATIENT)
Dept: UROLOGY | Facility: CLINIC | Age: 74
End: 2024-03-05
Payer: MEDICARE

## 2024-03-05 VITALS — SYSTOLIC BLOOD PRESSURE: 152 MMHG | DIASTOLIC BLOOD PRESSURE: 96 MMHG | TEMPERATURE: 98 F | HEART RATE: 81 BPM

## 2024-03-05 DIAGNOSIS — N28.89 RIGHT RENAL MASS: ICD-10-CM

## 2024-03-05 PROCEDURE — 1157F ADVNC CARE PLAN IN RCRD: CPT | Performed by: UROLOGY

## 2024-03-05 PROCEDURE — 3008F BODY MASS INDEX DOCD: CPT | Performed by: UROLOGY

## 2024-03-05 PROCEDURE — 1159F MED LIST DOCD IN RCRD: CPT | Performed by: UROLOGY

## 2024-03-05 PROCEDURE — 99213 OFFICE O/P EST LOW 20 MIN: CPT | Performed by: UROLOGY

## 2024-03-05 PROCEDURE — 3080F DIAST BP >= 90 MM HG: CPT | Performed by: UROLOGY

## 2024-03-05 PROCEDURE — 1036F TOBACCO NON-USER: CPT | Performed by: UROLOGY

## 2024-03-05 PROCEDURE — 1126F AMNT PAIN NOTED NONE PRSNT: CPT | Performed by: UROLOGY

## 2024-03-05 PROCEDURE — 3077F SYST BP >= 140 MM HG: CPT | Performed by: UROLOGY

## 2024-03-05 PROCEDURE — 4010F ACE/ARB THERAPY RXD/TAKEN: CPT | Performed by: UROLOGY

## 2024-03-05 NOTE — PROGRESS NOTES
"Subjective   Patient ID: Anjum Hernandez \"Ruthie" is a 73 y.o. male who presents for ct results. Last seen 1/3/24 when The patient tolerated the cystoscopy well today. We discussed that they may have hematuria after the procedure. Cystoscopy showed slight thickening of bladder muscle wall. I explained that this is most likely due to obstruction from his prostate. The patient is asymptomatic from this and not bothered. He will follow up in March 2024 with CT Kidney w/wo.    HPI    CT Kidney w/wo Results  IMPRESSION:  1. Stable appearance of the 2 centimeter solid right renal cortical  lesion. Numerous other right renal cyst.  2. Nonobstructing right renal calculi.  3. Left renal cortical cysts.    Review of Systems  A 12 system review was completed and is negative with the exception of those signs and symptoms noted in the history of present illness.    Objective   Physical Exam  General: in NAD, appears stated age  Head: normocephalic, atraumatic  Respiratory: normal effort, no use of accessory muscles  Cardiovascular: no edema noted  Skin: normal turgor, no rashes  Neurologic: grossly intact, oriented to person/place/time  Psychiatric: mode and affect appropriate     Assessment/Plan   Problem List Items Addressed This Visit             ICD-10-CM    Right renal mass N28.89    Relevant Orders    Follow Up In Urology    CT kidney w and wo IV contrast    Creatinine     CT scan findings show the renal lesion is unchanged. Order repeat CT Kidney w/wo for 6 months and see the patient in follow-up with results.     Follow-up in 6 months  for continued management of renal mass.     Scribe Attestation  By signing my name below, I, Rupert Carmona   attest that this documentation has been prepared under the direction and in the presence of Zhang Chávez MD.     "

## 2024-03-14 ENCOUNTER — OFFICE VISIT (OUTPATIENT)
Dept: NEUROSURGERY | Facility: CLINIC | Age: 74
End: 2024-03-14
Payer: MEDICARE

## 2024-03-14 VITALS
HEART RATE: 65 BPM | DIASTOLIC BLOOD PRESSURE: 88 MMHG | SYSTOLIC BLOOD PRESSURE: 140 MMHG | WEIGHT: 283 LBS | BODY MASS INDEX: 38.33 KG/M2 | HEIGHT: 72 IN | RESPIRATION RATE: 14 BRPM

## 2024-03-14 DIAGNOSIS — G95.9 CERVICAL MYELOPATHY (MULTI): ICD-10-CM

## 2024-03-14 PROCEDURE — 99205 OFFICE O/P NEW HI 60 MIN: CPT | Performed by: STUDENT IN AN ORGANIZED HEALTH CARE EDUCATION/TRAINING PROGRAM

## 2024-03-14 PROCEDURE — 1036F TOBACCO NON-USER: CPT | Performed by: STUDENT IN AN ORGANIZED HEALTH CARE EDUCATION/TRAINING PROGRAM

## 2024-03-14 PROCEDURE — 1126F AMNT PAIN NOTED NONE PRSNT: CPT | Performed by: STUDENT IN AN ORGANIZED HEALTH CARE EDUCATION/TRAINING PROGRAM

## 2024-03-14 PROCEDURE — 1157F ADVNC CARE PLAN IN RCRD: CPT | Performed by: STUDENT IN AN ORGANIZED HEALTH CARE EDUCATION/TRAINING PROGRAM

## 2024-03-14 PROCEDURE — 3008F BODY MASS INDEX DOCD: CPT | Performed by: STUDENT IN AN ORGANIZED HEALTH CARE EDUCATION/TRAINING PROGRAM

## 2024-03-14 PROCEDURE — 3077F SYST BP >= 140 MM HG: CPT | Performed by: STUDENT IN AN ORGANIZED HEALTH CARE EDUCATION/TRAINING PROGRAM

## 2024-03-14 PROCEDURE — 4010F ACE/ARB THERAPY RXD/TAKEN: CPT | Performed by: STUDENT IN AN ORGANIZED HEALTH CARE EDUCATION/TRAINING PROGRAM

## 2024-03-14 PROCEDURE — 3079F DIAST BP 80-89 MM HG: CPT | Performed by: STUDENT IN AN ORGANIZED HEALTH CARE EDUCATION/TRAINING PROGRAM

## 2024-03-14 PROCEDURE — 1159F MED LIST DOCD IN RCRD: CPT | Performed by: STUDENT IN AN ORGANIZED HEALTH CARE EDUCATION/TRAINING PROGRAM

## 2024-03-14 RX ORDER — AMOXICILLIN 500 MG/1
CAPSULE ORAL
COMMUNITY
Start: 2024-02-02

## 2024-03-14 ASSESSMENT — ENCOUNTER SYMPTOMS
OCCASIONAL FEELINGS OF UNSTEADINESS: 1
LOSS OF SENSATION IN FEET: 0
DEPRESSION: 1

## 2024-03-14 ASSESSMENT — PATIENT HEALTH QUESTIONNAIRE - PHQ9
1. LITTLE INTEREST OR PLEASURE IN DOING THINGS: NOT AT ALL
2. FEELING DOWN, DEPRESSED OR HOPELESS: SEVERAL DAYS
SUM OF ALL RESPONSES TO PHQ9 QUESTIONS 1 & 2: 1
10. IF YOU CHECKED OFF ANY PROBLEMS, HOW DIFFICULT HAVE THESE PROBLEMS MADE IT FOR YOU TO DO YOUR WORK, TAKE CARE OF THINGS AT HOME, OR GET ALONG WITH OTHER PEOPLE: SOMEWHAT DIFFICULT

## 2024-03-14 ASSESSMENT — PAIN SCALES - GENERAL: PAINLEVEL: 0-NO PAIN

## 2024-03-14 NOTE — PROGRESS NOTES
Anjum Hernandez is a 73 y.o. year old male p/w imbalance requiring a cane    14/14 systems reviewed and negative other than what is listed in the history of present illness        Past Medical History:   Diagnosis Date    Arthritis     COPD (chronic obstructive pulmonary disease) (CMS/[facility])     Crohn's disease, unspecified, with rectal bleeding (CMS/[facility]) 08/16/2022    Crohn's disease with rectal bleeding, unspecified gastrointestinal tract location    Diarrhea, unspecified     Hemorrhagic diarrhea    Hematuria of undiagnosed cause 02/01/2023    History of falling     History of fall    HL (hearing loss)     Hypertension     Iron deficiency anemia secondary to blood loss (chronic)     Anemia due to gastrointestinal blood loss    Obesity, unspecified 03/02/2022    Class 2 obesity with body mass index (BMI) of 38.0 to 38.9 in adult    Other general symptoms and signs 04/14/2021    Eye, ear, nose, and throat symptom    Personal history of diseases of the blood and blood-forming organs and certain disorders involving the immune mechanism     History of leukocytosis    Personal history of diseases of the skin and subcutaneous tissue     History of pyoderma gangrenosum    Personal history of other diseases of the circulatory system 04/14/2021    History of hypertension    Personal history of other diseases of the circulatory system 04/14/2021    History of cardiac disorder    Personal history of other diseases of the musculoskeletal system and connective tissue 04/14/2021    History of arthritis    Personal history of other diseases of urinary system     History of acute renal failure    Personal history of other endocrine, nutritional and metabolic disease     History of hypovolemia    UTI (urinary tract infection) 08/22/2023       Past Surgical History:   Procedure Laterality Date    JOINT REPLACEMENT      OTHER SURGICAL HISTORY  02/11/2021    Gallbladder surgery    OTHER SURGICAL HISTORY  02/11/2021    Knee surgery     OTHER SURGICAL HISTORY  02/11/2021    Colonoscopy    OTHER SURGICAL HISTORY  02/11/2021    Tonsillectomy           Current Outpatient Medications:     albuterol (Ventolin HFA) 90 mcg/actuation inhaler, Inhale 2 puffs every 4 hours if needed for shortness of breath., Disp: 18 g, Rfl: 5    buPROPion XL (Wellbutrin XL) 300 mg 24 hr tablet, TAKE 1 TABLET EVERY MORNING, Disp: 90 tablet, Rfl: 1    cholecalciferol (Vitamin D-3) 50 mcg (2,000 unit) capsule, Take 1 tablet by mouth 1 (one) time each day., Disp: , Rfl:     levothyroxine (Synthroid, Levoxyl) 25 mcg tablet, TAKE 1 TABLET IN THE MORNING, Disp: 90 tablet, Rfl: 3    metoprolol tartrate (Lopressor) 25 mg tablet, Take 1 tablet (25 mg) by mouth 2 times a day., Disp: , Rfl:     mirtazapine (Remeron) 45 mg tablet, Take 1 tablet (45 mg) by mouth once daily at bedtime., Disp: 90 tablet, Rfl: 3    rivaroxaban (Xarelto) 20 mg tablet, Take 1 tablet (20 mg) by mouth once daily., Disp: , Rfl:     tofacitinib (Xeljanz) 5 mg tablet, Take 1 tablet (5 mg) by mouth 2 times a day., Disp: , Rfl:     valsartan (Diovan) 160 mg tablet, Take 1 tablet (160 mg) by mouth once daily., Disp: 30 tablet, Rfl: 5    amoxicillin (Amoxil) 500 mg capsule, TAKE 4 CAPSULES BY MOUTH ONE HOUR PRIOR TO DENTAL APPOINTMENT.. No additional capsules following appointment., Disp: , Rfl:     mupirocin (Bactroban) 2 % ointment, 220 Applications., Disp: , Rfl:     rosuvastatin (Crestor) 10 mg tablet, Take 1 tablet (10 mg) by mouth once daily at bedtime. (Patient not taking: Reported on 3/14/2024), Disp: 90 tablet, Rfl: 3      Vitals:    03/14/24 1312   BP: 140/88   Pulse: 65   Resp: 14     Objective   General: Well developed, awake/alert/oriented x3, no distress, alert and cooperative  Skin: Warm and dry, no lesions, no rashes  ENMT: Mucous membranes moist, no apparent injury, no lesions seen  Head/Neck: Neck Supple, no apparent injury  Respiratory/Thorax: Normal breath sounds with good chest expansion, thorax  symmetric  Cardiovascular: No pitting edema, no JVD    Motor Strength: 5/5 Throughout all extremities  Negative Sampson's    Gait: imbalanced, wide based, requires hand on wall for turns    Muscle Bulk: Normal and symmetric in all extremities    Posture:   -- Cervical: Normal  -- Thoracic: Normal  -- Lumbar : Normal  Paraspinal muscle spasm/tenderness absent.     Sensation: intact to light touch     Relevant Results    MRI c-spine: cervical stenosis from C3-7    Problem List Items Addressed This Visit       Cervical myelopathy (CMS/[facility])    Relevant Orders    XR cervical spine 2-3 views          Assessment/Plan       Mr. Hernandez  is a very nice 74 yo with history of pacemaker for bradycardia, afib on xarelto presenting with gait instability and difficulty using his hands. He initially seeked out assistance from neurology who obtained the MRI C-spine showing C3-6 cervical stenosis.     I showed the patient his imaging and offered him a C3-6 PCDF. I explained risks of anesthesia, nerve damage, CSF leak, C5 palsy, pseudoarthrosis and possible need for further surgery. I explained the goal of surgery was to decompress the nerves and to prevent worsening. The patient noted understanding. At this point, his symptoms are moderate and he has time to think about whether or not he wants to undergo surgery. I do not feel that this is an emergency. However, I cautioned him against red flag symptoms such as weakness and warned that he may be at higher risk should he have a fall or accident for paralysis.     The patient will call us back should he want to proceed with surgery. He will in the meantime, obtain an xray cervical spine for assessment of his cervical alignment. He will also obtain clearance from his cardiologist. He will also need to see PATMary Khalil MD    of Neurosurgery   [facility] [place]   [facility]   Office: [phone]  Fax: [phone]

## 2024-03-20 ENCOUNTER — HOSPITAL ENCOUNTER (OUTPATIENT)
Dept: RADIOLOGY | Facility: HOSPITAL | Age: 74
Discharge: HOME | End: 2024-03-20
Payer: MEDICARE

## 2024-03-20 DIAGNOSIS — G95.9 CERVICAL MYELOPATHY (MULTI): ICD-10-CM

## 2024-03-20 PROCEDURE — 72040 X-RAY EXAM NECK SPINE 2-3 VW: CPT

## 2024-03-21 ENCOUNTER — TELEPHONE (OUTPATIENT)
Dept: NEUROSURGERY | Facility: CLINIC | Age: 74
End: 2024-03-21
Payer: COMMERCIAL

## 2024-03-21 ASSESSMENT — ENCOUNTER SYMPTOMS
LIGHT-HEADEDNESS: 0
SHORTNESS OF BREATH: 1
CONFUSION: 0
CLUMSINESS: 1
ABDOMINAL PAIN: 0
FATIGUE: 0
AURA: 0
VISUAL CHANGE: 1
DIAPHORESIS: 0
NECK PAIN: 0
NAUSEA: 0
VOMITING: 0
HEADACHES: 0
FEVER: 0
NEUROLOGIC COMPLAINT: 1
DIZZINESS: 0
BACK PAIN: 1
LOSS OF BALANCE: 1
MEMORY LOSS: 1
VERTIGO: 0
PALPITATIONS: 0
BOWEL INCONTINENCE: 0

## 2024-03-21 NOTE — TELEPHONE ENCOUNTER
----- Message from Anjum Hernandez sent at 3/20/2024  7:44 PM EDT -----  Regarding: Cervical Stenosis  Contact: [phone]  Dr. Khalil:  I am very sorry I missed your call today. I was at the hospital today getting my Xray for you . Please try calling me again tomorrow in the morning if you can and I will watch for you call.    Thank you;    Sami Hernandez

## 2024-03-26 ENCOUNTER — LAB (OUTPATIENT)
Dept: LAB | Facility: LAB | Age: 74
End: 2024-03-26
Payer: MEDICARE

## 2024-03-26 ENCOUNTER — OFFICE VISIT (OUTPATIENT)
Dept: PRIMARY CARE | Facility: CLINIC | Age: 74
End: 2024-03-26
Payer: MEDICARE

## 2024-03-26 VITALS
HEART RATE: 76 BPM | SYSTOLIC BLOOD PRESSURE: 124 MMHG | HEIGHT: 72 IN | TEMPERATURE: 97.5 F | BODY MASS INDEX: 38.6 KG/M2 | OXYGEN SATURATION: 95 % | RESPIRATION RATE: 17 BRPM | DIASTOLIC BLOOD PRESSURE: 76 MMHG | WEIGHT: 285 LBS

## 2024-03-26 DIAGNOSIS — Z95.0 CARDIAC PACEMAKER: ICD-10-CM

## 2024-03-26 DIAGNOSIS — I48.0 PAROXYSMAL ATRIAL FIBRILLATION (MULTI): ICD-10-CM

## 2024-03-26 DIAGNOSIS — R53.81 PHYSICAL DECONDITIONING: ICD-10-CM

## 2024-03-26 DIAGNOSIS — M51.36 DDD (DEGENERATIVE DISC DISEASE), LUMBAR: ICD-10-CM

## 2024-03-26 DIAGNOSIS — J44.9 COPD, MILD (MULTI): ICD-10-CM

## 2024-03-26 DIAGNOSIS — E78.00 PURE HYPERCHOLESTEROLEMIA: ICD-10-CM

## 2024-03-26 DIAGNOSIS — I10 BENIGN ESSENTIAL HYPERTENSION: Primary | ICD-10-CM

## 2024-03-26 DIAGNOSIS — N18.32 CHRONIC KIDNEY DISEASE, STAGE 3B (MULTI): ICD-10-CM

## 2024-03-26 DIAGNOSIS — G47.33 OBSTRUCTIVE SLEEP APNEA, ADULT: ICD-10-CM

## 2024-03-26 DIAGNOSIS — E03.9 ACQUIRED HYPOTHYROIDISM: ICD-10-CM

## 2024-03-26 DIAGNOSIS — G81.94 LEFT HEMIPARESIS (MULTI): ICD-10-CM

## 2024-03-26 DIAGNOSIS — E66.01 CLASS 2 SEVERE OBESITY DUE TO EXCESS CALORIES WITH SERIOUS COMORBIDITY AND BODY MASS INDEX (BMI) OF 38.0 TO 38.9 IN ADULT (MULTI): ICD-10-CM

## 2024-03-26 DIAGNOSIS — F32.5 MAJOR DEPRESSIVE DISORDER IN FULL REMISSION, UNSPECIFIED WHETHER RECURRENT (CMS-HCC): ICD-10-CM

## 2024-03-26 DIAGNOSIS — E11.22 TYPE 2 DIABETES MELLITUS WITH CHRONIC KIDNEY DISEASE, WITHOUT LONG-TERM CURRENT USE OF INSULIN, UNSPECIFIED CKD STAGE (MULTI): ICD-10-CM

## 2024-03-26 DIAGNOSIS — K76.0 HEPATIC STEATOSIS: ICD-10-CM

## 2024-03-26 DIAGNOSIS — I10 BENIGN ESSENTIAL HYPERTENSION: ICD-10-CM

## 2024-03-26 DIAGNOSIS — E11.22 TYPE 2 DIABETES MELLITUS WITH CHRONIC KIDNEY DISEASE, WITHOUT LONG-TERM CURRENT USE OF INSULIN, UNSPECIFIED CKD STAGE (MULTI): Primary | ICD-10-CM

## 2024-03-26 DIAGNOSIS — K51.919 ULCERATIVE COLITIS WITH COMPLICATION, UNSPECIFIED LOCATION (MULTI): ICD-10-CM

## 2024-03-26 PROBLEM — S70.02XA: Status: RESOLVED | Noted: 2023-02-01 | Resolved: 2024-03-26

## 2024-03-26 PROBLEM — K50.10 CROHN'S DISEASE OF LARGE INTESTINE WITHOUT COMPLICATION (MULTI): Status: RESOLVED | Noted: 2023-12-27 | Resolved: 2024-03-26

## 2024-03-26 PROBLEM — R06.02 SOB (SHORTNESS OF BREATH) ON EXERTION: Status: RESOLVED | Noted: 2023-02-01 | Resolved: 2024-03-26

## 2024-03-26 LAB
EST. AVERAGE GLUCOSE BLD GHB EST-MCNC: 123 MG/DL
HBA1C MFR BLD: 5.9 %

## 2024-03-26 PROCEDURE — 1157F ADVNC CARE PLAN IN RCRD: CPT | Performed by: FAMILY MEDICINE

## 2024-03-26 PROCEDURE — 99214 OFFICE O/P EST MOD 30 MIN: CPT | Performed by: FAMILY MEDICINE

## 2024-03-26 PROCEDURE — 4010F ACE/ARB THERAPY RXD/TAKEN: CPT | Performed by: FAMILY MEDICINE

## 2024-03-26 PROCEDURE — 3078F DIAST BP <80 MM HG: CPT | Performed by: FAMILY MEDICINE

## 2024-03-26 PROCEDURE — 3074F SYST BP LT 130 MM HG: CPT | Performed by: FAMILY MEDICINE

## 2024-03-26 PROCEDURE — 36415 COLL VENOUS BLD VENIPUNCTURE: CPT

## 2024-03-26 PROCEDURE — 83036 HEMOGLOBIN GLYCOSYLATED A1C: CPT

## 2024-03-26 PROCEDURE — 1159F MED LIST DOCD IN RCRD: CPT | Performed by: FAMILY MEDICINE

## 2024-03-26 PROCEDURE — 1036F TOBACCO NON-USER: CPT | Performed by: FAMILY MEDICINE

## 2024-03-26 PROCEDURE — 3008F BODY MASS INDEX DOCD: CPT | Performed by: FAMILY MEDICINE

## 2024-03-26 RX ORDER — METOPROLOL TARTRATE 25 MG/1
25 TABLET, FILM COATED ORAL 2 TIMES DAILY
Qty: 180 TABLET | Refills: 3 | Status: SHIPPED | OUTPATIENT
Start: 2024-03-26

## 2024-03-26 ASSESSMENT — ENCOUNTER SYMPTOMS
VERTIGO: 0
NAUSEA: 0
FEVER: 0
DIZZINESS: 0
PALPITATIONS: 0
FATIGUE: 0
BACK PAIN: 1
LOSS OF BALANCE: 1
HEADACHES: 0
VISUAL CHANGE: 1
NECK PAIN: 0
MEMORY LOSS: 1
AURA: 0
BOWEL INCONTINENCE: 0
ABDOMINAL PAIN: 0
LIGHT-HEADEDNESS: 0
SHORTNESS OF BREATH: 1
CLUMSINESS: 1
CONFUSION: 0
VOMITING: 0
NEUROLOGIC COMPLAINT: 1
DIAPHORESIS: 0

## 2024-03-26 NOTE — ASSESSMENT & PLAN NOTE
Condition is stable. Patient is to continue current medications and regimen. Patient is to follow up with GI to monitor his/her condition at least once annually.

## 2024-03-26 NOTE — PATIENT INSTRUCTIONS
Patient will continue on a diabetic, low-cholesterol diet and weight reduction. Exercise as tolerated. He will continue medications as prescribed. Follow-up in 3 month(s) otherwise as needed.     Will obtain A1c today. Will call patient with results when available.     Patient is to return for fasting lipid panel, A1c, TSH labs at their convenience prior to his next appointment. Fasting is nothing to eat or drink except water or black coffee for 8-12 hours. Will call patient with results when available.     Advised patient that an ideal fasting blood sugar is under 120.     Patient is to follow up with Dr. Garrett (neuro), Dr. Goodwin (pulm), Dr. Chávez (uro), Dr. Salcedo (cardio) as scheduled.

## 2024-03-26 NOTE — TELEPHONE ENCOUNTER
Rx Refill Request     Name: Anjum Hernandez  :  1950   Medication Name:    metoprolol tartrate (Lopressor) 25 mg tablet - Take 1 tablet (25 mg) by mouth 2 times a day.  Specific Pharmacy location:  [facility]  Date of last appointment:  3/26/2024   Date of next appointment:  2024   Best number to reach patient:  [phone]     
yes

## 2024-03-26 NOTE — PROGRESS NOTES
Subjective   Patient ID: Sami Hernandez is a 73 y.o. male who presents for Follow-up. I last saw the patient 12/27/2023. Patient's sister is with him today.     Acute Neurological Problem  The patient's primary symptoms include clumsiness, a loss of balance, memory loss and a visual change. This is a chronic problem. The current episode started more than 1 year ago. The neurological problem developed insidiously. The problem has been gradually worsening since onset. There was no focality noted. Associated symptoms include back pain and shortness of breath. Pertinent negatives include no abdominal pain, auditory change, aura, bladder incontinence, bowel incontinence, chest pain, confusion, diaphoresis, dizziness, fatigue, fever, headaches, light-headedness, nausea, neck pain, palpitations, vertigo or vomiting. Past treatments include acetaminophen. The treatment provided mild relief.      Patient is considering cervical myelopathy surgery, wants to see what PCP thinks and if there are any alternatives. He states that his quality of life is not the same as it used to be. He has continued exercises that he learned in PT.     He mentions that he began driving again 3 weeks ago.     Review of Systems   Constitutional:  Negative for diaphoresis, fatigue and fever.   Respiratory:  Positive for shortness of breath.    Cardiovascular:  Negative for chest pain and palpitations.   Gastrointestinal:  Negative for abdominal pain, bowel incontinence, nausea and vomiting.   Genitourinary:  Negative for bladder incontinence.   Musculoskeletal:  Positive for back pain. Negative for neck pain.   Neurological:  Positive for loss of balance. Negative for dizziness, vertigo, light-headedness and headaches.   Psychiatric/Behavioral:  Positive for memory loss. Negative for confusion.      Objective   /76 (BP Location: Right arm, Patient Position: Sitting)   Pulse 76   Temp 36.4 °C (97.5 °F)   Resp 17   Ht 1.829 m (6')   Wt 129  kg (285 lb)   SpO2 95%   BMI 38.65 kg/m²     Physical Exam  124/76 on recheck of BP in the right arm.     General Appearance - well-developed, well-nourished, 73 y.o., White male in no acute distress. Patient is ambulating with a cane.     Skin - warm, pink and dry without rash or concerning lesions. Dryness of the skin involving the LEs.     Mental Status - alert and oriented x 3. Normal mood and affect appropriate to mood.     Neck - supple without lymphadenopathy. Carotid pulses are normal without bruits. Thyroid is normal in midline without nodules.     Chest - lungs are clear to auscultation without rales, rhonchi or wheezes. Mildly diminished breath sounds at the bases bilaterally. Pacemaker in the left upper chest.     Heart - regular, rate and rhythm without murmurs, rubs or gallops.     Abdomen - soft, obese, protuberant, nontender, nondistended. No masses, hepatomegaly or splenomegaly is noted. No rebound, rigidity or guarding is noted. Bowel sounds are normoactive.     Extremities - no cyanosis, clubbing or edema. Pedal pulses are 2+ normal at the dorsalis pedis and posterior pulses bilaterally.     Neurological - cranial nerves II through XII grossly intact. Motor strength 5/5 at all fours bilaterally.     Assessment/Plan   1. Type 2 diabetes mellitus with chronic kidney disease, without long-term current use of insulin, unspecified CKD stage (CMS/HCC)  Hemoglobin A1C    Hemoglobin A1C    Follow Up In [facility] - PCP - Established      2. Benign essential hypertension  Follow Up In [facility] - PCP - Established      3. DDD (degenerative disc disease), lumbar  Follow Up In [facility] - PCP - Established      4. Cardiac pacemaker        5. Paroxysmal atrial fibrillation (CMS/HCC)        6. Pure hypercholesterolemia  Lipid Panel    Follow Up In [facility] - PCP - Established      7. Acquired hypothyroidism  TSH with reflex to Free T4 if abnormal    Follow Up  In [facility] - PCP - Established      8. COPD, mild (CMS/HCC)        9. Obstructive sleep apnea, adult        10. Physical deconditioning        11. Left hemiparesis (CMS/HCC)        12. Ulcerative colitis with complication, unspecified location (CMS/HCC)        13. Hepatic steatosis        14. Class 2 severe obesity due to excess calories with serious comorbidity and body mass index (BMI) of 38.0 to 38.9 in adult (CMS/HCC)        15. Chronic kidney disease, stage 3b (CMS/HCC)        16. Major depressive disorder in full remission, unspecified whether recurrent (CMS/HCC)        Patient will continue on a diabetic, low-cholesterol diet and weight reduction. Exercise as tolerated. He will continue medications as prescribed. Follow-up in 3 month(s) otherwise as needed.     Will obtain A1c today. Will call patient with results when available.     Patient is to return for fasting lipid panel, A1c, TSH labs at their convenience prior to his next appointment. Fasting is nothing to eat or drink except water or black coffee for 8-12 hours. Will call patient with results when available.     Advised patient that an ideal fasting blood sugar is under 120.     Patient is to follow up with Dr. Garrett (neuro), Dr. Goodwin (pulm), Dr. Chávez (uro), Dr. Salcedo (cardio), Dr. Sanders (GI) as scheduled.         Scribe Attestation  By signing my name below, Ariana SERRANO, Rupert   attest that this documentation has been prepared under the direction and in the presence of Rian Lindsey MD.

## 2024-03-27 NOTE — ASSESSMENT & PLAN NOTE
Chronic kidney disease, stage IIIb is stable.  Patient to continue with current medications and treatment plan.  Follow-up at least annually.  Follow-up with nephrology as scheduled.  Last visit ordered

## 2024-03-27 NOTE — ASSESSMENT & PLAN NOTE
Major depressive disorder is improved and in remission.  Patient to continue with current medications and treatment plan.  Follow-up at least annually.  Follow-up with psychiatrist as scheduled.

## 2024-04-09 ENCOUNTER — APPOINTMENT (OUTPATIENT)
Dept: NEUROLOGY | Facility: CLINIC | Age: 74
End: 2024-04-09
Payer: MEDICARE

## 2024-04-18 ENCOUNTER — HOSPITAL ENCOUNTER (OUTPATIENT)
Dept: RADIOLOGY | Facility: HOSPITAL | Age: 74
Discharge: HOME | End: 2024-04-18
Payer: MEDICARE

## 2024-04-18 ENCOUNTER — TELEPHONE (OUTPATIENT)
Dept: NEUROLOGY | Facility: CLINIC | Age: 74
End: 2024-04-18

## 2024-04-18 DIAGNOSIS — G95.9 DISEASE OF SPINAL CORD, UNSPECIFIED (MULTI): ICD-10-CM

## 2024-04-18 PROCEDURE — 72125 CT NECK SPINE W/O DYE: CPT | Performed by: RADIOLOGY

## 2024-04-18 PROCEDURE — 72125 CT NECK SPINE W/O DYE: CPT

## 2024-04-18 NOTE — TELEPHONE ENCOUNTER
Patient called stating he received his letter about his canceled apt with Dr. Garrett on 4/9. Patient is getting neck surgery on 5/24. He said if we have any cancellations for Dr. Garrett before his surgery to please give him a call; otherwise; he will call us after he heals to reschedule his apt.

## 2024-04-24 ENCOUNTER — HOSPITAL ENCOUNTER (OUTPATIENT)
Dept: CARDIOLOGY | Facility: HOSPITAL | Age: 74
Discharge: HOME | End: 2024-04-24
Payer: MEDICARE

## 2024-04-24 DIAGNOSIS — I44.2 ATRIOVENTRICULAR BLOCK, THIRD DEGREE (MULTI): ICD-10-CM

## 2024-04-24 DIAGNOSIS — Z95.0 PACEMAKER: ICD-10-CM

## 2024-04-24 PROCEDURE — 93296 REM INTERROG EVL PM/IDS: CPT

## 2024-04-29 DIAGNOSIS — I10 BENIGN ESSENTIAL HYPERTENSION: ICD-10-CM

## 2024-04-29 RX ORDER — VALSARTAN 160 MG/1
160 TABLET ORAL DAILY
Qty: 30 TABLET | Refills: 5 | Status: SHIPPED | OUTPATIENT
Start: 2024-04-29 | End: 2024-10-26

## 2024-04-29 NOTE — TELEPHONE ENCOUNTER
Rx Refill Request Telephone Encounter    Name:  Anjum Hernandez  :  [id number]  Medication Name:  valsartan 160mg   Specific Pharmacy location:  [facility] pharmacy   Date of last appointment:  3/26/24  Date of next appointment:  24  Best number to reach patient:  [phone]

## 2024-05-11 NOTE — H&P
PMH:         hypertension         heart problems cardiologist for clearance Dr. Avtar Epperson           allergies:         imuran         clindamycin           psh:         knee 11-23-23         wrist unknown         gallbladder unknown date         tonsils unknown date    Sh:  Ex tobacco  Etoh 1/wk  Neg substance abuse                 This note was created using voice recognition software and was not corrected for typographical or grammatical errors and may have unintended errors         I spent 60 minutes with the patient and sister with over 50% for direct face to face counseling and coordination of care. Risks, benefits and alternatives to surgery were discussed:         Risks including bleeding, infection, temporary or permanent numbness, tingling, weakness, paralysis, bowel or bladder dysfunction, leaking of cerebrospinal fluid leading to meningitis, no pain improvement or worsening of pain, future staged surgery, seizure, stroke, blindness, heart attack, blood clot in legs, pulmonary embolism, coma, death, or other. Risk of recurrent or residual symptoms and scar tissue formation which cannot be improved by further surgery.         [Spinal hardware and instrumentation risks include: hardware failure or breaking of screws or rods or plates requiring revision or removal.         Nonfusion or pseudoarthrosis risk is higher with diabetes or tobacco use and may cause long term pain and require future revision, removal or other surgery.]         [Staged surgery a possibility including spinal cord stimulation for continuing pain in the back or legs after surgery.]         Benefit to include neurologic stabilization to that there is no further loss of function but without promise or guarantee of any improvement as any function already lost may be irreversible from longstanding injury or an injury that was too severe to allow recovery.         The goal of surgery would be to prevent you from becoming any worse

## 2024-05-13 ENCOUNTER — TELEPHONE (OUTPATIENT)
Dept: CARDIOLOGY CLINIC | Age: 74
End: 2024-05-13

## 2024-05-13 NOTE — TELEPHONE ENCOUNTER
Pt is having a cervical myelopathy done next week on the 24th and wants to know how many days before the procedure should he hold his Xarelto.

## 2024-05-15 DIAGNOSIS — F41.8 SITUATIONAL ANXIETY: Primary | ICD-10-CM

## 2024-05-15 LAB — NON-UH HIE PLATELET FUNCTION (ASPIRIN): 648 ARU

## 2024-05-15 RX ORDER — HYDROXYZINE HYDROCHLORIDE 25 MG/1
25 TABLET, FILM COATED ORAL EVERY 8 HOURS PRN
Qty: 45 TABLET | Refills: 0 | Status: SHIPPED | OUTPATIENT
Start: 2024-05-15 | End: 2024-05-23

## 2024-05-20 ENCOUNTER — HOSPITAL ENCOUNTER (OUTPATIENT)
Dept: RADIOLOGY | Facility: HOSPITAL | Age: 74
Discharge: HOME | End: 2024-05-20
Payer: MEDICARE

## 2024-05-20 DIAGNOSIS — G95.9 DISEASE OF SPINAL CORD, UNSPECIFIED (MULTI): ICD-10-CM

## 2024-05-20 PROCEDURE — 72050 X-RAY EXAM NECK SPINE 4/5VWS: CPT | Performed by: RADIOLOGY

## 2024-05-20 PROCEDURE — 72050 X-RAY EXAM NECK SPINE 4/5VWS: CPT

## 2024-05-22 ENCOUNTER — TELEPHONE (OUTPATIENT)
Dept: CARDIOLOGY CLINIC | Age: 74
End: 2024-05-22

## 2024-05-22 DIAGNOSIS — F41.8 SITUATIONAL ANXIETY: ICD-10-CM

## 2024-05-22 NOTE — TELEPHONE ENCOUNTER
Patient is having cervical spine surgery and will be using a cervical stim and is it ok to use since patient has a PPM? Device will be worn daily. Requested patient to have surgeon fax over additional information. Procedure is 5/25/2024. Please advise   Yes

## 2024-05-23 ENCOUNTER — ANESTHESIA EVENT (OUTPATIENT)
Dept: OPERATING ROOM | Age: 74
DRG: 472 | End: 2024-05-23
Payer: MEDICARE

## 2024-05-23 ENCOUNTER — TELEPHONE (OUTPATIENT)
Dept: CARDIOLOGY CLINIC | Age: 74
End: 2024-05-23

## 2024-05-23 RX ORDER — POLYETHYLENE GLYCOL 3350
POWDER (GRAM) MISCELLANEOUS
Status: ON HOLD | COMMUNITY
End: 2024-05-26

## 2024-05-23 RX ORDER — FENOFIBRATE 48 MG/1
48 TABLET, COATED ORAL DAILY
Status: ON HOLD | COMMUNITY

## 2024-05-23 RX ORDER — HYDROXYZINE HYDROCHLORIDE 25 MG/1
TABLET, FILM COATED ORAL
Qty: 45 TABLET | Refills: 0 | Status: SHIPPED | OUTPATIENT
Start: 2024-05-23

## 2024-05-23 RX ORDER — VALSARTAN 160 MG/1
160 TABLET ORAL DAILY
Status: ON HOLD | COMMUNITY
Start: 2023-11-20 | End: 2024-10-26

## 2024-05-23 NOTE — ANESTHESIA PRE PROCEDURE
Department of Anesthesiology  Preprocedure Note       Name:  Junito Harris   Age:  73 y.o.  :  1950                                          MRN:  [id number]         Date:  2024      Surgeon: Surgeon(s):  Turner Alexander MD    Procedure: Procedure(s):  CERVICAL 4-5 AND 5-6 ANTERIOR DISCECTOMY FUSION AUTOGRAFT, ALLOGRAFT MICRODISSECTION, FLUOROSCOPY, SPINAL MONITORING, PREOPERATIVE STEREOTACTIC PLANNING , INSERTION PLATE,  SCREWS, INTERBODY CAGES, MICROSCOPE 1 C-ARM, SPINAL MONITORING, SALVATORE MONACO, VIACELL  e-mailed Salvatore AWARE and [employer] CONFIRMATION #: [id number]    Medications prior to admission:   Prior to Admission medications    Medication Sig Start Date End Date Taking? Authorizing Provider   valsartan (DIOVAN) 160 MG tablet Take 1 tablet by mouth daily 11/20/23 10/26/24 Yes Deidra Spain MD   mupirocin (BACTROBAN) 2 % ointment Apply 22 g topically daily 21  Yes Deidra Spain MD   fenofibrate (TRICOR) 48 MG tablet Take 1 tablet by mouth daily   Yes Deidra Spain MD   Polyethylene Glycol 3350 POWD Take by mouth    Deidra Spain MD   rivaroxaban (XARELTO) 20 MG TABS tablet TAKE 1 TABLET AT BEDTIME 23   Avtar Reddy DO   VENTOLIN  (90 Base) MCG/ACT inhaler Inhale 2 puffs every 4 hours if needed for shortness of breath. 3/25/23   Deidra Spain MD   CPAP Machine MISC by Does not apply route Change CPAP pressure to Auto 5-10 cm 23   Thomas Zamorano MD   metoprolol tartrate (LOPRESSOR) 25 MG tablet TAKE 1 TABLET TWICE DAILY 23   Mary Kate Mo APRN - CNP   buPROPion (WELLBUTRIN XL) 300 MG extended release tablet Take 1 tablet by mouth every morning    Deidra Spain MD   Tofacitinib Citrate (XELJANZ) 5 MG TABS Take by mouth    Deidra Spain MD   rosuvastatin (CRESTOR) 10 MG tablet Take 1 tablet by mouth daily 22   Deidra Spain MD   Respiratory Therapy Supplies ROSAURA New C pap 5cm H2O and supplies  DX

## 2024-05-23 NOTE — TELEPHONE ENCOUNTER
Dr Alexander's office requesting pts device check reports and latest EKG.    Dr Alexander's fax # [phone]

## 2024-05-23 NOTE — PROGRESS NOTES
Phone PAT completed, pt given pre-op instructions (per orange sheet), instructed to take metoprolol AM of OR with SOW and albuterol inhaler AM of OR, pt verbalized understanding. Pt aware of 0945 arrival time. Electronically signed by Malathi Galvan RN on 5/23/2024 at 4:29 PM

## 2024-05-24 ENCOUNTER — APPOINTMENT (OUTPATIENT)
Dept: GENERAL RADIOLOGY | Age: 74
DRG: 472 | End: 2024-05-24
Attending: NEUROLOGICAL SURGERY
Payer: MEDICARE

## 2024-05-24 ENCOUNTER — HOSPITAL ENCOUNTER (INPATIENT)
Age: 74
LOS: 1 days | Discharge: NURSING, REHAB OR LONG-TERM CARE | DRG: 472 | End: 2024-05-26
Attending: NEUROLOGICAL SURGERY | Admitting: NEUROLOGICAL SURGERY
Payer: MEDICARE

## 2024-05-24 ENCOUNTER — ANESTHESIA (OUTPATIENT)
Dept: OPERATING ROOM | Age: 74
DRG: 472 | End: 2024-05-24
Payer: MEDICARE

## 2024-05-24 DIAGNOSIS — R52 PAIN: ICD-10-CM

## 2024-05-24 PROBLEM — G95.9 CERVICAL MYELOPATHY (HCC): Status: ACTIVE | Noted: 2024-05-24

## 2024-05-24 LAB
ABO + RH BLD: NORMAL
BLD GP AB SCN SERPL QL: NORMAL
GLUCOSE BLD-MCNC: 115 MG/DL (ref 70–99)
GLUCOSE BLD-MCNC: 99 MG/DL (ref 70–99)
PERFORMED ON: ABNORMAL
PERFORMED ON: NORMAL

## 2024-05-24 PROCEDURE — 86850 RBC ANTIBODY SCREEN: CPT

## 2024-05-24 PROCEDURE — 2580000003 HC RX 258: Performed by: STUDENT IN AN ORGANIZED HEALTH CARE EDUCATION/TRAINING PROGRAM

## 2024-05-24 PROCEDURE — 2780000010 HC IMPLANT OTHER: Performed by: NEUROLOGICAL SURGERY

## 2024-05-24 PROCEDURE — 2580000003 HC RX 258: Performed by: NEUROLOGICAL SURGERY

## 2024-05-24 PROCEDURE — 1210000000 HC MED SURG R&B

## 2024-05-24 PROCEDURE — 2500000003 HC RX 250 WO HCPCS: Performed by: NURSE ANESTHETIST, CERTIFIED REGISTERED

## 2024-05-24 PROCEDURE — 2500000003 HC RX 250 WO HCPCS: Performed by: REGISTERED NURSE

## 2024-05-24 PROCEDURE — 3600000004 HC SURGERY LEVEL 4 BASE: Performed by: NEUROLOGICAL SURGERY

## 2024-05-24 PROCEDURE — 6360000002 HC RX W HCPCS: Performed by: NEUROLOGICAL SURGERY

## 2024-05-24 PROCEDURE — 7100000000 HC PACU RECOVERY - FIRST 15 MIN: Performed by: NEUROLOGICAL SURGERY

## 2024-05-24 PROCEDURE — 6370000000 HC RX 637 (ALT 250 FOR IP): Performed by: NEUROLOGICAL SURGERY

## 2024-05-24 PROCEDURE — 2709999900 HC NON-CHARGEABLE SUPPLY: Performed by: NEUROLOGICAL SURGERY

## 2024-05-24 PROCEDURE — 3700000000 HC ANESTHESIA ATTENDED CARE: Performed by: NEUROLOGICAL SURGERY

## 2024-05-24 PROCEDURE — 00NW0ZZ RELEASE CERVICAL SPINAL CORD, OPEN APPROACH: ICD-10-PCS | Performed by: NEUROLOGICAL SURGERY

## 2024-05-24 PROCEDURE — 3600000014 HC SURGERY LEVEL 4 ADDTL 15MIN: Performed by: NEUROLOGICAL SURGERY

## 2024-05-24 PROCEDURE — A4217 STERILE WATER/SALINE, 500 ML: HCPCS | Performed by: NEUROLOGICAL SURGERY

## 2024-05-24 PROCEDURE — 3700000001 HC ADD 15 MINUTES (ANESTHESIA): Performed by: NEUROLOGICAL SURGERY

## 2024-05-24 PROCEDURE — 01N10ZZ RELEASE CERVICAL NERVE, OPEN APPROACH: ICD-10-PCS | Performed by: NEUROLOGICAL SURGERY

## 2024-05-24 PROCEDURE — 86900 BLOOD TYPING SEROLOGIC ABO: CPT

## 2024-05-24 PROCEDURE — 2580000003 HC RX 258: Performed by: NURSE ANESTHETIST, CERTIFIED REGISTERED

## 2024-05-24 PROCEDURE — 86901 BLOOD TYPING SEROLOGIC RH(D): CPT

## 2024-05-24 PROCEDURE — 0RT30ZZ RESECTION OF CERVICAL VERTEBRAL DISC, OPEN APPROACH: ICD-10-PCS | Performed by: NEUROLOGICAL SURGERY

## 2024-05-24 PROCEDURE — 0RG20A0 FUSION OF 2 OR MORE CERVICAL VERTEBRAL JOINTS WITH INTERBODY FUSION DEVICE, ANTERIOR APPROACH, ANTERIOR COLUMN, OPEN APPROACH: ICD-10-PCS | Performed by: NEUROLOGICAL SURGERY

## 2024-05-24 PROCEDURE — C1713 ANCHOR/SCREW BN/BN,TIS/BN: HCPCS | Performed by: NEUROLOGICAL SURGERY

## 2024-05-24 PROCEDURE — 7100000001 HC PACU RECOVERY - ADDTL 15 MIN: Performed by: NEUROLOGICAL SURGERY

## 2024-05-24 PROCEDURE — 2720000010 HC SURG SUPPLY STERILE: Performed by: NEUROLOGICAL SURGERY

## 2024-05-24 PROCEDURE — 93005 ELECTROCARDIOGRAM TRACING: CPT

## 2024-05-24 PROCEDURE — 6360000002 HC RX W HCPCS: Performed by: NURSE ANESTHETIST, CERTIFIED REGISTERED

## 2024-05-24 PROCEDURE — 2500000003 HC RX 250 WO HCPCS: Performed by: NEUROLOGICAL SURGERY

## 2024-05-24 PROCEDURE — 94660 CPAP INITIATION&MGMT: CPT

## 2024-05-24 PROCEDURE — 5A09457 ASSISTANCE WITH RESPIRATORY VENTILATION, 24-96 CONSECUTIVE HOURS, CONTINUOUS POSITIVE AIRWAY PRESSURE: ICD-10-PCS | Performed by: NEUROLOGICAL SURGERY

## 2024-05-24 PROCEDURE — L0180 CER POST COL OCC/MAN SUP ADJ: HCPCS | Performed by: NEUROLOGICAL SURGERY

## 2024-05-24 DEVICE — GRAFT BONE SUB SM PROCELLULAR SPNL MTRX OSTEOCEL: Type: IMPLANTABLE DEVICE | Site: SPINE CERVICAL | Status: FUNCTIONAL

## 2024-05-24 DEVICE — IMPLANTABLE DEVICE: Type: IMPLANTABLE DEVICE | Site: SPINE CERVICAL | Status: FUNCTIONAL

## 2024-05-24 DEVICE — HEDRON IC SPACER, 8X14, 7MM, 12°
Type: IMPLANTABLE DEVICE | Site: SPINE CERVICAL | Status: FUNCTIONAL
Brand: HEDRON

## 2024-05-24 DEVICE — 3.6MM BONE SCREW, VARIABLE, SELF-DRILLING, 14MM
Type: IMPLANTABLE DEVICE | Site: SPINE CERVICAL | Status: FUNCTIONAL
Brand: COALITION

## 2024-05-24 RX ORDER — MAGNESIUM HYDROXIDE 1200 MG/15ML
LIQUID ORAL CONTINUOUS PRN
Status: DISCONTINUED | OUTPATIENT
Start: 2024-05-24 | End: 2024-05-24 | Stop reason: HOSPADM

## 2024-05-24 RX ORDER — OXYCODONE HYDROCHLORIDE 5 MG/1
5 TABLET ORAL EVERY 4 HOURS PRN
Status: DISCONTINUED | OUTPATIENT
Start: 2024-05-24 | End: 2024-05-26 | Stop reason: HOSPADM

## 2024-05-24 RX ORDER — SODIUM CHLORIDE 0.9 % (FLUSH) 0.9 %
5-40 SYRINGE (ML) INJECTION PRN
Status: DISCONTINUED | OUTPATIENT
Start: 2024-05-24 | End: 2024-05-24 | Stop reason: HOSPADM

## 2024-05-24 RX ORDER — ALBUTEROL SULFATE 90 UG/1
2 AEROSOL, METERED RESPIRATORY (INHALATION) EVERY 4 HOURS PRN
Status: DISCONTINUED | OUTPATIENT
Start: 2024-05-24 | End: 2024-05-26 | Stop reason: HOSPADM

## 2024-05-24 RX ORDER — SODIUM CHLORIDE 9 MG/ML
INJECTION, SOLUTION INTRAVENOUS CONTINUOUS
Status: DISCONTINUED | OUTPATIENT
Start: 2024-05-24 | End: 2024-05-26 | Stop reason: HOSPADM

## 2024-05-24 RX ORDER — LIDOCAINE HYDROCHLORIDE 10 MG/ML
1 INJECTION, SOLUTION EPIDURAL; INFILTRATION; INTRACAUDAL; PERINEURAL
Status: DISCONTINUED | OUTPATIENT
Start: 2024-05-24 | End: 2024-05-24 | Stop reason: HOSPADM

## 2024-05-24 RX ORDER — ONDANSETRON 2 MG/ML
4 INJECTION INTRAMUSCULAR; INTRAVENOUS
Status: DISCONTINUED | OUTPATIENT
Start: 2024-05-24 | End: 2024-05-24 | Stop reason: HOSPADM

## 2024-05-24 RX ORDER — DEXAMETHASONE SODIUM PHOSPHATE 10 MG/ML
INJECTION INTRAMUSCULAR; INTRAVENOUS PRN
Status: DISCONTINUED | OUTPATIENT
Start: 2024-05-24 | End: 2024-05-24 | Stop reason: SDUPTHER

## 2024-05-24 RX ORDER — ROCURONIUM BROMIDE 10 MG/ML
INJECTION, SOLUTION INTRAVENOUS PRN
Status: DISCONTINUED | OUTPATIENT
Start: 2024-05-24 | End: 2024-05-24 | Stop reason: SDUPTHER

## 2024-05-24 RX ORDER — MIDAZOLAM HYDROCHLORIDE 1 MG/ML
INJECTION INTRAMUSCULAR; INTRAVENOUS PRN
Status: DISCONTINUED | OUTPATIENT
Start: 2024-05-24 | End: 2024-05-24 | Stop reason: SDUPTHER

## 2024-05-24 RX ORDER — ONDANSETRON 4 MG/1
4 TABLET, ORALLY DISINTEGRATING ORAL EVERY 8 HOURS PRN
Status: DISCONTINUED | OUTPATIENT
Start: 2024-05-24 | End: 2024-05-26 | Stop reason: HOSPADM

## 2024-05-24 RX ORDER — SODIUM CHLORIDE 9 MG/ML
INJECTION, SOLUTION INTRAVENOUS PRN
Status: DISCONTINUED | OUTPATIENT
Start: 2024-05-24 | End: 2024-05-26 | Stop reason: HOSPADM

## 2024-05-24 RX ORDER — FENTANYL CITRATE 0.05 MG/ML
25 INJECTION, SOLUTION INTRAMUSCULAR; INTRAVENOUS EVERY 5 MIN PRN
Status: DISCONTINUED | OUTPATIENT
Start: 2024-05-24 | End: 2024-05-24 | Stop reason: HOSPADM

## 2024-05-24 RX ORDER — SODIUM CHLORIDE 0.9 % (FLUSH) 0.9 %
5-40 SYRINGE (ML) INJECTION EVERY 12 HOURS SCHEDULED
Status: DISCONTINUED | OUTPATIENT
Start: 2024-05-24 | End: 2024-05-26 | Stop reason: HOSPADM

## 2024-05-24 RX ORDER — ENOXAPARIN SODIUM 100 MG/ML
30 INJECTION SUBCUTANEOUS DAILY
Status: DISCONTINUED | OUTPATIENT
Start: 2024-05-25 | End: 2024-05-24

## 2024-05-24 RX ORDER — NALOXONE HYDROCHLORIDE 0.4 MG/ML
INJECTION, SOLUTION INTRAMUSCULAR; INTRAVENOUS; SUBCUTANEOUS PRN
Status: DISCONTINUED | OUTPATIENT
Start: 2024-05-24 | End: 2024-05-24 | Stop reason: HOSPADM

## 2024-05-24 RX ORDER — SODIUM CHLORIDE 0.9 % (FLUSH) 0.9 %
5-40 SYRINGE (ML) INJECTION PRN
Status: DISCONTINUED | OUTPATIENT
Start: 2024-05-24 | End: 2024-05-26 | Stop reason: HOSPADM

## 2024-05-24 RX ORDER — SENNA AND DOCUSATE SODIUM 50; 8.6 MG/1; MG/1
1 TABLET, FILM COATED ORAL 2 TIMES DAILY
Status: DISCONTINUED | OUTPATIENT
Start: 2024-05-24 | End: 2024-05-26 | Stop reason: HOSPADM

## 2024-05-24 RX ORDER — SODIUM CHLORIDE, SODIUM LACTATE, POTASSIUM CHLORIDE, CALCIUM CHLORIDE 600; 310; 30; 20 MG/100ML; MG/100ML; MG/100ML; MG/100ML
INJECTION, SOLUTION INTRAVENOUS CONTINUOUS
Status: DISCONTINUED | OUTPATIENT
Start: 2024-05-24 | End: 2024-05-24 | Stop reason: HOSPADM

## 2024-05-24 RX ORDER — SODIUM CHLORIDE 9 MG/ML
INJECTION, SOLUTION INTRAVENOUS PRN
Status: DISCONTINUED | OUTPATIENT
Start: 2024-05-24 | End: 2024-05-24 | Stop reason: HOSPADM

## 2024-05-24 RX ORDER — ENOXAPARIN SODIUM 100 MG/ML
30 INJECTION SUBCUTANEOUS 2 TIMES DAILY
Status: DISCONTINUED | OUTPATIENT
Start: 2024-05-25 | End: 2024-05-26 | Stop reason: HOSPADM

## 2024-05-24 RX ORDER — SODIUM CHLORIDE 0.9 % (FLUSH) 0.9 %
5-40 SYRINGE (ML) INJECTION EVERY 12 HOURS SCHEDULED
Status: DISCONTINUED | OUTPATIENT
Start: 2024-05-24 | End: 2024-05-24 | Stop reason: HOSPADM

## 2024-05-24 RX ORDER — ONDANSETRON 2 MG/ML
4 INJECTION INTRAMUSCULAR; INTRAVENOUS EVERY 6 HOURS PRN
Status: DISCONTINUED | OUTPATIENT
Start: 2024-05-24 | End: 2024-05-26 | Stop reason: HOSPADM

## 2024-05-24 RX ORDER — OXYCODONE HYDROCHLORIDE 5 MG/1
5 TABLET ORAL
Status: DISCONTINUED | OUTPATIENT
Start: 2024-05-24 | End: 2024-05-24 | Stop reason: HOSPADM

## 2024-05-24 RX ORDER — VALSARTAN 160 MG/1
160 TABLET ORAL DAILY
Status: DISCONTINUED | OUTPATIENT
Start: 2024-05-24 | End: 2024-05-26 | Stop reason: HOSPADM

## 2024-05-24 RX ORDER — LEVOTHYROXINE SODIUM 0.03 MG/1
25 TABLET ORAL DAILY
Status: DISCONTINUED | OUTPATIENT
Start: 2024-05-24 | End: 2024-05-26 | Stop reason: HOSPADM

## 2024-05-24 RX ORDER — ACETAMINOPHEN 325 MG/1
650 TABLET ORAL EVERY 6 HOURS
Status: DISCONTINUED | OUTPATIENT
Start: 2024-05-24 | End: 2024-05-26 | Stop reason: HOSPADM

## 2024-05-24 RX ORDER — ONDANSETRON 2 MG/ML
INJECTION INTRAMUSCULAR; INTRAVENOUS PRN
Status: DISCONTINUED | OUTPATIENT
Start: 2024-05-24 | End: 2024-05-24 | Stop reason: SDUPTHER

## 2024-05-24 RX ORDER — MINERAL OIL
OIL (ML) MISCELLANEOUS PRN
Status: DISCONTINUED | OUTPATIENT
Start: 2024-05-24 | End: 2024-05-24 | Stop reason: HOSPADM

## 2024-05-24 RX ORDER — ROSUVASTATIN CALCIUM 10 MG/1
10 TABLET, COATED ORAL DAILY
Status: DISCONTINUED | OUTPATIENT
Start: 2024-05-24 | End: 2024-05-26 | Stop reason: HOSPADM

## 2024-05-24 RX ORDER — BISACODYL 10 MG
10 SUPPOSITORY, RECTAL RECTAL DAILY PRN
Status: DISCONTINUED | OUTPATIENT
Start: 2024-05-24 | End: 2024-05-26 | Stop reason: HOSPADM

## 2024-05-24 RX ORDER — LIDOCAINE HYDROCHLORIDE 10 MG/ML
INJECTION, SOLUTION EPIDURAL; INFILTRATION; INTRACAUDAL; PERINEURAL PRN
Status: DISCONTINUED | OUTPATIENT
Start: 2024-05-24 | End: 2024-05-24 | Stop reason: SDUPTHER

## 2024-05-24 RX ORDER — BUPROPION HYDROCHLORIDE 150 MG/1
300 TABLET ORAL EVERY MORNING
Status: DISCONTINUED | OUTPATIENT
Start: 2024-05-24 | End: 2024-05-26 | Stop reason: HOSPADM

## 2024-05-24 RX ORDER — ACETAMINOPHEN 325 MG/1
650 TABLET ORAL
Status: DISCONTINUED | OUTPATIENT
Start: 2024-05-24 | End: 2024-05-24 | Stop reason: HOSPADM

## 2024-05-24 RX ORDER — FENTANYL CITRATE 50 UG/ML
INJECTION, SOLUTION INTRAMUSCULAR; INTRAVENOUS PRN
Status: DISCONTINUED | OUTPATIENT
Start: 2024-05-24 | End: 2024-05-24 | Stop reason: SDUPTHER

## 2024-05-24 RX ORDER — PROPOFOL 10 MG/ML
INJECTION, EMULSION INTRAVENOUS PRN
Status: DISCONTINUED | OUTPATIENT
Start: 2024-05-24 | End: 2024-05-24 | Stop reason: SDUPTHER

## 2024-05-24 RX ADMIN — SENNOSIDES AND DOCUSATE SODIUM 1 TABLET: 50; 8.6 TABLET ORAL at 22:12

## 2024-05-24 RX ADMIN — HYDROMORPHONE HYDROCHLORIDE 0.5 MG: 1 INJECTION, SOLUTION INTRAMUSCULAR; INTRAVENOUS; SUBCUTANEOUS at 15:27

## 2024-05-24 RX ADMIN — DEXAMETHASONE SODIUM PHOSPHATE 10 MG: 10 INJECTION INTRAMUSCULAR; INTRAVENOUS at 13:21

## 2024-05-24 RX ADMIN — ROSUVASTATIN CALCIUM 10 MG: 10 TABLET, FILM COATED ORAL at 18:02

## 2024-05-24 RX ADMIN — PROPOFOL 200 MG: 10 INJECTION, EMULSION INTRAVENOUS at 13:12

## 2024-05-24 RX ADMIN — SODIUM CHLORIDE 3000 MG: 900 INJECTION INTRAVENOUS at 22:26

## 2024-05-24 RX ADMIN — PHENYLEPHRINE HYDROCHLORIDE 100 MCG/MIN: 10 INJECTION INTRAVENOUS at 13:49

## 2024-05-24 RX ADMIN — SODIUM CHLORIDE, POTASSIUM CHLORIDE, SODIUM LACTATE AND CALCIUM CHLORIDE: 600; 310; 30; 20 INJECTION, SOLUTION INTRAVENOUS at 13:44

## 2024-05-24 RX ADMIN — MIRTAZAPINE 45 MG: 30 TABLET, FILM COATED ORAL at 22:12

## 2024-05-24 RX ADMIN — SODIUM CHLORIDE: 9 INJECTION, SOLUTION INTRAVENOUS at 18:11

## 2024-05-24 RX ADMIN — LEVOTHYROXINE SODIUM 25 MCG: 0.03 TABLET ORAL at 18:02

## 2024-05-24 RX ADMIN — ACETAMINOPHEN 325MG 650 MG: 325 TABLET ORAL at 18:02

## 2024-05-24 RX ADMIN — BUPROPION HYDROCHLORIDE 300 MG: 150 TABLET, EXTENDED RELEASE ORAL at 18:02

## 2024-05-24 RX ADMIN — ROCURONIUM BROMIDE 50 MG: 10 INJECTION, SOLUTION INTRAVENOUS at 13:15

## 2024-05-24 RX ADMIN — PHENYLEPHRINE HYDROCHLORIDE 200 MCG: 10 INJECTION INTRAVENOUS at 13:44

## 2024-05-24 RX ADMIN — SODIUM CHLORIDE 3000 MG: 900 INJECTION INTRAVENOUS at 13:24

## 2024-05-24 RX ADMIN — HYDROMORPHONE HYDROCHLORIDE 0.5 MG: 1 INJECTION, SOLUTION INTRAMUSCULAR; INTRAVENOUS; SUBCUTANEOUS at 14:12

## 2024-05-24 RX ADMIN — HYDROMORPHONE HYDROCHLORIDE 0.5 MG: 1 INJECTION, SOLUTION INTRAMUSCULAR; INTRAVENOUS; SUBCUTANEOUS at 15:32

## 2024-05-24 RX ADMIN — SODIUM CHLORIDE, POTASSIUM CHLORIDE, SODIUM LACTATE AND CALCIUM CHLORIDE: 600; 310; 30; 20 INJECTION, SOLUTION INTRAVENOUS at 13:10

## 2024-05-24 RX ADMIN — SUGAMMADEX 200 MG: 100 INJECTION, SOLUTION INTRAVENOUS at 16:14

## 2024-05-24 RX ADMIN — SODIUM CHLORIDE, POTASSIUM CHLORIDE, SODIUM LACTATE AND CALCIUM CHLORIDE: 600; 310; 30; 20 INJECTION, SOLUTION INTRAVENOUS at 10:04

## 2024-05-24 RX ADMIN — ROCURONIUM BROMIDE 25 MG: 10 INJECTION, SOLUTION INTRAVENOUS at 15:32

## 2024-05-24 RX ADMIN — MIDAZOLAM HYDROCHLORIDE 2 MG: 1 INJECTION, SOLUTION INTRAMUSCULAR; INTRAVENOUS at 13:08

## 2024-05-24 RX ADMIN — ROCURONIUM BROMIDE 25 MG: 10 INJECTION, SOLUTION INTRAVENOUS at 14:45

## 2024-05-24 RX ADMIN — PROPOFOL 70 MCG/KG/MIN: 10 INJECTION, EMULSION INTRAVENOUS at 13:20

## 2024-05-24 RX ADMIN — ROCURONIUM BROMIDE 25 MG: 10 INJECTION, SOLUTION INTRAVENOUS at 14:06

## 2024-05-24 RX ADMIN — PROPOFOL 100 MG: 10 INJECTION, EMULSION INTRAVENOUS at 13:57

## 2024-05-24 RX ADMIN — LIDOCAINE HYDROCHLORIDE 25 MG: 10 INJECTION, SOLUTION EPIDURAL; INFILTRATION; INTRACAUDAL; PERINEURAL at 13:15

## 2024-05-24 RX ADMIN — PHENYLEPHRINE HYDROCHLORIDE 100 MCG: 10 INJECTION INTRAVENOUS at 13:40

## 2024-05-24 RX ADMIN — METOPROLOL TARTRATE 25 MG: 25 TABLET, FILM COATED ORAL at 22:12

## 2024-05-24 RX ADMIN — PHENYLEPHRINE HYDROCHLORIDE 300 MCG: 10 INJECTION INTRAVENOUS at 13:22

## 2024-05-24 RX ADMIN — FENTANYL CITRATE 100 MCG: 50 INJECTION, SOLUTION INTRAMUSCULAR; INTRAVENOUS at 13:12

## 2024-05-24 RX ADMIN — VALSARTAN 160 MG: 160 TABLET ORAL at 18:02

## 2024-05-24 RX ADMIN — ONDANSETRON 4 MG: 2 INJECTION INTRAMUSCULAR; INTRAVENOUS at 13:21

## 2024-05-24 ASSESSMENT — PAIN SCALES - GENERAL
PAINLEVEL_OUTOF10: 0
PAINLEVEL_OUTOF10: 5
PAINLEVEL_OUTOF10: 6

## 2024-05-24 ASSESSMENT — PAIN DESCRIPTION - FREQUENCY: FREQUENCY: INTERMITTENT

## 2024-05-24 ASSESSMENT — PAIN DESCRIPTION - ORIENTATION
ORIENTATION: MID
ORIENTATION: RIGHT

## 2024-05-24 ASSESSMENT — PAIN DESCRIPTION - DESCRIPTORS: DESCRIPTORS: SORE

## 2024-05-24 ASSESSMENT — PAIN DESCRIPTION - LOCATION
LOCATION: NECK
LOCATION: LEG

## 2024-05-24 NOTE — ANESTHESIA POSTPROCEDURE EVALUATION
Department of Anesthesiology  Postprocedure Note    Patient: Junito Harris  MRN: [id number]  YOB: 1950  Date of evaluation: 5/24/2024    Procedure Summary       Date: 05/24/24 Room / Location: [address]    Anesthesia Start: 1309 Anesthesia Stop: 1643    Procedure: CERVICAL 4-5 AND 5-6 ANTERIOR DISCECTOMY FUSION AUTOGRAFT, ALLOGRAFT MICRODISSECTION, FLUOROSCOPY, SPINAL MONITORING, PREOPERATIVE STEREOTACTIC PLANNING , INSERTION PLATE,  SCREWS, INTERBODY CAGES. (Spine Cervical) Diagnosis:       Cervical myelopathy (HCC)      (Cervical myelopathy (HCC) [G95.9])    Surgeons: Turner Alexander MD Responsible Provider: Angela Garza MD    Anesthesia Type: general ASA Status: 3            Anesthesia Type: No value filed.    Dann Phase I: Dann Score: 6    Dann Phase II:      Anesthesia Post Evaluation    Patient location during evaluation: bedside  Patient participation: complete - patient participated  Level of consciousness: awake and awake and alert  Airway patency: patent  Nausea & Vomiting: no nausea and no vomiting  Cardiovascular status: blood pressure returned to baseline and hemodynamically stable  Respiratory status: acceptable  Hydration status: euvolemic  Pain management: adequate        No notable events documented.

## 2024-05-24 NOTE — PROGRESS NOTES
Consult Note            Date:5/24/2024        Patient Name:Junito Harris     YOB: 1950     Age:73 y.o.    Reason for Consult: Chronic disease management.    Chief Complaint   Neck pain    History Obtained From   patient, electronic medical record    History of Present Illness   Patient is a 73-year-old male with a PMHx of COPD, arthritis, HTN, HLD, hypothyroidism, ETIENNE, A-fib, type 2 diabetes, presence of pacemaker and arthritis status post cervical discectomy and fusion postop day 0.  Patient endorses cervical neck pain unrelieved by conservative management.  Patient denies chest pain, palpitations, headache, dizziness, shortness of breath, cough, fever, chills, N/V/D, and changes in appetite.  Hospitalist consulted for chronic disease management.    Past Medical History     Past Medical History:   Diagnosis Date    Arthritis     Colitis     COPD (chronic obstructive pulmonary disease) (HCC)     Crohn disease (HCC)     Encephalopathy     Hyperlipidemia     Hypertension     Hypothyroidism     ETIENNE (obstructive sleep apnea)     Pacemaker     Paroxysmal atrial fibrillation (HCC) 11/01/2018    Persistent atrial fibrillation (HCC) 11/01/2018    Sick sinus syndrome (HCC)     Third degree AV block (HCC)     Type II or unspecified type diabetes mellitus without mention of complication, not stated as uncontrolled         Past Surgical History     Past Surgical History:   Procedure Laterality Date    ANTERIOR CRUCIATE LIGAMENT REPAIR      CARDIAC PACEMAKER PLACEMENT      COLONOSCOPY  05/22/2019    DR BONILLA    GALLBLADDER SURGERY      PACEMAKER PLACEMENT  07/10/2018    [facility] W [facility]IDAY DO    SIGMOIDOSCOPY  11/03/2017    DR. BONILLA    SIGMOIDOSCOPY  05/28/2019    DR BONILLA    TOE SURGERY      TONSILLECTOMY      TOTAL KNEE ARTHROPLASTY Right 11/23/2023    WRIST SURGERY  2013        Medications     Prior to Admission medications    Medication Sig Start Date End Date Taking? Authorizing Provider   valsartan

## 2024-05-24 NOTE — OP NOTE
death, or other. Risk of recurrent or residual symptoms and scar tissue formation which cannot be improved by further surgery. Benefit to include neurologic stabilization to that there is no further loss of function but without promise or guarantee of any improvement as any function already lost may be irreversible from longstanding injury or an injury that was too severe to allow recovery. The goal of surgery would be to prevent you from becoming any worse and losing even more neurological function, such as weakness, numbness or worse pain. Alternatives to be exhausted prior to surgery may include: medication, therapy, interventional pain management, chiropractic, acupuncture, massage therapy.     Operative procedure:  Patient was brought to the operating room at [facility].   Timeout procedure performed.  Patient was intubated by anesthesia service, monitoring lines were placed, preoperative antibiotics were given.   Patient was placed in a supine position meticulous padding all pressure points to prevent pressure sores.  I performed preoperative stereotactic planning and was placed on multiple axial sagittal coronal plane images on x-ray CT MRI.  Spinal monitoring was used with pre and post positioning monitoring within acceptable parameters.  Patient prepped and draped in the usual sterile manner as well as operative microscope.   Operative microdissection and microtechnique required and used throughout the case.   Fluoroscopy was draped in its usual sterile manner multiple AP lateral fluoroscopy images used throughout the case with my real-time interpretation of those images.  Local anesthesia was infiltrated and sharp skin incision was made.   Dissection through the platysma.  Identification and preservation and retraction of the carotid sheath laterally and the trachea esophagus medially.  Dissection to the prevertebral fascia with retractors placed.  Osteophytectomy performed with removal anterior

## 2024-05-24 NOTE — PROGRESS NOTES
05/24/24 1800   RT Protocol   History Pulmonary Disease 2   Respiratory pattern 0   Breath sounds 0   Cough 0   Indications for Bronchodilator Therapy On home bronchodilators   Bronchodilator Assessment Score 2

## 2024-05-25 ENCOUNTER — APPOINTMENT (OUTPATIENT)
Dept: CT IMAGING | Age: 74
DRG: 472 | End: 2024-05-25
Attending: NEUROLOGICAL SURGERY
Payer: MEDICARE

## 2024-05-25 PROBLEM — G72.9 CERVICAL MYOPATHY: Status: ACTIVE | Noted: 2024-05-25

## 2024-05-25 LAB
ANION GAP SERPL CALCULATED.3IONS-SCNC: 9 MEQ/L (ref 9–15)
BUN SERPL-MCNC: 20 MG/DL (ref 8–23)
CALCIUM SERPL-MCNC: 9.3 MG/DL (ref 8.5–9.9)
CHLORIDE SERPL-SCNC: 104 MEQ/L (ref 95–107)
CO2 SERPL-SCNC: 24 MEQ/L (ref 20–31)
CREAT SERPL-MCNC: 1.19 MG/DL (ref 0.7–1.2)
ERYTHROCYTE [DISTWIDTH] IN BLOOD BY AUTOMATED COUNT: 14 % (ref 11.5–14.5)
ESTIMATED AVERAGE GLUCOSE: 131 MG/DL
GLUCOSE BLD-MCNC: 110 MG/DL (ref 70–99)
GLUCOSE BLD-MCNC: 137 MG/DL (ref 70–99)
GLUCOSE SERPL-MCNC: 157 MG/DL (ref 70–99)
HBA1C MFR BLD: 6.2 % (ref 4–6)
HCT VFR BLD AUTO: 42.5 % (ref 42–52)
HGB BLD-MCNC: 13.9 G/DL (ref 14–18)
MAGNESIUM SERPL-MCNC: 1.9 MG/DL (ref 1.7–2.4)
MCH RBC QN AUTO: 28.3 PG (ref 27–31.3)
MCHC RBC AUTO-ENTMCNC: 32.7 % (ref 33–37)
MCV RBC AUTO: 86.4 FL (ref 79–92.2)
PERFORMED ON: ABNORMAL
PERFORMED ON: ABNORMAL
PHOSPHATE SERPL-MCNC: 2.4 MG/DL (ref 2.3–4.8)
PLATELET # BLD AUTO: 195 K/UL (ref 130–400)
POTASSIUM SERPL-SCNC: 4.3 MEQ/L (ref 3.4–4.9)
RBC # BLD AUTO: 4.92 M/UL (ref 4.7–6.1)
SODIUM SERPL-SCNC: 137 MEQ/L (ref 135–144)
WBC # BLD AUTO: 11.2 K/UL (ref 4.8–10.8)

## 2024-05-25 PROCEDURE — 85027 COMPLETE CBC AUTOMATED: CPT

## 2024-05-25 PROCEDURE — 94660 CPAP INITIATION&MGMT: CPT

## 2024-05-25 PROCEDURE — 2580000003 HC RX 258: Performed by: NEUROLOGICAL SURGERY

## 2024-05-25 PROCEDURE — 97110 THERAPEUTIC EXERCISES: CPT

## 2024-05-25 PROCEDURE — 80048 BASIC METABOLIC PNL TOTAL CA: CPT

## 2024-05-25 PROCEDURE — 84100 ASSAY OF PHOSPHORUS: CPT

## 2024-05-25 PROCEDURE — 6360000002 HC RX W HCPCS: Performed by: NEUROLOGICAL SURGERY

## 2024-05-25 PROCEDURE — 97166 OT EVAL MOD COMPLEX 45 MIN: CPT

## 2024-05-25 PROCEDURE — 97162 PT EVAL MOD COMPLEX 30 MIN: CPT

## 2024-05-25 PROCEDURE — G0378 HOSPITAL OBSERVATION PER HR: HCPCS

## 2024-05-25 PROCEDURE — 36415 COLL VENOUS BLD VENIPUNCTURE: CPT

## 2024-05-25 PROCEDURE — 83036 HEMOGLOBIN GLYCOSYLATED A1C: CPT

## 2024-05-25 PROCEDURE — 82330 ASSAY OF CALCIUM: CPT

## 2024-05-25 PROCEDURE — 83735 ASSAY OF MAGNESIUM: CPT

## 2024-05-25 PROCEDURE — 2700000000 HC OXYGEN THERAPY PER DAY

## 2024-05-25 PROCEDURE — 6370000000 HC RX 637 (ALT 250 FOR IP): Performed by: NEUROLOGICAL SURGERY

## 2024-05-25 PROCEDURE — 72125 CT NECK SPINE W/O DYE: CPT

## 2024-05-25 RX ORDER — OXYCODONE HYDROCHLORIDE 5 MG/1
5 TABLET ORAL EVERY 4 HOURS PRN
Status: CANCELLED | OUTPATIENT
Start: 2024-05-25

## 2024-05-25 RX ORDER — ACETAMINOPHEN 325 MG/1
650 TABLET ORAL EVERY 6 HOURS
Status: CANCELLED | OUTPATIENT
Start: 2024-05-25

## 2024-05-25 RX ORDER — INSULIN LISPRO 100 [IU]/ML
0-8 INJECTION, SOLUTION INTRAVENOUS; SUBCUTANEOUS
Status: DISCONTINUED | OUTPATIENT
Start: 2024-05-25 | End: 2024-05-26 | Stop reason: HOSPADM

## 2024-05-25 RX ORDER — BISACODYL 10 MG
10 SUPPOSITORY, RECTAL RECTAL DAILY PRN
Status: CANCELLED | OUTPATIENT
Start: 2024-05-25

## 2024-05-25 RX ORDER — ALBUTEROL SULFATE 90 UG/1
2 AEROSOL, METERED RESPIRATORY (INHALATION) EVERY 4 HOURS PRN
Status: CANCELLED | OUTPATIENT
Start: 2024-05-25

## 2024-05-25 RX ORDER — DEXTROSE MONOHYDRATE 100 MG/ML
INJECTION, SOLUTION INTRAVENOUS CONTINUOUS PRN
Status: DISCONTINUED | OUTPATIENT
Start: 2024-05-25 | End: 2024-05-26 | Stop reason: HOSPADM

## 2024-05-25 RX ORDER — LEVOTHYROXINE SODIUM 0.03 MG/1
25 TABLET ORAL DAILY
Status: CANCELLED | OUTPATIENT
Start: 2024-05-26

## 2024-05-25 RX ORDER — ONDANSETRON 4 MG/1
4 TABLET, ORALLY DISINTEGRATING ORAL EVERY 8 HOURS PRN
Status: CANCELLED | OUTPATIENT
Start: 2024-05-25

## 2024-05-25 RX ORDER — ONDANSETRON 2 MG/ML
4 INJECTION INTRAMUSCULAR; INTRAVENOUS EVERY 6 HOURS PRN
Status: CANCELLED | OUTPATIENT
Start: 2024-05-25

## 2024-05-25 RX ORDER — INSULIN LISPRO 100 [IU]/ML
0-4 INJECTION, SOLUTION INTRAVENOUS; SUBCUTANEOUS NIGHTLY
Status: DISCONTINUED | OUTPATIENT
Start: 2024-05-25 | End: 2024-05-26 | Stop reason: HOSPADM

## 2024-05-25 RX ORDER — VALSARTAN 40 MG/1
160 TABLET ORAL DAILY
Status: CANCELLED | OUTPATIENT
Start: 2024-05-26

## 2024-05-25 RX ORDER — ENOXAPARIN SODIUM 100 MG/ML
30 INJECTION SUBCUTANEOUS 2 TIMES DAILY
Status: CANCELLED | OUTPATIENT
Start: 2024-05-25

## 2024-05-25 RX ORDER — BUPROPION HYDROCHLORIDE 150 MG/1
300 TABLET ORAL EVERY MORNING
Status: CANCELLED | OUTPATIENT
Start: 2024-05-26

## 2024-05-25 RX ORDER — SENNA AND DOCUSATE SODIUM 50; 8.6 MG/1; MG/1
1 TABLET, FILM COATED ORAL 2 TIMES DAILY
Status: CANCELLED | OUTPATIENT
Start: 2024-05-25

## 2024-05-25 RX ORDER — ROSUVASTATIN CALCIUM 10 MG/1
10 TABLET, COATED ORAL DAILY
Status: CANCELLED | OUTPATIENT
Start: 2024-05-26

## 2024-05-25 RX ORDER — GLUCAGON 1 MG/ML
1 KIT INJECTION PRN
Status: DISCONTINUED | OUTPATIENT
Start: 2024-05-25 | End: 2024-05-26 | Stop reason: HOSPADM

## 2024-05-25 RX ADMIN — ACETAMINOPHEN 325MG 650 MG: 325 TABLET ORAL at 10:44

## 2024-05-25 RX ADMIN — OXYCODONE 5 MG: 5 TABLET ORAL at 02:43

## 2024-05-25 RX ADMIN — ENOXAPARIN SODIUM 30 MG: 100 INJECTION SUBCUTANEOUS at 20:25

## 2024-05-25 RX ADMIN — MIRTAZAPINE 45 MG: 30 TABLET, FILM COATED ORAL at 20:25

## 2024-05-25 RX ADMIN — VALSARTAN 160 MG: 160 TABLET ORAL at 10:45

## 2024-05-25 RX ADMIN — SODIUM CHLORIDE, PRESERVATIVE FREE 10 ML: 5 INJECTION INTRAVENOUS at 20:26

## 2024-05-25 RX ADMIN — SODIUM CHLORIDE 3000 MG: 900 INJECTION INTRAVENOUS at 06:12

## 2024-05-25 RX ADMIN — SENNOSIDES AND DOCUSATE SODIUM 1 TABLET: 50; 8.6 TABLET ORAL at 20:25

## 2024-05-25 RX ADMIN — ENOXAPARIN SODIUM 30 MG: 100 INJECTION SUBCUTANEOUS at 10:43

## 2024-05-25 RX ADMIN — METOPROLOL TARTRATE 25 MG: 25 TABLET, FILM COATED ORAL at 10:44

## 2024-05-25 RX ADMIN — ROSUVASTATIN CALCIUM 10 MG: 10 TABLET, FILM COATED ORAL at 10:45

## 2024-05-25 RX ADMIN — METOPROLOL TARTRATE 25 MG: 25 TABLET, FILM COATED ORAL at 20:25

## 2024-05-25 RX ADMIN — BUPROPION HYDROCHLORIDE 300 MG: 150 TABLET, EXTENDED RELEASE ORAL at 10:45

## 2024-05-25 RX ADMIN — ACETAMINOPHEN 325MG 650 MG: 325 TABLET ORAL at 20:24

## 2024-05-25 RX ADMIN — SENNOSIDES AND DOCUSATE SODIUM 1 TABLET: 50; 8.6 TABLET ORAL at 10:44

## 2024-05-25 RX ADMIN — ACETAMINOPHEN 325MG 650 MG: 325 TABLET ORAL at 02:43

## 2024-05-25 RX ADMIN — OXYCODONE 5 MG: 5 TABLET ORAL at 18:57

## 2024-05-25 RX ADMIN — OXYCODONE 5 MG: 5 TABLET ORAL at 06:54

## 2024-05-25 RX ADMIN — LEVOTHYROXINE SODIUM 25 MCG: 0.03 TABLET ORAL at 05:53

## 2024-05-25 RX ADMIN — ACETAMINOPHEN 325MG 650 MG: 325 TABLET ORAL at 18:56

## 2024-05-25 ASSESSMENT — PAIN SCALES - GENERAL
PAINLEVEL_OUTOF10: 7
PAINLEVEL_OUTOF10: 8
PAINLEVEL_OUTOF10: 5
PAINLEVEL_OUTOF10: 1
PAINLEVEL_OUTOF10: 7
PAINLEVEL_OUTOF10: 8

## 2024-05-25 ASSESSMENT — PAIN DESCRIPTION - LOCATION: LOCATION: LEG

## 2024-05-25 ASSESSMENT — PAIN DESCRIPTION - DESCRIPTORS: DESCRIPTORS: ACHING

## 2024-05-25 ASSESSMENT — PAIN DESCRIPTION - ORIENTATION: ORIENTATION: LEFT;RIGHT

## 2024-05-25 NOTE — PROGRESS NOTES
Physical Therapy Med Surg Initial Assessment  Facility/Department: [address] ORTHO TELE  Room: [facility] [address]       NAME: Junito Harris  : 1950 (73 y.o.)  MRN: [id number]  CODE STATUS: Full Code    Date of Service: 2024    Patient Diagnosis(es): Cervical myelopathy (HCC) [G95.9]   No chief complaint on file.    Patient Active Problem List    Diagnosis Date Noted    Cervical myelopathy (HCC) 2024    Stage 3a chronic kidney disease (HCC) 2022    Steatosis of liver 2022    Pure hypercholesterolemia 2022    Vitamin D deficiency 2022    Dyspnea 01/10/2022    Obesity (BMI 30-39.9) 01/10/2022    Mild chronic obstructive pulmonary disease (HCC) 2021    Crohn's disease of colon with rectal bleeding (HCC) 2018    Paroxysmal atrial fibrillation (HCC) 2018    SSS (sick sinus syndrome) (HCC) 2018    Presence of permanent cardiac pacemaker 2018    Complete atrioventricular block (HCC) 2018    Generalized anxiety disorder 2018    ETIENNE (obstructive sleep apnea) 2017    Dyslipidemia associated with type 2 diabetes mellitus (HCC) 12/15/2015    Major depressive disorder, recurrent episode, moderate (HCC) 2015    DM w/o complication type II (HCC) 2015    HTN (hypertension) 07/15/2015        Past Medical History:   Diagnosis Date    Arthritis     Colitis     COPD (chronic obstructive pulmonary disease) (HCC)     Crohn disease (HCC)     Encephalopathy     Hyperlipidemia     Hypertension     Hypothyroidism     ETIENNE (obstructive sleep apnea)     Pacemaker     Paroxysmal atrial fibrillation (HCC) 2018    Persistent atrial fibrillation (HCC) 2018    Sick sinus syndrome (HCC)     Third degree AV block (HCC)     Type II or unspecified type diabetes mellitus without mention of complication, not stated as uncontrolled      Past Surgical History:   Procedure Laterality Date    ANTERIOR CRUCIATE LIGAMENT REPAIR      CARDIAC PACEMAKER

## 2024-05-25 NOTE — PROGRESS NOTES
Pod1 acdf c456  Ambulated in rm w therapy  Sister and family in rm  D/w rn and cm  Acute rehab tomorrow if accepted  Awake alert cdi fluent speech emery po urinating  Postop ct stable hardware

## 2024-05-25 NOTE — ACP (ADVANCE CARE PLANNING)
Advance Care Planning   Healthcare Decision Maker:    Primary Decision Maker: Janiya Rodriguez - Brother/Sister - [phone]    Secondary Decision Maker: eboni Rodriguez - Niece/Nephew - [phone]    Click here to complete Healthcare Decision Makers including selection of the Healthcare Decision Maker Relationship (ie \"Primary\").

## 2024-05-25 NOTE — PROGRESS NOTES
See OT evaluation for all goals and OT POC. Electronically signed by Juan Lima OTR/L on 5/25/2024 at 3:16 PM

## 2024-05-25 NOTE — PROGRESS NOTES
Hospitalist Progress Note      PCP: Angelo Uriostegui MD    Date of Admission: 2024    Reason for consult: A-fib, hypertension    Subjective: 12 systems review are all negative except the following.    Patient was seen and examined today.  He is feeling well.  He denies any complaints.  He said that he takes Xarelto for A-fib.  He stopped his Xarelto on Wednesday.  He said that he is prediabetic and does not take any medication for it.        Medications:  Reviewed    Infusion Medications    dextrose      sodium chloride      sodium chloride Stopped (24 1130)     Scheduled Medications    insulin lispro  0-8 Units SubCUTAneous TID WC    insulin lispro  0-4 Units SubCUTAneous Nightly    buPROPion  300 mg Oral QAM    levothyroxine  25 mcg Oral Daily    metoprolol tartrate  25 mg Oral BID    mirtazapine  45 mg Oral Nightly    rosuvastatin  10 mg Oral Daily    valsartan  160 mg Oral Daily    Tofacitinib Citrate  5 mg Oral BID    sodium chloride flush  5-40 mL IntraVENous 2 times per day    acetaminophen  650 mg Oral Q6H    sennosides-docusate sodium  1 tablet Oral BID    enoxaparin  30 mg SubCUTAneous BID     PRN Meds: glucose, dextrose bolus **OR** dextrose bolus, glucagon (rDNA), dextrose, albuterol sulfate HFA, sodium chloride flush, sodium chloride, oxyCODONE, HYDROmorphone, bisacodyl, ondansetron **OR** ondansetron      Intake/Output Summary (Last 24 hours) at 2024 1522  Last data filed at 2024 0720  Gross per 24 hour   Intake 1100 ml   Output 1100 ml   Net 0 ml       Exam:  /70   Pulse 62   Temp 98.1 °F (36.7 °C) (Oral)   Resp 18   Ht 1.829 m (6')   Wt 122.5 kg (270 lb)   SpO2 95%   BMI 36.62 kg/m²     Average, Min, and Max for last 24 hours Vitals:  TEMPERATURE:  Temp  Av.6 °F (36.4 °C)  Min: 97 °F (36.1 °C)  Max: 98.2 °F (36.8 °C)    RESPIRATIONS RANGE: Resp  Av.2  Min: 16  Max: 19    PULSE RANGE: Pulse  Av.4  Min: 60  Max: 76    BLOOD PRESSURE RANGE:  Systolic

## 2024-05-25 NOTE — PROGRESS NOTES
Pt assessment compete.  Pt is alert and oriented.  Pt denies pain at this time. Denies numbness or tingling at this time.  Cervical collar in place.  Call light in reach. Bed alarm on.

## 2024-05-25 NOTE — PROGRESS NOTES
[facility] OCCUPATIONAL THERAPY EVALUATION - ACUTE     NAME: Junito Harris  : 1950 (73 y.o.)  MRN: [id number]  CODE STATUS: Full Code  Room: W264/W264-01    Date of Service: 2024    Patient Diagnosis(es): Cervical myelopathy (HCC) [G95.9]  Cervical myopathy [G72.9]   Patient Active Problem List    Diagnosis Date Noted    Cervical myopathy 2024    Cervical myelopathy (HCC) 2024    Stage 3a chronic kidney disease (HCC) 2022    Steatosis of liver 2022    Pure hypercholesterolemia 2022    Vitamin D deficiency 2022    Dyspnea 01/10/2022    Obesity (BMI 30-39.9) 01/10/2022    Mild chronic obstructive pulmonary disease (HCC) 2021    Crohn's disease of colon with rectal bleeding (HCC) 2018    Paroxysmal atrial fibrillation (HCC) 2018    SSS (sick sinus syndrome) (HCC) 2018    Presence of permanent cardiac pacemaker 2018    Complete atrioventricular block (HCC) 2018    Generalized anxiety disorder 2018    ETIENNE (obstructive sleep apnea) 2017    Dyslipidemia associated with type 2 diabetes mellitus (HCC) 12/15/2015    Major depressive disorder, recurrent episode, moderate (HCC) 2015    DM w/o complication type II (HCC) 2015    HTN (hypertension) 07/15/2015        Past Medical History:   Diagnosis Date    Arthritis     Colitis     COPD (chronic obstructive pulmonary disease) (HCC)     Crohn disease (HCC)     Encephalopathy     Hyperlipidemia     Hypertension     Hypothyroidism     ETIENNE (obstructive sleep apnea)     Pacemaker     Paroxysmal atrial fibrillation (HCC) 2018    Persistent atrial fibrillation (HCC) 2018    Sick sinus syndrome (HCC)     Third degree AV block (HCC)     Type II or unspecified type diabetes mellitus without mention of complication, not stated as uncontrolled      Past Surgical History:   Procedure Laterality Date    ANTERIOR CRUCIATE LIGAMENT REPAIR      CARDIAC PACEMAKER PLACEMENT

## 2024-05-26 ENCOUNTER — HOSPITAL ENCOUNTER (INPATIENT)
Age: 74
LOS: 13 days | Discharge: NURSING, REHAB OR LONG-TERM CARE | End: 2024-06-08
Attending: PHYSICAL MEDICINE & REHABILITATION | Admitting: PHYSICAL MEDICINE & REHABILITATION
Payer: MEDICARE

## 2024-05-26 VITALS
OXYGEN SATURATION: 93 % | WEIGHT: 270 LBS | HEIGHT: 72 IN | SYSTOLIC BLOOD PRESSURE: 129 MMHG | HEART RATE: 62 BPM | TEMPERATURE: 98.6 F | DIASTOLIC BLOOD PRESSURE: 65 MMHG | RESPIRATION RATE: 16 BRPM | BODY MASS INDEX: 36.57 KG/M2

## 2024-05-26 DIAGNOSIS — G89.18 POST-OP PAIN: ICD-10-CM

## 2024-05-26 DIAGNOSIS — Z74.09 IMPAIRED MOBILITY AND ACTIVITIES OF DAILY LIVING: Primary | ICD-10-CM

## 2024-05-26 DIAGNOSIS — Z78.9 IMPAIRED MOBILITY AND ACTIVITIES OF DAILY LIVING: Primary | ICD-10-CM

## 2024-05-26 LAB
ANION GAP SERPL CALCULATED.3IONS-SCNC: 9 MEQ/L (ref 9–15)
BASOPHILS # BLD: 0 K/UL (ref 0–0.2)
BASOPHILS NFR BLD: 0.3 %
BUN SERPL-MCNC: 27 MG/DL (ref 8–23)
CA-I ADJ PH7.4 SERPL-SCNC: 1.24 MMOL/L (ref 1.09–1.3)
CA-I SERPL ISE-SCNC: 1.22 MMOL/L (ref 1.09–1.3)
CALCIUM SERPL-MCNC: 9.2 MG/DL (ref 8.5–9.9)
CHLORIDE SERPL-SCNC: 107 MEQ/L (ref 95–107)
CO2 SERPL-SCNC: 26 MEQ/L (ref 20–31)
CREAT SERPL-MCNC: 1.16 MG/DL (ref 0.7–1.2)
EOSINOPHIL # BLD: 0.1 K/UL (ref 0–0.7)
EOSINOPHIL NFR BLD: 0.5 %
ERYTHROCYTE [DISTWIDTH] IN BLOOD BY AUTOMATED COUNT: 14.6 % (ref 11.5–14.5)
GLUCOSE BLD-MCNC: 110 MG/DL (ref 70–99)
GLUCOSE BLD-MCNC: 119 MG/DL (ref 70–99)
GLUCOSE BLD-MCNC: 130 MG/DL (ref 70–99)
GLUCOSE SERPL-MCNC: 114 MG/DL (ref 70–99)
HCT VFR BLD AUTO: 42.6 % (ref 42–52)
HGB BLD-MCNC: 13.8 G/DL (ref 14–18)
LYMPHOCYTES # BLD: 1.3 K/UL (ref 1–4.8)
LYMPHOCYTES NFR BLD: 13.8 %
MCH RBC QN AUTO: 28.5 PG (ref 27–31.3)
MCHC RBC AUTO-ENTMCNC: 32.4 % (ref 33–37)
MCV RBC AUTO: 88 FL (ref 79–92.2)
MONOCYTES # BLD: 0.9 K/UL (ref 0.2–0.8)
MONOCYTES NFR BLD: 9.4 %
NEUTROPHILS # BLD: 7 K/UL (ref 1.4–6.5)
NEUTS SEG NFR BLD: 75.5 %
PERFORMED ON: ABNORMAL
PLATELET # BLD AUTO: 176 K/UL (ref 130–400)
POTASSIUM SERPL-SCNC: 5.1 MEQ/L (ref 3.4–4.9)
RBC # BLD AUTO: 4.84 M/UL (ref 4.7–6.1)
SODIUM SERPL-SCNC: 142 MEQ/L (ref 135–144)
WBC # BLD AUTO: 9.3 K/UL (ref 4.8–10.8)

## 2024-05-26 PROCEDURE — 36415 COLL VENOUS BLD VENIPUNCTURE: CPT

## 2024-05-26 PROCEDURE — G0378 HOSPITAL OBSERVATION PER HR: HCPCS

## 2024-05-26 PROCEDURE — 80048 BASIC METABOLIC PNL TOTAL CA: CPT

## 2024-05-26 PROCEDURE — 6370000000 HC RX 637 (ALT 250 FOR IP): Performed by: NEUROLOGICAL SURGERY

## 2024-05-26 PROCEDURE — 85025 COMPLETE CBC W/AUTO DIFF WBC: CPT

## 2024-05-26 PROCEDURE — 6360000002 HC RX W HCPCS: Performed by: NEUROLOGICAL SURGERY

## 2024-05-26 PROCEDURE — 1180000000 HC REHAB R&B

## 2024-05-26 PROCEDURE — 2580000003 HC RX 258: Performed by: NEUROLOGICAL SURGERY

## 2024-05-26 RX ORDER — MIRTAZAPINE 15 MG/1
45 TABLET, FILM COATED ORAL NIGHTLY
Status: DISCONTINUED | OUTPATIENT
Start: 2024-05-26 | End: 2024-06-08 | Stop reason: HOSPADM

## 2024-05-26 RX ORDER — ALBUTEROL SULFATE 90 UG/1
2 AEROSOL, METERED RESPIRATORY (INHALATION) EVERY 4 HOURS PRN
Status: DISCONTINUED | OUTPATIENT
Start: 2024-05-26 | End: 2024-06-08 | Stop reason: HOSPADM

## 2024-05-26 RX ORDER — ENOXAPARIN SODIUM 100 MG/ML
30 INJECTION SUBCUTANEOUS 2 TIMES DAILY
Status: DISCONTINUED | OUTPATIENT
Start: 2024-05-26 | End: 2024-05-30

## 2024-05-26 RX ORDER — ACETAMINOPHEN 325 MG/1
650 TABLET ORAL EVERY 6 HOURS
Status: DISCONTINUED | OUTPATIENT
Start: 2024-05-26 | End: 2024-05-28

## 2024-05-26 RX ORDER — ROSUVASTATIN CALCIUM 5 MG/1
10 TABLET, COATED ORAL DAILY
Status: DISCONTINUED | OUTPATIENT
Start: 2024-05-27 | End: 2024-05-29

## 2024-05-26 RX ORDER — ONDANSETRON 2 MG/ML
4 INJECTION INTRAMUSCULAR; INTRAVENOUS EVERY 6 HOURS PRN
Status: DISCONTINUED | OUTPATIENT
Start: 2024-05-26 | End: 2024-06-08 | Stop reason: HOSPADM

## 2024-05-26 RX ORDER — SENNA AND DOCUSATE SODIUM 50; 8.6 MG/1; MG/1
1 TABLET, FILM COATED ORAL 2 TIMES DAILY
Status: DISCONTINUED | OUTPATIENT
Start: 2024-05-26 | End: 2024-06-08 | Stop reason: HOSPADM

## 2024-05-26 RX ORDER — BISACODYL 10 MG
10 SUPPOSITORY, RECTAL RECTAL DAILY PRN
Status: DISCONTINUED | OUTPATIENT
Start: 2024-05-26 | End: 2024-06-08 | Stop reason: HOSPADM

## 2024-05-26 RX ORDER — OXYCODONE HYDROCHLORIDE 5 MG/1
5 TABLET ORAL EVERY 4 HOURS PRN
Status: DISCONTINUED | OUTPATIENT
Start: 2024-05-26 | End: 2024-05-28

## 2024-05-26 RX ORDER — LEVOTHYROXINE SODIUM 0.03 MG/1
25 TABLET ORAL DAILY
Status: DISCONTINUED | OUTPATIENT
Start: 2024-05-27 | End: 2024-06-08 | Stop reason: HOSPADM

## 2024-05-26 RX ORDER — VALSARTAN 160 MG/1
160 TABLET ORAL DAILY
Status: DISCONTINUED | OUTPATIENT
Start: 2024-05-27 | End: 2024-06-08 | Stop reason: HOSPADM

## 2024-05-26 RX ORDER — BUPROPION HYDROCHLORIDE 300 MG/1
300 TABLET ORAL EVERY MORNING
Status: DISCONTINUED | OUTPATIENT
Start: 2024-05-27 | End: 2024-06-08 | Stop reason: HOSPADM

## 2024-05-26 RX ORDER — ONDANSETRON 4 MG/1
4 TABLET, ORALLY DISINTEGRATING ORAL EVERY 8 HOURS PRN
Status: DISCONTINUED | OUTPATIENT
Start: 2024-05-26 | End: 2024-06-08 | Stop reason: HOSPADM

## 2024-05-26 RX ADMIN — OXYCODONE 5 MG: 5 TABLET ORAL at 20:14

## 2024-05-26 RX ADMIN — SENNOSIDES AND DOCUSATE SODIUM 1 TABLET: 50; 8.6 TABLET ORAL at 08:52

## 2024-05-26 RX ADMIN — ENOXAPARIN SODIUM 30 MG: 100 INJECTION SUBCUTANEOUS at 20:14

## 2024-05-26 RX ADMIN — VALSARTAN 160 MG: 160 TABLET ORAL at 10:14

## 2024-05-26 RX ADMIN — SENNOSIDES AND DOCUSATE SODIUM 1 TABLET: 50; 8.6 TABLET ORAL at 20:15

## 2024-05-26 RX ADMIN — METOPROLOL TARTRATE 25 MG: 25 TABLET, FILM COATED ORAL at 08:51

## 2024-05-26 RX ADMIN — SODIUM CHLORIDE, PRESERVATIVE FREE 10 ML: 5 INJECTION INTRAVENOUS at 08:55

## 2024-05-26 RX ADMIN — ACETAMINOPHEN 325MG 650 MG: 325 TABLET ORAL at 20:15

## 2024-05-26 RX ADMIN — OXYCODONE 5 MG: 5 TABLET ORAL at 08:53

## 2024-05-26 RX ADMIN — LEVOTHYROXINE SODIUM 25 MCG: 0.03 TABLET ORAL at 07:43

## 2024-05-26 RX ADMIN — ACETAMINOPHEN 325MG 650 MG: 325 TABLET ORAL at 08:52

## 2024-05-26 RX ADMIN — METOPROLOL TARTRATE 25 MG: 25 TABLET, FILM COATED ORAL at 20:15

## 2024-05-26 RX ADMIN — ENOXAPARIN SODIUM 30 MG: 100 INJECTION SUBCUTANEOUS at 08:53

## 2024-05-26 RX ADMIN — ROSUVASTATIN CALCIUM 10 MG: 10 TABLET, FILM COATED ORAL at 08:51

## 2024-05-26 RX ADMIN — MIRTAZAPINE 45 MG: 15 TABLET, FILM COATED ORAL at 20:15

## 2024-05-26 RX ADMIN — BUPROPION HYDROCHLORIDE 300 MG: 150 TABLET, EXTENDED RELEASE ORAL at 08:51

## 2024-05-26 ASSESSMENT — PAIN DESCRIPTION - LOCATION
LOCATION: NECK
LOCATION: NECK

## 2024-05-26 ASSESSMENT — PAIN DESCRIPTION - ORIENTATION
ORIENTATION: POSTERIOR
ORIENTATION: POSTERIOR

## 2024-05-26 ASSESSMENT — PAIN SCALES - GENERAL
PAINLEVEL_OUTOF10: 0
PAINLEVEL_OUTOF10: 7
PAINLEVEL_OUTOF10: 0

## 2024-05-26 ASSESSMENT — PAIN DESCRIPTION - DESCRIPTORS
DESCRIPTORS: ACHING
DESCRIPTORS: ACHING;DISCOMFORT

## 2024-05-26 ASSESSMENT — PAIN DESCRIPTION - FREQUENCY: FREQUENCY: INTERMITTENT

## 2024-05-26 NOTE — PROGRESS NOTES
Pod2 acdf doing well seen examined for acute rehab today d/w rn awake alert no c/o pain emery po postop ct stable hardware  Ok dc rehab will follow there

## 2024-05-26 NOTE — DISCHARGE INSTRUCTIONS
Check operative dressing with vital signs. 2) Change dressing and apply light dressing on POD # 3 and PRN when saturated. 3) Leave to open air on POD #4.   Surgery date: 5-  Wear cervical collar at all times for 6 weeks

## 2024-05-26 NOTE — DISCHARGE INSTR - COC
scale): Pain Level: 7  Last Weight:   Wt Readings from Last 1 Encounters:   05/24/24 122.5 kg (270 lb)     Mental Status:  oriented and alert    IV Access:  - None    Nursing Mobility/ADLs:  Walking   Assisted  Transfer  Assisted  Bathing  Assisted  Dressing  Assisted  Toileting  Assisted  Feeding  Independent  Med Admin  Assisted  Med Delivery   whole    Wound Care Documentation and Therapy:  Incision 05/24/24 Neck Right (Active)   Dressing Status Clean;Dry;Intact 05/26/24 0901   Dressing/Treatment Gauze dressing/dressing sponge 05/26/24 0901   Drainage Amount None (dry) 05/26/24 0901   Odor None 05/25/24 2315   Number of days: 1        Elimination:  Continence:   Bowel: Yes  Bladder: incontinent at times  Urinary Catheter: None   Colostomy/Ileostomy/Ileal Conduit: No       Date of Last BM: 5-    Intake/Output Summary (Last 24 hours) at 5/26/2024 1229  Last data filed at 5/26/2024 0901  Gross per 24 hour   Intake 660 ml   Output 950 ml   Net -290 ml     I/O last 3 completed shifts:  In: 300 [P.O.:300]  Out: 1650 [Urine:1650]    Safety Concerns:     None    Impairments/Disabilities:      Hearing    Nutrition Therapy:  Current Nutrition Therapy:   - Oral Diet:  General    Routes of Feeding: Oral  Liquids: Thin Liquids  Daily Fluid Restriction: no  Last Modified Barium Swallow with Video (Video Swallowing Test): not done    Treatments at the Time of Hospital Discharge:   Respiratory Treatments: as ordered  Oxygen Therapy:  is not on home oxygen therapy.  Ventilator:    - No ventilator support    Rehab Therapies: Physical Therapy and Occupational Therapy  Weight Bearing Status/Restrictions: No weight bearing restrictions  Other Medical Equipment (for information only, NOT a DME order):  walker  Other Treatments: see previous instructions    Patient's personal belongings (please select all that are sent with patient):  Hearing Aides bilateral    RN SIGNATURE:  Electronically signed by Yudith Adames RN on

## 2024-05-26 NOTE — CONSULTS
postoperative pain, titration of opiate and high risk medications,   blood pressure and blood sugar control.    It is my opinion that they will be able to tolerate and benefit from 3 hours of therapy a day.  I reviewed the various options re: levels of care with the patient and family.  Please see pre-admission screen note for further details. I discussed acute rehab with the patient and verify that the patient is able and willing to participate in 3 hours of therapy a day.  Rehab and Acute Care Case Management has also reinforced this expectation.  This patient requires multidisciplinary rehabilitation treatment, including daily care and management from a PM&R physician, 24-hour rehabilitation nursing, Physical Therapy, Occupational Therapy, rehabilitation psychology, consideration of speech and language pathology, recreational therapy, nutritional services, and a rehabilitation .  I feel that it is reasonable to plan for a discharge to home setting after acute rehab.          Specialized nursing care to focus on:  Bowel and bladder issues-Monitor for urinary retention-check PVRs, bladder scan--cath if no void.  Wound risk and management   -pressure relief protocols-side to side turns  IVF medication administration      Monitor endurance and if necessary spread therapy out over a 7-day window-adding scheduled rest breaks when needed.  Focus on energy conservation.  Monitor heart rate and   cardiac medications effects on heart rate and blood pressure before, during and after therapy.  Progress toward endurance training with pulse ox monitoring for saturation and heart rate.    monitoring for dysphagia-- Improve hydration and nutrition by adding Vitamin B12 shot times one, adding Protein supplements and push PO fluids.    Treat and monitor for higher level cognitive deficits, focus on difficulty with sequencing and problem-solving.    Focus on higher-level balance and falls risk issues focusing on

## 2024-05-26 NOTE — CARE COORDINATION
I called patient to explain to him that he is now an inpatient again and not observation status. I let him know he has Medicare appeal rights and he states everything is going smooth and well and he is comfortable with him plan to dc to acute rehab today. Pt had IMM and has a copy of IMM in his room. He asked if he needed to do anything and I let him know he did not. He is aware that he is inpatient status now and had the right to appeal his discharge .   
Pt plan is [facility] rehab today and he was approved. We are awaiting room number. I called Berenice on rehab and they expect call from Angi to relate info to her.   
Received email that pt status was downgraded to observation and I met with patient to explain and gave him Condition code 44 letter. I had him sign letter also and placed on chart. I let pt know that his acute rehab auth will not change but if he was going to SNF it would be affected. Pt verbalizes understanding of his status and has his copy of letter.   
Reviewed MOON with patient and he signed and copy given. Received call from Ruby on rehab stating pt is approved by Dr. Ch to admit to rehab tomorrow.   
Spoke to Dr. Ch, who is covering on Sunday. Discussed patient Rehab appropriateness. Received okay to admit to IRF tomorrow after she co-signs the PAS. Electronically signed by Raghu Reardon RN on 5/25/24 at 3:45 PM EDT    
Spoke with CM, advised of needing PM&R physician to review and co-sign preadmission screening prior to patient admitting.  Electronically signed by [place] Isela Arreola on 5/26/2024 at 10:56 AM    
Spoke with patient regarding Inpatient Rehab Facility (IRF) referral. Discussed IRF mission, goals, daily process, Team, approximate length of stay, 3 hours of intensive therapy per day, discharge planning with goal of home with Home Health Care, and Medicare coverage. Patient had multiple questions, verbalized understanding when answered. Provided IRF brochure, which the patient stated he really appreciated the brochure. Explained that we are still waiting for OT and the PM&R doctor to see the patient. Asked if he would like to admit to IRF if appropriate and approved by PM&R doctor and using [place] of Choice, the patient chose [facility] In[facility] rehab Facility if aapproved. Electronically signed by Raghu Reardon RN on 5/25/24 at 2:48 PM EDT    
discharge to: Rehabilitation facility  Plan for transportation at discharge:      Financial    Payor: MEDICARE / Plan: MEDICARE PART A AND B / Product Type: *No Product type* /     Does insurance require precert for SNF: No    Potential assistance Purchasing Medications:    Meds-to-Beds request: Yes      [id number] - [place], [place] - [address] [phone] - F [phone]  [address] [place] [address]  Phone: [phone] Fax: [phone]    [facility] [place] - [address] - P [phone] - F [phone]  [address] [facility]  [place] [address]  Phone: [phone] Fax: [phone]      Notes:    Factors facilitating achievement of predicted outcomes: Family support and Motivated    Barriers to discharge: POST OP     Additional Case Management Notes: MET WITH PT AND SISTER AT BEDSIDE. PT FROM HOME ALONE, USES A CANE OR ROLATOR, NO O2, NO VA, NO HD, NO PRIOR SERVICES. PT HAS STAIRS IN HOME AND IS REQUESTING TO GO TO ACUTE REHAB. FOC GIVEN AND PT WOULD LIKE [facility]. REFERRAL CALLED TO [place] WITH [facility] WHO WILL REVIEW ONCE OT HAS EVALS.   REVIEWED IMM WITH PT AND PT SIGNED CONSENT, COPY PROVIDED TO PT.     The Plan for Transition of Care is related to the following treatment goals of Cervical myelopathy (HCC) [G95.9]    IF APPLICABLE: The Patient and/or patient representative Junito and his family were provided with a choice of provider and agrees with the discharge plan. Freedom of choice list with basic dialogue that supports the patient's individualized plan of care/goals and shares the quality data associated with the providers was provided to:     Patient Representative Name:       The Patient and/or Patient Representative Agree with the Discharge Plan?      Debbie Gudino RN  Case Management Department      
Side to side turns, Dressing changes, Surgical incision, Monitor for S/S of infection, and Wound Care RN  Risk for Medical/Clinical Complications = high  Nutrition/Hydration Deficiency: Yes  Intervention Required = Monitor I&Os, Check Labs, and Dietary Eval  Risk for Medical/Clinical Complications = moderate  Medical Comorbidities: Yes  Intervention Required = DVT risk and s/p C4-C5, C5-C6 discectomy and fustion, heart problems  Risk for Medical/Clinical Complications = high    Rehab/Skilled Needs:   900 minutes of Intensive Acute Rehab therapy over 7 days/week  PT Treatment Time:  1.5 hrs/day  OT Treatment Time: 1.5 hrs/day  Rehabilitation Nursing   Case management/Social work  Wound Care    Cultural needs:   Ethnic, Cultural, Spiritual, and Sikhism Needs  Do you have any ethnic, cultural, Islam practices or restrictions we should know about during your stay in the hospital?  : No  Are you able to do the things that help you spiritually even though you are sick? : No  Do you need support with your Islam or spiritual needs?: No   Funding needs:     To Be Determined    Expected Level of Improvement with Rehab  Assist for ADL Supervision   Assist for Transfers [place]  Assist for Gait [place]    Patient's willingness to participate: Yes  Patient's ability to tolerate proposed care: Yes  Patient/Family Goals of Rehab (in patient's/family's own words): go home when I get stronger    Anticipated Discharge Plan:  Home with   Home Health, RN PT OT Aide Social Work to be determined      Barriers to Discharge:  Home entry accessibility  Multi-level home  Bathroom accessibility  Bedroom accessibility  Equipment needs  Caregiver availability  Inaccessible transportation  Resource availability      Rehab evaluation plan: Recommend [facility] Acute Inpatient Rehab  Rehabilitation Impairment Group Code: 04.130  Rehab Impairment Group: Etiological Diagnoses: Cervical Myelopathy  Estimated Length of Stay (days):

## 2024-05-26 NOTE — CONSULTS
Consult Note            Date:5/26/2024        Patient Name:Junito Harris     YOB: 1950     Age:73 y.o.    Reason for Consult: A-fib, HTN    Chief Complaint   Cervical myelopathy      History Obtained From   patient, electronic medical record    History of Present Illness   Patient is a 73-year-old male who is status post cervical discectomy and fusion  on 5/24 by Dr. Turner Alexander .He has a past medical history of A-fib, pacemaker DM 2 ,COPD, arthritis, HTN, HLD hypothyroidism  and ETIENNE.  He now admitted to inpatient rehab for therapy.  Patient with cervical collar to be worn for 6 weeks.  Patient denies chest pain, palpitations, headache, dizziness, shortness of breath, cough, fever, chills, N/V/D, and changes in appetite.  No concerns voiced.  Hospital consulted to evaluate chronic disease with recommendations while receiving inpatient rehab services.    Past Medical History     Past Medical History:   Diagnosis Date    Arthritis     Colitis     COPD (chronic obstructive pulmonary disease) (HCC)     Crohn disease (HCC)     Encephalopathy     Hyperlipidemia     Hypertension     Hypothyroidism     ETIENNE (obstructive sleep apnea)     Pacemaker     Paroxysmal atrial fibrillation (HCC) 11/01/2018    Persistent atrial fibrillation (HCC) 11/01/2018    Sick sinus syndrome (HCC)     Third degree AV block (HCC)     Type II or unspecified type diabetes mellitus without mention of complication, not stated as uncontrolled         Past Surgical History     Past Surgical History:   Procedure Laterality Date    ANTERIOR CRUCIATE LIGAMENT REPAIR      CARDIAC PACEMAKER PLACEMENT      COLONOSCOPY  05/22/2019    DR BONILLA    GALLBLADDER SURGERY      PACEMAKER PLACEMENT  07/10/2018    [facility]IDAY DO    SIGMOIDOSCOPY  11/03/2017    DR. BONILLA    SIGMOIDOSCOPY  05/28/2019    DR BONILLA    TOE SURGERY      TONSILLECTOMY      TOTAL KNEE ARTHROPLASTY Right 11/23/2023    WRIST SURGERY  2013        Medications

## 2024-05-26 NOTE — DISCHARGE SUMMARY
each, Refills: 0      mirtazapine (REMERON) 45 MG tablet Take 1 tablet by mouth nightly      levothyroxine (SYNTHROID) 25 MCG tablet Take 1 tablet by mouth Daily      blood glucose monitor kit and supplies Test 2-3 times a day & as needed for symptoms of irregular blood glucose.  Qty: 1 kit, Refills: 0    Comments: Brand per patient preference. May round up to next available package size.  Associated Diagnoses: Type 2 diabetes mellitus without complication, without long-term current use of insulin (HCC)      Cholecalciferol (VITAMIN D) 2000 units CAPS capsule Take 1 capsule by mouth daily           Activity: activity as tolerated  Diet: regular diet  Wound Care: keep wound clean and dry    Follow-up with dr roe in 3 weeks.    Signed:  Dr roe  5/26/2024  3:35 PM

## 2024-05-26 NOTE — DISCHARGE INSTR - DIET

## 2024-05-27 LAB
GLUCOSE BLD-MCNC: 109 MG/DL (ref 70–99)
GLUCOSE BLD-MCNC: 96 MG/DL (ref 70–99)
PERFORMED ON: ABNORMAL
PERFORMED ON: NORMAL
TSH SERPL-MCNC: 2.28 UIU/ML (ref 0.44–3.86)

## 2024-05-27 PROCEDURE — 97535 SELF CARE MNGMENT TRAINING: CPT

## 2024-05-27 PROCEDURE — 6370000000 HC RX 637 (ALT 250 FOR IP): Performed by: NEUROLOGICAL SURGERY

## 2024-05-27 PROCEDURE — 84443 ASSAY THYROID STIM HORMONE: CPT

## 2024-05-27 PROCEDURE — 97162 PT EVAL MOD COMPLEX 30 MIN: CPT

## 2024-05-27 PROCEDURE — 36415 COLL VENOUS BLD VENIPUNCTURE: CPT

## 2024-05-27 PROCEDURE — 97116 GAIT TRAINING THERAPY: CPT

## 2024-05-27 PROCEDURE — 6360000002 HC RX W HCPCS: Performed by: NEUROLOGICAL SURGERY

## 2024-05-27 PROCEDURE — 1180000000 HC REHAB R&B

## 2024-05-27 PROCEDURE — 97166 OT EVAL MOD COMPLEX 45 MIN: CPT

## 2024-05-27 RX ADMIN — ROSUVASTATIN CALCIUM 10 MG: 5 TABLET, FILM COATED ORAL at 09:43

## 2024-05-27 RX ADMIN — OXYCODONE 5 MG: 5 TABLET ORAL at 09:42

## 2024-05-27 RX ADMIN — METOPROLOL TARTRATE 25 MG: 25 TABLET, FILM COATED ORAL at 19:58

## 2024-05-27 RX ADMIN — OXYCODONE 5 MG: 5 TABLET ORAL at 01:03

## 2024-05-27 RX ADMIN — OXYCODONE 5 MG: 5 TABLET ORAL at 15:10

## 2024-05-27 RX ADMIN — ACETAMINOPHEN 325MG 650 MG: 325 TABLET ORAL at 15:10

## 2024-05-27 RX ADMIN — LEVOTHYROXINE SODIUM 25 MCG: 0.03 TABLET ORAL at 05:59

## 2024-05-27 RX ADMIN — OXYCODONE 5 MG: 5 TABLET ORAL at 19:57

## 2024-05-27 RX ADMIN — VALSARTAN 160 MG: 160 TABLET, FILM COATED ORAL at 09:42

## 2024-05-27 RX ADMIN — ACETAMINOPHEN 325MG 650 MG: 325 TABLET ORAL at 01:20

## 2024-05-27 RX ADMIN — SENNOSIDES AND DOCUSATE SODIUM 1 TABLET: 50; 8.6 TABLET ORAL at 19:58

## 2024-05-27 RX ADMIN — ACETAMINOPHEN 325MG 650 MG: 325 TABLET ORAL at 09:42

## 2024-05-27 RX ADMIN — ENOXAPARIN SODIUM 30 MG: 100 INJECTION SUBCUTANEOUS at 09:44

## 2024-05-27 RX ADMIN — ENOXAPARIN SODIUM 30 MG: 100 INJECTION SUBCUTANEOUS at 19:58

## 2024-05-27 RX ADMIN — OXYCODONE 5 MG: 5 TABLET ORAL at 05:59

## 2024-05-27 RX ADMIN — MIRTAZAPINE 45 MG: 15 TABLET, FILM COATED ORAL at 19:58

## 2024-05-27 RX ADMIN — METOPROLOL TARTRATE 25 MG: 25 TABLET, FILM COATED ORAL at 09:43

## 2024-05-27 RX ADMIN — ACETAMINOPHEN 325MG 650 MG: 325 TABLET ORAL at 19:57

## 2024-05-27 RX ADMIN — BUPROPION HYDROCHLORIDE 300 MG: 300 TABLET, EXTENDED RELEASE ORAL at 09:42

## 2024-05-27 RX ADMIN — SENNOSIDES AND DOCUSATE SODIUM 1 TABLET: 50; 8.6 TABLET ORAL at 09:43

## 2024-05-27 ASSESSMENT — PAIN DESCRIPTION - LOCATION
LOCATION: NECK

## 2024-05-27 ASSESSMENT — PAIN SCALES - GENERAL
PAINLEVEL_OUTOF10: 7
PAINLEVEL_OUTOF10: 9
PAINLEVEL_OUTOF10: 6
PAINLEVEL_OUTOF10: 7
PAINLEVEL_OUTOF10: 8

## 2024-05-27 ASSESSMENT — PAIN DESCRIPTION - DESCRIPTORS
DESCRIPTORS: ACHING
DESCRIPTORS: ACHING
DESCRIPTORS: ACHING;SORE
DESCRIPTORS: ACHING;DISCOMFORT
DESCRIPTORS: ACHING

## 2024-05-27 ASSESSMENT — PAIN DESCRIPTION - ORIENTATION
ORIENTATION: POSTERIOR

## 2024-05-27 NOTE — H&P
HISTORY & PHYSICAL       DATE OF ADMISSION:  5/26/2024    DATE OF SERVICE:  5/27/24    Subjective:    Junito Harris, 73 y.o. male presents today with:     CHIEF COMPLAINT:   Weakness      HPI    I reviewed recent nursing note, \"  see notes \".    Hospital Course:  Admit Date: 5/24/2024  9:33 AM  Inpatient Rehab Referral Date: 05/25/242  Narrative of hospital course/history of present illness: 73 y.o. male who has progressively worsening problems with balance using a cane after using a walker. Patient has problems with handwriting and using buttons and opening jars and texting and typing with his fingers. He also has some blurriness of his eyes and tremor of the left hand. He has lost 20% of his hand function in the last year. He has good range of motion on cervical spine. On 5/24/24 the patient underwent a Cervical C4-C5, C5-C6 discectomy and fusion r/t Cervical myelopathy.     Neurosurgery 5/25/24:   Pod1 acdf [id number]  Ambulated in  w therapy  Sister and family in   D/w rn and cm  Acute rehab tomorrow if accepted  Awake alert cdi fluent speech emery po urinating  Postop ct stable hardware     I reviewed recent nursing notes discussed care with acute care providers, \"  see notes \".   Events from the previous 24 hours reviewed and discussed .        Their inpatient work up has included:     Imaging:  Imaging and other studies reviewed and discussed with patient and staff     CT cervical spine without contrast     Result Date: 5/25/2024  EXAMINATION: CT OF THE CERVICAL SPINE WITHOUT CONTRAST 5/25/2024 8:18 am TECHNIQUE: CT of the cervical spine was performed without the administration of intravenous contrast. Multiplanar reformatted images are provided for review. Automated exposure control, iterative reconstruction, and/or weight based adjustment of the mA/kV was utilized to reduce the radiation dose to as low as reasonably achievable. COMPARISON: None. HISTORY: ORDERING SYSTEM PROVIDED HISTORY: pain TECHNOLOGIST

## 2024-05-27 NOTE — PROGRESS NOTES
Physical Therapy Rehab Treatment Note  [facility]       NAME: Junito Harris  : 1950 (73 y.o.)  MRN: [id number]  CODE STATUS: Full Code    Date of Service: 2024  Chart Reviewed: Yes  Family / Caregiver Present: No  Diagnosis: Impaired mobility and ADL's s/p C4-C5, C5-C6 Discectomy and fusion r/t Cervical Myelopathy. [facility] rehab admission 24    Restrictions:  Restrictions/Precautions: Fall Risk  Required Braces or Orthoses  Cervical: c-collar       SUBJECTIVE:   Subjective: \"I am ready\"    Pain   5/10 pre and post tx       OBJECTIVE:      Transfers  Sit to Stand: Minimal Assistance  Stand to Sit: Minimal Assistance    Ambulation  Surface: Level tile  Device: Rollator  Assistance: Stand by assistance;Minimal assistance  Quality of Gait: Decreased foot clearance and step length L LE. Verbal cues for posture and lifting L LE through the swing. Slow to complete.  Gait Deviations: Decreased step length;Slow Munira  Distance: 25ft x2    PT Exercises  Exercise Treatment: Gait Drills: F/R/L x 3 laps; marches;  AP, knee flexion, marches, abd/add(YTB and ball) x 10 ea  Static Standing Balance Exercises: FA/EO 15sec then touch  Dynamic Standing Balance Exercises: weight shifting with touch assist; LOS        ASSESSMENT/PROGRESS TOWARDS GOALS:   Assessment: Static and dynamic standing for balance with touch assist and 2 HHA with weight shifting. Moderate assist sit to stand with first trial before ambulation then improved sit to stand with min assist with good stability.    Goals:  Long Term Goals  Long Term Goal 1: Pt to complete bed mobility with indep  Long Term Goal 2: Pt to complete transfers with indep  Long Term Goal 3: Pt to ambulate 150ft with LRD and indep  Long Term Goal 4: Pt to manage 12 steps with HR and indep  Long Term Goal 5: Pt to complete HEP    PLAN OF CARE/Safety:   Safety Devices  Type of Devices: All fall risk precautions in place      Therapy  Time:   Individual   Time In 1300   Time Out 1400   Minutes 60   Timed Code Treatment Minutes: 10 Minutes (transfers 6min; 4min gait)  Minutes: 60  Transfer/Bed mobility trainin  Gait trainin  Neuro re education:  Therapeutic ex:      Aria Mason PTA, 24 at 3:12 PM

## 2024-05-27 NOTE — FLOWSHEET NOTE
Patient assessment complete. Patient is resting in bed at this time with some complaints of posterior neck pain rated 6/10. Patient medicated with scheduled Tylenol 650 MG PO and Roxicodone 5 MG PO PRN per patient request. Patient was able to take medications two at a time with sips of water without difficulty. Patient requested ice cream for HS snack. Patient has cervical collar in place with anterior neck cervical incision covered with gauze dressing. Patient requested to have cervical collar off while he has a snack. Patient is now talking on phone and verbalized no further needs at this time. Bed alarm engaged and call light within reach.

## 2024-05-27 NOTE — FLOWSHEET NOTE
Patient complains of pain to posterior neck rated 9/10. Patient medicated with scheduled Tylenol 650 MG PO and Roxicodone 5MG PO PRN for pain. Preston FARMER in room now to assist patient with putting home CPAP on. Patient ambulated to restroom with front wheeled walker and one assist and then back to bed. Patient verbalized no further needs at this time, bed alarm engaged and call light within reach.

## 2024-05-27 NOTE — PROGRESS NOTES
Facility/Department: [facility]  Rehabilitation Initial Assessment: Physical Therapy  [facility]    NAME: Junito Harris  : 1950  MRN: [id number]    Date of Service: 2024    Rehab Diagnosis(es): Impaired mobility and ADL's s/p C4-C5, C5-C6 Discectomy and fusion r/t Cervical Myelopathy. [facility] rehab admission 24  Patient Active Problem List    Diagnosis Date Noted    Cervical myopathy 2024    Cervical myelopathy (HCC) 2024    Stage 3a chronic kidney disease (HCC) 2022    Steatosis of liver 2022    Pure hypercholesterolemia 2022    Vitamin D deficiency 2022    Dyspnea 01/10/2022    Obesity (BMI 30-39.9) 01/10/2022    Mild chronic obstructive pulmonary disease (HCC) 2021    Crohn's disease of colon with rectal bleeding (HCC) 2018    Paroxysmal atrial fibrillation (HCC) 2018    SSS (sick sinus syndrome) (HCC) 2018    Presence of permanent cardiac pacemaker 2018    Complete atrioventricular block (HCC) 2018    Generalized anxiety disorder 2018    ETIENNE (obstructive sleep apnea) 2017    Dyslipidemia associated with type 2 diabetes mellitus (HCC) 12/15/2015    Major depressive disorder, recurrent episode, moderate (HCC) 2015    DM w/o complication type II (HCC) 2015    HTN (hypertension) 07/15/2015       Past Medical History:   Diagnosis Date    Arthritis     Colitis     COPD (chronic obstructive pulmonary disease) (HCC)     Crohn disease (HCC)     Encephalopathy     Hyperlipidemia     Hypertension     Hypothyroidism     ETIENNE (obstructive sleep apnea)     Pacemaker     Paroxysmal atrial fibrillation (HCC) 2018    Persistent atrial fibrillation (HCC) 2018    Sick sinus syndrome (HCC)     Third degree AV block (HCC)     Type II or unspecified type diabetes mellitus without mention of complication, not stated as uncontrolled      Past Surgical History:   Procedure Laterality Date    ANTERIOR  Rollator, Cane (sister has walker pt can borrow)  Has the patient had two or more falls in the past year or any fall with injury in the past year?: Yes (5-6 falls. Fell switching between cars, \"got my feet tied up\")  Receives Help From: Home health, Family (sister and nephew help w/ chores, feeds cat, getting groceries)  ADL Assistance: Independent (supervision for shower transfers)  Homemaking Assistance: Needs assistance (HH aid comes in 3x/wk to do laundry, cook dinner, clean. difficult to fold laundry)  Ambulation Assistance: Independent  Transfer Assistance: Independent  Active : Yes (occasional driving)  Occupation: Retired  Type of Occupation: Neurescue manager  Leisure & Hobbies: collect 2 [employer] memorabilia  Additional Comments: HH aid comes in 3x/wk to do laundry, cook dinner, clean.  Pt avoids amb on uneven surfaces    OBJECTIVE:   Vision/Hearing:  Vision  Vision Exceptions: Wears glasses for reading  Hearing Exceptions: Bilateral hearing aid    Cognition/Observation:  Overall Orientation Status: Within Functional Limits  Follows Commands: Within Functional Limits    Observation/Palpation  Observation: Pleasant and motivated. No acute distress. C collar donned and fitted for pt size and comfort.    ROM:  AROM: Within functional limits    Strength:  Strength: Generally decreased, functional (Trunk weakness and L UE/LE weakness noted)    Neuro:  Balance  Sitting - Static: Good  Sitting - Dynamic: Fair  Standing - Static: Fair  Standing - Dynamic: Fair                       Tone: Normal  Sensation: Impaired (L UE)    Bed mobility  Bridging: Minimal assistance  Rolling to Left: Minimal assistance  Rolling to Right: Minimal assistance  Supine to Sit: Minimal assistance  Sit to Supine: Minimal assistance  Scooting: Minimal assistance  Bed Mobility Comments: Verbal cues for sequencing. Intermittent Vanessa for power and follow through.    Transfers  Sit to Stand: Minimal Assistance  Stand

## 2024-05-27 NOTE — PROGRESS NOTES
Facility/Department: [facility]  Rehabilitation Initial Assessment: Occupational Therapy  [facility]    NAME: Junito Harris  : 1950  MRN: [id number]    Date of Service: 2024    Rehab Diagnosis(es): Impaired mobility and ADL's s/p C4-C5, C5-C6 discectomy and fusion r/t cervical myelopathy  Patient Active Problem List    Diagnosis Date Noted    Cervical myopathy 2024    Cervical myelopathy (HCC) 2024    Stage 3a chronic kidney disease (HCC) 2022    Steatosis of liver 2022    Pure hypercholesterolemia 2022    Vitamin D deficiency 2022    Dyspnea 01/10/2022    Obesity (BMI 30-39.9) 01/10/2022    Mild chronic obstructive pulmonary disease (HCC) 2021    Crohn's disease of colon with rectal bleeding (HCC) 2018    Paroxysmal atrial fibrillation (HCC) 2018    SSS (sick sinus syndrome) (HCC) 2018    Presence of permanent cardiac pacemaker 2018    Complete atrioventricular block (HCC) 2018    Generalized anxiety disorder 2018    ETIENNE (obstructive sleep apnea) 2017    Dyslipidemia associated with type 2 diabetes mellitus (HCC) 12/15/2015    Major depressive disorder, recurrent episode, moderate (HCC) 2015    DM w/o complication type II (HCC) 2015    HTN (hypertension) 07/15/2015       Past Medical History:   Diagnosis Date    Arthritis     Colitis     COPD (chronic obstructive pulmonary disease) (HCC)     Crohn disease (HCC)     Encephalopathy     Hyperlipidemia     Hypertension     Hypothyroidism     ETIENNE (obstructive sleep apnea)     Pacemaker     Paroxysmal atrial fibrillation (HCC) 2018    Persistent atrial fibrillation (HCC) 2018    Sick sinus syndrome (HCC)     Third degree AV block (HCC)     Type II or unspecified type diabetes mellitus without mention of complication, not stated as uncontrolled      Past Surgical History:   Procedure Laterality Date    ANTERIOR CRUCIATE LIGAMENT REPAIR       Education:  Education Given To: Patient  Education Provided: Role of Therapy, Plan of Care, Safety, Transfer Training  Education Provided Comments: hand placement for transfers  Education Method: Verbal, Demonstration  Barriers to Learning: None  Education Outcome: Verbalized understanding    Plan:  Occupational Therapy Plan  Times Per Week: 5-7x/wk  Therapy Duration: 10 Days  Current Treatment Recommendations: Balance training;Functional mobility training;Endurance training;Patient/Caregiver education & training;Safety education & training;Pain management;Equipment evaluation, education, & procurement;Positioning;Self-Care / ADL;Coordination training    Goals:  Patient goals : \"Be playing golf by this fall.\"         - Patient will complete self care as followed using the recommended adaptive equipment and/or adaptive techniques as instructed:  Feeding: Independent  Grooming: Independent  Bathing: Mod I  UE Dressing: Mod I  LE Dressing: Mod I  Footwear: Mod I  Toileting: Mod I  Toilet Transfer: Mod I  - Patient will improve static and dynamic standing balance to complete pants management at Mod I level  - Patient will improve functional endurance to tolerate/complete 60 minutes of ADLs.  - Patient will improve B UE strength and endurance to WFL in order to participate in self-care activities as projected.  - Patient will improve B hand fine motor coordination to WFL in order to manage clothing fasteners/self-care containers in a timely manner  - Patient will perform kitchen mobility at device level without episodes of LOB and good safety awareness   - Patient will perform basic room mobility at Mod I level.  - Patient will access appropriate D/C site with as few architectural barriers as possible.     Therapy Time:   Individual   Time In 0800   Time Out 0915   Minutes 75            Eval: 75 minutes     Electronically signed by:    Suzanna Hand OT,   5/27/2024, 11:51 AM

## 2024-05-27 NOTE — PLAN OF CARE
Problem: Safety - Adult  Goal: Free from fall injury  Outcome: Progressing     Problem: Discharge Planning  Goal: Discharge to home or other facility with appropriate resources  Outcome: Progressing     Problem: ABCDS Injury Assessment  Goal: Absence of physical injury  Outcome: Progressing     Problem: Pain  Goal: Verbalizes/displays adequate comfort level or baseline comfort level  Outcome: Progressing

## 2024-05-27 NOTE — PROGRESS NOTES
Physical Therapy Med Surg Initial Assessment  [place] [facility]  Room: [place] [address]       NAME: Junito Harris  : 1950 (73 y.o.)  MRN: [id number]  CODE STATUS: Full Code    Date of Service: 2024    Patient Diagnosis(es): Impaired mobility and activities of daily living [Z74.09, Z78.9]   No chief complaint on file.    Patient Active Problem List    Diagnosis Date Noted    Cervical myopathy 2024    Cervical myelopathy (HCC) 2024    Stage 3a chronic kidney disease (HCC) 2022    Steatosis of liver 2022    Pure hypercholesterolemia 2022    Vitamin D deficiency 2022    Dyspnea 01/10/2022    Obesity (BMI 30-39.9) 01/10/2022    Mild chronic obstructive pulmonary disease (HCC) 2021    Crohn's disease of colon with rectal bleeding (HCC) 2018    Paroxysmal atrial fibrillation (HCC) 2018    SSS (sick sinus syndrome) (HCC) 2018    Presence of permanent cardiac pacemaker 2018    Complete atrioventricular block (HCC) 2018    Generalized anxiety disorder 2018    ETIENNE (obstructive sleep apnea) 2017    Dyslipidemia associated with type 2 diabetes mellitus (HCC) 12/15/2015    Major depressive disorder, recurrent episode, moderate (HCC) 2015    DM w/o complication type II (HCC) 2015    HTN (hypertension) 07/15/2015        Past Medical History:   Diagnosis Date    Arthritis     Colitis     COPD (chronic obstructive pulmonary disease) (HCC)     Crohn disease (HCC)     Encephalopathy     Hyperlipidemia     Hypertension     Hypothyroidism     ETIENNE (obstructive sleep apnea)     Pacemaker     Paroxysmal atrial fibrillation (HCC) 2018    Persistent atrial fibrillation (HCC) 2018    Sick sinus syndrome (HCC)     Third degree AV block (HCC)     Type II or unspecified type diabetes mellitus without mention of complication, not stated as uncontrolled      Past Surgical History:   Procedure Laterality Date

## 2024-05-27 NOTE — PROGRESS NOTES
OCCUPATIONAL THERAPY  INPATIENT REHAB TREATMENT NOTE  [facility]      NAME: Junito Harris  : 1950 (73 y.o.)  MRN: [id number]  CODE STATUS: Full Code  Room: R251/R251-01    Date of Service: 2024    Referring Physician: Dr. Carlos  Rehab Diagnosis: Impaired mobility and ADL's s/p C4-C5, C5-C6 discectomy and fusion r/t cervical myelopathy    Hospital course:   Comments: 73 y.o. male who has progressively worsening problems with balance using a cane after using a walker. Patient has problems with handwriting and using buttons and opening jars and texting and typing with his fingers. He also has some blurriness of his eyes and tremor of the left hand. He has lost 20% of his hand function in the last year. He has good range of motion on cervical spine. On 24 the patient underwent a Cervical C4-C5, C5-C6 discectomy and fusion r/t Cervical myelopathy.      Restrictions  Restrictions/Precautions  Restrictions/Precautions: Fall Risk         Required Braces or Orthoses  Cervical: c-collar  Position Activity Restriction  Other position/activity restrictions: RAMÓN Franklin states pt will need to wear C Collar for 6 weeks    Patient's date of birth confirmed: Yes    SAFETY:  Safety Devices  Safety Devices in place: Yes  Type of devices: All fall risk precautions in place    SUBJECTIVE:       Pain at start/end of treatment: Yes: 7/10 in neck, medial patella 5/10  Nursing notified: Yes  RN: Noemi  Intervention: RN provided pain medication    COGNITION:  Orientation  Overall Orientation Status: Within Functional Limits  Orientation Level: Oriented X4  Cognition  Overall Cognitive Status: WFL    OBJECTIVE:     Pt completed BUE AROM shoulder ex with Supervision post demo.  Pt completed shoulder flex/ext, horiz ab/ad, chest presses, elbow flex/ext, forearm sup/pro, and wrist flex/ext.  Pt completed 10 x 1 set of each.  Provided task to continue improving movement, act tolerance and ability to complete ADL  tasks with increased Ind.       Education:  Education  Education Given To: Patient  Education Provided: ADL Function;Role of Therapy;Precautions  Education Provided Comments: Pt educated on purpose of c collar, discussed trying to avoid neck movements.  Pt educated on role of OT vs PT, and that he would most likely have the same therapist most of the time being in rehab, but that therapists can switch at times.  Education Method: Verbal  Barriers to Learning: None  Education Outcome: Verbalized understanding;Demonstrated understanding      ASSESSMENT:  Activity Tolerance: Patient tolerated treatment well      PLAN OF CARE:  Balance training, Functional mobility training, Endurance training, Patient/Caregiver education & training, Safety education & training, Pain management, Equipment evaluation, education, & procurement, Positioning, Self-Care / ADL, Coordination training  Continue OT POC until discharge    Patient goals : \"Be playing golf by this fall.\"           Therapy Time:   Individual Group Co-Treat   Time In 1510       Time Out 1540         Minutes 30                 Therapeutic activities: 30 minutes     Electronically signed by:    VERENICE Baxter,   5/27/2024, 3:59 PM                Curettage Text: The wound bed was treated with curettage after the biopsy was performed.

## 2024-05-27 NOTE — PROGRESS NOTES
Seen examined pod3 acdf wife at bedside   Cdi dressing removed  Ok to shower  Wear cervical brace at all times 6wks  Doing well awake alert sitting in chair  Pt ot acute rehab  Will follow

## 2024-05-27 NOTE — PROGRESS NOTES
Scheduled and PRN pain medications given. Aspen collar remains in place. Dressing to anterior neck CDI. Staff to call [facility] tomorrow to verify dose of Xeljanz, order states 5 mg BID and bottle of tablets pt brought from home are 10 mg, Rx dated 2/15/24. Visitors at bedside, call light in reach. Electronically signed by Chayito Madera RN on 5/27/2024 at 4:00 PM

## 2024-05-28 PROBLEM — S70.02XA HEMATOMA OF LEFT HIP: Status: ACTIVE | Noted: 2023-02-01

## 2024-05-28 PROBLEM — M75.41 IMPINGEMENT SYNDROME OF RIGHT SHOULDER: Status: ACTIVE | Noted: 2023-02-01

## 2024-05-28 PROBLEM — R73.9 HYPERGLYCEMIA: Status: ACTIVE | Noted: 2023-06-15

## 2024-05-28 PROBLEM — H90.3 BILATERAL SENSORINEURAL HEARING LOSS: Status: ACTIVE | Noted: 2023-02-01

## 2024-05-28 PROBLEM — E03.9 ACQUIRED HYPOTHYROIDISM: Status: ACTIVE | Noted: 2022-11-22

## 2024-05-28 PROBLEM — N28.89 RIGHT RENAL MASS: Status: ACTIVE | Noted: 2023-11-01

## 2024-05-28 PROBLEM — M51.369 DDD (DEGENERATIVE DISC DISEASE), LUMBAR: Status: ACTIVE | Noted: 2023-12-27

## 2024-05-28 PROBLEM — E78.00 ELEVATED LDL CHOLESTEROL LEVEL: Status: ACTIVE | Noted: 2023-02-01

## 2024-05-28 PROBLEM — R26.9 ABNORMAL GAIT: Status: ACTIVE | Noted: 2024-01-18

## 2024-05-28 PROBLEM — R60.9 EDEMA: Status: ACTIVE | Noted: 2023-02-01

## 2024-05-28 PROBLEM — G89.29 CHRONIC MIDLINE THORACIC BACK PAIN: Status: ACTIVE | Noted: 2022-08-18

## 2024-05-28 PROBLEM — M54.6 CHRONIC MIDLINE THORACIC BACK PAIN: Status: ACTIVE | Noted: 2022-08-18

## 2024-05-28 PROBLEM — R41.89 COGNITIVE DECLINE: Status: ACTIVE | Noted: 2023-08-31

## 2024-05-28 PROBLEM — Z78.9 IMPAIRED MOBILITY AND ACTIVITIES OF DAILY LIVING: Status: ACTIVE | Noted: 2024-05-28

## 2024-05-28 PROBLEM — Z74.09 IMPAIRED MOBILITY AND ACTIVITIES OF DAILY LIVING: Status: ACTIVE | Noted: 2024-05-28

## 2024-05-28 PROBLEM — G81.94 LEFT HEMIPARESIS (HCC): Status: ACTIVE | Noted: 2024-03-26

## 2024-05-28 PROBLEM — L02.611 ABSCESS OF TOE OF RIGHT FOOT: Status: ACTIVE | Noted: 2023-02-01

## 2024-05-28 PROBLEM — Z78.9 IMPAIRED DRIVING SKILLS: Status: ACTIVE | Noted: 2024-02-26

## 2024-05-28 PROBLEM — H91.92 DECREASED HEARING OF LEFT EAR: Status: ACTIVE | Noted: 2023-02-01

## 2024-05-28 PROBLEM — R41.844 IMPAIRED EXECUTIVE FUNCTIONING: Status: ACTIVE | Noted: 2024-02-26

## 2024-05-28 PROBLEM — M51.36 DDD (DEGENERATIVE DISC DISEASE), LUMBAR: Status: ACTIVE | Noted: 2023-12-27

## 2024-05-28 PROBLEM — F41.9 ANXIETY: Status: ACTIVE | Noted: 2023-02-01

## 2024-05-28 PROBLEM — R35.0 INCREASED FREQUENCY OF URINATION: Status: ACTIVE | Noted: 2023-02-01

## 2024-05-28 PROBLEM — M17.11 PRIMARY OSTEOARTHRITIS OF RIGHT KNEE: Status: ACTIVE | Noted: 2022-11-21

## 2024-05-28 PROBLEM — F32.5 MAJOR DEPRESSIVE DISORDER IN FULL REMISSION (HCC): Status: ACTIVE | Noted: 2023-02-01

## 2024-05-28 PROBLEM — G93.9 DISORDER OF BRAIN: Status: ACTIVE | Noted: 2023-08-31

## 2024-05-28 PROBLEM — Z90.49 ACQUIRED ABSENCE OF OTHER SPECIFIED PARTS OF DIGESTIVE TRACT: Status: ACTIVE | Noted: 2018-06-25

## 2024-05-28 PROBLEM — N30.00 ACUTE CYSTITIS WITHOUT HEMATURIA: Status: ACTIVE | Noted: 2023-11-01

## 2024-05-28 LAB
EKG ATRIAL RATE: 67 BPM
EKG P AXIS: 27 DEGREES
EKG Q-T INTERVAL: 446 MS
EKG QRS DURATION: 158 MS
EKG QTC CALCULATION (BAZETT): 471 MS
EKG R AXIS: -43 DEGREES
EKG T AXIS: 108 DEGREES
EKG VENTRICULAR RATE: 67 BPM
GLUCOSE BLD-MCNC: 106 MG/DL (ref 70–99)
GLUCOSE BLD-MCNC: 136 MG/DL (ref 70–99)
PERFORMED ON: ABNORMAL
PERFORMED ON: ABNORMAL

## 2024-05-28 PROCEDURE — 97116 GAIT TRAINING THERAPY: CPT

## 2024-05-28 PROCEDURE — 6370000000 HC RX 637 (ALT 250 FOR IP): Performed by: PHYSICAL MEDICINE & REHABILITATION

## 2024-05-28 PROCEDURE — 97112 NEUROMUSCULAR REEDUCATION: CPT

## 2024-05-28 PROCEDURE — 6370000000 HC RX 637 (ALT 250 FOR IP): Performed by: NEUROLOGICAL SURGERY

## 2024-05-28 PROCEDURE — 6370000000 HC RX 637 (ALT 250 FOR IP)

## 2024-05-28 PROCEDURE — 97535 SELF CARE MNGMENT TRAINING: CPT

## 2024-05-28 PROCEDURE — 1180000000 HC REHAB R&B

## 2024-05-28 PROCEDURE — 97110 THERAPEUTIC EXERCISES: CPT

## 2024-05-28 PROCEDURE — 6360000002 HC RX W HCPCS: Performed by: NEUROLOGICAL SURGERY

## 2024-05-28 PROCEDURE — 97530 THERAPEUTIC ACTIVITIES: CPT

## 2024-05-28 PROCEDURE — 6360000002 HC RX W HCPCS: Performed by: PHYSICAL MEDICINE & REHABILITATION

## 2024-05-28 PROCEDURE — 99233 SBSQ HOSP IP/OBS HIGH 50: CPT | Performed by: PHYSICAL MEDICINE & REHABILITATION

## 2024-05-28 RX ORDER — LIDOCAINE 4 G/G
3 PATCH TOPICAL DAILY
Status: DISCONTINUED | OUTPATIENT
Start: 2024-05-28 | End: 2024-06-08 | Stop reason: HOSPADM

## 2024-05-28 RX ORDER — UBIDECARENONE 100 MG
100 CAPSULE ORAL DAILY
Status: DISCONTINUED | OUTPATIENT
Start: 2024-05-28 | End: 2024-06-08 | Stop reason: HOSPADM

## 2024-05-28 RX ORDER — CYANOCOBALAMIN 1000 UG/ML
1000 INJECTION, SOLUTION INTRAMUSCULAR; SUBCUTANEOUS WEEKLY
Status: DISCONTINUED | OUTPATIENT
Start: 2024-05-28 | End: 2024-06-08 | Stop reason: HOSPADM

## 2024-05-28 RX ORDER — OXYCODONE HYDROCHLORIDE 5 MG/1
10 TABLET ORAL EVERY 4 HOURS PRN
Status: DISCONTINUED | OUTPATIENT
Start: 2024-05-28 | End: 2024-06-08 | Stop reason: HOSPADM

## 2024-05-28 RX ORDER — HYDRALAZINE HYDROCHLORIDE 25 MG/1
25 TABLET, FILM COATED ORAL EVERY 6 HOURS PRN
Status: DISCONTINUED | OUTPATIENT
Start: 2024-05-28 | End: 2024-06-08 | Stop reason: HOSPADM

## 2024-05-28 RX ORDER — OXYCODONE AND ACETAMINOPHEN 7.5; 325 MG/1; MG/1
1 TABLET ORAL EVERY 4 HOURS PRN
Status: DISCONTINUED | OUTPATIENT
Start: 2024-05-28 | End: 2024-05-28

## 2024-05-28 RX ORDER — OXYCODONE AND ACETAMINOPHEN 7.5; 325 MG/1; MG/1
1 TABLET ORAL EVERY 8 HOURS
Status: DISCONTINUED | OUTPATIENT
Start: 2024-05-28 | End: 2024-06-03

## 2024-05-28 RX ORDER — VITAMIN B COMPLEX
2000 TABLET ORAL
Status: DISCONTINUED | OUTPATIENT
Start: 2024-05-28 | End: 2024-06-08 | Stop reason: HOSPADM

## 2024-05-28 RX ADMIN — ENOXAPARIN SODIUM 30 MG: 100 INJECTION SUBCUTANEOUS at 08:17

## 2024-05-28 RX ADMIN — OXYCODONE AND ACETAMINOPHEN 1 TABLET: 7.5; 325 TABLET ORAL at 14:24

## 2024-05-28 RX ADMIN — ACETAMINOPHEN 325MG 650 MG: 325 TABLET ORAL at 08:17

## 2024-05-28 RX ADMIN — CYANOCOBALAMIN 1000 MCG: 1000 INJECTION INTRAMUSCULAR; SUBCUTANEOUS at 09:41

## 2024-05-28 RX ADMIN — MIRTAZAPINE 45 MG: 15 TABLET, FILM COATED ORAL at 20:22

## 2024-05-28 RX ADMIN — OXYCODONE 5 MG: 5 TABLET ORAL at 09:40

## 2024-05-28 RX ADMIN — Medication 2000 UNITS: at 16:41

## 2024-05-28 RX ADMIN — BUPROPION HYDROCHLORIDE 300 MG: 300 TABLET, EXTENDED RELEASE ORAL at 08:16

## 2024-05-28 RX ADMIN — OXYCODONE 5 MG: 5 TABLET ORAL at 05:53

## 2024-05-28 RX ADMIN — LEVOTHYROXINE SODIUM 25 MCG: 0.03 TABLET ORAL at 05:53

## 2024-05-28 RX ADMIN — METOPROLOL TARTRATE 25 MG: 25 TABLET, FILM COATED ORAL at 20:21

## 2024-05-28 RX ADMIN — ACETAMINOPHEN 325MG 650 MG: 325 TABLET ORAL at 01:00

## 2024-05-28 RX ADMIN — VALSARTAN 160 MG: 160 TABLET, FILM COATED ORAL at 08:17

## 2024-05-28 RX ADMIN — Medication 100 MG: at 09:40

## 2024-05-28 RX ADMIN — SENNOSIDES AND DOCUSATE SODIUM 1 TABLET: 50; 8.6 TABLET ORAL at 08:17

## 2024-05-28 RX ADMIN — OXYCODONE AND ACETAMINOPHEN 1 TABLET: 7.5; 325 TABLET ORAL at 20:22

## 2024-05-28 RX ADMIN — METOPROLOL TARTRATE 25 MG: 25 TABLET, FILM COATED ORAL at 08:17

## 2024-05-28 RX ADMIN — ENOXAPARIN SODIUM 30 MG: 100 INJECTION SUBCUTANEOUS at 20:22

## 2024-05-28 RX ADMIN — ROSUVASTATIN CALCIUM 10 MG: 5 TABLET, FILM COATED ORAL at 08:17

## 2024-05-28 RX ADMIN — OXYCODONE 5 MG: 5 TABLET ORAL at 01:00

## 2024-05-28 RX ADMIN — HYDRALAZINE HYDROCHLORIDE 25 MG: 25 TABLET ORAL at 14:57

## 2024-05-28 RX ADMIN — SENNOSIDES AND DOCUSATE SODIUM 1 TABLET: 50; 8.6 TABLET ORAL at 20:21

## 2024-05-28 ASSESSMENT — PAIN SCALES - GENERAL
PAINLEVEL_OUTOF10: 4
PAINLEVEL_OUTOF10: 7
PAINLEVEL_OUTOF10: 6
PAINLEVEL_OUTOF10: 5
PAINLEVEL_OUTOF10: 4
PAINLEVEL_OUTOF10: 8

## 2024-05-28 ASSESSMENT — PAIN DESCRIPTION - LOCATION
LOCATION: NECK

## 2024-05-28 ASSESSMENT — PAIN DESCRIPTION - ORIENTATION
ORIENTATION: POSTERIOR
ORIENTATION: ANTERIOR
ORIENTATION: POSTERIOR

## 2024-05-28 ASSESSMENT — PAIN DESCRIPTION - DESCRIPTORS
DESCRIPTORS: ACHING;DISCOMFORT

## 2024-05-28 ASSESSMENT — PAIN DESCRIPTION - PAIN TYPE: TYPE: SURGICAL PAIN

## 2024-05-28 NOTE — PLAN OF CARE
Problem: Safety - Adult  Goal: Free from fall injury  5/28/2024 1028 by Keiry Potter RN  Outcome: Progressing  Flowsheets (Taken 5/28/2024 1028)  Free From Fall Injury: Instruct family/caregiver on patient safety  5/28/2024 0524 by Mary Anne Cook RN  Outcome: Progressing     Problem: Discharge Planning  Goal: Discharge to home or other facility with appropriate resources  5/28/2024 1028 by Keiry Potter RN  Outcome: Progressing  Flowsheets (Taken 5/28/2024 1028)  Discharge to home or other facility with appropriate resources:   Identify barriers to discharge with patient and caregiver   Arrange for needed discharge resources and transportation as appropriate   Identify discharge learning needs (meds, wound care, etc)   Refer to discharge planning if patient needs post-hospital services based on physician order or complex needs related to functional status, cognitive ability or social support system  5/28/2024 0524 by Mray Anne Cook RN  Outcome: Progressing     Problem: ABCDS Injury Assessment  Goal: Absence of physical injury  5/28/2024 1028 by Keiry Potter RN  Outcome: Progressing  Flowsheets (Taken 5/28/2024 1028)  Absence of Physical Injury: Implement safety measures based on patient assessment  5/28/2024 0524 by Mary Anne Cook RN  Outcome: Progressing  5/28/2024 0524 by Mary Anne Cook RN  Outcome: /[facility] Resolved Resume Next Certification Period     Problem: Pain  Goal: Verbalizes/displays adequate comfort level or baseline comfort level  5/28/2024 1028 by Keiry Potter RN  Outcome: Progressing  Flowsheets (Taken 5/28/2024 1028)  Verbalizes/displays adequate comfort level or baseline comfort level:   Encourage patient to monitor pain and request assistance   Assess pain using appropriate pain scale   Administer analgesics based on type and severity of pain and evaluate response   Implement non-pharmacological measures as appropriate and evaluate response   Notify Licensed  Independent Practitioner if interventions unsuccessful or patient reports new pain  5/28/2024 0524 by Mary Anne Cook, RN  Outcome: Progressing     Problem: Skin/Tissue Integrity  Goal: Absence of new skin breakdown  Description: 1.  Monitor for areas of redness and/or skin breakdown  2.  Assess vascular access sites hourly  3.  Every 4-6 hours minimum:  Change oxygen saturation probe site  4.  Every 4-6 hours:  If on nasal continuous positive airway pressure, respiratory therapy assess nares and determine need for appliance change or resting period.  5/28/2024 1028 by Keiry Potter RN  Outcome: Progressing  5/28/2024 0524 by Mary Anne Cook RN  Outcome: Progressing     Problem: Chronic Conditions and Co-morbidities  Goal: Patient's chronic conditions and co-morbidity symptoms are monitored and maintained or improved  Outcome: Progressing  Flowsheets (Taken 5/28/2024 1028)  Care Plan - Patient's Chronic Conditions and Co-Morbidity Symptoms are Monitored and Maintained or Improved:   Monitor and assess patient's chronic conditions and comorbid symptoms for stability, deterioration, or improvement   Collaborate with multidisciplinary team to address chronic and comorbid conditions and prevent exacerbation or deterioration   Update acute care plan with appropriate goals if chronic or comorbid symptoms are exacerbated and prevent overall improvement and discharge    Electronically signed by Keiry Potter RN on 5/28/2024 at 10:28 AM

## 2024-05-28 NOTE — PROGRESS NOTES
Physical Therapy Missed Treatment   Facility/Department: [facility] MED SURG R251/R251-01    NAME: Junito Harris  Patient Status:   : 1950 (73 y.o.)  MRN: [id number]  Account: [id number]  Gender: male        [] Patient Declines PT Treatment            [x] Patient Unavailable:     Pt resting so pm time was rescheduled to 1530 and rest break will be placed into tomorrows schedule going forward.   Will attempt PT Treatment again at earliest convenience.        Electronically signed by Alessia Dooley PTA on 24 at 2:10 PM EDT

## 2024-05-28 NOTE — CARE COORDINATION
CM met with pt and discussed  therapy goals with the pt. Pt is aware that discharge is 6/5/24. Pt is agreeable. CM also explained therapy will probably want family training  closer to hs discharge date. Electronically signed by Mar Salamanca RN on 5/28/2024 at 5:21 PM

## 2024-05-28 NOTE — CARE COORDINATION
Case Management Initial Assessment        NAME:  Junito Harris  ROOM: R251/R251-01  :  1950  DATE: 2024        Social Functional:  Social/Functional History  Lives With: Alone (cat)  Type of Home: House  Home Layout: Two level;Able to Live on Main level with bedroom/bathroom;Work area in basement (12 steps up and downwith HR)  Home Access: Stairs to enter with rails  Entrance Stairs - Number of Steps: 4  Entrance Stairs - Rails: Both  Bathroom Shower/Tub: Walk-in shower;Curtain  Bathroom Toilet: Handicap height  Bathroom Equipment: Grab bars in shower;Grab bars around toilet;Toilet raiser;Shower chair  Bathroom Accessibility: Wheelchair accessible;Walker accessible  Home Equipment: Rollator;Cane;Alert button (sister has walker pt can borrow)  Receives Help From: Home health;Family (sister and nephew help w/ chores, feeds cat, getting groceries)  ADL Assistance: Independent (supervision for shower transfers)  Homemaking Assistance: Needs assistance (HH aid comes in 3x/wk to do laundry, cook dinner, clean. difficult to fold laundry)  Ambulation Assistance: Independent  Transfer Assistance: Independent  Active : Yes (occasional driving)  Mode of Transportation: [employer]  Education: [employer]  Occupation: Retired  Type of Occupation: [employer]    Spoke with patient and explained role in the team. Patient questions answered appropriately. Explained discharge process. Patient stated understanding.    Pt lives alone in a 2 story house. Pt stays on the first floor with a bedroom and a bath on that level. Pt has a sister and a nephew that are supportive. Pt was independent with ADL'S and IADL's.Pt has a cat. Pt has 4 stairs to enter with HR 's on both sides. Pt has a walk in shower with a curtain,handicapped height toilet, grab bars in shower and around the toilet, toilet

## 2024-05-28 NOTE — PROGRESS NOTES
Subjective:   The patient complains of severe acute on chronic progressive fatigue and generalized weakness and severe postop neck pain partially relieved by rest, medications and medication adjustments, PT,  OT,   SLP and rest and exacerbated by recent cervical decompression.      73 y.o. male who has progressively worsening problems with balance using a cane after using a walker. Patient has problems with handwriting and using buttons and opening jars and texting and typing with his fingers. He also has some blurriness of his eyes and tremor of the left hand. He has lost 20% of his hand function in the last year. He has good range of motion on cervical spine. On 5/24/24 the patient underwent a Cervical C4-C5, C5-C6 discectomy and fusion r/t Cervical myelopathy.     I am concerned about patient’s medical complexities and barriers to advancing in rehab goals including control pain at lowest effective dose of pain medication.        I discussed current functional, rehabilitation, medical status with other rehabilitation providers including nursing and case management.  According to recent nursing note, \" Scheduled and PRN pain medications given. Aspen collar remains in place. Dressing to anterior neck CDI. Staff to call [facility] tomorrow to verify dose of Xeljanz, order states 5 mg BID and bottle of tablets pt brought from home are 10 mg, Rx dated 2/15/24. Visitors at bedside, call light in reach.  \".    He states that his current dose of Percocet is not helpful.  We discussed going up to Percocet 7.5 325.  He states that he is never felt overmedication on his previous pain medications and has chronic use of the 5 mg dose.  He also states that he is very [employer]ism about the use of his CPAP.    ROS x10:  The patient also complains of severely impaired mobility and activities of daily living.  Otherwise no new problems with vision, hearing, nose, mouth, throat, dermal, cardiovascular, GI, ,

## 2024-05-28 NOTE — PROGRESS NOTES
Physical Therapy Rehab Treatment Note  Facility/Department: [facility] REHAB  [facility]       NAME: Junito Harris  : 1950 (73 y.o.)  MRN: [id number]  CODE STATUS: Full Code    Date of Service: 2024       Restrictions:fall risk          SUBJECTIVE:   Subjective: \"I just wish I could be normal again\"    Pain  Pain: neck pain 4/10. tolerated. previously medicated      OBJECTIVE:   Orientation  Overall Orientation Status: Within Functional Limits  Cognition  Overall Cognitive Status: WFL             Transfer Training  Transfer Training: Yes  Overall Level of Assistance: Minimum assistance  Interventions: Safety awareness training;Verbal cues;Manual cues  Sit to Stand: Minimum assistance  Stand to Sit: Minimum assistance  Pt with varying level of assist need for transfers. Sba to mod assist.  Overall Level of Assistance: Minimum assistance  Distance (ft): 30 Feet x 2 with seated rest.   Trialed left df assist wrap to help with foot drop. Minimal change.   Assistive Device: Walker, rollator  Interventions: Safety awareness training;Verbal cues;Manual cues  Base of Support: Widened  Speed/Munira: Fluctuations  Step Length: Left shortened  Stance: Left decreased  Gait Abnormalities: Foot drop  Right Side Weight Bearing: As tolerated  Left Side Weight Bearing: As tolerated    Manual: gastroc stretches to bilateral lower extremities.                                        ASSESSMENT/PROGRESS TOWARDS GOALS: pt continues to struggle with reverse motion and approaching chair/seat. Decreased motor planning on left side.        Goals:  Long Term Goals  Long Term Goal 1: Pt to complete bed mobility with indep  Long Term Goal 2: Pt to complete transfers with indep  Long Term Goal 3: Pt to ambulate 150ft with LRD and indep  Long Term Goal 4: Pt to manage 12 steps with HR and indep  Long Term Goal 5: Pt to complete HEP    PLAN OF CARE/Safety: ongoing          Therapy Time:   Individual   Time In 0   Time Out  1600   Minutes 30      Minutes:30  Transfer/Bed mobility training:10  Gait training:10  Neuro re education:0  Therapeutic ex:0  Manual: 10      Alessia Dooley PTA, 05/28/24 at 4:49 PM

## 2024-05-28 NOTE — CARE COORDINATION
indep  Long Term Goal 2: Pt to complete transfers with indep  Long Term Goal 3: Pt to ambulate 150ft with LRD and indep  Long Term Goal 4: Pt to manage 12 steps with HR and indep  Long Term Goal 5: Pt to complete HEP  PT Treatment Time:  1.5 hrs      OCCUPATIONAL THERAPY    EVALUATION SELF CARE STATUS:  Hand Dominance: Left  Feeding: Setup (05/27/24 0927)  Grooming: Setup (05/27/24 0927)  UE Bathing: Minimal assistance (05/27/24 0927)  UE Bathing Skilled Clinical Factors: for thorough washing (05/27/24 0927)  LE Bathing: Minimal assistance (05/27/24 0927)  LE Bathing Skilled Clinical Factors: assist to clean B feet (05/27/24 0927)  UE Dressing Skilled Clinical Factors: unable to assess. hospital gown donned d/t pt not having button-down shirts to accomodate cervical collar (05/27/24 0927)  LE Dressing: Moderate assistance (05/27/24 0927)  LE Dressing Skilled Clinical Factors: Assist to thread LLE through undewear, pull pants up over hips, don L sock, and push B feet into slip-on shoes (05/27/24 0927)  Toileting: Stand by assistance (05/27/24 0927)  Functional Mobility: Contact guard assistance;Stand by assistance (05/27/24 0927)  Functional Mobility Skilled Clinical Factors: RW for ambulation. Slow, shuffling gait and LLE dragging (05/27/24 0927)  Additional Comments: Pt w/ limited mobility d/t cervical collar donned. (05/27/24 0927)             CURRENT SELF CARE:           LTG:  Eating:Discharge Goal: Independent  Oral Hygiene:Discharge Goal: Independent  Shower/Bathe self: Discharge Goal: Independent  Upper body dressing:Discharge Goal: Independent  Lower body dressing:Discharge Goal: Independent  Putting on taking off footwear:Discharge Goal: Independent   Toileting: Discharge Goal: Independent  Toilet transfer:Discharge Goal: Independent  OT Treatment Time: 1.5 hrs      SPEECH THERAPY                 Diet/Swallow:                       LTG:                COGNITION  OT: Cognition  Overall Cognitive Status: WFL

## 2024-05-28 NOTE — PROGRESS NOTES
OCCUPATIONAL THERAPY  INPATIENT REHAB TREATMENT NOTE  [facility]      NAME: Junito Harris  : 1950 (73 y.o.)  MRN: [id number]  CODE STATUS: Full Code  Room: R251/R251-01    Date of Service: 2024    Referring Physician: Dr. Carlos  Rehab Diagnosis: Impaired mobility and ADL's s/p C4-C5, C5-C6 discectomy and fusion r/t cervical myelopathy    Hospital course:   Comments: 73 y.o. male who has progressively worsening problems with balance using a cane after using a walker. Patient has problems with handwriting and using buttons and opening jars and texting and typing with his fingers. He also has some blurriness of his eyes and tremor of the left hand. He has lost 20% of his hand function in the last year. He has good range of motion on cervical spine. On 24 the patient underwent a Cervical C4-C5, C5-C6 discectomy and fusion r/t Cervical myelopathy.      Restrictions  Restrictions/Precautions  Restrictions/Precautions: Fall Risk  Required Braces or Orthoses?: Yes         Required Braces or Orthoses  Cervical: c-collar  Position Activity Restriction  Other position/activity restrictions: RAMÓN Franklin states pt will need to wear C Collar for 6 weeks    Patient's date of birth confirmed: Yes    SAFETY:  Safety Devices  Type of devices: All fall risk precautions in place    SUBJECTIVE:   \"I would love a shower\"    Pain at start of treatment: Yes: 5/10    Pain at end of treatment: Yes: 6/10    Location: neck  Description ache  Nursing notified: Declined  Medicated prior to session    COGNITION:     Problem Solving: Independent  Memory: Independent    OBJECTIVE:    ADL- shower completed       Grooming/Oral Hygiene  Assistance Level: Modified independent  Skilled Clinical Factors: seated at sink  Upper Extremity Bathing  Assistance Level: Stand by assist  Skilled Clinical Factors: multiple cues to avoid wetting c collar - washed chest and down  Lower Extremity Bathing  Assistance Level: Minimal  needed    ASSESSMENT:  Activity Tolerance: Patient tolerated treatment well    Assist needed for STSs and somewhat pain limited throughout session. AE used for LBD    PLAN OF CARE:  Balance training, Functional mobility training, Endurance training, Patient/Caregiver education & training, Safety education & training, Pain management, Equipment evaluation, education, & procurement, Positioning, Self-Care / ADL, Coordination training  Continue OT POC until discharge    Patient goals : \"Be playing golf by this fall.\"       Therapy Time:   Individual Group Co-Treat   Time In 0830       Time Out 0930         Minutes 60             ADL/IADL trainin minutes     Electronically signed by:    Jonny Potter OT,   2024, 10:15 AM

## 2024-05-28 NOTE — PLAN OF CARE
Problem: Safety - Adult  Goal: Free from fall injury  Outcome: Progressing     Problem: Discharge Planning  Goal: Discharge to home or other facility with appropriate resources  Outcome: Progressing     Problem: ABCDS Injury Assessment  Goal: Absence of physical injury  5/28/2024 0524 by Mary Anne Cook RN  Outcome: Progressing  5/28/2024 0524 by Mary Anne Cook RN  Outcome: HH/HSPC Resolved Resume Next Certification Period     Problem: Pain  Goal: Verbalizes/displays adequate comfort level or baseline comfort level  Outcome: Progressing     Problem: Skin/Tissue Integrity  Goal: Absence of new skin breakdown  Description: 1.  Monitor for areas of redness and/or skin breakdown  2.  Assess vascular access sites hourly  3.  Every 4-6 hours minimum:  Change oxygen saturation probe site  4.  Every 4-6 hours:  If on nasal continuous positive airway pressure, respiratory therapy assess nares and determine need for appliance change or resting period.  Outcome: Progressing

## 2024-05-28 NOTE — PROGRESS NOTES
OCCUPATIONAL THERAPY  INPATIENT REHAB TREATMENT NOTE  [facility]      NAME: Junito Harris  : 1950 (73 y.o.)  MRN: [id number]  CODE STATUS: Full Code  Room: R251/R251-01    Date of Service: 2024    Referring Physician: Dr. Carlos  Rehab Diagnosis: Impaired mobility and ADL's s/p C4-C5, C5-C6 discectomy and fusion r/t cervical myelopathy    Hospital course:   Comments: 73 y.o. male who has progressively worsening problems with balance using a cane after using a walker. Patient has problems with handwriting and using buttons and opening jars and texting and typing with his fingers. He also has some blurriness of his eyes and tremor of the left hand. He has lost 20% of his hand function in the last year. He has good range of motion on cervical spine. On 24 the patient underwent a Cervical C4-C5, C5-C6 discectomy and fusion r/t Cervical myelopathy.      Restrictions  Restrictions/Precautions  Restrictions/Precautions: Fall Risk  Required Braces or Orthoses?: Yes     Required Braces or Orthoses  Cervical: c-collar  Position Activity Restriction  Other position/activity restrictions: \"RN Noemi states pt will need to wear C Collar for 6 weeks\"    Patient's date of birth confirmed: Yes    SAFETY:  Safety Devices  Safety Devices in place: Yes  Type of devices: All fall risk precautions in place    SUBJECTIVE: \"I'm a big history buff.\"    Pain at start of treatment: Yes: 7/10    Pain at end of treatment: Yes: 7/10    Location: neck  Description \"It's not too bad.\"  Nursing notified: No  Intervention: Other: Patient reports RN administered pain medication prior to therapists arrival.    COGNITION:  Orientation  Overall Orientation Status: Within Functional Limits  Cognition  Overall Cognitive Status: WFL    OBJECTIVE:    FM Coordination:  Patient engaged in B FM coordination to increase I with fasteners and small objects for ADL's and IADL's.   Patient provided example unit consisting of 1 bolt,

## 2024-05-28 NOTE — PROGRESS NOTES
Physical Therapy Rehab Treatment Note  Facility/Department: [facility]  [facility]       NAME: Junito Harris  : 1950 (73 y.o.)  MRN: [id number]  CODE STATUS: Full Code    Date of Service: 2024       Restrictions:fall risk          SUBJECTIVE:   Subjective: \"I'm trying to get used to my neck brace\"    Pain  Pain: 4/10 neck pain. pt states he was medicated      OBJECTIVE:   Orientation  Overall Orientation Status: Within Functional Limits  Cognition  Overall Cognitive Status: WFL         Bed Mobility Training  Bed Mobility Training: Yes  Overall Level of Assistance: Minimum assistance  Interventions: Safety awareness training;Verbal cues;Manual cues  Rolling: Stand-by assistance  Supine to Sit: Minimum assistance  Sit to Supine: Minimum assistance  Scooting: Minimum assistance    Transfer Training  Transfer Training: Yes  Overall Level of Assistance: Moderate assistance  Interventions: Safety awareness training;Verbal cues;Manual cues  Sit to Stand: Moderate assistance  Stand to Sit: Moderate assistance    Overall Level of Assistance: Minimum assistance  Distance (ft): 30 Feet x 2 with seated rest.   Assistive Device: Walker, rollator  Interventions: Safety awareness training;Verbal cues;Manual cues  Base of Support: Widened  Speed/Munira: Fluctuations  Step Length: Left shortened  Stance: Left decreased  Gait Abnormalities: Foot drop  Right Side Weight Bearing: As tolerated  Left Side Weight Bearing: As tolerated       Neuro: standing with rollator. Weight shifting. Pt able to march in place and lift legs up without lob.     Manual: gastroc stretching to bilateral legs. Pt with decreased tolerance of this activity. Left leg with increased weakness over right.    Indonesian ball longseated position. Lateral flexion and knee flexion.                              ASSESSMENT/PROGRESS TOWARDS GOALS: pt tends to struggle with lower surface to get to standing from. Pt required mod assist to get to

## 2024-05-28 NOTE — PROGRESS NOTES
INDIVIDUALIZED OVERALL REHAB PLAN OF CARE  ADDENDUM TO REHAB PROGRESS NOTE-for audit purposes must also refer to this day's clinical note and combine the information      Date: 2024  Patient Name: Junito Harris   Room: R251/R251-01    MRN: [id number]    : 1950  (73 y.o.)  Gender: male       Today 2024 during weekly team meeting, I reviewed the patient Junito Harris in detail with the therapists and nurses involved in patient's care gathering complex physiatric data regarding current medical issues, progress in therapies, factors limiting progress, social issues, psychological issues, ongoing therapeutic plans and discharge planning.    Legend:  I= independent Im =Modified independent  S=Supervised SB=stand by VERGARA=set up CG=contact cheyanne Min= minimal Mod=Moderate Max=maximal Max of 2 =maximal assist of 2 people      CURRENT FUNCTIONAL STATUS:    73 y.o. male who has progressively worsening problems with balance using a cane after using a walker. Patient has problems with handwriting and using buttons and opening jars and texting and typing with his fingers. He also has some blurriness of his eyes and tremor of the left hand. He has lost 20% of his hand function in the last year. He has good range of motion on cervical spine. On 24 the patient underwent a Cervical C4-C5, C5-C6 discectomy and fusion r/t Cervical myelopathy.     NURSING ISSUES:    Scheduled and PRN pain medications given. Aspen collar remains in place. Dressing to anterior neck CDI. Staff to call [facility] tomorrow to verify dose of Xeljanz, order states 5 mg BID and bottle of tablets pt brought from home are 10 mg, Rx dated 2/15/24. Visitors at bedside, call light in reach.      Nursing will continue to focus on bowel and bladder continence transitioning toward independence by time of discharge.  Monitoring post void residuals monitoring for severe constipation and bowel obstruction.      Barriers to

## 2024-05-28 NOTE — PROGRESS NOTES
Patient noted to be hypertensive this afternoon with SBP in the 180s- denies headache, nausea, blurred vision. Complaints of pain to upper back/neck, medicated as ordered. NP notified, states recheck BP in half hr after pain med administration. BP noted to be elevated still with SBP in the 170s, patient states pain has improved. New order received for PRN hydralazine, affective with BP returning to 148/91 after administration. Patient educated on new medication, verbalizes understanding. Pt remains A&O x4, call light within reach.

## 2024-05-29 LAB
GLUCOSE BLD-MCNC: 109 MG/DL (ref 70–99)
GLUCOSE BLD-MCNC: 113 MG/DL (ref 70–99)
GLUCOSE BLD-MCNC: 125 MG/DL (ref 70–99)
PERFORMED ON: ABNORMAL

## 2024-05-29 PROCEDURE — 97535 SELF CARE MNGMENT TRAINING: CPT

## 2024-05-29 PROCEDURE — 6370000000 HC RX 637 (ALT 250 FOR IP): Performed by: NEUROLOGICAL SURGERY

## 2024-05-29 PROCEDURE — 97530 THERAPEUTIC ACTIVITIES: CPT

## 2024-05-29 PROCEDURE — 99232 SBSQ HOSP IP/OBS MODERATE 35: CPT | Performed by: PHYSICAL MEDICINE & REHABILITATION

## 2024-05-29 PROCEDURE — 97112 NEUROMUSCULAR REEDUCATION: CPT

## 2024-05-29 PROCEDURE — 1180000000 HC REHAB R&B

## 2024-05-29 PROCEDURE — 6370000000 HC RX 637 (ALT 250 FOR IP): Performed by: PHYSICAL MEDICINE & REHABILITATION

## 2024-05-29 PROCEDURE — 6360000002 HC RX W HCPCS: Performed by: NEUROLOGICAL SURGERY

## 2024-05-29 PROCEDURE — 97110 THERAPEUTIC EXERCISES: CPT

## 2024-05-29 PROCEDURE — 97116 GAIT TRAINING THERAPY: CPT

## 2024-05-29 RX ORDER — LACTULOSE 10 G/15ML
20 SOLUTION ORAL DAILY
Status: DISCONTINUED | OUTPATIENT
Start: 2024-05-29 | End: 2024-05-31

## 2024-05-29 RX ORDER — ROSUVASTATIN CALCIUM 5 MG/1
10 TABLET, COATED ORAL NIGHTLY
Status: DISCONTINUED | OUTPATIENT
Start: 2024-05-30 | End: 2024-06-08 | Stop reason: HOSPADM

## 2024-05-29 RX ADMIN — METOPROLOL TARTRATE 25 MG: 25 TABLET, FILM COATED ORAL at 20:26

## 2024-05-29 RX ADMIN — BUPROPION HYDROCHLORIDE 300 MG: 300 TABLET, EXTENDED RELEASE ORAL at 09:22

## 2024-05-29 RX ADMIN — LEVOTHYROXINE SODIUM 25 MCG: 0.03 TABLET ORAL at 05:26

## 2024-05-29 RX ADMIN — SENNOSIDES AND DOCUSATE SODIUM 1 TABLET: 50; 8.6 TABLET ORAL at 20:26

## 2024-05-29 RX ADMIN — Medication 100 MG: at 09:20

## 2024-05-29 RX ADMIN — SENNOSIDES AND DOCUSATE SODIUM 1 TABLET: 50; 8.6 TABLET ORAL at 09:20

## 2024-05-29 RX ADMIN — OXYCODONE 10 MG: 5 TABLET ORAL at 09:20

## 2024-05-29 RX ADMIN — OXYCODONE AND ACETAMINOPHEN 1 TABLET: 7.5; 325 TABLET ORAL at 05:26

## 2024-05-29 RX ADMIN — MIRTAZAPINE 45 MG: 15 TABLET, FILM COATED ORAL at 20:24

## 2024-05-29 RX ADMIN — METOPROLOL TARTRATE 25 MG: 25 TABLET, FILM COATED ORAL at 09:20

## 2024-05-29 RX ADMIN — VALSARTAN 160 MG: 160 TABLET, FILM COATED ORAL at 09:19

## 2024-05-29 RX ADMIN — ROSUVASTATIN CALCIUM 10 MG: 5 TABLET, FILM COATED ORAL at 09:19

## 2024-05-29 RX ADMIN — OXYCODONE AND ACETAMINOPHEN 1 TABLET: 7.5; 325 TABLET ORAL at 16:02

## 2024-05-29 RX ADMIN — Medication 2000 UNITS: at 16:02

## 2024-05-29 RX ADMIN — ENOXAPARIN SODIUM 30 MG: 100 INJECTION SUBCUTANEOUS at 20:25

## 2024-05-29 RX ADMIN — LACTULOSE 20 G: 20 SOLUTION ORAL at 16:03

## 2024-05-29 RX ADMIN — OXYCODONE AND ACETAMINOPHEN 1 TABLET: 7.5; 325 TABLET ORAL at 20:24

## 2024-05-29 RX ADMIN — ENOXAPARIN SODIUM 30 MG: 100 INJECTION SUBCUTANEOUS at 09:24

## 2024-05-29 ASSESSMENT — PAIN DESCRIPTION - LOCATION
LOCATION: NECK

## 2024-05-29 ASSESSMENT — PAIN SCALES - GENERAL
PAINLEVEL_OUTOF10: 0
PAINLEVEL_OUTOF10: 6
PAINLEVEL_OUTOF10: 5
PAINLEVEL_OUTOF10: 0

## 2024-05-29 ASSESSMENT — PAIN DESCRIPTION - DESCRIPTORS
DESCRIPTORS: ACHING;DISCOMFORT
DESCRIPTORS: ACHING

## 2024-05-29 ASSESSMENT — PAIN DESCRIPTION - ORIENTATION
ORIENTATION: POSTERIOR
ORIENTATION: POSTERIOR

## 2024-05-29 NOTE — PROGRESS NOTES
Hospitalist Progress Note      PCP: Angelo Uriostegui MD    Date of Admission: 5/26/2024    Chief Complaint:      Weakness  Constipation    HPI:     Patient was seen and examined in rehab for hospitalist follow-up.    Patient was assessed and evaluated while participating in physical therapy.  Patient is in a good mood, alert and oriented, reports feeling and doing much better.  Participates in PT with noticeable improvement.  Reports pain being well-controlled on current medications.  Cervical collar is in place.  Patient denies chest pain, palpitation, dyspnea, dizziness, and headache.  He also denies fever/chills, N/V/D but reports feeling very constipated and having abdominal discomfort.  No change in appetite.  Wears CPAP at night and states he is sleeping well.    I have personally reviewed recent lab work, radiology reports, medications, and interdisciplinary notes. The  results and POC have been discussed with the patient.  All questions were answered, patient verbalized understanding.      Subjective:    12 point ROS negative other than mentioned above       Medications:  Reviewed    Infusion Medications   Scheduled Medications    lactulose  20 g Oral Daily    Vitamin D  2,000 Units Oral Dinner    cyanocobalamin  1,000 mcg IntraMUSCular Weekly    coenzyme Q10  100 mg Oral Daily    lidocaine  3 patch TransDERmal Daily    Tofacitinib Citrate  10 mg Oral BID    oxyCODONE-acetaminophen  1 tablet Oral q8h    buPROPion  300 mg Oral QAM    enoxaparin  30 mg SubCUTAneous BID    levothyroxine  25 mcg Oral Daily    metoprolol tartrate  25 mg Oral BID    mirtazapine  45 mg Oral Nightly    rosuvastatin  10 mg Oral Daily    sennosides-docusate sodium  1 tablet Oral BID    valsartan  160 mg Oral Daily     PRN Meds: oxyCODONE, hydrALAZINE, albuterol sulfate HFA, bisacodyl, ondansetron **OR** ondansetron      Intake/Output Summary (Last 24 hours) at 5/29/2024 1402  Last data filed at 5/28/2024 1822  Gross per 24 hour    Intake 120 ml   Output --   Net 120 ml       Exam:    /78   Pulse 66   Temp 98.1 °F (36.7 °C) (Oral)   Resp 18   Ht 1.829 m (6')   Wt 128.8 kg (283 lb 14.4 oz)   SpO2 98%   BMI 38.50 kg/m²     General appearance: No apparent distress, appears stated age and cooperative.  HEENT: Pupils equal, round, and reactive to light. Conjunctivae/corneas clear.  Neck: Limited ROM.  Cervical collar in place.  No jugular venous distention. Trachea midline.  Respiratory:  Normal respiratory effort. Clear/diminished to auscultation, bilaterally without Rales/Wheezes/Rhonchi.  Cardiovascular: Regular rate and rhythm with normal S1/S2 without murmurs, rubs or gallops.  Abdomen: Soft, non-tender, non-distended with normal bowel sounds.  Truncal obesity.  Musculoskeletal: No clubbing, cyanosis; trace edema bilaterally.   Skin: Skin color, texture, turgor normal.  No rashes or lesions.  Surgical incision is well-approximated, no signs of infection.  Neuro: Non Focal. Symetrical motor and tone. Nl Comprehension, Alert,awake and oriented. NL CN. Symetrical tone and reflexes.  Psychiatric: Alert and oriented, thought content appropriate, normal insight.  Capillary Refill: Brisk,< 3 seconds   Peripheral Pulses: +2 palpable, equal bilaterally       Labs:   Recent Labs     05/26/24 1922   WBC 9.3   HGB 13.8*   HCT 42.6        Recent Labs     05/26/24 1922      K 5.1*      CO2 26   BUN 27*   CREATININE 1.16   CALCIUM 9.2     No results for input(s): \"AST\", \"ALT\", \"BILIDIR\", \"BILITOT\", \"ALKPHOS\" in the last 72 hours.  No results for input(s): \"INR\" in the last 72 hours.  No results for input(s): \"CKTOTAL\", \"TROPONINI\" in the last 72 hours.    Urinalysis:      Lab Results   Component Value Date/Time    NITRU NEGATIVE 08/23/2023 04:13 PM    WBCUA 5 08/23/2023 04:13 PM    BACTERIA 3+ 08/09/2023 05:48 PM    RBCUA 22 08/23/2023 04:13 PM    BLOODU SMALL (1+) 08/23/2023 04:13 PM    SPECGRAV 1.020 08/09/2017 02:18 PM

## 2024-05-29 NOTE — PROGRESS NOTES
Physical Therapy Rehab Treatment Note  Facility/Department: [facility]  [facility]       NAME: Junito Harris  : 1950 (73 y.o.)  MRN: [id number]  CODE STATUS: Full Code    Date of Service: 2024       Restrictions:  Restrictions/Precautions: Fall Risk  Required Braces or Orthoses  Cervical: c-collar  Position Activity Restriction  Other position/activity restrictions: \"RAMÓN Franklin states pt will need to wear C Collar for 6 weeks\"       SUBJECTIVE:   Subjective: Pt reports his lunch wasn't the best    Pain  Pain: denies pain pre /post      OBJECTIVE:     Transfers  Surface: Wheelchair  Additional Factors: Verbal cues;Hand placement cues  Device: Walker  Sit to Stand  Assistance Level: Stand by assist;Minimal assistance  Skilled Clinical Factors: stability assistance initially, improves technique and stability throughout session requiring only SBA  Stand to Sit  Assistance Level: Stand by assist;Minimal assistance  Skilled Clinical Factors: Cues for approach to chair, improving technique with BUE and eccentric control    Ambulation  Surface: Level surface  Device: Rolling walker  Distance: 40' x 2  Assistance Level: Stand by assist;Minimal assistance  Gait Deviations: Slow pepe;Decreased step length bilateral  Skilled Clinical Factors: heavy bracing on BUE, cues for improving technique and sequencing, improving heel strike on LLE as well as foot clearance    Neuromuscular Education  Facilitation techniques: static standing at ww with emphasis on upright posture and endurance / Standing without UE support 30\" x2 ,horizontal / vertical eye movements without UE support to challenge balance , dynamic balance tasks - trunk rotations L/R , fwd reaching in all planes OOBOS    PT Exercises  Exercise Treatment: 2\" alternating step taps x10 reps x2 sets , STS x5  A/AROM Exercises: Seated: AP (DF/PF) x20 , LAQ 2-3\" hold x10 , marching x10       ASSESSMENT/PROGRESS TOWARDS GOALS:   Assessment  Assessment:

## 2024-05-29 NOTE — PROGRESS NOTES
OCCUPATIONAL THERAPY  INPATIENT REHAB TREATMENT NOTE  [facility]      NAME: Junito Harris  : 1950 (73 y.o.)  MRN: [id number]  CODE STATUS: Full Code  Room: R261/R261-01    Date of Service: 2024    Referring Physician: Dr. Carlos  Rehab Diagnosis: Impaired mobility and ADL's s/p C4-C5, C5-C6 discectomy and fusion r/t cervical myelopathy    Hospital course:   Comments: 73 y.o. male who has progressively worsening problems with balance using a cane after using a walker. Patient has problems with handwriting and using buttons and opening jars and texting and typing with his fingers. He also has some blurriness of his eyes and tremor of the left hand. He has lost 20% of his hand function in the last year. He has good range of motion on cervical spine. On 24 the patient underwent a Cervical C4-C5, C5-C6 discectomy and fusion r/t Cervical myelopathy.      Restrictions  Restrictions/Precautions  Restrictions/Precautions: Fall Risk  Required Braces or Orthoses?: Yes     Patient's date of birth confirmed: Yes    SAFETY:  Safety Devices  Safety Devices in place: Yes  Type of devices: All fall risk precautions in place    SUBJECTIVE: \"Things can be blurry at times.\"    Pain at start of treatment: No    Pain at end of treatment: No    COGNITION:  Orientation  Overall Orientation Status: Within Functional Limits  Orientation Level: Oriented X4  Cognition  Overall Cognitive Status: WFL    OBJECTIVE:    Instrumental ADL's  Instrumental ADLs: Yes  Health Management  Health Management Level of Assistance: Minimum Assistance  Health Management:   Medication Management Simulation Activity:  Patient completed simulation activity utilizing 7 provided medication bottles with written instruction to place a total of 74 beads into the provided weekly medication organizer.   Patient with 0 difficulty opening medication bottles.   Patient with 0 difficulty opening organizer boxes.   Patient placed a total of 67  1400       Time Out 1500         Minutes 60                   ADL/IADL trainin minutes  Therapeutic activities: 20 minutes     Electronically signed by:    VERENICE Nolan,   2024, 4:07 PM

## 2024-05-29 NOTE — PLAN OF CARE
Problem: Safety - Adult  Goal: Free from fall injury  5/28/2024 2226 by Mary Anne Cook RN  Outcome: Progressing  5/28/2024 1028 by Keiry Potter RN  Outcome: Progressing  Flowsheets (Taken 5/28/2024 1028)  Free From Fall Injury: Instruct family/caregiver on patient safety     Problem: Discharge Planning  Goal: Discharge to home or other facility with appropriate resources  5/28/2024 2226 by Mary Anne Cook RN  Outcome: Progressing  5/28/2024 1028 by Keiry Potter RN  Outcome: Progressing  Flowsheets (Taken 5/28/2024 1028)  Discharge to home or other facility with appropriate resources:   Identify barriers to discharge with patient and caregiver   Arrange for needed discharge resources and transportation as appropriate   Identify discharge learning needs (meds, wound care, etc)   Refer to discharge planning if patient needs post-hospital services based on physician order or complex needs related to functional status, cognitive ability or social support system     Problem: ABCDS Injury Assessment  Goal: Absence of physical injury  5/28/2024 2226 by Mary Anne Cook RN  Outcome: Progressing  5/28/2024 1028 by Keiry Potter RN  Outcome: Progressing  Flowsheets (Taken 5/28/2024 1028)  Absence of Physical Injury: Implement safety measures based on patient assessment     Problem: Pain  Goal: Verbalizes/displays adequate comfort level or baseline comfort level  5/28/2024 2226 by Mary Anne Cook RN  Outcome: Progressing  5/28/2024 1028 by Keiry Potter RN  Outcome: Progressing  Flowsheets (Taken 5/28/2024 1028)  Verbalizes/displays adequate comfort level or baseline comfort level:   Encourage patient to monitor pain and request assistance   Assess pain using appropriate pain scale   Administer analgesics based on type and severity of pain and evaluate response   Implement non-pharmacological measures as appropriate and evaluate response   Notify Licensed Independent Practitioner if interventions

## 2024-05-29 NOTE — PROGRESS NOTES
Transfers  Technique: Stand step  Equipment: Grab bars;Raised toilet seat with arms  Assistance Level: Stand by assist  Skilled Clinical Factors: w/c level    FM Coordination and Strengthening:  Patient engaged in B FM coordination and strengthening to increase I with fasteners and small objects for ADL's and IADL's.   Patient able to remove green MOD resistive theraputty from container with MIN difficulty.   Patient able to roll theraputty into log shape with MIN difficulty.   Patient able to place 15 small beads 1 at a time in theraputty with 0 difficulty.   Patient able to roll theraputty into ball shape with MIN difficulty.   Patient able to flatten theraputty with MIN difficulty.   Patient able to pinch theraputty with MIN difficulty to locate beads.   Patient able to remove beads with 0 difficulty.   Patient noted to have MIN difficulty manipulating beads.    ASSESSMENT: Patient pleasant and chatty while working at a steady pace.    Activity Tolerance: Patient tolerated treatment well      PLAN OF CARE:  Balance training, Functional mobility training, Endurance training, Patient/Caregiver education & training, Safety education & training, Pain management, Equipment evaluation, education, & procurement, Positioning, Self-Care / ADL, Coordination training    Continue per OT POC for planned discharge on 6-5-24.    Patient goals : \"Be playing golf by this fall.\"           Therapy Time:   Individual Group Co-Treat   Time In 1000       Time Out 1030         Minutes 30                   ADL/IADL training: 15 minutes  Therapeutic activities: 15 minutes     Electronically signed by:    VERENICE Nolan,   5/29/2024, 11:52 AM

## 2024-05-29 NOTE — PROGRESS NOTES
Patient complains of 6/10 pain to neck. PRN oxycodone given. Lidoderm patch applied.  Electronically signed by Chaya Pineda LPN on 5/29/2024 at 9:34 AM

## 2024-05-29 NOTE — PROGRESS NOTES
barriers to progress and strategies to address these barriers, and discharge planning.  See the hand written addendum to rehab progress note.  The patient continues to be high risk for future disability and their medical and rehabilitation prognosis continue to be good and therefore, we will continue the patient's rehabilitation course as planned.  The patient's tentative discharge date was set. Patient and family education was discussed.  The patient was made aware of the team discussion regarding their progress.  Discharge plans were discussed along with barriers to progress and strategies to address these barriers, patient encouraged to continue to discuss discharge plans with .       Complex Physical Medicine & Rehab Issues Assess & Plan:   Severe abnormality of gait and mobility and impaired self-care and ADL's secondary to progressive weakness dt cervical spinal stenosis.  Functional and medical status reassessed regarding patient’s ability to participate in therapies and patient found to be able to participate in acute intensive comprehensive inpatient rehabilitation program including PT/OT to improve balance, ambulation, ADL’s, and to improve the P/AROM.  Therapeutic modifications regarding activities in therapies, place, amount of time per day and intensity of therapy made daily.  In bed therapies or bedside therapies prn.   Bowel progressive constipation, and Bladder dysfunction monitoring neurogenic bladder:  frequent toileting, ambulate to bathroom with assistance, check post void residuals.  Check for C.difficile x1 if >2 loose stools in 24 hours, continue bowel & bladder program.  Monitor bowel and bladder function.  Lactinex 2 PO every AC.  MOM prn, Brown Bomb prn, Glycerin suppository prn, enema prn.  Add lactulose encourage Senokot  Severe postop cervical post pain as well as generalized OA pain: reassess pain every shift and prior to and after each therapy session, give scheduled  Percocet every 8 prn Tylenol and Oxy IR, modalities prn in therapy, Lidoderm, K-pad prn.   Skin healing cervical spine incision and breakdown risk:  continue pressure relief program.  Daily skin exams and reports from nursing.  Severe fatigue due to nutritional and hydration deficiency: Add vitamin B12 vitamin D and CoQ10 continue to monitor I&O’s, calorie counts prn, dietary consult prn.  Add healthy HS snack.  Acute episodic insomnia with situational adjustment disorder:  consider prn low dose Ambien, monitor for day time sedation.  Add HS \"Tuck In\"  Falls risk elevated:  patient to use call light to get nursing assistance to get up, bed and chair alarm.  Elevated DVT risk: progressive activities in PT, continue prophylaxis LEAH hose, elevation and Lovenox.  Complex discharge planning:  MS 6/5/24--home alone with [facility].  Weekly team meeting  every Monday to re-assess progress towards goals, discuss and address social, psychological and medical comorbidities and to address difficulties they may be having progressing in therapy.  Patient and family education is in progress.  The patient is to follow-up with their family physician after discharge.        Complex Active General Medical Issues that complicate care Assess & Plan:      Severe ETIENNE (obstructive sleep apnea)-Acute rehab for endurance traing with Pulse Ox to monitoring oxygen saturation and heart rate with O2 titration to lowest effective dose. Pulse oximeter checks to shift and at HS to dose and titrate oxygen and aerosol treatments monitor for nocturnal hypoxemia, monitor vital signs, oxygen prn.  Focus on energy conservation.  Difficult to control blood pressure, SSS (sick sinus syndrome),  Paroxysmal atrial fibrillation,   Pure hypercholesterolemia-Acute rehab to monitor heart rate and rhythm with the option of telemetry and the effects of chronotropic medication with respect to increasing physical activity and exercise in PT, OT, ADLs with medication

## 2024-05-29 NOTE — PROGRESS NOTES
Physical Therapy Rehab Treatment Note  Facility/Department: [facility]  Room: [place] [address]       NAME: Junito Harris  : 1950 (73 y.o.)  MRN: [id number]  CODE STATUS: Full Code    Date of Service: 2024       Restrictions:  Restrictions/Precautions: Fall Risk  Required Braces or Orthoses  Cervical: c-collar  Position Activity Restriction  Other position/activity restrictions: \"RN Noemi states pt will need to wear C Collar for 6 weeks\"       SUBJECTIVE:   Subjective: Pt states \"I'll do the best I can.\"    Pain  Pain: 5-6 middle back - declines intervention d/t receiving pain meds earlier      OBJECTIVE:       Transfers  Surface: Wheelchair;Standard toilet;From chair with arms;To chair with arms  Additional Factors: Verbal cues;Hand placement cues  Device: Walker  Sit to Stand  Assistance Level: Stand by assist;Minimal assistance  Skilled Clinical Factors: stability assistance initially, improves technique and stability throughout session requiring only SBA  Stand to Sit  Assistance Level: Stand by assist;Minimal assistance  Skilled Clinical Factors: Cues for approach to chair, improving technique with BUE and eccentric control    Ambulation  Surface: Level surface  Device: Rolling walker  Distance: 25' , 35' , 40' two turns  Assistance Level: Stand by assist;Minimal assistance  Gait Deviations: Slow pepe;Decreased step length bilateral  Skilled Clinical Factors: heavy bracing on BUE, cues for improving technique and sequencing, improving heel strike on LLE as well as foot clearance    Stairs  Stair Height: 6''  Device: Bilateral handrails  Number of Stairs: 4  Additional Factors: Non-reciprocal going up;Non-reciprocal going down;Increased time to complete;Verbal cues  Assistance Level: Minimal assistance  Skilled Clinical Factors: cues throughout for improving hand placement to decrease posterior lean as well as overall sequencing    PT Exercises  Exercise Treatment: 4\" step ups with ww x5 , STS

## 2024-05-30 LAB
GLUCOSE BLD-MCNC: 115 MG/DL (ref 70–99)
GLUCOSE BLD-MCNC: 137 MG/DL (ref 70–99)
PERFORMED ON: ABNORMAL
PERFORMED ON: ABNORMAL

## 2024-05-30 PROCEDURE — 97530 THERAPEUTIC ACTIVITIES: CPT

## 2024-05-30 PROCEDURE — 6360000002 HC RX W HCPCS: Performed by: NEUROLOGICAL SURGERY

## 2024-05-30 PROCEDURE — 99232 SBSQ HOSP IP/OBS MODERATE 35: CPT | Performed by: PHYSICAL MEDICINE & REHABILITATION

## 2024-05-30 PROCEDURE — 6370000000 HC RX 637 (ALT 250 FOR IP)

## 2024-05-30 PROCEDURE — 97535 SELF CARE MNGMENT TRAINING: CPT

## 2024-05-30 PROCEDURE — 97110 THERAPEUTIC EXERCISES: CPT

## 2024-05-30 PROCEDURE — 6370000000 HC RX 637 (ALT 250 FOR IP): Performed by: PHYSICAL MEDICINE & REHABILITATION

## 2024-05-30 PROCEDURE — 2500000003 HC RX 250 WO HCPCS: Performed by: PHYSICAL MEDICINE & REHABILITATION

## 2024-05-30 PROCEDURE — 97116 GAIT TRAINING THERAPY: CPT

## 2024-05-30 PROCEDURE — 6370000000 HC RX 637 (ALT 250 FOR IP): Performed by: NEUROLOGICAL SURGERY

## 2024-05-30 PROCEDURE — 1180000000 HC REHAB R&B

## 2024-05-30 RX ORDER — POLYETHYLENE GLYCOL 3350 17 G/17G
17 POWDER, FOR SOLUTION ORAL DAILY
Status: DISCONTINUED | OUTPATIENT
Start: 2024-05-30 | End: 2024-06-08 | Stop reason: HOSPADM

## 2024-05-30 RX ORDER — DOCUSATE SODIUM 100 MG/1
100 CAPSULE, LIQUID FILLED ORAL DAILY
Status: DISCONTINUED | OUTPATIENT
Start: 2024-05-30 | End: 2024-06-08 | Stop reason: HOSPADM

## 2024-05-30 RX ADMIN — VALSARTAN 160 MG: 160 TABLET, FILM COATED ORAL at 09:28

## 2024-05-30 RX ADMIN — Medication 2000 UNITS: at 17:58

## 2024-05-30 RX ADMIN — MICONAZOLE NITRATE: 2 POWDER TOPICAL at 20:57

## 2024-05-30 RX ADMIN — MIRTAZAPINE 45 MG: 15 TABLET, FILM COATED ORAL at 20:55

## 2024-05-30 RX ADMIN — ENOXAPARIN SODIUM 30 MG: 100 INJECTION SUBCUTANEOUS at 09:26

## 2024-05-30 RX ADMIN — OXYCODONE AND ACETAMINOPHEN 1 TABLET: 7.5; 325 TABLET ORAL at 05:38

## 2024-05-30 RX ADMIN — BUPROPION HYDROCHLORIDE 300 MG: 300 TABLET, EXTENDED RELEASE ORAL at 09:29

## 2024-05-30 RX ADMIN — OXYCODONE AND ACETAMINOPHEN 1 TABLET: 7.5; 325 TABLET ORAL at 20:56

## 2024-05-30 RX ADMIN — METOPROLOL TARTRATE 25 MG: 25 TABLET, FILM COATED ORAL at 20:56

## 2024-05-30 RX ADMIN — ROSUVASTATIN CALCIUM 10 MG: 5 TABLET, FILM COATED ORAL at 20:55

## 2024-05-30 RX ADMIN — Medication 100 MG: at 09:27

## 2024-05-30 RX ADMIN — DOCUSATE SODIUM 100 MG: 100 CAPSULE, LIQUID FILLED ORAL at 12:20

## 2024-05-30 RX ADMIN — SENNOSIDES AND DOCUSATE SODIUM 1 TABLET: 50; 8.6 TABLET ORAL at 09:28

## 2024-05-30 RX ADMIN — OXYCODONE 10 MG: 5 TABLET ORAL at 00:49

## 2024-05-30 RX ADMIN — SENNOSIDES AND DOCUSATE SODIUM 1 TABLET: 50; 8.6 TABLET ORAL at 20:57

## 2024-05-30 RX ADMIN — OXYCODONE AND ACETAMINOPHEN 1 TABLET: 7.5; 325 TABLET ORAL at 14:18

## 2024-05-30 RX ADMIN — OXYCODONE 10 MG: 5 TABLET ORAL at 09:26

## 2024-05-30 RX ADMIN — POLYETHYLENE GLYCOL 3350 17 G: 17 POWDER, FOR SOLUTION ORAL at 20:54

## 2024-05-30 RX ADMIN — LEVOTHYROXINE SODIUM 25 MCG: 0.03 TABLET ORAL at 05:38

## 2024-05-30 RX ADMIN — METOPROLOL TARTRATE 25 MG: 25 TABLET, FILM COATED ORAL at 09:28

## 2024-05-30 ASSESSMENT — PAIN SCALES - GENERAL
PAINLEVEL_OUTOF10: 1
PAINLEVEL_OUTOF10: 5
PAINLEVEL_OUTOF10: 4
PAINLEVEL_OUTOF10: 9
PAINLEVEL_OUTOF10: 2

## 2024-05-30 ASSESSMENT — PAIN DESCRIPTION - ORIENTATION
ORIENTATION: POSTERIOR
ORIENTATION: POSTERIOR
ORIENTATION: MID
ORIENTATION: POSTERIOR
ORIENTATION: POSTERIOR

## 2024-05-30 ASSESSMENT — PAIN DESCRIPTION - DESCRIPTORS
DESCRIPTORS: ACHING
DESCRIPTORS: ACHING;DISCOMFORT
DESCRIPTORS: ACHING
DESCRIPTORS: ACHING

## 2024-05-30 ASSESSMENT — PAIN DESCRIPTION - LOCATION
LOCATION: NECK

## 2024-05-30 NOTE — PLAN OF CARE
Problem: Safety - Adult  Goal: Free from fall injury  Outcome: Progressing     Problem: Discharge Planning  Goal: Discharge to home or other facility with appropriate resources  Outcome: Progressing  Flowsheets (Taken 5/30/2024 1401)  Discharge to home or other facility with appropriate resources: Identify barriers to discharge with patient and caregiver     Problem: ABCDS Injury Assessment  Goal: Absence of physical injury  Outcome: Progressing     Problem: Pain  Goal: Verbalizes/displays adequate comfort level or baseline comfort level  Outcome: Progressing     Problem: Skin/Tissue Integrity  Goal: Absence of new skin breakdown  Description: 1.  Monitor for areas of redness and/or skin breakdown  2.  Assess vascular access sites hourly  3.  Every 4-6 hours minimum:  Change oxygen saturation probe site  4.  Every 4-6 hours:  If on nasal continuous positive airway pressure, respiratory therapy assess nares and determine need for appliance change or resting period.  Outcome: Progressing     Problem: Chronic Conditions and Co-morbidities  Goal: Patient's chronic conditions and co-morbidity symptoms are monitored and maintained or improved  Outcome: Progressing

## 2024-05-30 NOTE — PROGRESS NOTES
OCCUPATIONAL THERAPY  INPATIENT REHAB TREATMENT NOTE  [facility]      NAME: Junito Harris  : 1950 (73 y.o.)  MRN: [id number]  CODE STATUS: Full Code  Room: R261/R261-01    Date of Service: 2024    Referring Physician: Dr. Carlos  Rehab Diagnosis: Impaired mobility and ADL's s/p C4-C5, C5-C6 discectomy and fusion r/t cervical myelopathy    Hospital course:   Comments: 73 y.o. male who has progressively worsening problems with balance using a cane after using a walker. Patient has problems with handwriting and using buttons and opening jars and texting and typing with his fingers. He also has some blurriness of his eyes and tremor of the left hand. He has lost 20% of his hand function in the last year. He has good range of motion on cervical spine. On 24 the patient underwent a Cervical C4-C5, C5-C6 discectomy and fusion r/t Cervical myelopathy.      Restrictions  Restrictions/Precautions  Restrictions/Precautions: Fall Risk  Required Braces or Orthoses?: Yes     Patient's date of birth confirmed: Yes    SAFETY:  Safety Devices  Safety Devices in place: Yes  Type of devices: All fall risk precautions in place    SUBJECTIVE: \"I don't walk with a walker. I go in the w/c.\"     Pain at start of treatment: Yes: 5/10    Pain at end of treatment: Yes: 5/10    Location: neck  Description dull, ache  Nursing notified: Yes  RN: Erica  Intervention: RN provided pain medication prior to therapists arrival; RN provided pain patches following ADL.    COGNITION:  Orientation  Overall Orientation Status: Within Functional Limits  Cognition  Overall Cognitive Status: WFL    OBJECTIVE:    Grooming/Oral Hygiene  Assistance Level: Modified independent  Skilled Clinical Factors: seated in w/c at sink  Upper Extremity Bathing  Assistance Level: Supervision  Lower Extremity Bathing  Assistance Level: Moderate assistance  Skilled Clinical Factors: B feet, posterior thoroughness  Upper Extremity  Dressing  Assistance Level: Stand by assist  Skilled Clinical Factors: in standing  Lower Extremity Dressing  Equipment Provided: Reachers  Assistance Level: Stand by assist  Putting On/Taking Off Footwear  Assistance Level: Dependent  Skilled Clinical Factors: wide sock aid unavailable  Toileting  Assistance Level: Supervision  Toilet Transfers  Technique: Stand step  Equipment: Grab bars;Raised toilet seat with arms  Assistance Level: Stand by assist  Skilled Clinical Factors: w/c level  Tub/Shower Transfers  Skilled Clinical Factors: Patient completed sponge bath seated in w/c at bathroom sink.    ASSESSMENT: Patient pleasant and motivated for new learning with AE while completing ADL tasks.    Activity Tolerance: Patient tolerated treatment well      PLAN OF CARE:  Balance training, Functional mobility training, Endurance training, Patient/Caregiver education & training, Safety education & training, Pain management, Equipment evaluation, education, & procurement, Positioning, Self-Care / ADL, Coordination training    Continue per OT POC for planned discharge on 24.    Patient goals : \"Be playing golf by this fall.\"           Therapy Time:   Individual Group Co-Treat   Time In 930       Time Out 1030         Minutes 60                   ADL/IADL trainin minutes     Electronically signed by:    VERENICE Nolan,   2024, 11:02 AM

## 2024-05-30 NOTE — PROGRESS NOTES
OCCUPATIONAL THERAPY  INPATIENT REHAB TREATMENT NOTE  [facility]      NAME: Junito Harris  : 1950 (73 y.o.)  MRN: [id number]  CODE STATUS: Full Code  Room: R261/R261-01    Date of Service: 2024    Referring Physician: Dr. Carlos  Rehab Diagnosis: Impaired mobility and ADL's s/p C4-C5, C5-C6 discectomy and fusion r/t cervical myelopathy    Hospital course:   Comments: 73 y.o. male who has progressively worsening problems with balance using a cane after using a walker. Patient has problems with handwriting and using buttons and opening jars and texting and typing with his fingers. He also has some blurriness of his eyes and tremor of the left hand. He has lost 20% of his hand function in the last year. He has good range of motion on cervical spine. On 24 the patient underwent a Cervical C4-C5, C5-C6 discectomy and fusion r/t Cervical myelopathy.      Restrictions  Restrictions/Precautions  Restrictions/Precautions: Fall Risk  Required Braces or Orthoses?: Yes     Patient's date of birth confirmed: Yes    SAFETY:  Safety Devices  Safety Devices in place: Yes  Type of devices: All fall risk precautions in place    SUBJECTIVE: \"It was pretty good.\" - lunch    Pain at start of treatment: Yes: 5/10    Pain at end of treatment: Yes: 5/10    Location: neck  Description dull, ache  Nursing notified: Declined  Intervention: None    COGNITION:  Orientation  Overall Orientation Status: Within Functional Limits  Cognition  Overall Cognitive Status: WFL    OBJECTIVE:    Supine to Sit  Assistance Level: Minimal assistance  Skilled Clinical Factors: HOB flat - bed rail utilized - increased effort  Scooting  Assistance Level: Stand by assist  Skilled Clinical Factors: bed rail utilized  Sit to Stand  Assistance Level: Stand by assist  Stand to Sit  Assistance Level: Stand by assist  Bed To/From Chair  Technique: Stand step  Assistance Level: Supervision  Skilled Clinical Factors: EOB -> w/c without

## 2024-05-30 NOTE — FLOWSHEET NOTE
Patient complains of posterior neck pain rated 9/10. Medicated patient with Roxicodone 10 MG PO PRN for pain per patient request. Repositioned patient in bed and adjusted neck brace. Patient verbalized no further needs at this time. Bed alarm engaged and call light within reach.

## 2024-05-30 NOTE — FLOWSHEET NOTE
Patient assessment complete. Patient ambulated to toilet and back to bed with front wheeled walker with some difficulty moving left foot/leg. Patient had a large formed BM. Patient is now back to bed and states he is ready to go to sleep. Patient received scheduled Percocet 7.5-325 MG PO for posterior neck discomfort. Patient denies any pain at this time. Patient put on CPAP machine with ASPEN collar in place. Patient verbalized no further needs at this time. Bed alarm engaged and call light within reach.

## 2024-05-30 NOTE — PROGRESS NOTES
Physical Therapy Rehab Treatment Note  Facility/Department: [facility]  [facility]       NAME: Junito Harris  : 1950 (73 y.o.)  MRN: [id number]  CODE STATUS: Full Code    Date of Service: 2024       Restrictions:  Restrictions/Precautions: Fall Risk  Required Braces or Orthoses  Cervical: c-collar  Position Activity Restriction  Other position/activity restrictions: \"RAMÓN Franklin states pt will need to wear C Collar for 6 weeks\"       SUBJECTIVE:   Subjective: \"I am doing okay\"    Pain  Pain: denies pain pre /post      OBJECTIVE:     Transfers  Surface: Wheelchair  Additional Factors: Verbal cues;Hand placement cues  Device: Walker  Sit to Stand  Assistance Level: Stand by assist;Minimal assistance  Skilled Clinical Factors: stability assistance initially, improves technique and stability throughout session requiring only SBA  Stand to Sit  Assistance Level: Stand by assist;Minimal assistance  Skilled Clinical Factors: Cues for approach to chair, improving technique with BUE and eccentric control    Ambulation  Surface: Level surface  Device: Rolling walker  Distance: 65'  Activity Comments: +2 for WC follow for safety  Additional Factors: Verbal cues (L dorsiflex wrap)  Assistance Level: Stand by assist;Minimal assistance  Gait Deviations: Slow pepe;Decreased step length bilateral  Skilled Clinical Factors: heavy bracing on BUE, cues for improving technique and sequencing, improving heel strike on LLE as well as foot clearance. L lean with gait this session, pt reports \"just feeling off balance\". WC follow utilized for safety this session d/t this feeling.    Stairs  Stair Height: 6''  Device: Bilateral handrails  Number of Stairs: 4  Additional Factors: Non-reciprocal going up;Non-reciprocal going down;Increased time to complete;Verbal cues  Assistance Level: Minimal assistance  Skilled Clinical Factors: cues throughout for improving hand placement to decrease posterior lean as well as  overall sequencing    PT Exercises  PROM Exercises: seated HS/gastroc stretch x3 30 sec hold BLE  A/AROM Exercises: seated AP/LAQ/Marches/Hip Abd/Hip Add x10 BLE  Resistive Exercises: seated RTB HS curls/hip abd x10 BLE     Activity Tolerance  Activity Tolerance: Patient tolerated treatment well    ASSESSMENT/PROGRESS TOWARDS GOALS:   Assessment  Assessment: Pt with increased L lean in standing this date, WC follow utilized for safety this date d/t decreased balance, however able to correct with cues. Requires occasional cues throughout tasks for safety throughout. L dorsiflex wrap utilized this session to promote improved foot clearance.  Activity Tolerance: Patient tolerated treatment well    Goals:  Long Term Goals  Long Term Goal 1: Pt to complete bed mobility with indep  Long Term Goal 2: Pt to complete transfers with indep  Long Term Goal 3: Pt to ambulate 150ft with LRD and indep  Long Term Goal 4: Pt to manage 12 steps with HR and indep  Long Term Goal 5: Pt to complete HEP    PLAN OF CARE/Safety:   Safety Devices  Type of Devices: All fall risk precautions in place;Chair alarm in place;Left in chair      Therapy Time:   Individual   Time In 1030   Time Out 1130   Minutes 60      Minutes: 60  Transfer/Bed mobility training: 15  Gait trainin  Neuro re education:  Therapeutic ex: 25      Elliott Johnson PTA, 24 at 12:08 PM

## 2024-05-30 NOTE — PROGRESS NOTES
Subjective:   The patient complains of severe acute on chronic progressive fatigue and generalized weakness and severe postop neck pain partially relieved by rest, medications and medication adjustments, PT,  OT,   SLP and rest and exacerbated by recent cervical decompression.      73 y.o. male who has progressively worsening problems with balance using a cane after using a walker. Patient has problems with handwriting and using buttons and opening jars and texting and typing with his fingers. He also has some blurriness of his eyes and tremor of the left hand. He has lost 20% of his hand function in the last year. He has good range of motion on cervical spine. On 5/24/24 the patient underwent a Cervical C4-C5, C5-C6 discectomy and fusion r/t Cervical myelopathy.     I am concerned about patient’s medical complexities and barriers to advancing in rehab goals including control pain at lowest effective dose of pain medication.        I discussed current functional, rehabilitation, medical status with other rehabilitation providers including nursing and case management.  According to recent nursing note,\" complains of posterior neck pain rated 9/10. Medicated patient with Roxicodone 10 MG PO PRN for pain per patient request. Repositioned patient in bed and adjusted neck brace. Patient verbalized no further needs at this time. Bed alarm engaged and call light within reach \".     He has come in complaining of severe osteoarthritic flareup pain.  Pain medication now scheduled is improving.  I got a message from the pharmacy that he is on scheduled Tylenol he is not.  And he was very constipated with his opiates but seems to be improving with lactulose he had a bowel movement on 5/29/2024.    ROS x10:  The patient also complains of severely impaired mobility and activities of daily living.  Otherwise no new problems with vision, hearing, nose, mouth, throat, dermal, cardiovascular, GI, , pulmonary, musculoskeletal,  spinal stenosis.  Functional and medical status reassessed regarding patient’s ability to participate in therapies and patient found to be able to participate in acute intensive comprehensive inpatient rehabilitation program including PT/OT to improve balance, ambulation, ADL’s, and to improve the P/AROM.  Therapeutic modifications regarding activities in therapies, place, amount of time per day and intensity of therapy made daily.  In bed therapies or bedside therapies prn.   Bowel progressive constipation, and Bladder dysfunction monitoring neurogenic bladder:  frequent toileting, ambulate to bathroom with assistance, check post void residuals.  Check for C.difficile x1 if >2 loose stools in 24 hours, continue bowel & bladder program.  Monitor bowel and bladder function.  Lactinex 2 PO every AC.  MOM prn, Brown Bomb prn, Glycerin suppository prn, enema prn.  Add lactulose encourage Senokot  Severe postop cervical post pain as well as generalized OA pain: reassess pain every shift and prior to and after each therapy session, give scheduled Percocet every 8 prn Tylenol and Oxy IR, modalities prn in therapy, Lidoderm, K-pad prn.   Skin healing cervical spine incision and breakdown risk:  continue pressure relief program.  Daily skin exams and reports from nursing.  Severe fatigue due to nutritional and hydration deficiency: Add vitamin B12 vitamin D and CoQ10 continue to monitor I&O’s, calorie counts prn, dietary consult prn.  Add healthy HS snack.  Acute episodic insomnia with situational adjustment disorder:  consider prn low dose Ambien, monitor for day time sedation.  Add HS \"Tuck In\"  Falls risk elevated:  patient to use call light to get nursing assistance to get up, bed and chair alarm.  Elevated DVT risk: progressive activities in PT, continue prophylaxis LEAH hose, elevation and Lovenox.  Complex discharge planning:  ID 6/5/24--home alone with [facility].  Weekly team meeting  every Monday to re-assess progress

## 2024-05-30 NOTE — PLAN OF CARE
Problem: Safety - Adult  Goal: Free from fall injury  Outcome: Progressing     Problem: Discharge Planning  Goal: Discharge to home or other facility with appropriate resources  Outcome: Progressing     Problem: ABCDS Injury Assessment  Goal: Absence of physical injury  Outcome: Progressing     Problem: Pain  Goal: Verbalizes/displays adequate comfort level or baseline comfort level  Outcome: Progressing     Problem: Skin/Tissue Integrity  Goal: Absence of new skin breakdown  Description: 1.  Monitor for areas of redness and/or skin breakdown  2.  Assess vascular access sites hourly  3.  Every 4-6 hours minimum:  Change oxygen saturation probe site  4.  Every 4-6 hours:  If on nasal continuous positive airway pressure, respiratory therapy assess nares and determine need for appliance change or resting period.  Outcome: Progressing     Problem: Chronic Conditions and Co-morbidities  Goal: Patient's chronic conditions and co-morbidity symptoms are monitored and maintained or improved  Outcome: Progressing

## 2024-05-30 NOTE — PLAN OF CARE
conditions and co-morbidity symptoms are monitored and maintained or improved  5/30/2024 1443 by Jeanne Pierre RN  Outcome: Progressing  5/30/2024 1442 by Jeanne Pierre RN  Outcome: Progressing  5/30/2024 1442 by Jeanne Pierre RN  Outcome: Progressing

## 2024-05-30 NOTE — PROGRESS NOTES
Physical Therapy Rehab Treatment Note  Facility/Department: [facility]  [facility]       NAME: Junito Harris  : 1950 (73 y.o.)  MRN: [id number]  CODE STATUS: Full Code    Date of Service: 2024       Restrictions:  Restrictions/Precautions: Fall Risk  Required Braces or Orthoses  Cervical: c-collar  Position Activity Restriction  Other position/activity restrictions: \"RN Noemi states pt will need to wear C Collar for 6 weeks\"       SUBJECTIVE:   Subjective: \"Are the ducks there?\"    Pain  Pain: denies pain pre /post      OBJECTIVE:   Transfers  Surface: Wheelchair;To mat;From mat  Additional Factors: Verbal cues;Hand placement cues  Device: Walker  Sit to Stand  Assistance Level: Stand by assist;Minimal assistance  Skilled Clinical Factors: stability assistance initially, improves technique and stability throughout session requiring only SBA  Stand to Sit  Assistance Level: Stand by assist;Minimal assistance  Skilled Clinical Factors: Cues for approach to chair, improving technique with BUE and eccentric control    Ambulation  Surface: Level surface  Device: Rolling walker  Distance: 35' x 2, 20' with use of mirror  Activity Comments: +2 for WC follow for safety  Additional Factors: Verbal cues  Assistance Level: Stand by assist;Minimal assistance  Gait Deviations: Slow pepe;Decreased step length bilateral  Skilled Clinical Factors: heavy bracing on BUE, cues for improving technique and sequencing, improving heel strike on LLE as well as foot clearance. Use of mirror with one gait trial for visual feedback provided to pt on L lean in standing and improved postural control.    PT Exercises  Static Standing Balance Exercises: static standing at ww with emphasis on upright posture and endurance x5 min  Postural Correction Exercises: scap retractions x10 , shld depression x10     Activity Tolerance  Activity Tolerance: Patient tolerated treatment well    ASSESSMENT/PROGRESS TOWARDS GOALS:

## 2024-05-30 NOTE — FLOWSHEET NOTE
Patient assessment complete. RN gave him a percocet at 1400.   RN called Dr Alexander regarding the xarelto . It is to be restarted today and the lovenox discontinued.

## 2024-05-31 LAB
GLUCOSE BLD-MCNC: 103 MG/DL (ref 70–99)
GLUCOSE BLD-MCNC: 105 MG/DL (ref 70–99)
PERFORMED ON: ABNORMAL
PERFORMED ON: ABNORMAL

## 2024-05-31 PROCEDURE — 6370000000 HC RX 637 (ALT 250 FOR IP): Performed by: NEUROLOGICAL SURGERY

## 2024-05-31 PROCEDURE — 6370000000 HC RX 637 (ALT 250 FOR IP): Performed by: PHYSICAL MEDICINE & REHABILITATION

## 2024-05-31 PROCEDURE — 97116 GAIT TRAINING THERAPY: CPT

## 2024-05-31 PROCEDURE — 97535 SELF CARE MNGMENT TRAINING: CPT

## 2024-05-31 PROCEDURE — 1180000000 HC REHAB R&B

## 2024-05-31 PROCEDURE — 99232 SBSQ HOSP IP/OBS MODERATE 35: CPT | Performed by: PHYSICAL MEDICINE & REHABILITATION

## 2024-05-31 PROCEDURE — 97110 THERAPEUTIC EXERCISES: CPT

## 2024-05-31 PROCEDURE — 6370000000 HC RX 637 (ALT 250 FOR IP)

## 2024-05-31 PROCEDURE — 97530 THERAPEUTIC ACTIVITIES: CPT

## 2024-05-31 RX ORDER — LACTULOSE 10 G/15ML
20 SOLUTION ORAL DAILY PRN
Status: DISCONTINUED | OUTPATIENT
Start: 2024-05-31 | End: 2024-06-08 | Stop reason: HOSPADM

## 2024-05-31 RX ADMIN — LEVOTHYROXINE SODIUM 25 MCG: 0.03 TABLET ORAL at 06:08

## 2024-05-31 RX ADMIN — SENNOSIDES AND DOCUSATE SODIUM 1 TABLET: 50; 8.6 TABLET ORAL at 09:14

## 2024-05-31 RX ADMIN — OXYCODONE AND ACETAMINOPHEN 1 TABLET: 7.5; 325 TABLET ORAL at 14:46

## 2024-05-31 RX ADMIN — DOCUSATE SODIUM 100 MG: 100 CAPSULE, LIQUID FILLED ORAL at 09:14

## 2024-05-31 RX ADMIN — LACTULOSE 20 G: 20 SOLUTION ORAL at 09:23

## 2024-05-31 RX ADMIN — SENNOSIDES AND DOCUSATE SODIUM 1 TABLET: 50; 8.6 TABLET ORAL at 21:22

## 2024-05-31 RX ADMIN — VALSARTAN 160 MG: 160 TABLET, FILM COATED ORAL at 09:14

## 2024-05-31 RX ADMIN — Medication 2000 UNITS: at 17:41

## 2024-05-31 RX ADMIN — MIRTAZAPINE 45 MG: 15 TABLET, FILM COATED ORAL at 21:22

## 2024-05-31 RX ADMIN — MICONAZOLE NITRATE: 2 POWDER TOPICAL at 21:27

## 2024-05-31 RX ADMIN — MICONAZOLE NITRATE: 2 POWDER TOPICAL at 09:24

## 2024-05-31 RX ADMIN — POLYETHYLENE GLYCOL 3350 17 G: 17 POWDER, FOR SOLUTION ORAL at 09:14

## 2024-05-31 RX ADMIN — Medication 100 MG: at 09:23

## 2024-05-31 RX ADMIN — METOPROLOL TARTRATE 25 MG: 25 TABLET, FILM COATED ORAL at 09:15

## 2024-05-31 RX ADMIN — OXYCODONE AND ACETAMINOPHEN 1 TABLET: 7.5; 325 TABLET ORAL at 06:08

## 2024-05-31 RX ADMIN — ROSUVASTATIN CALCIUM 10 MG: 5 TABLET, FILM COATED ORAL at 21:22

## 2024-05-31 RX ADMIN — BUPROPION HYDROCHLORIDE 300 MG: 300 TABLET, EXTENDED RELEASE ORAL at 09:14

## 2024-05-31 RX ADMIN — OXYCODONE AND ACETAMINOPHEN 1 TABLET: 7.5; 325 TABLET ORAL at 21:22

## 2024-05-31 RX ADMIN — METOPROLOL TARTRATE 25 MG: 25 TABLET, FILM COATED ORAL at 21:21

## 2024-05-31 ASSESSMENT — PAIN DESCRIPTION - LOCATION
LOCATION: NECK

## 2024-05-31 ASSESSMENT — PAIN DESCRIPTION - DESCRIPTORS
DESCRIPTORS: ACHING
DESCRIPTORS: ACHING;DISCOMFORT
DESCRIPTORS: ACHING

## 2024-05-31 ASSESSMENT — PAIN SCALES - GENERAL
PAINLEVEL_OUTOF10: 5
PAINLEVEL_OUTOF10: 7
PAINLEVEL_OUTOF10: 4

## 2024-05-31 ASSESSMENT — PAIN DESCRIPTION - ORIENTATION
ORIENTATION: POSTERIOR
ORIENTATION: MID;POSTERIOR
ORIENTATION: POSTERIOR

## 2024-05-31 NOTE — PROGRESS NOTES
OCCUPATIONAL THERAPY  INPATIENT REHAB TREATMENT NOTE  [facility]      NAME: Junito Harris  : 1950 (73 y.o.)  MRN: [id number]  CODE STATUS: Full Code  Room: R261/R261-01    Date of Service: 2024    Referring Physician: Dr. Carlos  Rehab Diagnosis: Impaired mobility and ADL's s/p C4-C5, C5-C6 discectomy and fusion r/t cervical myelopathy    Hospital course:   Comments: 73 y.o. male who has progressively worsening problems with balance using a cane after using a walker. Patient has problems with handwriting and using buttons and opening jars and texting and typing with his fingers. He also has some blurriness of his eyes and tremor of the left hand. He has lost 20% of his hand function in the last year. He has good range of motion on cervical spine. On 24 the patient underwent a Cervical C4-C5, C5-C6 discectomy and fusion r/t Cervical myelopathy.      Restrictions  Restrictions/Precautions  Restrictions/Precautions: Fall Risk  Required Braces or Orthoses?: Yes   Cervical: c-collar  Position Activity Restriction  Other position/activity restrictions: \"RAMÓN Franklin states pt will need to wear C Collar for 6 weeks\"    Patient's date of birth confirmed: Yes    SAFETY:  Safety Devices  Safety Devices in place: Yes  Type of devices: All fall risk precautions in place    SUBJECTIVE:    Pain   Pain at start of treatment: No    Pain at end of treatment: No      OBJECTIVE:      Grooming/Oral Hygiene  Assistance Level: Modified independent  Skilled Clinical Factors: Pt completed hand hygiene while seated at sink in   Toileting  Assistance Level: Maximum assistance  Skilled Clinical Factors: Pt sat to have loose bowel movement. Pt required assistance for hygiene and pants mgmt    Toilet Transfers  Technique: Stand pivot  Equipment: Grab bars  Additional Factors: With handrails  Assistance Level: Stand by assist    Fine Motor:   Small Nuts and Bolts: Pt used bilateral hands to don a wooden washer onto

## 2024-05-31 NOTE — PROGRESS NOTES
[facility]  Occupational Therapy      NAME:  Junito Harris  ROOM: R261/R261-01  :  1950  DATE: 2024    Attempted to see Junito Harris on this date at 1:00 PM for a 30 minute session for   []  Initial Evaluation   [x]  Scheduled therapy    []  Make-up therapy   []  Group therapy   []  Family training    Patient was unable to be seen due to:   [] Off unit for testing/procedure   [] Patient refused, stating \"    [] Therapy on hold due to     [] Nursing deferred due to    [x] Other: Due to late lunch arrival the pt had just started eating lunch upon entering room. Will attempt again when able.    Electronically signed by VERENICE Patel on 24 at 2:52 PM EDT

## 2024-05-31 NOTE — PROGRESS NOTES
OCCUPATIONAL THERAPY  INPATIENT REHAB TREATMENT NOTE  [facility]      NAME: Junito Harris  : 1950 (73 y.o.)  MRN: [id number]  CODE STATUS: Full Code  Room: R261/R261-01    Date of Service: 2024    Referring Physician: Dr. Carlos  Rehab Diagnosis: Impaired mobility and ADL's s/p C4-C5, C5-C6 discectomy and fusion r/t cervical myelopathy    Hospital course:   Comments: 73 y.o. male who has progressively worsening problems with balance using a cane after using a walker. Patient has problems with handwriting and using buttons and opening jars and texting and typing with his fingers. He also has some blurriness of his eyes and tremor of the left hand. He has lost 20% of his hand function in the last year. He has good range of motion on cervical spine. On 24 the patient underwent a Cervical C4-C5, C5-C6 discectomy and fusion r/t Cervical myelopathy.      Restrictions  Restrictions/Precautions  Restrictions/Precautions: Fall Risk      Patient's date of birth confirmed: Yes    SAFETY:  Safety Devices  Safety Devices in place: Yes  Type of devices: All fall risk precautions in place    SUBJECTIVE:   \"I don't have my hearing aids in right now.\"    Pain at start of treatment: Yes: 5/10    Pain at end of treatment: No    Location: neck  Nursing notified: Yes  RN: Erica  Intervention: Other: RN provided pain patch.    COGNITION:  Orientation  Overall Orientation Status: Within Functional Limits  Orientation Level: Oriented X4  Cognition  Overall Cognitive Status: WFL    OBJECTIVE:    Grooming/Oral Hygiene  Assistance Level: Modified independent  Skilled Clinical Factors: Wash face, oral care, hair care and apply deodorant.  Upper Extremity Bathing  Assistance Level: Modified independent  Skilled Clinical Factors: Seated in shower bench. Pt verbalized awareness about getting c collar wet.  Lower Extremity Bathing  Assistance Level: Minimal assistance  Skilled Clinical Factors: Assist to wash  posterior janiya area.  Upper Extremity Dressing  Assistance Level: Stand by assist  Skilled Clinical Factors: Able to don shirt while seated. Stood up to fasten buttons at SBA.  Lower Extremity Dressing  Assistance Level: Minimal assistance  Skilled Clinical Factors: Pt observed to have difficulty pulling underwear all the way up over hips. Assist provided. Pt able to don pants at SBA.  Putting On/Taking Off Footwear  Assistance Level: Set-up  Skilled Clinical Factors: Hospital socks.  Toileting  Assistance Level: Supervision  Skilled Clinical Factors: seated, urinated only  Toilet Transfers  Technique: Stand pivot  Equipment: Grab bars;Raised toilet seat with arms  Additional Factors: Verbal cues  Assistance Level: Stand by assist  Skilled Clinical Factors: w/c level  Tub/Shower Transfers  Type: Shower  Transfer From: Rolling walker  Transfer To: Shower chair with back  Additional Factors: Verbal cues  Assistance Level: Stand by assist    Functional Mobility  Device: Rolling walker  Activity: To/From bathroom  Assistance Level: Stand by assist  Sit to Stand  Assistance Level: Stand by assist  Stand to Sit  Assistance Level: Stand by assist    Education:  Education  Education Given To: Patient  Education Provided: Role of Therapy;Plan of Care  Education Method: Verbal  Barriers to Learning: None  Education Outcome: Verbalized understanding    ASSESSMENT:  Assessment: Pt participated well. Pleasant and conversational.  Activity Tolerance: Patient tolerated treatment well    PLAN OF CARE:  Balance training, Functional mobility training, Endurance training, Patient/Caregiver education & training, Safety education & training, Pain management, Equipment evaluation, education, & procurement, Positioning, Self-Care / ADL, Coordination training  Continue per OT POC for planned discharge on 6-5-24.    Patient goals : \"Be playing golf by this fall.\"       Therapy Time:   Individual Group Co-Treat   Time In 0930       Time Out

## 2024-05-31 NOTE — PROGRESS NOTES
Physical Therapy Rehab Treatment Note  Facility/Department[facility]  [facility]       NAME: Junito Harris  : 1950 (73 y.o.)  MRN: [id number]  CODE STATUS: Full Code    Date of Service: 2024       Restrictions:  Restrictions/Precautions: Fall Risk  Required Braces or Orthoses  Cervical: c-collar  Position Activity Restriction  Other position/activity restrictions: \"RN Noemi states pt will need to wear C Collar for 6 weeks\"       SUBJECTIVE:   Subjective: \"Are the ducks there?\"    Pain  Pain: denies pain pre /post      OBJECTIVE:   Transfers  Surface: Wheelchair;From chair with arms;To chair with arms  Additional Factors: Verbal cues;Hand placement cues  Device: Walker  Sit to Stand  Assistance Level: Stand by assist;Minimal assistance  Skilled Clinical Factors: stability assistance initially, improves technique and stability throughout session requiring only SBA  Stand to Sit  Assistance Level: Stand by assist;Minimal assistance  Skilled Clinical Factors: Cues for approach to chair, improving technique with BUE and eccentric control  Car Transfer  Assistance Level: Stand by assist;Minimal assistance  Skilled Clinical Factors: increased time and effort, Vanessa to come to stand from car d/t lower surface. vc's for technique and sequencing throughout    Ambulation  Surface: Level surface  Device: Rolling walker  Distance: 45' x 2  Additional Factors: Verbal cues  Assistance Level: Stand by assist;Minimal assistance  Gait Deviations: Slow pepe;Decreased step length bilateral  Skilled Clinical Factors: heavy bracing on BUE, cues for improving technique and sequencing, improving heel strike on LLE as well as foot clearance. Less L lean noted this date as opposed to yesterdays session. Improved postural control and carryover of cues.    Stairs  Stair Height: 6''  Device: Bilateral handrails  Number of Stairs: 4  Additional Factors: Non-reciprocal going up;Non-reciprocal going down;Increased time

## 2024-05-31 NOTE — FLOWSHEET NOTE
Patient assessment is complete. Patient had a large, continent stool. RN had given him chronulac.

## 2024-05-31 NOTE — PLAN OF CARE
Problem: Safety - Adult  Goal: Free from fall injury  5/31/2024 0214 by Mary Anne Cook RN  Outcome: Progressing  5/30/2024 1443 by Jeanne Pierre RN  Outcome: Progressing  5/30/2024 1442 by Jeanne Pierre RN  Outcome: Progressing  5/30/2024 1442 by Jeanne Pierre RN  Outcome: Progressing     Problem: Discharge Planning  Goal: Discharge to home or other facility with appropriate resources  5/30/2024 1443 by Jeanne Pierre RN  Outcome: Progressing  5/30/2024 1442 by Jeanne Pierre RN  Outcome: Progressing  5/30/2024 1442 by Jeanne Pierre RN  Outcome: Progressing  Flowsheets (Taken 5/30/2024 1401)  Discharge to home or other facility with appropriate resources: Identify barriers to discharge with patient and caregiver     Problem: ABCDS Injury Assessment  Goal: Absence of physical injury  5/30/2024 1443 by Jeanne Pierre RN  Outcome: Progressing  5/30/2024 1442 by Jeanne Pierre RN  Outcome: Progressing  5/30/2024 1442 by Jeanne Pierre RN  Outcome: Progressing     Problem: Pain  Goal: Verbalizes/displays adequate comfort level or baseline comfort level  5/30/2024 1443 by Jeanne Pierre RN  Outcome: Progressing  5/30/2024 1442 by Jeanne Pierre RN  Outcome: Progressing  5/30/2024 1442 by Jeanne Pierre RN  Outcome: Progressing     Problem: Skin/Tissue Integrity  Goal: Absence of new skin breakdown  Description: 1.  Monitor for areas of redness and/or skin breakdown  2.  Assess vascular access sites hourly  3.  Every 4-6 hours minimum:  Change oxygen saturation probe site  4.  Every 4-6 hours:  If on nasal continuous positive airway pressure, respiratory therapy assess nares and determine need for appliance change or resting period.  5/30/2024 1443 by Jeanne Pierre RN  Outcome: Progressing  5/30/2024 1442 by Jeanne Pierre RN  Outcome: Progressing  5/30/2024 1442 by Jeanne Pierre, RN  Outcome: Progressing     Problem:

## 2024-05-31 NOTE — PLAN OF CARE
Problem: Safety - Adult  Goal: Free from fall injury  5/31/2024 1552 by Jeanne Pierre RN  Outcome: Progressing  5/31/2024 0214 by Mary Anne Cook RN  Outcome: Progressing     Problem: Discharge Planning  Goal: Discharge to home or other facility with appropriate resources  Outcome: Progressing  Flowsheets (Taken 5/31/2024 1544)  Discharge to home or other facility with appropriate resources: Identify barriers to discharge with patient and caregiver     Problem: ABCDS Injury Assessment  Goal: Absence of physical injury  Outcome: Progressing     Problem: Pain  Goal: Verbalizes/displays adequate comfort level or baseline comfort level  Outcome: Progressing     Problem: Skin/Tissue Integrity  Goal: Absence of new skin breakdown  Description: 1.  Monitor for areas of redness and/or skin breakdown  2.  Assess vascular access sites hourly  3.  Every 4-6 hours minimum:  Change oxygen saturation probe site  4.  Every 4-6 hours:  If on nasal continuous positive airway pressure, respiratory therapy assess nares and determine need for appliance change or resting period.  Outcome: Progressing     Problem: Chronic Conditions and Co-morbidities  Goal: Patient's chronic conditions and co-morbidity symptoms are monitored and maintained or improved  Outcome: Progressing     Problem: Falls - Risk of:  Goal: Will remain free from falls  Description: Will remain free from falls  Outcome: Progressing     Problem: Blood Glucose:  Goal: Ability to maintain appropriate glucose levels will improve  Description: Ability to maintain appropriate glucose levels will improve  5/31/2024 1552 by Jeanne Pierre RN  Outcome: Progressing  5/31/2024 0217 by Mary Anne Cook RN  Outcome: Progressing     Problem: Respiratory - Adult  Goal: Achieves optimal ventilation and oxygenation  5/31/2024 1552 by Jeanne Pierre RN  Outcome: Progressing  Flowsheets (Taken 5/31/2024 1544)  Achieves optimal ventilation and oxygenation:

## 2024-05-31 NOTE — PROGRESS NOTES
assist and 2 HHA with weight shifting. Moderate assist sit to stand with first trial before ambulation then improved sit to stand with min assist with good stability.    Occupational therapy: FIMS:   ,  , Assessment: Pt is a 73-year-old male presenting to [facility] from home s/p cervical C4-C5 and C5-C6 anterior discectomy fusion. Pt presents w/ post-surgical pain and deficits listed. Pt will benefit from continued OT to address these deficits to maximize safety and dindependence n in daily activities.    Speech therapy: FIMS:        Lab/X-ray studies reviewed, analyzed and discussed with patient and staff:   Recent Results (from the past 24 hour(s))   POCT Glucose    Collection Time: 05/30/24  4:26 PM   Result Value Ref Range    POC Glucose 137 (H) 70 - 99 mg/dl    Performed on ACCU-CHEK    POCT Glucose    Collection Time: 05/31/24  6:03 AM   Result Value Ref Range    POC Glucose 103 (H) 70 - 99 mg/dl    Performed on ACCU-CHEK        Previous extensive, complex labs, notes and diagnostics reviewed and analyzed.     ALLERGIES:    Allergies as of 05/26/2024 - Fully Reviewed 05/26/2024   Allergen Reaction Noted    Azathioprine Diarrhea and Nausea And Vomiting 07/15/2015    Clindamycin Other (See Comments) 03/09/2020      (please also verify by checking MAR)        Complex Physical Medicine & Rehab Issues Assess & Plan:   Severe abnormality of gait and mobility and impaired self-care and ADL's secondary to progressive weakness dt cervical spinal stenosis.  Functional and medical status reassessed regarding patient’s ability to participate in therapies and patient found to be able to participate in acute intensive comprehensive inpatient rehabilitation program including PT/OT to improve balance, ambulation, ADL’s, and to improve the P/AROM.  Therapeutic modifications regarding activities in therapies, place, amount of time per day and intensity of therapy made daily.  In bed therapies or bedside therapies prn.   Bowel  monitoring oxygen saturation and heart rate with O2 titration to lowest effective dose. Pulse oximeter checks to shift and at HS to dose and titrate oxygen and aerosol treatments monitor for nocturnal hypoxemia, monitor vital signs, oxygen prn.  Focus on energy conservation.  Difficult to control blood pressure, SSS (sick sinus syndrome),  Paroxysmal atrial fibrillation,   Pure hypercholesterolemia-Acute rehab to monitor heart rate and rhythm with the option of telemetry and the effects of chronotropic medication with respect to increasing physical activity and exercise in PT, OT, ADLs with medication titration to lowest effective dosing.  Continue blood signs every shift focusing on heart rate, rhythm and blood pressure checks with orthostatic checks-monitoring the effect of exercise, therapy and posture.  Consult hospitalist for backup medical and adjust/add medications.  Monitor heart rate and blood pressure as well as medications effects on vital signs before during and after therapy with especial focus on preventing orthostasis and falls risk.   New order received for PRN hydralazine, affective with BP returning to 148/91 after administration.      Right renal mass,  Stage 3a chronic kidney disease-Eliminate toxic medications, monitor I's and O's focusing on urine output, recheck BMP.    Steatosis of liver-focus on weight loss avoid toxic medications    Vitamin D deficiency-Add high-dose vitamin D, recheck vitamin D level out after discharge,    Left hemiparesis due to cervical myelopathy    Major depressive disorder in full remission-Add high-dose vitamin D, recheck vitamin D level out after discharge  Patient is on Xarelto-monitor for bleeding      Focus on emotional health and caregiver involvement in care.        Electronically signed by Gayle Carlos DO on 5/28/24 at 8:36 AM EDT       Gayle Carlos D.O., PM&R     Attending    [phone]   [facility]

## 2024-05-31 NOTE — PROGRESS NOTES
Physical Therapy Rehab Treatment Note  Facility/Department: [facility]  Room: [place] [address]       NAME: Junito Harris  : 1950 (73 y.o.)  MRN: [id number]  CODE STATUS: Full Code    Date of Service: 2024       Restrictions:  Restrictions/Precautions: Fall Risk  Required Braces or Orthoses  Cervical: c-collar  Position Activity Restriction  Other position/activity restrictions: \"RAMÓN Franklin states pt will need to wear C Collar for 6 weeks\"       SUBJECTIVE:   Subjective: \"I have to use the restroom\"    Pain  Pain: denies pain pre /post      OBJECTIVE:     Transfers  Surface: Wheelchair;From chair with arms;To chair with arms  Additional Factors: Verbal cues;Hand placement cues  Device: Walker  Sit to Stand  Assistance Level: Stand by assist;Minimal assistance  Skilled Clinical Factors: stability assistance initially, improves technique and stability throughout session requiring only SBA  Stand to Sit  Assistance Level: Stand by assist;Minimal assistance  Skilled Clinical Factors: Cues for approach to chair, improving technique with BUE and eccentric control    Ambulation  Surface: Level surface  Device: Rolling walker  Distance: 55' x 2, 125'  Additional Factors: Verbal cues  Assistance Level: Stand by assist;Minimal assistance  Gait Deviations: Slow pepe;Decreased step length bilateral  Skilled Clinical Factors: heavy bracing on BUE, cues for improving technique and sequencing, improving heel strike on LLE as well as foot clearance. Less L lean noted this date as opposed to yesterdays session. Improved postural control and carryover of cues.    PT Exercises  Exercise Treatment: STS x5  PROM Exercises: seated HS/gastroc stretch x3 30 sec hold BLE  A/AROM Exercises: seated AP/LAQ/Marches/Hip Abd/Hip Add x10 BLE  Resistive Exercises: seated RTB HS curls/hip abd x10 BLE  Static Standing Balance Exercises: static standing at ww with emphasis on upright posture and endurance x5 min  Dynamic Standing Balance  Exercises: standing bag toss with no UE support with focus on maintaining balance     Activity Tolerance  Activity Tolerance: Patient tolerated treatment well    ASSESSMENT/PROGRESS TOWARDS GOALS:   Assessment  Assessment: Pt with good progress towards goals this date, able to increase gait distance this session. Continued to work on standing balance and strengthening throughout session. Cervical collar adjusted for pt comfort.  Activity Tolerance: Patient tolerated treatment well    Goals:  Long Term Goals  Long Term Goal 1: Pt to complete bed mobility with indep  Long Term Goal 2: Pt to complete transfers with indep  Long Term Goal 3: Pt to ambulate 150ft with LRD and indep  Long Term Goal 4: Pt to manage 12 steps with HR and indep  Long Term Goal 5: Pt to complete HEP    PLAN OF CARE/Safety:   Safety Devices  Type of Devices: All fall risk precautions in place;Chair alarm in place;Left in chair      Therapy Time:   Individual   Time In 1330   Time Out 1430   Minutes 60      Minutes: 60  Transfer/Bed mobility training: 15  Gait trainin  Neuro re education:  Therapeutic ex: 25      Elliott Johnson PTA, 24 at 2:40 PM

## 2024-06-01 LAB
GLUCOSE BLD-MCNC: 115 MG/DL (ref 70–99)
GLUCOSE BLD-MCNC: 130 MG/DL (ref 70–99)
PERFORMED ON: ABNORMAL
PERFORMED ON: ABNORMAL

## 2024-06-01 PROCEDURE — 6370000000 HC RX 637 (ALT 250 FOR IP)

## 2024-06-01 PROCEDURE — 97116 GAIT TRAINING THERAPY: CPT

## 2024-06-01 PROCEDURE — 6370000000 HC RX 637 (ALT 250 FOR IP): Performed by: PHYSICAL MEDICINE & REHABILITATION

## 2024-06-01 PROCEDURE — 1180000000 HC REHAB R&B

## 2024-06-01 PROCEDURE — 97110 THERAPEUTIC EXERCISES: CPT

## 2024-06-01 PROCEDURE — 6370000000 HC RX 637 (ALT 250 FOR IP): Performed by: NEUROLOGICAL SURGERY

## 2024-06-01 RX ADMIN — OXYCODONE AND ACETAMINOPHEN 1 TABLET: 7.5; 325 TABLET ORAL at 15:00

## 2024-06-01 RX ADMIN — ROSUVASTATIN CALCIUM 10 MG: 5 TABLET, FILM COATED ORAL at 20:15

## 2024-06-01 RX ADMIN — LEVOTHYROXINE SODIUM 25 MCG: 0.03 TABLET ORAL at 05:28

## 2024-06-01 RX ADMIN — MICONAZOLE NITRATE: 2 POWDER TOPICAL at 20:24

## 2024-06-01 RX ADMIN — METOPROLOL TARTRATE 25 MG: 25 TABLET, FILM COATED ORAL at 10:12

## 2024-06-01 RX ADMIN — Medication 2000 UNITS: at 18:43

## 2024-06-01 RX ADMIN — MIRTAZAPINE 45 MG: 15 TABLET, FILM COATED ORAL at 20:15

## 2024-06-01 RX ADMIN — BUPROPION HYDROCHLORIDE 300 MG: 300 TABLET, EXTENDED RELEASE ORAL at 10:12

## 2024-06-01 RX ADMIN — SENNOSIDES AND DOCUSATE SODIUM 1 TABLET: 50; 8.6 TABLET ORAL at 20:14

## 2024-06-01 RX ADMIN — VALSARTAN 160 MG: 160 TABLET, FILM COATED ORAL at 10:12

## 2024-06-01 RX ADMIN — METOPROLOL TARTRATE 25 MG: 25 TABLET, FILM COATED ORAL at 20:14

## 2024-06-01 RX ADMIN — OXYCODONE AND ACETAMINOPHEN 1 TABLET: 7.5; 325 TABLET ORAL at 20:14

## 2024-06-01 RX ADMIN — DOCUSATE SODIUM 100 MG: 100 CAPSULE, LIQUID FILLED ORAL at 10:12

## 2024-06-01 RX ADMIN — RIVAROXABAN 20 MG: 20 TABLET, FILM COATED ORAL at 20:19

## 2024-06-01 RX ADMIN — Medication 100 MG: at 10:12

## 2024-06-01 RX ADMIN — SENNOSIDES AND DOCUSATE SODIUM 1 TABLET: 50; 8.6 TABLET ORAL at 10:12

## 2024-06-01 RX ADMIN — OXYCODONE AND ACETAMINOPHEN 1 TABLET: 7.5; 325 TABLET ORAL at 05:28

## 2024-06-01 RX ADMIN — POLYETHYLENE GLYCOL 3350 17 G: 17 POWDER, FOR SOLUTION ORAL at 10:12

## 2024-06-01 ASSESSMENT — PAIN DESCRIPTION - DESCRIPTORS
DESCRIPTORS: ACHING;DISCOMFORT
DESCRIPTORS: ACHING
DESCRIPTORS: ACHING;DISCOMFORT

## 2024-06-01 ASSESSMENT — PAIN DESCRIPTION - ORIENTATION
ORIENTATION: LOWER;MID
ORIENTATION: MID
ORIENTATION: POSTERIOR

## 2024-06-01 ASSESSMENT — PAIN DESCRIPTION - LOCATION
LOCATION: NECK

## 2024-06-01 ASSESSMENT — PAIN SCALES - GENERAL
PAINLEVEL_OUTOF10: 4
PAINLEVEL_OUTOF10: 5
PAINLEVEL_OUTOF10: 5

## 2024-06-01 NOTE — PROGRESS NOTES
Physical Therapy Rehab Treatment Note  Facility/Department: [facility]  [facility]       NAME: Junito Harris  : 1950 (73 y.o.)  MRN: [id number]  CODE STATUS: Full Code    Date of Service: 2024       Restrictions:  Restrictions/Precautions: Fall Risk  Required Braces or Orthoses  Cervical: c-collar  Position Activity Restriction  Other position/activity restrictions: \"RAMÓN Franklin states pt will need to wear C Collar for 6 weeks\"       SUBJECTIVE:   Subjective: \"No one came to get me ready\"    Pain  Pain: denies pain pre /post      OBJECTIVE:     Bed Mobility  Overall Assistance Level: Stand By Assist  Additional Factors: Verbal cues;Head of bed flat;Increased time to complete;With handrails  Roll Left  Assistance Level: Stand by assist  Roll Right  Assistance Level: Stand by assist  Sit to Supine  Assistance Level: Stand by assist  Skilled Clinical Factors: increased time and effort, utilized HR as pt reports having one at home  Supine to Sit  Assistance Level: Stand by assist  Skilled Clinical Factors: increased time and effort, utilized HR as pt reports having one at home  Scooting  Assistance Level: Stand by assist    Transfers  Surface: Wheelchair;Standard toilet;To bed  Additional Factors: Verbal cues;Hand placement cues  Device: Walker  Sit to Stand  Assistance Level: Stand by assist  Skilled Clinical Factors: improves technique and stability throughout session requiring only SBA  Stand to Sit  Assistance Level: Stand by assist  Skilled Clinical Factors: Cues for approach to chair, improving technique with BUE and eccentric control    Ambulation  Surface: Level surface  Device: Rolling walker  Distance: 15' x 2, 75'  Additional Factors: Verbal cues  Assistance Level: Stand by assist;Minimal assistance  Skilled Clinical Factors: heavy bracing on BUE, cues for improving technique and sequencing, improving heel strike on LLE as well as foot clearance. Less L lean noted this date as opposed to

## 2024-06-01 NOTE — PLAN OF CARE
Problem: Safety - Adult  Goal: Free from fall injury  5/31/2024 2251 by Mary Anne Cook RN  Outcome: Progressing  5/31/2024 1552 by Jeanne Pierre RN  Outcome: Progressing     Problem: Discharge Planning  Goal: Discharge to home or other facility with appropriate resources  5/31/2024 2251 by Mary Anne Cook RN  Outcome: Progressing  5/31/2024 1552 by Jeanne Pierre RN  Outcome: Progressing  Flowsheets (Taken 5/31/2024 1544)  Discharge to home or other facility with appropriate resources: Identify barriers to discharge with patient and caregiver     Problem: ABCDS Injury Assessment  Goal: Absence of physical injury  5/31/2024 2251 by Mary Anne Cook RN  Outcome: Progressing  5/31/2024 1552 by Jeanne Pierre RN  Outcome: Progressing     Problem: Pain  Goal: Verbalizes/displays adequate comfort level or baseline comfort level  5/31/2024 2251 by Mary Anne Cook RN  Outcome: Progressing  5/31/2024 1552 by Jeanne Pierre RN  Outcome: Progressing     Problem: Skin/Tissue Integrity  Goal: Absence of new skin breakdown  Description: 1.  Monitor for areas of redness and/or skin breakdown  2.  Assess vascular access sites hourly  3.  Every 4-6 hours minimum:  Change oxygen saturation probe site  4.  Every 4-6 hours:  If on nasal continuous positive airway pressure, respiratory therapy assess nares and determine need for appliance change or resting period.  5/31/2024 2251 by Mary Anne Cook RN  Outcome: Progressing  5/31/2024 1552 by Jeanne Pierre RN  Outcome: Progressing     Problem: Chronic Conditions and Co-morbidities  Goal: Patient's chronic conditions and co-morbidity symptoms are monitored and maintained or improved  5/31/2024 2251 by Mary Anne Cook RN  Outcome: Progressing  5/31/2024 1552 by Jeanne Pierre RN  Outcome: Progressing     Problem: Falls - Risk of:  Goal: Will remain free from falls  Description: Will remain free from falls  5/31/2024 2251 by

## 2024-06-02 LAB
ANION GAP SERPL CALCULATED.3IONS-SCNC: 9 MEQ/L (ref 9–15)
BASOPHILS # BLD: 0 K/UL (ref 0–0.2)
BASOPHILS NFR BLD: 0.4 %
BUN SERPL-MCNC: 18 MG/DL (ref 8–23)
CALCIUM SERPL-MCNC: 9.2 MG/DL (ref 8.5–9.9)
CHLORIDE SERPL-SCNC: 104 MEQ/L (ref 95–107)
CO2 SERPL-SCNC: 28 MEQ/L (ref 20–31)
CREAT SERPL-MCNC: 1.05 MG/DL (ref 0.7–1.2)
EOSINOPHIL # BLD: 0.2 K/UL (ref 0–0.7)
EOSINOPHIL NFR BLD: 3.5 %
ERYTHROCYTE [DISTWIDTH] IN BLOOD BY AUTOMATED COUNT: 14 % (ref 11.5–14.5)
GLUCOSE BLD-MCNC: 123 MG/DL (ref 70–99)
GLUCOSE BLD-MCNC: 142 MG/DL (ref 70–99)
GLUCOSE BLD-MCNC: 93 MG/DL (ref 70–99)
GLUCOSE SERPL-MCNC: 115 MG/DL (ref 70–99)
HCT VFR BLD AUTO: 39.4 % (ref 42–52)
HGB BLD-MCNC: 12.6 G/DL (ref 14–18)
LYMPHOCYTES # BLD: 1.4 K/UL (ref 1–4.8)
LYMPHOCYTES NFR BLD: 19.8 %
MCH RBC QN AUTO: 28.4 PG (ref 27–31.3)
MCHC RBC AUTO-ENTMCNC: 32 % (ref 33–37)
MCV RBC AUTO: 88.9 FL (ref 79–92.2)
MONOCYTES # BLD: 0.6 K/UL (ref 0.2–0.8)
MONOCYTES NFR BLD: 8.6 %
NEUTROPHILS # BLD: 4.5 K/UL (ref 1.4–6.5)
NEUTS SEG NFR BLD: 66 %
PERFORMED ON: ABNORMAL
PERFORMED ON: ABNORMAL
PERFORMED ON: NORMAL
PLATELET # BLD AUTO: 172 K/UL (ref 130–400)
POTASSIUM SERPL-SCNC: 4 MEQ/L (ref 3.4–4.9)
RBC # BLD AUTO: 4.43 M/UL (ref 4.7–6.1)
SODIUM SERPL-SCNC: 141 MEQ/L (ref 135–144)
WBC # BLD AUTO: 6.9 K/UL (ref 4.8–10.8)

## 2024-06-02 PROCEDURE — 6370000000 HC RX 637 (ALT 250 FOR IP): Performed by: NEUROLOGICAL SURGERY

## 2024-06-02 PROCEDURE — 6370000000 HC RX 637 (ALT 250 FOR IP)

## 2024-06-02 PROCEDURE — 85025 COMPLETE CBC W/AUTO DIFF WBC: CPT

## 2024-06-02 PROCEDURE — 1180000000 HC REHAB R&B

## 2024-06-02 PROCEDURE — 36415 COLL VENOUS BLD VENIPUNCTURE: CPT

## 2024-06-02 PROCEDURE — 6370000000 HC RX 637 (ALT 250 FOR IP): Performed by: PHYSICAL MEDICINE & REHABILITATION

## 2024-06-02 PROCEDURE — 80048 BASIC METABOLIC PNL TOTAL CA: CPT

## 2024-06-02 RX ADMIN — OXYCODONE AND ACETAMINOPHEN 1 TABLET: 7.5; 325 TABLET ORAL at 06:10

## 2024-06-02 RX ADMIN — MICONAZOLE NITRATE: 2 POWDER TOPICAL at 21:23

## 2024-06-02 RX ADMIN — RIVAROXABAN 20 MG: 20 TABLET, FILM COATED ORAL at 17:46

## 2024-06-02 RX ADMIN — ROSUVASTATIN CALCIUM 10 MG: 5 TABLET, FILM COATED ORAL at 21:22

## 2024-06-02 RX ADMIN — DOCUSATE SODIUM 100 MG: 100 CAPSULE, LIQUID FILLED ORAL at 08:04

## 2024-06-02 RX ADMIN — MIRTAZAPINE 45 MG: 15 TABLET, FILM COATED ORAL at 21:22

## 2024-06-02 RX ADMIN — VALSARTAN 160 MG: 160 TABLET, FILM COATED ORAL at 08:04

## 2024-06-02 RX ADMIN — METOPROLOL TARTRATE 25 MG: 25 TABLET, FILM COATED ORAL at 21:22

## 2024-06-02 RX ADMIN — Medication 2000 UNITS: at 17:46

## 2024-06-02 RX ADMIN — SENNOSIDES AND DOCUSATE SODIUM 1 TABLET: 50; 8.6 TABLET ORAL at 08:04

## 2024-06-02 RX ADMIN — POLYETHYLENE GLYCOL 3350 17 G: 17 POWDER, FOR SOLUTION ORAL at 08:04

## 2024-06-02 RX ADMIN — BUPROPION HYDROCHLORIDE 300 MG: 300 TABLET, EXTENDED RELEASE ORAL at 08:04

## 2024-06-02 RX ADMIN — SENNOSIDES AND DOCUSATE SODIUM 1 TABLET: 50; 8.6 TABLET ORAL at 21:23

## 2024-06-02 RX ADMIN — LEVOTHYROXINE SODIUM 25 MCG: 0.03 TABLET ORAL at 06:10

## 2024-06-02 RX ADMIN — Medication 100 MG: at 08:04

## 2024-06-02 RX ADMIN — METOPROLOL TARTRATE 25 MG: 25 TABLET, FILM COATED ORAL at 08:04

## 2024-06-02 RX ADMIN — OXYCODONE AND ACETAMINOPHEN 1 TABLET: 7.5; 325 TABLET ORAL at 21:22

## 2024-06-02 ASSESSMENT — PAIN DESCRIPTION - DESCRIPTORS
DESCRIPTORS: ACHING;DISCOMFORT
DESCRIPTORS: ACHING;DISCOMFORT

## 2024-06-02 ASSESSMENT — PAIN SCALES - GENERAL
PAINLEVEL_OUTOF10: 4
PAINLEVEL_OUTOF10: 0
PAINLEVEL_OUTOF10: 5

## 2024-06-02 ASSESSMENT — PAIN DESCRIPTION - FREQUENCY: FREQUENCY: INTERMITTENT

## 2024-06-02 ASSESSMENT — PAIN DESCRIPTION - ORIENTATION
ORIENTATION: ANTERIOR
ORIENTATION: ANTERIOR

## 2024-06-02 ASSESSMENT — PAIN DESCRIPTION - LOCATION
LOCATION: NECK
LOCATION: NECK

## 2024-06-02 NOTE — PROGRESS NOTES
Subjective:   The patient complains of severe acute on chronic progressive fatigue and generalized weakness and severe postop neck pain partially relieved by rest, medications and medication adjustments, PT,  OT,   SLP and rest and exacerbated by recent cervical decompression.      73 y.o. male who has progressively worsening problems with balance using a cane after using a walker. Patient has problems with handwriting and using buttons and opening jars and texting and typing with his fingers. He also has some blurriness of his eyes and tremor of the left hand. He has lost 20% of his hand function in the last year. He has good range of motion on cervical spine. On 5/24/24 the patient underwent a Cervical C4-C5, C5-C6 discectomy and fusion r/t Cervical myelopathy.     I am concerned about patient’s medical complexities and barriers to advancing in rehab goals including control pain at lowest effective dose of pain medication.        I discussed current functional, rehabilitation, medical status with other rehabilitation providers including nursing and case management.  According to recent nursing note,\" complains of posterior neck pain rated 9/10. Medicated patient with Roxicodone 10 MG PO PRN for pain per patient request. Repositioned patient in bed and adjusted neck brace. Patient verbalized no further needs at this time. Bed alarm engaged and call light within reach \".     He is doing much better on his current dose of opiate medication no signs of overuse abuse.  Postop and opiate related constipation is being addressed with laxatives.    He has successfully transitioned from Lovenox back to Xarelto without any signs of bleeding.    ROS x10:  The patient also complains of severely impaired mobility and activities of daily living.  Otherwise no new problems with vision, hearing, nose, mouth, throat, dermal, cardiovascular, GI, , pulmonary, musculoskeletal, psychiatric or neurological. See Rehab H&P on Rehab

## 2024-06-02 NOTE — PLAN OF CARE
Problem: Safety - Adult  Goal: Free from fall injury  Outcome: Progressing     Problem: Discharge Planning  Goal: Discharge to home or other facility with appropriate resources  Outcome: Progressing     Problem: ABCDS Injury Assessment  Goal: Absence of physical injury  Outcome: Progressing     Problem: Pain  Goal: Verbalizes/displays adequate comfort level or baseline comfort level  Outcome: Progressing     Problem: Skin/Tissue Integrity  Goal: Absence of new skin breakdown  Description: 1.  Monitor for areas of redness and/or skin breakdown  2.  Assess vascular access sites hourly  3.  Every 4-6 hours minimum:  Change oxygen saturation probe site  4.  Every 4-6 hours:  If on nasal continuous positive airway pressure, respiratory therapy assess nares and determine need for appliance change or resting period.  Outcome: Progressing     Problem: Chronic Conditions and Co-morbidities  Goal: Patient's chronic conditions and co-morbidity symptoms are monitored and maintained or improved  Outcome: Progressing     Problem: Falls - Risk of:  Goal: Will remain free from falls  Description: Will remain free from falls  Outcome: Progressing     Problem: Respiratory - Adult  Goal: Achieves optimal ventilation and oxygenation  Outcome: Progressing     Problem: Skin/Tissue Integrity - Adult  Goal: Skin integrity remains intact  Outcome: Progressing  Goal: Incisions, wounds, or drain sites healing without S/S of infection  Outcome: Progressing     Problem: Musculoskeletal - Adult  Goal: Return mobility to safest level of function  Outcome: Progressing  Goal: Return ADL status to a safe level of function  Outcome: Progressing     Problem: Infection - Adult  Goal: Absence of infection at discharge  Outcome: Progressing  Goal: Absence of infection during hospitalization  Outcome: Progressing

## 2024-06-03 LAB
GLUCOSE BLD-MCNC: 100 MG/DL (ref 70–99)
GLUCOSE BLD-MCNC: 120 MG/DL (ref 70–99)
PERFORMED ON: ABNORMAL
PERFORMED ON: ABNORMAL

## 2024-06-03 PROCEDURE — 6370000000 HC RX 637 (ALT 250 FOR IP): Performed by: PHYSICAL MEDICINE & REHABILITATION

## 2024-06-03 PROCEDURE — 6370000000 HC RX 637 (ALT 250 FOR IP): Performed by: NEUROLOGICAL SURGERY

## 2024-06-03 PROCEDURE — 97110 THERAPEUTIC EXERCISES: CPT

## 2024-06-03 PROCEDURE — 99233 SBSQ HOSP IP/OBS HIGH 50: CPT | Performed by: PHYSICAL MEDICINE & REHABILITATION

## 2024-06-03 PROCEDURE — 97116 GAIT TRAINING THERAPY: CPT

## 2024-06-03 PROCEDURE — 1180000000 HC REHAB R&B

## 2024-06-03 PROCEDURE — 97535 SELF CARE MNGMENT TRAINING: CPT

## 2024-06-03 PROCEDURE — 6370000000 HC RX 637 (ALT 250 FOR IP)

## 2024-06-03 PROCEDURE — 97112 NEUROMUSCULAR REEDUCATION: CPT

## 2024-06-03 RX ORDER — UBIDECARENONE 100 MG
100 CAPSULE ORAL DAILY
Qty: 120 CAPSULE | Refills: 0 | Status: SHIPPED | OUTPATIENT
Start: 2024-06-04

## 2024-06-03 RX ORDER — PSEUDOEPHEDRINE HCL 30 MG
100 TABLET ORAL DAILY
Qty: 60 CAPSULE | Refills: 0 | Status: SHIPPED | OUTPATIENT
Start: 2024-06-04

## 2024-06-03 RX ORDER — OXYCODONE AND ACETAMINOPHEN 7.5; 325 MG/1; MG/1
1 TABLET ORAL EVERY 12 HOURS
Status: DISCONTINUED | OUTPATIENT
Start: 2024-06-03 | End: 2024-06-07

## 2024-06-03 RX ADMIN — METOPROLOL TARTRATE 25 MG: 25 TABLET, FILM COATED ORAL at 20:22

## 2024-06-03 RX ADMIN — DOCUSATE SODIUM 100 MG: 100 CAPSULE, LIQUID FILLED ORAL at 09:13

## 2024-06-03 RX ADMIN — MICONAZOLE NITRATE: 2 POWDER TOPICAL at 09:20

## 2024-06-03 RX ADMIN — OXYCODONE AND ACETAMINOPHEN 1 TABLET: 7.5; 325 TABLET ORAL at 06:07

## 2024-06-03 RX ADMIN — LEVOTHYROXINE SODIUM 25 MCG: 0.03 TABLET ORAL at 06:08

## 2024-06-03 RX ADMIN — Medication 2000 UNITS: at 17:30

## 2024-06-03 RX ADMIN — METOPROLOL TARTRATE 25 MG: 25 TABLET, FILM COATED ORAL at 09:12

## 2024-06-03 RX ADMIN — ROSUVASTATIN CALCIUM 10 MG: 5 TABLET, FILM COATED ORAL at 20:22

## 2024-06-03 RX ADMIN — VALSARTAN 160 MG: 160 TABLET, FILM COATED ORAL at 09:12

## 2024-06-03 RX ADMIN — BUPROPION HYDROCHLORIDE 300 MG: 300 TABLET, EXTENDED RELEASE ORAL at 09:12

## 2024-06-03 RX ADMIN — Medication 100 MG: at 09:13

## 2024-06-03 RX ADMIN — SENNOSIDES AND DOCUSATE SODIUM 1 TABLET: 50; 8.6 TABLET ORAL at 09:13

## 2024-06-03 RX ADMIN — SENNOSIDES AND DOCUSATE SODIUM 1 TABLET: 50; 8.6 TABLET ORAL at 20:23

## 2024-06-03 RX ADMIN — RIVAROXABAN 20 MG: 20 TABLET, FILM COATED ORAL at 17:30

## 2024-06-03 RX ADMIN — OXYCODONE AND ACETAMINOPHEN 1 TABLET: 7.5; 325 TABLET ORAL at 20:23

## 2024-06-03 RX ADMIN — MIRTAZAPINE 45 MG: 15 TABLET, FILM COATED ORAL at 20:23

## 2024-06-03 ASSESSMENT — PAIN DESCRIPTION - LOCATION
LOCATION: NECK
LOCATION: NECK

## 2024-06-03 ASSESSMENT — PAIN SCALES - GENERAL
PAINLEVEL_OUTOF10: 5
PAINLEVEL_OUTOF10: 0
PAINLEVEL_OUTOF10: 5

## 2024-06-03 ASSESSMENT — PAIN DESCRIPTION - DESCRIPTORS
DESCRIPTORS: ACHING;DISCOMFORT
DESCRIPTORS: ACHING

## 2024-06-03 ASSESSMENT — PAIN DESCRIPTION - ORIENTATION
ORIENTATION: ANTERIOR
ORIENTATION: ANTERIOR

## 2024-06-03 ASSESSMENT — PAIN DESCRIPTION - PAIN TYPE: TYPE: SURGICAL PAIN

## 2024-06-03 NOTE — PROGRESS NOTES
OCCUPATIONAL THERAPY  INPATIENT REHAB TREATMENT NOTE  [facility]      NAME: Junito Harris  : 1950 (73 y.o.)  MRN: [id number]  CODE STATUS: Full Code  Room: R261/R261-01    Date of Service: 6/3/2024    Referring Physician: Dr. Carlos  Rehab Diagnosis: Impaired mobility and ADL's s/p C4-C5, C5-C6 discectomy and fusion r/t cervical myelopathy    Hospital course:   Comments: 73 y.o. male who has progressively worsening problems with balance using a cane after using a walker. Patient has problems with handwriting and using buttons and opening jars and texting and typing with his fingers. He also has some blurriness of his eyes and tremor of the left hand. He has lost 20% of his hand function in the last year. He has good range of motion on cervical spine. On 24 the patient underwent a Cervical C4-C5, C5-C6 discectomy and fusion r/t Cervical myelopathy.      Restrictions  Restrictions/Precautions  Restrictions/Precautions: Fall Risk  Required Braces or Orthoses?: Yes     Patient's date of birth confirmed: Yes    SAFETY:  Safety Devices  Safety Devices in place: Yes  Type of devices: All fall risk precautions in place    SUBJECTIVE: \"I think a few more days (on rehab) would be good if it's possible.\"    Pain at start of treatment: No    Pain at end of treatment: No    Orientation  Overall Orientation Status: Within Functional Limits  Cognition  Overall Cognitive Status: WFL    OBJECTIVE:    Grooming/Oral Hygiene  Assistance Level: Modified independent  Skilled Clinical Factors: seated in w/c at sink  Upper Extremity Bathing  Assistance Level: Modified independent  Lower Extremity Bathing  Assistance Level: Supervision  Upper Extremity Dressing  Assistance Level: Supervision;Verbal cues  Skilled Clinical Factors: verbal cues for donning technique after multiple attempts to don shirt in standing without success - patient provided with verbal cues to thread B UE's and pull around back seated and

## 2024-06-03 NOTE — PROGRESS NOTES
Subjective:   The patient complains of severe acute on chronic progressive fatigue and generalized weakness and severe postop neck pain partially relieved by rest, medications and medication adjustments, PT,  OT,   SLP and rest and exacerbated by recent cervical decompression.      73 y.o. male who has progressively worsening problems with balance using a cane after using a walker. Patient has problems with handwriting and using buttons and opening jars and texting and typing with his fingers. He also has some blurriness of his eyes and tremor of the left hand. He has lost 20% of his hand function in the last year. He has good range of motion on cervical spine. On 5/24/24 the patient underwent a Cervical C4-C5, C5-C6 discectomy and fusion r/t Cervical myelopathy.     I am concerned about patient’s medical complexities and barriers to advancing in rehab goals including control pain at lowest effective dose of pain medication.        I discussed current functional, rehabilitation, medical status with other rehabilitation providers including nursing and case management.  According to recent nursing note,\"2pm scheduled pain medication, states he is not in pain at the current time. \".     His pain is improving we may need to go down on his usual scheduled pain medications-he is using his CPAP from home and is very good about using it every night.    ROS x10:  The patient also complains of severely impaired mobility and activities of daily living.  Otherwise no new problems with vision, hearing, nose, mouth, throat, dermal, cardiovascular, GI, , pulmonary, musculoskeletal, psychiatric or neurological. See Rehab H&P on Rehab chart dated .       Vital signs:  BP (!) 165/95   Pulse 59   Temp 97.5 °F (36.4 °C) (Oral)   Resp 17   Ht 1.829 m (6')   Wt 128.8 kg (283 lb 14.4 oz)   SpO2 95%   BMI 38.50 kg/m²   I/O:   PO/Intake:  fair PO intake, no problems observed or reported.    Bowel/Bladder:  continent, constipation  disease-Eliminate toxic medications, monitor I's and O's focusing on urine output, recheck BMP.    Steatosis of liver-focus on weight loss avoid toxic medications    Vitamin D deficiency-Add high-dose vitamin D, recheck vitamin D level out after discharge,    Left hemiparesis due to cervical myelopathy    Major depressive disorder in full remission-Add high-dose vitamin D, recheck vitamin D level out after discharge  Patient is on Xarelto-monitor for bleeding      Focus on endurance, activity pacing, reassessing rehab goals and discharge planning.        Electronically signed by Gayle Carlos DO on 5/28/24 at 8:36 AM EDT       Gayle Carlos D.O., PM&R     Attending    [phone]   [facility]

## 2024-06-03 NOTE — PROGRESS NOTES
Physical Therapy Rehab Treatment Note  Facility/Department[facility]  [facility]       NAME: Junito Harris  : 1950 (73 y.o.)  MRN: [id number]  CODE STATUS: Full Code    Date of Service: 6/3/2024       Restrictions:  Restrictions/Precautions: Fall Risk  Required Braces or Orthoses  Cervical: c-collar       SUBJECTIVE:   Subjective: \"I am feeling better overall but not 100%    Pain  Pain: denies pain      OBJECTIVE:   Orientation  Overall Orientation Status: Within Functional Limits  Cognition  Overall Cognitive Status: WFL         Bed Mobility  Overall Assistance Level: Stand By Assist  Additional Factors: Verbal cues;Head of bed flat;Increased time to complete;With handrails  Roll Left  Assistance Level: Stand by assist  Roll Right  Assistance Level: Stand by assist  Sit to Supine  Assistance Level: Stand by assist  Supine to Sit  Assistance Level: Stand by assist  Scooting  Assistance Level: Stand by assist    Transfers  Surface: Wheelchair;Standard toilet;To bed  Additional Factors: Verbal cues;Hand placement cues  Device:  (rollator)  Sit to Stand  Assistance Level: Stand by assist  Stand to Sit  Assistance Level: Stand by assist  Skilled Clinical Factors: Cues for approach to chair, improving technique with BUE and eccentric control  Min assist for approach to chair safely this pm.   Ambulation  Surface: Carpet;Uneven surface;Level surface  Device: Rollator  Distance: 100 feet x 2 with turns and standing rest breaks  Additional Factors: Verbal cues  Assistance Level: Stand by assist;Minimal assistance  Gait Deviations: Decreased step length left;Slow pepe  Skilled Clinical Factors: decreased step through on left with fatigue, increased wb through upper extremities, mild trunk flexion.         Neuromuscular Education  Neuromuscular Comments: sit to stand x 5 with supervision.  Balance  Sitting Balance: Supervision  Standing Balance: Stand by assistance  Practice with approaching chair x 5

## 2024-06-03 NOTE — CARE COORDINATION
Clinical Factors: seated in w/c at sink (06/03/24 1225)  Upper Extremity Bathing  Assistance Level: Modified independent (06/03/24 1225)  Skilled Clinical Factors: Seated in shower bench. Pt verbalized awareness about getting c collar wet. (05/31/24 1251)  Lower Extremity Bathing  Assistance Level: Supervision (06/03/24 1225)  Skilled Clinical Factors: Assist to wash posterior janiya area. (05/31/24 1251)  Upper Extremity Dressing  Assistance Level: Supervision;Verbal cues (06/03/24 1225)  Skilled Clinical Factors: verbal cues for donning technique after multiple attempts to don shirt in standing without success - patient provided with verbal cues to thread B UE's and pull around back seated and then to button in standing -patient successful completing seated and then in standing (06/03/24 1225)  Lower Extremity Dressing  Equipment Provided: Reachers (06/03/24 1225)  Assistance Level: Minimal assistance (06/03/24 1225)  Skilled Clinical Factors: L pant leg became stuck on non slip hospital sock - after multiple attempts patient required assist to pull pant leg up over sock (06/03/24 1225)  Putting On/Taking Off Footwear  Assistance Level: Modified independent (06/03/24 1225)  Skilled Clinical Factors: patient utilized figure four technique (06/03/24 1225)  Toileting  Assistance Level: Supervision (06/03/24 1225)  Skilled Clinical Factors: Pt sat to have loose bowel movement. Pt required assistance for hygiene and pants mgmt (05/31/24 1548)  Toilet Transfers  Technique: Stand step (06/03/24 1225)  Equipment: Grab bars (06/03/24 1225)  Additional Factors: With handrails (06/03/24 1225)  Assistance Level: Stand by assist (06/03/24 1225)  Skilled Clinical Factors: ww (06/03/24 1225)  Tub/Shower Transfers  Type: Shower (05/31/24 1251)  Transfer From: Rolling walker (05/31/24 1251)  Transfer To: Shower chair with back (05/31/24 1251)  Additional Factors: Verbal cues (05/31/24 1251)  Assistance Level: Stand by assist  Stay: 12 days    Tentative Discharge date: 6/7/24        Anticipated Discharge Destination:  Home      Team recommendations:    1. Follow up Therapy :    PT  OT  RN  HH Aide    2. Home Health    Other:     Equipment needed at Discharge: Rollator      Team Members Present at Conference:    Physician: Dr. Gayle Carlos  RN: Gerda Whitt RN  Physical Therapist: Lorraine Cagle, PT  Occupational Therapist: Rosalba Paul, OTR  Speech Therapist: Felicity Saldivar, SLP  Other: Mar Salamanca RN/CM      Electronically signed by Mar Salamanca RN on 6/3/2024 at 6:29 PM

## 2024-06-03 NOTE — CARE COORDINATION
Cm spoke with pt about HHC vs OP. Pt has a sister that will come in for family training tomorrow  at 1 pm. Pt plans on going home with home care.  Pt given FOC and he chose [facility].  An order was placed for HHC. Electronically signed by Mar Salamanca RN on 6/3/2024 at 12:03 PM

## 2024-06-03 NOTE — PROGRESS NOTES
OCCUPATIONAL THERAPY  INPATIENT REHAB TREATMENT NOTE  [facility]      NAME: Junito Harris  : 1950 (73 y.o.)  MRN: [id number]  CODE STATUS: Full Code  Room: R261/R261-01    Date of Service: 6/3/2024    Referring Physician: Dr. Carlos  Rehab Diagnosis: Impaired mobility and ADL's s/p C4-C5, C5-C6 discectomy and fusion r/t cervical myelopathy    Hospital course:   Comments: 73 y.o. male who has progressively worsening problems with balance using a cane after using a walker. Patient has problems with handwriting and using buttons and opening jars and texting and typing with his fingers. He also has some blurriness of his eyes and tremor of the left hand. He has lost 20% of his hand function in the last year. He has good range of motion on cervical spine. On 24 the patient underwent a Cervical C4-C5, C5-C6 discectomy and fusion r/t Cervical myelopathy.      Restrictions  Restrictions/Precautions  Restrictions/Precautions: Fall Risk  Required Braces or Orthoses?: Yes     Patient's date of birth confirmed: Yes    SAFETY:  Safety Devices  Safety Devices in place: Yes  Type of devices: All fall risk precautions in place    SUBJECTIVE: \"I need your help logging in to Anki. I can't get it to work.\"    Pain at start of treatment: No    Pain at end of treatment: No    COGNITION:  Orientation  Overall Orientation Status: Within Functional Limits  Cognition  Overall Cognitive Status: WFL    OBJECTIVE:    Instrumental ADL's  Instrumental ADLs: Yes  Health Management  Health Management Level of Assistance: Moderate assistance  Health Management: Per patients request, therapist assisted patient with logging into personal Anki. Patient with MAX difficulty with phone screen buttons. Therapist utilized computer per patients request. Patient provided therapist with information to input. Multiple attempts made without success. Patient required to have code sent to email. Patient with MAX difficulty

## 2024-06-03 NOTE — PLAN OF CARE
Problem: Safety - Adult  Goal: Free from fall injury  Outcome: Progressing     Problem: Discharge Planning  Goal: Discharge to home or other facility with appropriate resources  Outcome: Progressing     Problem: ABCDS Injury Assessment  Goal: Absence of physical injury  Outcome: Progressing     Problem: Pain  Goal: Verbalizes/displays adequate comfort level or baseline comfort level  Outcome: Progressing     Problem: Skin/Tissue Integrity  Goal: Absence of new skin breakdown  Description: 1.  Monitor for areas of redness and/or skin breakdown  2.  Assess vascular access sites hourly  3.  Every 4-6 hours minimum:  Change oxygen saturation probe site  4.  Every 4-6 hours:  If on nasal continuous positive airway pressure, respiratory therapy assess nares and determine need for appliance change or resting period.  Outcome: Progressing     Problem: Chronic Conditions and Co-morbidities  Goal: Patient's chronic conditions and co-morbidity symptoms are monitored and maintained or improved  Outcome: Progressing     Problem: Falls - Risk of:  Goal: Will remain free from falls  Description: Will remain free from falls  Outcome: Progressing     Problem: Blood Glucose:  Goal: Ability to maintain appropriate glucose levels will improve  Description: Ability to maintain appropriate glucose levels will improve  Outcome: Progressing     Problem: Respiratory - Adult  Goal: Achieves optimal ventilation and oxygenation  Outcome: Progressing     Problem: Skin/Tissue Integrity - Adult  Goal: Skin integrity remains intact  Outcome: Progressing  Goal: Incisions, wounds, or drain sites healing without S/S of infection  Outcome: Progressing     Problem: Musculoskeletal - Adult  Goal: Return mobility to safest level of function  Outcome: Progressing  Goal: Return ADL status to a safe level of function  Outcome: Progressing     Problem: Infection - Adult  Goal: Absence of infection at discharge  Outcome: Progressing  Goal: Absence of  infection during hospitalization  Outcome: Progressing

## 2024-06-03 NOTE — PROGRESS NOTES
1505)  Assistance: Stand by assistance;Minimal assistance (05/27/24 1505)  Quality of Gait: Decreased foot clearance and step length L LE. Verbal cues for posture and lifting L LE through the swing. Slow to complete. (05/27/24 1505)  Gait Deviations: Decreased step length;Slow Pepe (05/27/24 1505)  Distance: 25ft x2 (05/27/24 1505)  Overall Level of Assistance: Minimum assistance (05/28/24 1647)  Distance (ft): 30 Feet (05/28/24 1647)  Assistive Device: Walker, rollator (05/28/24 1647)  Interventions: Safety awareness training;Verbal cues;Manual cues (05/28/24 1647)  Base of Support: Widened (05/28/24 1647)  Speed/Pepe: Fluctuations (05/28/24 1647)  Step Length: Left shortened (05/28/24 1647)  Stance: Left decreased (05/28/24 1647)  Gait Abnormalities: Foot drop (05/28/24 1647)  Right Side Weight Bearing: As tolerated (05/28/24 1647)  Left Side Weight Bearing: As tolerated (05/28/24 1647)  Ambulation  Surface: Carpet;Uneven surface;Level surface (06/03/24 1345)  Device: Rollator (06/03/24 1130)  Distance: 100 feet x 2 with turns and standing rest breaks (06/03/24 1345)  Activity Comments: +2 for WC follow for safety (05/30/24 1104)  Additional Factors: Verbal cues (06/03/24 1345)  Assistance Level: Stand by assist;Minimal assistance (06/03/24 1345)  Gait Deviations: Decreased step length left;Slow pepe (06/03/24 1345)  Skilled Clinical Factors: decreased step through on left with fatigue, increased wb through upper extremities, mild trunk flexion. (06/03/24 1345)  Stairs:  Stairs  Stair Height: 6'' (06/03/24 1130)  Device: Bilateral handrails (06/03/24 1130)  Number of Stairs: 8 (06/03/24 1130)  Additional Factors: Reciprocal going up;Non-reciprocal going down (06/03/24 1130)  Assistance Level: Stand by assist;Minimal assistance (06/03/24 1130)  Skilled Clinical Factors: cues throughout for improving hand placement to decrease posterior lean as well as overall sequencing (06/03/24 1130)  W/C mobility:    these interventions.         Based on a comprehensive evaluation of the above, the individualized therapy and Discharge plan will be:    -Times stated are an average that will be varied based on the patient's daily need.        PT   1 1/2  hrs/day 5-7 days per wk      OT   1 1/2 hrs per day 5-7 days per wk     ST  1/2    hrs /day 3-5 days per week       Estimated LOS   1-2 week(s)    -Overall functional prognosis:     [x]  Good    []  Fair    []  Poor     -Medical Prognosis:   [x]  Good    []  Fair    []  Poor    This patient was made aware of the discussion of Plan of Care, their projected dicharge date and their projected function at discharge.         Gayle Carlos, DO

## 2024-06-03 NOTE — PROGRESS NOTES
Assessment complete earlier this shift. Accucheck BID, 100 this am. Percocet changed to every 12 hours scheduled. Aspen collar on, surgical incision VERENICE to anterior neck. LBM 6/2. Electronically signed by Gerda Whitt RN on 6/3/24 at 1:27 PM EDT

## 2024-06-03 NOTE — PROGRESS NOTES
Physical Therapy Rehab Treatment Note  [facility]  [facility]       NAME: Junito Harris  : 1950 (73 y.o.)  MRN: [id number]  CODE STATUS: Full Code    Date of Service: 6/3/2024       Restrictions:  Restrictions/Precautions: Fall Risk  Required Braces or Orthoses  Cervical: c-collar       SUBJECTIVE:   Subjective: \"I don't feel ready to go home yet\"    Pain  Pain: denies pain      OBJECTIVE:   Orientation  Overall Orientation Status: Within Functional Limits         Bed Mobility  Overall Assistance Level: Stand By Assist  Additional Factors: Verbal cues;Head of bed flat;Increased time to complete;With handrails  Roll Left  Assistance Level: Stand by assist  Roll Right  Assistance Level: Stand by assist  Sit to Supine  Assistance Level: Stand by assist  Supine to Sit  Assistance Level: Stand by assist  Scooting  Assistance Level: Stand by assist    Transfers  Surface: Wheelchair;Standard toilet;To bed  Additional Factors: Verbal cues;Hand placement cues  Device:  (rollator)  Sit to Stand  Assistance Level: Stand by assist  Stand to Sit  Assistance Level: Stand by assist  Skilled Clinical Factors: Cues for approach to chair, improving technique with BUE and eccentric control    Ambulation  Surface: Carpet;Uneven surface;Level surface  Device: Rollator  Distance: 100 feet with turns and changes in terrain  Additional Factors: Verbal cues  Assistance Level: Stand by assist;Minimal assistance  Gait Deviations: Slow pepe;Decreased step length bilateral    Stairs  Stair Height: 6''  Device: Bilateral handrails  Number of Stairs: 8  Additional Factors: Reciprocal going up;Non-reciprocal going down  Assistance Level: Stand by assist;Minimal assistance  Skilled Clinical Factors: cues throughout for improving hand placement to decrease posterior lean as well as overall sequencing         Neuromuscular Education  Neuromuscular Comments: sit to stand x 5 with

## 2024-06-03 NOTE — DISCHARGE INSTR - COC
functioning R41.844    DDD (degenerative disc disease), lumbar M51.36    Decreased hearing of left ear H91.92    Elevated LDL cholesterol level E78.00    Edema R60.9    Disorder of brain G93.9    Hematoma of left hip S70.02XA    Hyperglycemia R73.9    Impaired driving skills Z78.9    Impingement syndrome of right shoulder M75.41    Increased frequency of urination R35.0    Left hemiparesis (HCC) G81.94    Major depressive disorder in full remission (HCC) F32.5    Primary osteoarthritis of right knee M17.11    Right renal mass N28.89    Impaired mobility and activities of daily living dt Cervical myelopathy Z74.09, Z78.9       Isolation/Infection:   Isolation            No Isolation          Patient Infection Status       None to display            Nurse Assessment:  Last Vital Signs: BP (!) 165/95   Pulse 59   Temp 97.5 °F (36.4 °C) (Oral)   Resp 17   Ht 1.829 m (6')   Wt 128.8 kg (283 lb 14.4 oz)   SpO2 95%   BMI 38.50 kg/m²     Last documented pain score (0-10 scale): Pain Level: 0  Last Weight:   Wt Readings from Last 1 Encounters:   05/26/24 128.8 kg (283 lb 14.4 oz)     Mental Status:  oriented and alert    IV Access:  - None    Nursing Mobility/ADLs:  Walking   Assisted  Transfer  Independent  Bathing  Assisted  Dressing  Assisted  Toileting  Independent  Feeding  Independent  Med Admin  Independent  Med Delivery   whole    Wound Care Documentation and Therapy:  Incision 05/24/24 Neck Right (Active)   Dressing Status Clean;Dry;Intact 06/03/24 1025   Dressing/Treatment Open to air 06/03/24 1025   Closure Surgical glue 06/03/24 1025   Drainage Amount None (dry) 06/03/24 1025   Odor None 06/03/24 1025   Shawanda-incision Assessment Intact 06/03/24 1025   Number of days: 9        Elimination:  Continence:   Bowel: Yes  Bladder: Yes  Urinary Catheter: None   Colostomy/Ileostomy/Ileal Conduit: No       Date of Last BM: 6/7/2024    Intake/Output Summary (Last 24 hours) at 6/3/2024 1208  Last data filed at 6/3/2024

## 2024-06-04 ENCOUNTER — TELEPHONE (OUTPATIENT)
Dept: PRIMARY CARE | Facility: CLINIC | Age: 74
End: 2024-06-04
Payer: COMMERCIAL

## 2024-06-04 LAB
GLUCOSE BLD-MCNC: 117 MG/DL (ref 70–99)
GLUCOSE BLD-MCNC: 171 MG/DL (ref 70–99)
PERFORMED ON: ABNORMAL
PERFORMED ON: ABNORMAL

## 2024-06-04 PROCEDURE — 97116 GAIT TRAINING THERAPY: CPT

## 2024-06-04 PROCEDURE — 97535 SELF CARE MNGMENT TRAINING: CPT

## 2024-06-04 PROCEDURE — 6370000000 HC RX 637 (ALT 250 FOR IP): Performed by: PHYSICAL MEDICINE & REHABILITATION

## 2024-06-04 PROCEDURE — 97110 THERAPEUTIC EXERCISES: CPT

## 2024-06-04 PROCEDURE — 1180000000 HC REHAB R&B

## 2024-06-04 PROCEDURE — 6370000000 HC RX 637 (ALT 250 FOR IP)

## 2024-06-04 PROCEDURE — 6370000000 HC RX 637 (ALT 250 FOR IP): Performed by: NEUROLOGICAL SURGERY

## 2024-06-04 PROCEDURE — 99232 SBSQ HOSP IP/OBS MODERATE 35: CPT | Performed by: PHYSICAL MEDICINE & REHABILITATION

## 2024-06-04 PROCEDURE — 6360000002 HC RX W HCPCS: Performed by: PHYSICAL MEDICINE & REHABILITATION

## 2024-06-04 RX ADMIN — OXYCODONE AND ACETAMINOPHEN 1 TABLET: 7.5; 325 TABLET ORAL at 10:44

## 2024-06-04 RX ADMIN — VALSARTAN 160 MG: 160 TABLET, FILM COATED ORAL at 10:43

## 2024-06-04 RX ADMIN — METOPROLOL TARTRATE 25 MG: 25 TABLET, FILM COATED ORAL at 21:05

## 2024-06-04 RX ADMIN — Medication 100 MG: at 10:44

## 2024-06-04 RX ADMIN — SENNOSIDES AND DOCUSATE SODIUM 1 TABLET: 50; 8.6 TABLET ORAL at 21:05

## 2024-06-04 RX ADMIN — CYANOCOBALAMIN 1000 MCG: 1000 INJECTION INTRAMUSCULAR; SUBCUTANEOUS at 10:44

## 2024-06-04 RX ADMIN — BUPROPION HYDROCHLORIDE 300 MG: 300 TABLET, EXTENDED RELEASE ORAL at 10:44

## 2024-06-04 RX ADMIN — ROSUVASTATIN CALCIUM 10 MG: 5 TABLET, FILM COATED ORAL at 21:05

## 2024-06-04 RX ADMIN — RIVAROXABAN 20 MG: 20 TABLET, FILM COATED ORAL at 17:49

## 2024-06-04 RX ADMIN — LEVOTHYROXINE SODIUM 25 MCG: 0.03 TABLET ORAL at 05:36

## 2024-06-04 RX ADMIN — Medication 2000 UNITS: at 17:48

## 2024-06-04 RX ADMIN — OXYCODONE AND ACETAMINOPHEN 1 TABLET: 7.5; 325 TABLET ORAL at 21:05

## 2024-06-04 RX ADMIN — MIRTAZAPINE 45 MG: 15 TABLET, FILM COATED ORAL at 21:05

## 2024-06-04 RX ADMIN — METOPROLOL TARTRATE 25 MG: 25 TABLET, FILM COATED ORAL at 10:44

## 2024-06-04 NOTE — PLAN OF CARE
Problem: Safety - Adult  Goal: Free from fall injury  Outcome: Progressing     Problem: Discharge Planning  Goal: Discharge to home or other facility with appropriate resources  Outcome: Progressing     Problem: ABCDS Injury Assessment  Goal: Absence of physical injury  Outcome: Progressing     Problem: Pain  Goal: Verbalizes/displays adequate comfort level or baseline comfort level  Outcome: Progressing     Problem: Skin/Tissue Integrity  Goal: Absence of new skin breakdown  Description: 1.  Monitor for areas of redness and/or skin breakdown  2.  Assess vascular access sites hourly  3.  Every 4-6 hours minimum:  Change oxygen saturation probe site  4.  Every 4-6 hours:  If on nasal continuous positive airway pressure, respiratory therapy assess nares and determine need for appliance change or resting period.  Outcome: Progressing     Problem: Chronic Conditions and Co-morbidities  Goal: Patient's chronic conditions and co-morbidity symptoms are monitored and maintained or improved  Outcome: Progressing     Problem: Falls - Risk of:  Goal: Will remain free from falls  Description: Will remain free from falls  Outcome: Progressing     Problem: Blood Glucose:  Goal: Ability to maintain appropriate glucose levels will improve  Description: Ability to maintain appropriate glucose levels will improve  Outcome: Progressing     Problem: Respiratory - Adult  Goal: Achieves optimal ventilation and oxygenation  Outcome: Progressing     Problem: Skin/Tissue Integrity - Adult  Goal: Skin integrity remains intact  Outcome: Progressing  Goal: Incisions, wounds, or drain sites healing without S/S of infection  Outcome: Progressing     Problem: Musculoskeletal - Adult  Goal: Return mobility to safest level of function  Outcome: Progressing  Goal: Return ADL status to a safe level of function  Outcome: Progressing     Problem: Infection - Adult  Goal: Absence of infection at discharge  Outcome: Progressing  Goal: Absence of

## 2024-06-04 NOTE — PROGRESS NOTES
Physical Therapy Rehab Treatment Note  Facility/Department: [facility] [place] [facility]  [facility]       NAME: Junito Harris  : 1950 (73 y.o.)  MRN: [id number]  CODE STATUS: Full Code    Date of Service: 2024       Restrictions:  Restrictions/Precautions: Fall Risk  Required Braces or Orthoses  Cervical: c-collar  Position Activity Restriction  Other position/activity restrictions: \"RAMÓN Franklin states pt will need to wear C Collar for 6 weeks\"       SUBJECTIVE:   Subjective: \"I am doing okay\"    Pain  Pain: denies pain      OBJECTIVE:     Transfers  Surface: Wheelchair;To chair with arms;From chair with arms  Additional Factors: Verbal cues;Hand placement cues  Device:  (rollator)  Sit to Stand  Assistance Level: Stand by assist  Skilled Clinical Factors: improves technique and stability throughout session requiring only SBA  Stand to Sit  Assistance Level: Stand by assist  Skilled Clinical Factors: Cues for approach to chair, improving technique with BUE and eccentric control  Car Transfer  Assistance Level: Stand by assist;Minimal assistance  Skilled Clinical Factors: increased time and effort, Vanessa to come to stand from car d/t lower surface. vc's for technique and sequencing throughout    Ambulation  Surface: Level surface;Uneven surface  Device: Rollator  Distance: 35' x 2  Additional Factors: Verbal cues  Assistance Level: Stand by assist  Gait Deviations: Decreased step length left;Slow pepe  Skilled Clinical Factors: decreased step through on left with fatigue, increased wb through upper extremities, mild trunk flexion. vc's for foot clearance on LLE but improved opposed to previous sessions. good tolerance to increased distance.    Stairs  Stair Height: 6''  Device: Bilateral handrails  Number of Stairs: 12  Additional Factors: Non-reciprocal going down;Non-reciprocal going up;Verbal cues;Hand placement cues;Increased time to complete  Assistance Level: Stand by assist;Minimal assistance  Skilled  Clinical Factors: cues throughout for improving hand placement to decrease posterior lean as well as overall sequencing    Activity Tolerance  Activity Tolerance: Patient tolerated treatment well    Education Provided: Role of Therapy;Transfer Training;Energy Conservation;Cognition;Precautions;Family Education;Safety;Equipment;ADL Function;Fall Prevention Strategies;Mobility Training  Education  Education Given To: Patient;Family  Education Provided: Role of Therapy;Transfer Training;Energy Conservation;Cognition;Precautions;Family Education;Safety;Equipment;ADL Function;Fall Prevention Strategies;Mobility Training  Education Method: Verbal;Demonstration  Barriers to Learning: None  Education Outcome: Verbalized understanding;Demonstrated understanding  Skilled Clinical Factors: Pt's sister and brother in law present for PT session this PM. Able to observe all aspects of pt's mobility including gait, car transfer, and stairs. All questions answered regarding home set-up and equipment recomendations. Pt and family deny further questions/concerns at this time.      ASSESSMENT/PROGRESS TOWARDS GOALS:   Assessment  Assessment: Family training completed this session.  Activity Tolerance: Patient tolerated treatment well    Goals:  Long Term Goals  Long Term Goal 1: Pt to complete bed mobility with indep  Long Term Goal 2: Pt to complete transfers with indep  Long Term Goal 3: Pt to ambulate 150ft with LRD and indep  Long Term Goal 4: Pt to manage 12 steps with HR and indep  Long Term Goal 5: Pt to complete HEP    PLAN OF CARE/Safety:   Safety Devices  Type of Devices: All fall risk precautions in place;Chair alarm in place;Left in chair      Therapy Time:   Individual   Time In 1330   Time Out 1400   Minutes 30      Minutes: 30  Transfer/Bed mobility training: 15  Gait training:15  Neuro re education:  Therapeutic ex:      Elliott Johnson PTA, 06/04/24 at 4:09 PM

## 2024-06-04 NOTE — PLAN OF CARE
Problem: Safety - Adult  Goal: Free from fall injury  6/4/2024 1145 by Chayito Madera RN  Outcome: Progressing  6/4/2024 0404 by Mary Anne Cook RN  Outcome: Progressing     Problem: Discharge Planning  Goal: Discharge to home or other facility with appropriate resources  6/4/2024 1145 by Chayito Madera RN  Outcome: Progressing  6/4/2024 0404 by Mary Anne Cook RN  Outcome: Progressing     Problem: ABCDS Injury Assessment  Goal: Absence of physical injury  6/4/2024 1145 by Chayito Madera RN  Outcome: Progressing  6/4/2024 0404 by Mary Anne Cook RN  Outcome: Progressing     Problem: Pain  Goal: Verbalizes/displays adequate comfort level or baseline comfort level  6/4/2024 1145 by Chayito Madera RN  Outcome: Progressing  6/4/2024 0404 by Mary Anne Cook RN  Outcome: Progressing     Problem: Skin/Tissue Integrity  Goal: Absence of new skin breakdown  Description: 1.  Monitor for areas of redness and/or skin breakdown  2.  Assess vascular access sites hourly  3.  Every 4-6 hours minimum:  Change oxygen saturation probe site  4.  Every 4-6 hours:  If on nasal continuous positive airway pressure, respiratory therapy assess nares and determine need for appliance change or resting period.  6/4/2024 1145 by Chayito Madera RN  Outcome: Progressing  6/4/2024 0404 by Mary Anne Cook RN  Outcome: Progressing     Problem: Chronic Conditions and Co-morbidities  Goal: Patient's chronic conditions and co-morbidity symptoms are monitored and maintained or improved  6/4/2024 1145 by Chayito Madera RN  Outcome: Progressing  6/4/2024 0404 by Mary Anne Cook RN  Outcome: Progressing     Problem: Falls - Risk of:  Goal: Will remain free from falls  Description: Will remain free from falls  6/4/2024 1145 by Chayito Madera RN  Outcome: Progressing  6/4/2024 0404 by Mary Anne Cook RN  Outcome: Progressing     Problem: Blood Glucose:  Goal: Ability to maintain appropriate glucose levels will  improve  Description: Ability to maintain appropriate glucose levels will improve  6/4/2024 1145 by Chayito Madera RN  Outcome: Progressing  6/4/2024 0404 by Mary Anne Cook RN  Outcome: Progressing     Problem: Respiratory - Adult  Goal: Achieves optimal ventilation and oxygenation  6/4/2024 1145 by Chayito Madera RN  Outcome: Progressing  6/4/2024 0404 by Mary Anne Cook RN  Outcome: Progressing     Problem: Skin/Tissue Integrity - Adult  Goal: Skin integrity remains intact  6/4/2024 1145 by Chayito Madera RN  Outcome: Progressing  6/4/2024 0404 by Mary Anne Cook RN  Outcome: Progressing  Goal: Incisions, wounds, or drain sites healing without S/S of infection  6/4/2024 1145 by Chayito Madera RN  Outcome: Progressing  6/4/2024 0404 by Mary Anne Cook RN  Outcome: Progressing     Problem: Musculoskeletal - Adult  Goal: Return mobility to safest level of function  6/4/2024 1145 by Chayito Madera RN  Outcome: Progressing  6/4/2024 0404 by Mary Anne Cook RN  Outcome: Progressing  Goal: Return ADL status to a safe level of function  6/4/2024 1145 by Chayito Madera RN  Outcome: Progressing  6/4/2024 0404 by Mary Anne Cook RN  Outcome: Progressing     Problem: Infection - Adult  Goal: Absence of infection at discharge  6/4/2024 1145 by Chayito Madera RN  Outcome: Progressing  6/4/2024 0404 by Mary Anne Cook RN  Outcome: Progressing  Goal: Absence of infection during hospitalization  6/4/2024 1145 by Chayito Madera RN  Outcome: Progressing  6/4/2024 0404 by Mary Anne Cook RN  Outcome: Progressing

## 2024-06-04 NOTE — PROGRESS NOTES
Subjective:   The patient complains of severe acute on chronic progressive fatigue and generalized weakness and severe postop neck pain partially relieved by rest, medications and medication adjustments, PT,  OT,   SLP and rest and exacerbated by recent cervical decompression.      73 y.o. male who has progressively worsening problems with balance using a cane after using a walker. Patient has problems with handwriting and using buttons and opening jars and texting and typing with his fingers. He also has some blurriness of his eyes and tremor of the left hand. He has lost 20% of his hand function in the last year. He has good range of motion on cervical spine. On 5/24/24 the patient underwent a Cervical C4-C5, C5-C6 discectomy and fusion r/t Cervical myelopathy.     I am concerned about patient’s medical complexities and barriers to advancing in rehab goals including control pain at lowest effective dose of pain medication.        I discussed current functional, rehabilitation, medical status with other rehabilitation providers including nursing and case management.  According to recent nursing note,\" Accucheck BID, 100 this am. Percocet changed to every 12 hours scheduled. Aspen collar on, surgical incision VERENICE to anterior neck. LBM 6/2. \".     He is happy with his current dose of pain medication blood sugars are better we are still working on constipation.    ROS x10:  The patient also complains of severely impaired mobility and activities of daily living.  Otherwise no new problems with vision, hearing, nose, mouth, throat, dermal, cardiovascular, GI, , pulmonary, musculoskeletal, psychiatric or neurological. See Rehab H&P on Rehab chart dated .       Vital signs:  BP (!) 143/94   Pulse 68   Temp 97 °F (36.1 °C) (Oral)   Resp 17   Ht 1.829 m (6')   Wt 133.3 kg (293 lb 14 oz)   SpO2 97%   BMI 39.86 kg/m²   I/O:   PO/Intake:  fair PO intake, no problems observed or reported.    Bowel/Bladder:  continent,  urine output, recheck BMP.    Steatosis of liver-focus on weight loss avoid toxic medications    Vitamin D deficiency-Add high-dose vitamin D, recheck vitamin D level out after discharge,    Left hemiparesis due to cervical myelopathy    Major depressive disorder in full remission-  high-dose vitamin D, recheck vitamin D level out after discharge  Patient is on Xarelto-monitor for bleeding      Focus on coordinating dc plans with patient family and care givers and order necessary equipment.'      Electronically signed by Gayle Carlos DO on 5/28/24 at 8:36 AM EDT       Gayle Carlos D.O., PM&R     Attending    [phone]   [facility]

## 2024-06-04 NOTE — PROGRESS NOTES
Comprehensive Nutrition Assessment    Type and Reason for Visit:  Initial, RD Nutrition Re-Screen/LOS    Nutrition Recommendations/Plan:   Modify diet to carb control 5/ RUTH ANN     Malnutrition Assessment:  Malnutrition Status:  No malnutrition (06/04/24 1528)      Nutrition Assessment:    Pt seen for LOS Day #9. Intakes/appetite good. Noted mild edema with DM2 dx. RD to modify diet to RUTH ANN and add carb restriction. Pt declines DM education at this time. States he follows a carb control diet at home. Agreeable to modification.    Nutrition Related Findings:    Pmhx: COPD, Colitis, HLD, HTN, Dm2. Meds/labs reviewed. Gluc <160. +1 BLE edema noted. +Regular BMs. Wound Type: None       Current Nutrition Intake & Therapies:    Average Meal Intake: %  Average Supplements Intake: None Ordered  ADULT DIET; Regular    Anthropometric Measures:  Height: 182.9 cm (6')  Ideal Body Weight (IBW): 178 lbs (81 kg)    Admission Body Weight: 128.8 kg (283 lb 14 oz)  Current Body Weight: 133.3 kg (293 lb 14 oz) (6/4),    Weight Source: Bed Scale  Current BMI (kg/m2): 39.8  Usual Body Weight: 122.5 kg (270 lb 2 oz) (2022 bed)  % Weight Change (Calculated): 8.8                    BMI Categories: Obese Class 2 (BMI 35.0 -39.9)    Estimated Daily Nutrient Needs:  Energy Requirements Based On: Kcal/kg  Weight Used for Energy Requirements: Current  Energy (kcal/day): ~2000kcal (15kcal/kg)  Weight Used for Protein Requirements: Ideal  Protein (g/day): ~97 g (1.2g/kg)  Method Used for Fluid Requirements: 1 ml/kcal  Fluid (ml/day): ~2000ml    Nutrition Diagnosis:   Altered nutrition-related lab values related to endocrine dysfuntion as evidenced by lab values    Nutrition Interventions:   Food and/or Nutrient Delivery: Modify Current Diet  Nutrition Education/Counseling: Education declined  Coordination of Nutrition Care: Continue to monitor while inpatient       Goals:     Goals: PO intake 75% or greater, other (specify)  Specify Other  Goals: Gluc WNL, edema to improve    Nutrition Monitoring and Evaluation:   Behavioral-Environmental Outcomes: None Identified  Food/Nutrient Intake Outcomes: Food and Nutrient Intake  Physical Signs/Symptoms Outcomes: Biochemical Data, Weight, Fluid Status or Edema    Discharge Planning:    No discharge needs at this time     Keiry Vance, MS, RD, LD

## 2024-06-04 NOTE — TELEPHONE ENCOUNTER
[facility] called wondering if dr. Lindsey would follow [facility] for fior their phone number is [phone]

## 2024-06-04 NOTE — PROGRESS NOTES
OCCUPATIONAL THERAPY  INPATIENT REHAB TREATMENT NOTE  [facility]      NAME: Junito Harris  : 1950 (73 y.o.)  MRN: [id number]  CODE STATUS: Full Code  Room: R261/R261-01    Date of Service: 2024    Referring Physician: Dr. Carlos  Rehab Diagnosis: Impaired mobility and ADL's s/p C4-C5, C5-C6 discectomy and fusion r/t cervical myelopathy    Hospital course:   Comments: 73 y.o. male who has progressively worsening problems with balance using a cane after using a walker. Patient has problems with handwriting and using buttons and opening jars and texting and typing with his fingers. He also has some blurriness of his eyes and tremor of the left hand. He has lost 20% of his hand function in the last year. He has good range of motion on cervical spine. On 24 the patient underwent a Cervical C4-C5, C5-C6 discectomy and fusion r/t Cervical myelopathy.      Restrictions  Restrictions/Precautions  Restrictions/Precautions: Fall Risk  Required Braces or Orthoses?: Yes     Patient's date of birth confirmed: Yes    SAFETY:  Safety Devices  Safety Devices in place: Yes  Type of devices: All fall risk precautions in place    SUBJECTIVE: \"I'll take that walker back so when I go into the basement I have something down there.\"    Pain at start of treatment: No    Pain at end of treatment: No    COGNITION:  Orientation  Overall Orientation Status: Within Functional Limits  Cognition  Overall Cognitive Status: WFL    OBJECTIVE:    Grooming/Oral Hygiene  Assistance Level: Modified independent  Skilled Clinical Factors: oral care in standing  Toileting  Assistance Level: Modified independent  Skilled Clinical Factors: urine only  Toilet Transfers  Technique: Stand step  Equipment: Grab bars  Additional Factors: With handrails  Assistance Level: Modified independent  Skilled Clinical Factors: ww Functional Mobility  Device: Rolling walker  Activity: To/From bathroom  Assistance Level: Modified

## 2024-06-04 NOTE — CARE COORDINATION
LSW met with pt and discussed upcoming discharge of 6/7, pt reported he is feeling better with the extension. Family training held today and pt stated it went well. HHC was discussed and given freedom of choice, pt would like [facility]. LSW will make a referral. Pt also will need a rollator at discharge, LSW will send order through [facility]. Electronically signed by FREYA Anderson, MAURO on 6/4/2024 at 5:26 PM

## 2024-06-04 NOTE — PROGRESS NOTES
cues  Skilled Clinical Factors: verbal cues to don pants prior to non-slip hospital socks  Putting On/Taking Off Footwear  Assistance Level: Modified independent  Skilled Clinical Factors: patient utilized figure four technique  Toileting  Assistance Level: Modified independent  Skilled Clinical Factors: urine only  Toilet Transfers  Technique: Stand step  Equipment: Grab bars  Additional Factors: With handrails  Assistance Level: Modified independent  Skilled Clinical Factors: ww  Tub/Shower Transfers  Type: Shower  Transfer From: Rolling walker  Transfer To: Tub transfer bench  Additional Factors: Verbal cues  Assistance Level: Supervision  Skilled Clinical Factors: verbal cues for hand placement and safety 2° patient attempting to back up into w/c when the w/c was in front of the snk and the ww was placed on the outside of the shower    Functional Mobility  Device: Rolling walker  Activity: To/From bathroom  Assistance Level: Supervision  Skilled Clinical Factors: slow pace - L foot with minimal elevation from floor - therapist managed doors and lights  Sit to Stand  Assistance Level: Modified independent  Stand to Sit  Assistance Level: Modified independent    Per patients request, therapist assisted patient with logging in to his  [employer]. Patient required MIN verbal cues and MIN A to change password and successfully login after multiple failed attempts by patient locked him out of chart.    PLAN OF CARE: Patient's plan of care was discussed by team OTs and OTAs at Monday POC review meeting.     Balance training, Functional mobility training, Endurance training, Patient/Caregiver education & training, Safety education & training, Pain management, Equipment evaluation, education, & procurement, Positioning, Self-Care / ADL, Coordination training    Continue per OT POC for updated planned discharge on 6-7-24.    Patient goals : \"Be playing golf by this fall.\"           Therapy Time:   Individual Group Co-Treat

## 2024-06-04 NOTE — PROGRESS NOTES
Physical Therapy Rehab Treatment Note  [facility]  [facility]       NAME: Junito Harris  : 1950 (73 y.o.)  MRN: [id number]  CODE STATUS: Full Code    Date of Service: 2024       Restrictions:  Restrictions/Precautions: Fall Risk  Required Braces or Orthoses  Cervical: c-collar  Position Activity Restriction  Other position/activity restrictions: \"RN Noemi states pt will need to wear C Collar for 6 weeks\"       SUBJECTIVE:   Subjective: \"Rough morning but better now\"    Pain  Pain: denies pain      OBJECTIVE:     Transfers  Surface: Wheelchair  Additional Factors: Verbal cues;Hand placement cues  Device:  (rollator)  Sit to Stand  Assistance Level: Stand by assist  Skilled Clinical Factors: improves technique and stability throughout session requiring only SBA  Stand to Sit  Assistance Level: Stand by assist  Skilled Clinical Factors: Cues for approach to chair, improving technique with BUE and eccentric control    Ambulation  Surface: Level surface;Uneven surface  Device: Rollator  Distance: 300', 35' x 2, 130'  Additional Factors: Verbal cues  Assistance Level: Stand by assist  Gait Deviations: Decreased step length left;Slow pepe  Skilled Clinical Factors: decreased step through on left with fatigue, increased wb through upper extremities, mild trunk flexion. vc's for foot clearance on LLE but improved opposed to previous sessions. good tolerance to increased distance.    Stairs  Stair Height: 6''  Device: Bilateral handrails  Number of Stairs: 4  Additional Factors: Non-reciprocal going down;Non-reciprocal going up;Verbal cues;Hand placement cues;Increased time to complete  Assistance Level: Stand by assist;Minimal assistance  Skilled Clinical Factors: cues throughout for improving hand placement to decrease posterior lean as well as overall sequencing    PT Exercises  PROM Exercises: seated HS/gastroc stretch x3 30 sec hold BLE  A/AROM Exercises: seated

## 2024-06-05 LAB
ANION GAP SERPL CALCULATED.3IONS-SCNC: 9 MEQ/L (ref 9–15)
BASOPHILS # BLD: 0 K/UL (ref 0–0.2)
BASOPHILS NFR BLD: 0.3 %
BUN SERPL-MCNC: 16 MG/DL (ref 8–23)
CALCIUM SERPL-MCNC: 8.9 MG/DL (ref 8.5–9.9)
CHLORIDE SERPL-SCNC: 107 MEQ/L (ref 95–107)
CO2 SERPL-SCNC: 26 MEQ/L (ref 20–31)
CREAT SERPL-MCNC: 1.11 MG/DL (ref 0.7–1.2)
EOSINOPHIL # BLD: 0.2 K/UL (ref 0–0.7)
EOSINOPHIL NFR BLD: 2.6 %
ERYTHROCYTE [DISTWIDTH] IN BLOOD BY AUTOMATED COUNT: 14.1 % (ref 11.5–14.5)
GLUCOSE BLD-MCNC: 106 MG/DL (ref 70–99)
GLUCOSE SERPL-MCNC: 90 MG/DL (ref 70–99)
HCT VFR BLD AUTO: 40.3 % (ref 42–52)
HGB BLD-MCNC: 12.9 G/DL (ref 14–18)
LYMPHOCYTES # BLD: 1.2 K/UL (ref 1–4.8)
LYMPHOCYTES NFR BLD: 16.8 %
MCH RBC QN AUTO: 28 PG (ref 27–31.3)
MCHC RBC AUTO-ENTMCNC: 32 % (ref 33–37)
MCV RBC AUTO: 87.6 FL (ref 79–92.2)
MONOCYTES # BLD: 0.5 K/UL (ref 0.2–0.8)
MONOCYTES NFR BLD: 7.2 %
NEUTROPHILS # BLD: 5 K/UL (ref 1.4–6.5)
NEUTS SEG NFR BLD: 72.1 %
PERFORMED ON: ABNORMAL
PLATELET # BLD AUTO: 164 K/UL (ref 130–400)
POTASSIUM SERPL-SCNC: 4 MEQ/L (ref 3.4–4.9)
RBC # BLD AUTO: 4.6 M/UL (ref 4.7–6.1)
SODIUM SERPL-SCNC: 142 MEQ/L (ref 135–144)
WBC # BLD AUTO: 6.9 K/UL (ref 4.8–10.8)

## 2024-06-05 PROCEDURE — 97535 SELF CARE MNGMENT TRAINING: CPT

## 2024-06-05 PROCEDURE — 99232 SBSQ HOSP IP/OBS MODERATE 35: CPT | Performed by: PHYSICAL MEDICINE & REHABILITATION

## 2024-06-05 PROCEDURE — 6370000000 HC RX 637 (ALT 250 FOR IP): Performed by: PHYSICAL MEDICINE & REHABILITATION

## 2024-06-05 PROCEDURE — 97530 THERAPEUTIC ACTIVITIES: CPT

## 2024-06-05 PROCEDURE — 6370000000 HC RX 637 (ALT 250 FOR IP)

## 2024-06-05 PROCEDURE — 85025 COMPLETE CBC W/AUTO DIFF WBC: CPT

## 2024-06-05 PROCEDURE — 97116 GAIT TRAINING THERAPY: CPT

## 2024-06-05 PROCEDURE — 80048 BASIC METABOLIC PNL TOTAL CA: CPT

## 2024-06-05 PROCEDURE — 97110 THERAPEUTIC EXERCISES: CPT

## 2024-06-05 PROCEDURE — 1180000000 HC REHAB R&B

## 2024-06-05 PROCEDURE — 6370000000 HC RX 637 (ALT 250 FOR IP): Performed by: NEUROLOGICAL SURGERY

## 2024-06-05 PROCEDURE — 36415 COLL VENOUS BLD VENIPUNCTURE: CPT

## 2024-06-05 RX ADMIN — BUPROPION HYDROCHLORIDE 300 MG: 300 TABLET, EXTENDED RELEASE ORAL at 09:20

## 2024-06-05 RX ADMIN — METOPROLOL TARTRATE 25 MG: 25 TABLET, FILM COATED ORAL at 21:35

## 2024-06-05 RX ADMIN — DOCUSATE SODIUM 100 MG: 100 CAPSULE, LIQUID FILLED ORAL at 09:20

## 2024-06-05 RX ADMIN — Medication 100 MG: at 09:20

## 2024-06-05 RX ADMIN — VALSARTAN 160 MG: 160 TABLET, FILM COATED ORAL at 09:20

## 2024-06-05 RX ADMIN — ROSUVASTATIN CALCIUM 10 MG: 5 TABLET, FILM COATED ORAL at 21:29

## 2024-06-05 RX ADMIN — LEVOTHYROXINE SODIUM 25 MCG: 0.03 TABLET ORAL at 06:50

## 2024-06-05 RX ADMIN — RIVAROXABAN 20 MG: 20 TABLET, FILM COATED ORAL at 17:03

## 2024-06-05 RX ADMIN — POLYETHYLENE GLYCOL 3350 17 G: 17 POWDER, FOR SOLUTION ORAL at 09:20

## 2024-06-05 RX ADMIN — SENNOSIDES AND DOCUSATE SODIUM 1 TABLET: 50; 8.6 TABLET ORAL at 21:29

## 2024-06-05 RX ADMIN — MIRTAZAPINE 45 MG: 15 TABLET, FILM COATED ORAL at 21:30

## 2024-06-05 RX ADMIN — SENNOSIDES AND DOCUSATE SODIUM 1 TABLET: 50; 8.6 TABLET ORAL at 09:20

## 2024-06-05 RX ADMIN — METOPROLOL TARTRATE 25 MG: 25 TABLET, FILM COATED ORAL at 09:20

## 2024-06-05 RX ADMIN — Medication 2000 UNITS: at 17:02

## 2024-06-05 RX ADMIN — OXYCODONE AND ACETAMINOPHEN 1 TABLET: 7.5; 325 TABLET ORAL at 21:30

## 2024-06-05 ASSESSMENT — PAIN SCALES - GENERAL: PAINLEVEL_OUTOF10: 2

## 2024-06-05 ASSESSMENT — PAIN DESCRIPTION - DESCRIPTORS: DESCRIPTORS: DISCOMFORT

## 2024-06-05 ASSESSMENT — PAIN DESCRIPTION - LOCATION: LOCATION: NECK

## 2024-06-05 NOTE — PROGRESS NOTES
Physical Therapy Rehab Treatment Note  Facility/Department: [facility]  [facility]       NAME: Junito Harris  : 1950 (73 y.o.)  MRN: [id number]  CODE STATUS: Full Code    Date of Service: 2024       Restrictions:  Restrictions/Precautions: Fall Risk  Required Braces or Orthoses  Cervical: c-collar  Position Activity Restriction  Other position/activity restrictions: \"RN Noemi states pt will need to wear C Collar for 6 weeks\"       SUBJECTIVE:   Subjective: \"My sisters got my head in a [place]\"    Pain  Pain: denies pain      OBJECTIVE:     Transfers  Surface: Wheelchair;To chair with arms;From chair with arms  Additional Factors: Verbal cues;Hand placement cues  Device: Walker  Sit to Stand  Assistance Level: Stand by assist  Skilled Clinical Factors: improves technique and stability throughout session requiring only SBA  Stand to Sit  Assistance Level: Stand by assist  Skilled Clinical Factors: Cues for approach to chair, improving technique with BUE and eccentric control  Car Transfer  Assistance Level: Stand by assist;Minimal assistance  Skilled Clinical Factors: increased time and effort, Vanessa to come to stand from car d/t lower surface. vc's for technique and sequencing throughout    Ambulation  Surface: Level surface;Uneven surface;Carpet  Device: Rolling walker  Distance: 45' x 2, 125', 75'  Additional Factors: Verbal cues  Assistance Level: Stand by assist  Gait Deviations: Decreased step length left;Slow pepe  Skilled Clinical Factors: decreased step through on left with fatigue, increased wb through upper extremities, mild trunk flexion. improved ability to clear L foot this session with less verbal cues provided. good tolerance to increased distance.    Stairs  Stair Height: 6''  Device: Bilateral handrails  Number of Stairs: 8  Additional Factors: Non-reciprocal going down;Non-reciprocal going up;Verbal cues;Hand placement cues;Increased time to complete  Assistance Level: Stand

## 2024-06-05 NOTE — TELEPHONE ENCOUNTER
I left [facility] a detailed vm on their confidential vm line that BB will follow the pt with [facility]

## 2024-06-05 NOTE — PROGRESS NOTES
Subjective:   The patient complains of severe acute on chronic progressive fatigue and generalized weakness and severe postop neck pain partially relieved by rest, medications and medication adjustments, PT,  OT,   SLP and rest and exacerbated by recent cervical decompression.      73 y.o. male who has progressively worsening problems with balance using a cane after using a walker. Patient has problems with handwriting and using buttons and opening jars and texting and typing with his fingers. He also has some blurriness of his eyes and tremor of the left hand. He has lost 20% of his hand function in the last year. He has good range of motion on cervical spine. On 5/24/24 the patient underwent a Cervical C4-C5, C5-C6 discectomy and fusion r/t Cervical myelopathy.     I am concerned about patient’s medical complexities and barriers to advancing in rehab goals including control pain at lowest effective dose of pain medication.        I discussed current functional, rehabilitation, medical status with other rehabilitation providers including nursing and case management.  According to recent   note,\" met with pt and discussed upcoming discharge of 6/7, pt reported he is feeling better with the extension. Family training held today and pt stated it went well. HHC was discussed and given freedom of choice, pt would like [facility]. LSW will make a referral. Pt also will need a rollator at discharge, LSW will send order through [facility] \".        Despite extending his discharge date he is apprehensive about discharge home alone.  He states that he has some friends and family in the area they can stop by and check on him.  He is hoping to get permission by his neuro spine surgeon to use the cervical collar just as needed.    ROS x10:  The patient also complains of severely impaired mobility and activities of daily living.  Otherwise no new problems with vision, hearing, nose, mouth, throat, dermal, cardiovascular, GI, ,      FIMS:  ,  , Assessment: Static and dynamic standing for balance with touch assist and 2 HHA with weight shifting. Moderate assist sit to stand with first trial before ambulation then improved sit to stand with min assist with good stability.    Occupational therapy: FIMS:   ,  , Assessment: Pt is a 73-year-old male presenting to [facility] from home s/p cervical C4-C5 and C5-C6 anterior discectomy fusion. Pt presents w/ post-surgical pain and deficits listed. Pt will benefit from continued OT to address these deficits to maximize safety and dindependence n in daily activities.    Speech therapy: FIMS:        Lab/X-ray studies reviewed, analyzed and discussed with patient and staff:   Recent Results (from the past 24 hour(s))   POCT Glucose    Collection Time: 06/04/24  4:58 PM   Result Value Ref Range    POC Glucose 171 (H) 70 - 99 mg/dl    Performed on ACCU-CHEK    Basic Metabolic Panel w/ Reflex to MG    Collection Time: 06/05/24  4:47 AM   Result Value Ref Range    Sodium 142 135 - 144 mEq/L    Potassium reflex Magnesium 4.0 3.4 - 4.9 mEq/L    Chloride 107 95 - 107 mEq/L    CO2 26 20 - 31 mEq/L    Anion Gap 9 9 - 15 mEq/L    Glucose 90 70 - 99 mg/dL    BUN 16 8 - 23 mg/dL    Creatinine 1.11 0.70 - 1.20 mg/dL    Est, Glom Filt Rate 70.2 >60    Calcium 8.9 8.5 - 9.9 mg/dL   CBC with Auto Differential    Collection Time: 06/05/24  4:47 AM   Result Value Ref Range    WBC 6.9 4.8 - 10.8 K/uL    RBC 4.60 (L) 4.70 - 6.10 M/uL    Hemoglobin 12.9 (L) 14.0 - 18.0 g/dL    Hematocrit 40.3 (L) 42.0 - 52.0 %    MCV 87.6 79.0 - 92.2 fL    MCH 28.0 27.0 - 31.3 pg    MCHC 32.0 (L) 33.0 - 37.0 %    RDW 14.1 11.5 - 14.5 %    Platelets 164 130 - 400 K/uL    Neutrophils % 72.1 %    Lymphocytes % 16.8 %    Monocytes % 7.2 %    Eosinophils % 2.6 %    Basophils % 0.3 %    Neutrophils Absolute 5.0 1.4 - 6.5 K/uL    Lymphocytes Absolute 1.2 1.0 - 4.8 K/uL    Monocytes Absolute 0.5 0.2 - 0.8 K/uL    Eosinophils Absolute 0.2 0.0 - 0.7 K/uL

## 2024-06-05 NOTE — CARE COORDINATION
LSW spoke with pt and after speaking with his sister, he said he does not feel safe to discharge home. Per therapy, pt has exhibited some impaired safety awareness and they also voiced concern for him discharging home alone. Pt would like to discharge to [facility]. Referral will be picked up by Mae from [facility] on 6/6. LSW called pt's sister and left a voicemail. Electronically signed by FREYA nAderson, MAURO on 6/5/2024 at 5:59 PM

## 2024-06-05 NOTE — PROGRESS NOTES
OCCUPATIONAL THERAPY  INPATIENT REHAB TREATMENT NOTE  [facility]      NAME: Junito Harris  : 1950 (73 y.o.)  MRN: [id number]  CODE STATUS: Full Code  Room: R261/R261-01    Date of Service: 2024    Referring Physician: Dr. Carlos  Rehab Diagnosis: Impaired mobility and ADL's s/p C4-C5, C5-C6 discectomy and fusion r/t cervical myelopathy    Hospital course:   Comments: 73 y.o. male who has progressively worsening problems with balance using a cane after using a walker. Patient has problems with handwriting and using buttons and opening jars and texting and typing with his fingers. He also has some blurriness of his eyes and tremor of the left hand. He has lost 20% of his hand function in the last year. He has good range of motion on cervical spine. On 24 the patient underwent a Cervical C4-C5, C5-C6 discectomy and fusion r/t Cervical myelopathy.      Restrictions  Restrictions/Precautions  Restrictions/Precautions: Fall Risk  Required Braces or Orthoses?: Yes     Patient's date of birth confirmed: Yes    SAFETY:  Safety Devices  Safety Devices in place: Yes  Type of devices: All fall risk precautions in place    SUBJECTIVE: \"I have something to talk to you about. Do you think I'm going to be able to go home? My sister doesn't think so.\"    Pain at start of treatment: No    Pain at end of treatment: No    COGNITION:  Orientation  Overall Orientation Status: Within Functional Limits  Cognition  Overall Cognitive Status: WFL    OBJECTIVE:    Grooming/Oral Hygiene  Assistance Level: Modified independent  Skilled Clinical Factors: sated in w/c at bathroom sink  Toileting  Assistance Level: Modified independent  Skilled Clinical Factors: urine only  Toilet Transfers  Technique: Stand step  Equipment: Grab bars;[place] safety frame  Assistance Level: Modified independent  Skilled Clinical Factors: w/c    Functional Mobility:  Static Standing Balance Level: MOD I  Static Standing Balance Time:

## 2024-06-05 NOTE — PROGRESS NOTES
to transport mug to microwave at SBA.  Patient able to microwave mug cake at Supervision.  Patient able to remove mug cake from microwave at SBA.    Functional Mobility  Device: Rolling walker  Activity: Retrieve items;Transport items  Assistance Level: Stand by assist  Skilled Clinical Factors:   Functional Mobility:  Patient engaged in functional mobility activity.  Patient ambulated throughout therapy suite retrieving cards.  Patient with MIN/MOD difficulty picking up items from various levels.  Patient placed items in walker basket.  Patient with 0 LOB.  Patient required 0 rest breaks.  Patient required repeated verbal cues for safety.  Sit to Stand  Assistance Level: Modified independent  Stand to Sit  Assistance Level: Modified independent    Small Peg Activity:  Patient engaged in B FM coordination/strengthening and cognition to increase I with ADL's, IADL's and transfers.   Patient completed small peg design replications with example picture placed at midline.  Patient able to reach in lateral planes with 0 difficulty.  Patient able to reach in forward planes with 0 difficulty.   Patient able to  small pegs from table top with MIN/MOD difficulty.   Patient able to turn pegs into correct position for placement with MOD/MAX difficulty with in hand manipulation.   Patient able to place pegs into small pegboard with MIN difficulty.   Patient able to follow design with MIN difficulty.     Patient completed a simple 30 piece zig zag design with original design placed at midline.   Patient placed a total of 30 pegs with 22 being correct on first attempt.   Patient completed an intermediate 58 piece straight line design with original design placed at midline.   Patient placed a total of 58 pegs with 58 being correct on first attempt.   Patient able to remove pegs with MIN/MOD difficulty.   Patient with MIN errors with correct color choices of pegs.    ASSESSMENT: Patient pleasant and motivated while working

## 2024-06-05 NOTE — PROGRESS NOTES
Physical Therapy Rehab Treatment Note  [place] [facility]  Room: [place] [address]       NAME: Junito Harris  : 1950 (73 y.o.)  MRN: [id number]  CODE STATUS: Full Code    Date of Service: 2024       Restrictions:  Restrictions/Precautions: Fall Risk  Required Braces or Orthoses  Cervical: c-collar  Position Activity Restriction  Other position/activity restrictions: \"RN Noemi states pt will need to wear C Collar for 6 weeks\"       SUBJECTIVE:   Subjective: \"I caught some Z's\"    Pain  Pain: denies pain      OBJECTIVE:   Bed Mobility  Overall Assistance Level: Stand By Assist  Additional Factors: Verbal cues;Head of bed flat;Increased time to complete;Without handrails  Roll Left  Assistance Level: Stand by assist  Roll Right  Assistance Level: Stand by assist  Sit to Supine  Assistance Level: Stand by assist  Supine to Sit  Assistance Level: Stand by assist  Scooting  Assistance Level: Stand by assist    Transfers  Surface: Wheelchair;To mat;From mat  Additional Factors: Verbal cues;Hand placement cues  Device: Walker  Sit to Stand  Assistance Level: Stand by assist  Skilled Clinical Factors: improves technique and stability throughout session requiring only SBA  Stand to Sit  Assistance Level: Stand by assist  Skilled Clinical Factors: Cues for approach to chair, improving technique with BUE and eccentric control    Ambulation  Surface: Level surface;Uneven surface  Device: Rolling walker  Distance: 15' x 2  Additional Factors: Verbal cues  Assistance Level: Stand by assist  Gait Deviations: Decreased step length left;Slow pepe  Skilled Clinical Factors: decreased step through on left with fatigue, increased wb through upper extremities, mild trunk flexion. improved ability to clear L foot this session with less verbal cues provided. good tolerance to increased distance.    PT Exercises  A/AROM Exercises: seated AP/LAQ/Marches/Hip Abd/Hip Add x10 BLE  Resistive Exercises: seated GTB HS

## 2024-06-06 LAB
GLUCOSE BLD-MCNC: 93 MG/DL (ref 70–99)
GLUCOSE BLD-MCNC: 98 MG/DL (ref 70–99)
PERFORMED ON: NORMAL
PERFORMED ON: NORMAL

## 2024-06-06 PROCEDURE — 97112 NEUROMUSCULAR REEDUCATION: CPT

## 2024-06-06 PROCEDURE — 1180000000 HC REHAB R&B

## 2024-06-06 PROCEDURE — 6370000000 HC RX 637 (ALT 250 FOR IP): Performed by: NEUROLOGICAL SURGERY

## 2024-06-06 PROCEDURE — 6370000000 HC RX 637 (ALT 250 FOR IP)

## 2024-06-06 PROCEDURE — 97116 GAIT TRAINING THERAPY: CPT

## 2024-06-06 PROCEDURE — 97535 SELF CARE MNGMENT TRAINING: CPT

## 2024-06-06 PROCEDURE — 97110 THERAPEUTIC EXERCISES: CPT

## 2024-06-06 PROCEDURE — 99232 SBSQ HOSP IP/OBS MODERATE 35: CPT | Performed by: PHYSICAL MEDICINE & REHABILITATION

## 2024-06-06 PROCEDURE — 6370000000 HC RX 637 (ALT 250 FOR IP): Performed by: PHYSICAL MEDICINE & REHABILITATION

## 2024-06-06 PROCEDURE — 6370000000 HC RX 637 (ALT 250 FOR IP): Performed by: INTERNAL MEDICINE

## 2024-06-06 PROCEDURE — 97530 THERAPEUTIC ACTIVITIES: CPT

## 2024-06-06 RX ORDER — AMLODIPINE BESYLATE 5 MG/1
5 TABLET ORAL DAILY
DISCHARGE
Start: 2024-06-06

## 2024-06-06 RX ORDER — AMLODIPINE BESYLATE 2.5 MG/1
2.5 TABLET ORAL DAILY
Status: DISCONTINUED | OUTPATIENT
Start: 2024-06-06 | End: 2024-06-08 | Stop reason: HOSPADM

## 2024-06-06 RX ADMIN — HYDRALAZINE HYDROCHLORIDE 25 MG: 25 TABLET ORAL at 17:18

## 2024-06-06 RX ADMIN — RIVAROXABAN 20 MG: 20 TABLET, FILM COATED ORAL at 17:15

## 2024-06-06 RX ADMIN — METOPROLOL TARTRATE 25 MG: 25 TABLET, FILM COATED ORAL at 21:09

## 2024-06-06 RX ADMIN — SENNOSIDES AND DOCUSATE SODIUM 1 TABLET: 50; 8.6 TABLET ORAL at 21:07

## 2024-06-06 RX ADMIN — OXYCODONE AND ACETAMINOPHEN 1 TABLET: 7.5; 325 TABLET ORAL at 21:08

## 2024-06-06 RX ADMIN — Medication 100 MG: at 07:36

## 2024-06-06 RX ADMIN — VALSARTAN 160 MG: 160 TABLET, FILM COATED ORAL at 07:36

## 2024-06-06 RX ADMIN — BUPROPION HYDROCHLORIDE 300 MG: 300 TABLET, EXTENDED RELEASE ORAL at 07:36

## 2024-06-06 RX ADMIN — MIRTAZAPINE 45 MG: 15 TABLET, FILM COATED ORAL at 21:07

## 2024-06-06 RX ADMIN — SENNOSIDES AND DOCUSATE SODIUM 1 TABLET: 50; 8.6 TABLET ORAL at 07:36

## 2024-06-06 RX ADMIN — DOCUSATE SODIUM 100 MG: 100 CAPSULE, LIQUID FILLED ORAL at 07:36

## 2024-06-06 RX ADMIN — LEVOTHYROXINE SODIUM 25 MCG: 0.03 TABLET ORAL at 05:59

## 2024-06-06 RX ADMIN — ROSUVASTATIN CALCIUM 10 MG: 5 TABLET, FILM COATED ORAL at 21:07

## 2024-06-06 RX ADMIN — Medication 2000 UNITS: at 17:15

## 2024-06-06 RX ADMIN — AMLODIPINE BESYLATE 2.5 MG: 2.5 TABLET ORAL at 15:40

## 2024-06-06 RX ADMIN — MICONAZOLE NITRATE: 2 POWDER TOPICAL at 07:37

## 2024-06-06 RX ADMIN — METOPROLOL TARTRATE 25 MG: 25 TABLET, FILM COATED ORAL at 07:36

## 2024-06-06 ASSESSMENT — PAIN DESCRIPTION - DESCRIPTORS: DESCRIPTORS: DISCOMFORT

## 2024-06-06 ASSESSMENT — PAIN SCALES - GENERAL: PAINLEVEL_OUTOF10: 4

## 2024-06-06 ASSESSMENT — PAIN DESCRIPTION - LOCATION: LOCATION: NECK

## 2024-06-06 NOTE — PROGRESS NOTES
Physical Therapy Rehab Treatment Note  [place]Department[place] [facility]  [facility]: [id number]       NAME: Junito Harris  : 1950 (73 y.o.)  MRN: [id number]  CODE STATUS: Full Code    Date of Service: 2024       Restrictions:  Restrictions/Precautions: Fall Risk  Required Braces or Orthoses  Cervical: c-collar  Position Activity Restriction  Other position/activity restrictions: \"RAMÓN Franklin states pt will need to wear C Collar for 6 weeks\"       SUBJECTIVE:   Subjective: \"I am okay\"    Pain  Pain: denies pain      OBJECTIVE:   Transfers  Surface: Wheelchair;To chair with arms;From chair with arms  Additional Factors: Verbal cues;Hand placement cues  Device: Walker  Sit to Stand  Assistance Level: Stand by assist  Skilled Clinical Factors: improves technique and stability throughout session requiring only SBA  Stand to Sit  Assistance Level: Stand by assist  Skilled Clinical Factors: Cues for approach to chair, improving technique with BUE and eccentric control    Ambulation  Surface: Level surface;Uneven surface  Device: Rolling walker  Distance: 125', 35'  Additional Factors: Verbal cues  Assistance Level: Stand by assist  Gait Deviations: Decreased step length left;Slow pepe  Skilled Clinical Factors: decreased step through on left with fatigue, increased wb through upper extremities, mild trunk flexion. improved ability to clear L foot this session with less verbal cues provided. good tolerance to increased distance.    PT Exercises  PROM Exercises: seated HS/gastroc stretch x3 30 sec hold BLE  A/AROM Exercises: seated AP/LAQ/Marches/Hip Abd/Hip Add x10 BLE  Resistive Exercises: seated GTB HS curls/hip abd x10 BLE     Activity Tolerance  Activity Tolerance: Patient tolerated treatment well    ASSESSMENT/PROGRESS TOWARDS GOALS:   Assessment  Assessment: Pt with good carryover of mobility from previous sessions. Pt fixated on deficits this date and asks repetitive questions on his CLOF. Education

## 2024-06-06 NOTE — PROGRESS NOTES
I was asked to see patient prior to discharge to a nursing home tomorrow.  Patient is doing well overall.  Denies any chest or abdominal pain.  No nausea or vomiting.  No headaches, loss of conscious or seizure    Patient is sitting in a chair.  No distress.  Chest is clear, heart is regular.  Abdomen soft.  Lower extremities trace pitting edema.  Functional status is impaired.  Please refer to physical Occupational Therapy team note for details on her functional status and treatment plan.    *Hypertension, poor control.  Patient is on beta-blocker and ARB.  Added amlodipine 2.5 mg daily with holding parameters.  These medications would need to be titrated at the nursing home to achieve optimal blood pressure control.    *Diabetes, fair control.    *Functional impairment managed by rehab team    *Hypothyroidism, continue Synthroid    *Vitamin D deficiency, continue vitamin D supplementation    *Status post cervical discectomy.  Patient is to follow-up with spine surgery team.    *Anemia.  No evidence of acute blood loss.  This may need to be investigated further in the outpatient setting to be handled by PCP postdischarge    Patient has multiple medical issues.  All appear to be stable at this time.    Patient will be discharged tomorrow.  I will address discharge home medications pertinent to my involvement portion of his care.    I did not address all of his chronic medical conditions and all abnormalities seen on labs and imaging during this admission to the rehab unit or prior to admission.  I instructed patient to ask his PCP to obtain [facility] record entirely to follow-up on needed medical care in the outpatient setting.    Discharging patient is by no means the end of the care that he would need or require.  The patient would likely need and require to have additional workup, vesication and therapeutic intervention as well as close monitoring of his medical conditions overall, titration of his medications to

## 2024-06-06 NOTE — PROGRESS NOTES
Physical Therapy Rehab Treatment Note  [facility]  [facility]       NAME: Junito Harris  : 1950 (73 y.o.)  MRN: [id number]  CODE STATUS: Full Code    Date of Service: 2024       Restrictions:  Restrictions/Precautions: Fall Risk  Required Braces or Orthoses  Cervical: c-collar  Position Activity Restriction  Other position/activity restrictions: \"RN Noemi states pt will need to wear C Collar for 6 weeks\"       SUBJECTIVE:   Subjective: \"I worked on my balance\"    Pain  Pain: denies pain pre and post tx      OBJECTIVE:   Transfers  Surface: Wheelchair;To mat;From mat  Additional Factors: Verbal cues;Hand placement cues  Device: Walker  Sit to Stand  Assistance Level: Supervision  Skilled Clinical Factors: improves technique and stability throughout session requiring only SBA  Stand to Sit  Assistance Level: Stand by assist  Skilled Clinical Factors: Occasional LOB back into chair when practicing balance activities.    Ambulation  Surface: Level surface;Uneven surface  Device: Rolling walker  Distance: 45' x 2  Additional Factors: Verbal cues  Assistance Level: Stand by assist  Gait Deviations: Decreased step length left;Slow pepe  Skilled Clinical Factors: decreased step through on left with fatigue, increased wb through upper extremities, mild trunk flexion. improved ability to clear L foot this session with less verbal cues provided. good tolerance to increased distance.    Stairs  Stair Height: 6''  Device: Bilateral handrails  Number of Stairs: 4  Additional Factors: Non-reciprocal going down;Non-reciprocal going up;Verbal cues;Hand placement cues;Increased time to complete  Assistance Level: Stand by assist;Minimal assistance  Skilled Clinical Factors: cues throughout for improving hand placement to decrease posterior lean as well as overall sequencing. cues to move WW completely out of the way. corrects with vc's.    PT Exercises  Exercise Treatment: STS

## 2024-06-06 NOTE — CARE COORDINATION
CM was called into room to talk with pt and sister. Pt not sure he wants to go to [facility]  . Pt was wanting to see if he could stay another week. CM explained to pt and sister that would be up the therapy team. We could add him to the team list for tomorrow and they could discuss it. Pt agreeable. Electronically signed by Mar Salamanca RN on 6/6/2024 at 4:58 PM

## 2024-06-06 NOTE — DISCHARGE INSTRUCTIONS
Follow up with  @PCP@ in the next 7 days or sooner if needed.    Please return to ER or call 911 if you develop any significant signs or symptoms.    I may or may not have addressed all of your symptoms, medical issues,  Illnesses, or all of the abnormal blood work or imaging therefore please ask your PCP and other specialists to obtain [facility] record entirely to follow up on all of your symptoms, illnesses, abnormal labs, imaging and findings that I have and have not addressed during your hospitalization.     Nursing home providers.  Please titrate his blood pressure medications to keep systolic blood pressure between 145 and 165    Discharging you from the hospital does not mean that your medical care ends here and now. You may still need to have additional work up, investigation, monitoring, surveillance, and treatment to be handled from this point on by in the out patient setting by  out patient providers including your PCP, Specialists and other healthcare providers.     For medication questions, contact your retail pharmacy and your PCP.    Your medical team at [facility] appreciates the opportunity to work with you to get well!    Armaan Marie MD

## 2024-06-06 NOTE — PROGRESS NOTES
OCCUPATIONAL THERAPY  INPATIENT REHAB TREATMENT NOTE  [facility]      NAME: Junito Harris  : 1950 (73 y.o.)  MRN: [id number]  CODE STATUS: Full Code  Room: R261/R261-01    Date of Service: 2024    Referring Physician: Dr. Carlos  Rehab Diagnosis: Impaired mobility and ADL's s/p C4-C5, C5-C6 discectomy and fusion r/t cervical myelopathy    Hospital course:   Comments: 73 y.o. male who has progressively worsening problems with balance using a cane after using a walker. Patient has problems with handwriting and using buttons and opening jars and texting and typing with his fingers. He also has some blurriness of his eyes and tremor of the left hand. He has lost 20% of his hand function in the last year. He has good range of motion on cervical spine. On 24 the patient underwent a Cervical C4-C5, C5-C6 discectomy and fusion r/t Cervical myelopathy.      Restrictions  Restrictions/Precautions  Restrictions/Precautions: Fall Risk  Required Braces or Orthoses?: Yes     Patient's date of birth confirmed: Yes    SAFETY:  Safety Devices  Safety Devices in place: Yes  Type of devices: All fall risk precautions in place    SUBJECTIVE: \"I guess I have to go to another facility before I can go home. I'm hoping it's only for a week.\"    Pain at start of treatment: No    Pain at end of treatment: No    COGNITION:  Orientation  Overall Orientation Status: Within Functional Limits  Cognition  Overall Cognitive Status: WFL    OBJECTIVE:    Grooming/Oral Hygiene  Assistance Level: Modified independent  Skilled Clinical Factors: sated in w/c at bathroom sink  Upper Extremity Bathing  Assistance Level: Modified independent  Lower Extremity Bathing  Assistance Level: Modified independent  Upper Extremity Dressing  Assistance Level: Supervision;Verbal cues  Skilled Clinical Factors: verbal cues for doffing/donning c-collar and technique of doffing t-shirt over c-collar - in seated position  Lower Extremity

## 2024-06-06 NOTE — PROGRESS NOTES
Physical Therapy Rehab Treatment Note  [facility]Department[facility]  [facility]       NAME: Junito Harris  : 1950 (73 y.o.)  MRN: [id number]  CODE STATUS: Full Code    Date of Service: 2024       Restrictions:  Restrictions/Precautions: Fall Risk  Required Braces or Orthoses  Cervical: c-collar  Position Activity Restriction  Other position/activity restrictions: \"RAMÓN Franklin states pt will need to wear C Collar for 6 weeks\"       SUBJECTIVE:   Subjective: I want to work on my balance.  I have terrible balance.  It has been for a while.  I was hoping this operation would helped with that.    Pain  Pain: denies pain Pre and post tx      OBJECTIVE:                  Transfers  Surface: Wheelchair  Sit to Stand  Assistance Level: Supervision  Stand to Sit  Assistance Level: Stand by assist  Skilled Clinical Factors: Occasional LOB back into chair when practicing balance activities.                   Neuromuscular Education  Neuromuscular Comments: MISTRY balance test  Outcomes Measures:  Mistry Balance Score: 24                                 ASSESSMENT/PROGRESS TOWARDS GOALS:   Body Structures, Functions, Activity Limitations Requiring Skilled Therapeutic Intervention: Decreased functional mobility ;Decreased ADL status;Decreased body mechanics;Decreased strength;Decreased balance;Decreased vision/visual deficit;Increased pain;Decreased posture;Decreased safe awareness;Decreased sensation;Decreased coordination  Assessment: Pt tx focused on balance with MISTRY test.  Pt is a medium risk of falls for standing activities.  Educated pt on results.    Goals:  Long Term Goals  Long Term Goal 1: Pt to complete bed mobility with indep  Long Term Goal 2: Pt to complete transfers with indep  Long Term Goal 3: Pt to ambulate 150ft with LRD and indep  Long Term Goal 4: Pt to manage 12 steps with HR and indep  Long Term Goal 5: Pt to complete HEP    PLAN OF CARE/Safety:   Safety Devices  Type of Devices: Left in  chair      Therapy Time:   Individual   Time In 1330   Time Out 1400   Minutes 30      Minutes:30    Neuro re education: 30        Emma Macias PTA, 06/06/24 at 2:06 PM

## 2024-06-06 NOTE — CARE COORDINATION
Clinicals for SNF referral provided to [facility]. Mae to follow up with our team in regards to acceptance decision. Advised of ADOD likely 6/7 if medically cleared.  Electronically signed by Rosa Isela Arreola on 6/6/2024 at 3:04 PM

## 2024-06-06 NOTE — PROGRESS NOTES
Subjective:   The patient complains of severe acute on chronic progressive fatigue and generalized weakness and severe postop neck pain partially relieved by rest, medications and medication adjustments, PT,  OT,   SLP and rest and exacerbated by recent cervical decompression.      73 y.o. male who has progressively worsening problems with balance using a cane after using a walker. Patient has problems with handwriting and using buttons and opening jars and texting and typing with his fingers. He also has some blurriness of his eyes and tremor of the left hand. He has lost 20% of his hand function in the last year. He has good range of motion on cervical spine. On 5/24/24 the patient underwent a Cervical C4-C5, C5-C6 discectomy and fusion r/t Cervical myelopathy.     I am concerned about patient’s medical complexities and barriers to advancing in rehab goals including control pain at lowest effective dose of pain medication.        I discussed current functional, rehabilitation, medical status with other rehabilitation providers including nursing and case management.  According to recent   note -no substantive notes-- only form notes from the nursing over the past 24 hours any and all other notes reviewed as well.    We are reaching out to his neurosurgeon to find out if we can be able to remove his hard cervical collar during ADLs.  For now patient is changed his mind about discharge wants to go to skilled not home.    ROS x10:  The patient also complains of severely impaired mobility and activities of daily living.  Otherwise no new problems with vision, hearing, nose, mouth, throat, dermal, cardiovascular, GI, , pulmonary, musculoskeletal, psychiatric or neurological. See Rehab H&P on Rehab chart dated .       Vital signs:  BP (!) 180/112   Pulse 66   Temp 98.1 °F (36.7 °C) (Oral)   Resp 18   Ht 1.829 m (6')   Wt 133.3 kg (293 lb 14 oz)   SpO2 97%   BMI 39.86 kg/m²   I/O:   PO/Intake:  fair PO intake, no

## 2024-06-06 NOTE — CARE COORDINATION
[facility]  INPATIENT REHABILITATION  TEAM CONFERENCE NOTE  Room: R261/R261-01  Admit Date: 2024       Date: 2024  Patient Name: Junito Harris        MRN: [id number]    : 1950  (73 y.o.)  Gender: male        REHAB DIAGNOSIS:   Diagnosis: Impaired mobility and ADL's s/p C4-C5, C5-C6 Discectomy and fusion r/t Cervical Myelopathy. [facility] rehab admission 24    CO MORBIDITIES:      Past Medical History:   Diagnosis Date    Arthritis     Colitis     COPD (chronic obstructive pulmonary disease) (HCC)     Crohn disease (HCC)     Encephalopathy     Hyperlipidemia     Hypertension     Hypothyroidism     ETIENNE (obstructive sleep apnea)     Pacemaker     Paroxysmal atrial fibrillation ([facility]) 2018    Persistent atrial fibrillation (HCC) 2018    Sick sinus syndrome ([facility])     Third degree AV block ([facility])     Type II or unspecified type diabetes mellitus without mention of complication, not stated as uncontrolled      Past Surgical History:   Procedure Laterality Date    ANTERIOR CRUCIATE LIGAMENT REPAIR      CARDIAC PACEMAKER PLACEMENT      CERVICAL FUSION N/A 2024    CERVICAL 4-5 AND 5-6 ANTERIOR DISCECTOMY FUSION AUTOGRAFT, ALLOGRAFT MICRODISSECTION, FLUOROSCOPY, SPINAL MONITORING, PREOPERATIVE STEREOTACTIC PLANNING , INSERTION PLATE,  SCREWS, INTERBODY CAGES. performed by Turner Alexander MD at [facility]    COLONOSCOPY  2019    DR BONILLA    GALLBLADDER SURGERY      PACEMAKER PLACEMENT  07/10/2018    [facility]ID[facility] DO    SIGMOIDOSCOPY  2017    DR. BONILLA    SIGMOIDOSCOPY  2019    DR BONILLA    TOE SURGERY      TONSILLECTOMY      TOTAL KNEE ARTHROPLASTY Right 2023    WRIST SURGERY          Restrictions  Restrictions/Precautions: Fall Risk  Required Braces or Orthoses  Cervical: c-collar  Position Activity Restriction  Other position/activity restrictions: \"RAMÓN Franklin states pt will need to wear C Collar for 6 weeks\"  CASE

## 2024-06-06 NOTE — PROGRESS NOTES
OCCUPATIONAL THERAPY  INPATIENT REHAB TREATMENT NOTE  [facility]      NAME: Junito Harris  : 1950 (73 y.o.)  MRN: [id number]  CODE STATUS: Full Code  Room: R261/R261-01    Date of Service: 2024    Referring Physician: Dr. Carlos  Rehab Diagnosis: Impaired mobility and ADL's s/p C4-C5, C5-C6 discectomy and fusion r/t cervical myelopathy    Hospital course:   Comments: 73 y.o. male who has progressively worsening problems with balance using a cane after using a walker. Patient has problems with handwriting and using buttons and opening jars and texting and typing with his fingers. He also has some blurriness of his eyes and tremor of the left hand. He has lost 20% of his hand function in the last year. He has good range of motion on cervical spine. On 24 the patient underwent a Cervical C4-C5, C5-C6 discectomy and fusion r/t Cervical myelopathy.      Restrictions  Restrictions/Precautions  Restrictions/Precautions: Fall Risk  Required Braces or Orthoses?: Yes     Patient's date of birth confirmed: Yes    SAFETY:  Safety Devices  Safety Devices in place: Yes  Type of devices: All fall risk precautions in place    SUBJECTIVE: \"I talked to my sister and she said that she didn't know anything about what's going on tomorrow.\"    Pain at start of treatment: No    Pain at end of treatment: No    COGNITION:  Orientation  Overall Orientation Status: Within Functional Limits  Cognition  Overall Cognitive Status: WFL    OBJECTIVE:    UE Strengthening HEP:   Patient engaged in B UE ROM/strengthening to increase I with ADL's and transfers.   Patient issued written HEP focusing on all UE joints including scapular protraction/retraction, shoulder flexion/extension/rotation/horizontal abduction, elbow flexion/extension, supination/pronation, and wrist/digit flexion/extension..  Patient able to utilize 2 # hand weight 1 X 5 repetitions in various planes of motion.  Patient required MOD verbal cues for

## 2024-06-07 LAB
GLUCOSE BLD-MCNC: 100 MG/DL (ref 70–99)
GLUCOSE BLD-MCNC: 105 MG/DL (ref 70–99)
GLUCOSE BLD-MCNC: 92 MG/DL (ref 70–99)
PERFORMED ON: ABNORMAL
PERFORMED ON: ABNORMAL
PERFORMED ON: NORMAL

## 2024-06-07 PROCEDURE — 97116 GAIT TRAINING THERAPY: CPT

## 2024-06-07 PROCEDURE — 6370000000 HC RX 637 (ALT 250 FOR IP)

## 2024-06-07 PROCEDURE — 6370000000 HC RX 637 (ALT 250 FOR IP): Performed by: INTERNAL MEDICINE

## 2024-06-07 PROCEDURE — 6370000000 HC RX 637 (ALT 250 FOR IP): Performed by: NEUROLOGICAL SURGERY

## 2024-06-07 PROCEDURE — 1180000000 HC REHAB R&B

## 2024-06-07 PROCEDURE — 97535 SELF CARE MNGMENT TRAINING: CPT

## 2024-06-07 PROCEDURE — 97530 THERAPEUTIC ACTIVITIES: CPT

## 2024-06-07 PROCEDURE — 97110 THERAPEUTIC EXERCISES: CPT

## 2024-06-07 PROCEDURE — 99233 SBSQ HOSP IP/OBS HIGH 50: CPT | Performed by: PHYSICAL MEDICINE & REHABILITATION

## 2024-06-07 PROCEDURE — 6370000000 HC RX 637 (ALT 250 FOR IP): Performed by: PHYSICAL MEDICINE & REHABILITATION

## 2024-06-07 RX ADMIN — METOPROLOL TARTRATE 25 MG: 25 TABLET, FILM COATED ORAL at 09:07

## 2024-06-07 RX ADMIN — RIVAROXABAN 20 MG: 20 TABLET, FILM COATED ORAL at 17:31

## 2024-06-07 RX ADMIN — LEVOTHYROXINE SODIUM 25 MCG: 0.03 TABLET ORAL at 05:23

## 2024-06-07 RX ADMIN — AMLODIPINE BESYLATE 2.5 MG: 2.5 TABLET ORAL at 09:04

## 2024-06-07 RX ADMIN — ROSUVASTATIN CALCIUM 10 MG: 5 TABLET, FILM COATED ORAL at 20:09

## 2024-06-07 RX ADMIN — VALSARTAN 160 MG: 160 TABLET, FILM COATED ORAL at 09:16

## 2024-06-07 RX ADMIN — Medication 100 MG: at 09:04

## 2024-06-07 RX ADMIN — SENNOSIDES AND DOCUSATE SODIUM 1 TABLET: 50; 8.6 TABLET ORAL at 20:09

## 2024-06-07 RX ADMIN — Medication 2000 UNITS: at 17:31

## 2024-06-07 RX ADMIN — METOPROLOL TARTRATE 25 MG: 25 TABLET, FILM COATED ORAL at 20:09

## 2024-06-07 RX ADMIN — MIRTAZAPINE 45 MG: 15 TABLET, FILM COATED ORAL at 20:09

## 2024-06-07 RX ADMIN — BUPROPION HYDROCHLORIDE 300 MG: 300 TABLET, EXTENDED RELEASE ORAL at 09:06

## 2024-06-07 NOTE — PROGRESS NOTES
Physical Therapy Rehab Treatment Note  [facility]  [facility]       NAME: Junito Harris  : 1950 (73 y.o.)  MRN: [id number]  CODE STATUS: Full Code    Date of Service: 2024       Restrictions:  Restrictions/Precautions: Fall Risk  Required Braces or Orthoses  Cervical: c-collar  Position Activity Restriction  Other position/activity restrictions: \"RAMÓN Franklin states pt will need to wear C Collar for 6 weeks\"       SUBJECTIVE:   Subjective: \"I guess I got a bad report\"    Pain  Pain: denies pain pre and post tx      OBJECTIVE:   Transfers  Surface: Wheelchair;To chair with arms;From chair with arms  Additional Factors: Verbal cues;Hand placement cues  Device: Walker  Sit to Stand  Assistance Level: Supervision  Skilled Clinical Factors: improves technique and stability throughout session requiring only SBA  Stand to Sit  Assistance Level: Stand by assist  Skilled Clinical Factors: occasional cues for positioning when backing towards chair    Ambulation  Surface: Level surface;Uneven surface  Device: Rolling walker  Distance: 150', 45'  Additional Factors: Verbal cues  Assistance Level: Stand by assist  Gait Deviations: Decreased step length left;Slow pepe  Skilled Clinical Factors: decreased step through on left with fatigue, increased wb through upper extremities, mild trunk flexion. improved ability to clear L foot this session with less verbal cues provided. good tolerance to increased distance.    Stairs  Stair Height: 6''  Device: Bilateral handrails  Number of Stairs: 4  Additional Factors: Non-reciprocal going down;Non-reciprocal going up;Verbal cues;Hand placement cues;Increased time to complete  Assistance Level: Stand by assist  Skilled Clinical Factors: cues throughout for improving hand placement to decrease posterior lean as well as overall sequencing. cues to move WW completely out of the way. corrects with vc's. improved stability noted this date as opposed to  previous sessions, cues for proper foot placement.    Activity Tolerance  Activity Tolerance: Patient tolerated treatment well    ASSESSMENT/PROGRESS TOWARDS GOALS:   Assessment  Assessment: Increased time spent at beginning of session discussing discharge plan with pt. Pt with lots of questions regarding why he can't stay at the hospital vs going to SNF. Extensive education provided.  Activity Tolerance: Patient tolerated treatment well    Goals:  Long Term Goals  Long Term Goal 1: Pt to complete bed mobility with indep  Long Term Goal 2: Pt to complete transfers with indep  Long Term Goal 3: Pt to ambulate 150ft with LRD and indep  Long Term Goal 4: Pt to manage 12 steps with HR and indep  Long Term Goal 5: Pt to complete HEP    PLAN OF CARE/Safety:   Safety Devices  Type of Devices: All fall risk precautions in place;Chair alarm in place;Left in chair      Therapy Time:   Individual   Time In 1000   Time Out 1030   Minutes 30      Minutes: 30  Transfer/Bed mobility training: 10  Gait training:  Neuro re education:  Therapeutic ex: 20      Elliott Johnson PTA, 06/07/24 at 10:36 AM

## 2024-06-07 NOTE — PROGRESS NOTES
OCCUPATIONAL THERAPY  INPATIENT REHAB TREATMENT NOTE  [facility]      NAME: Junito Harris  : 1950 (73 y.o.)  MRN: [id number]  CODE STATUS: Full Code  Room: R261/R261-01    Date of Service: 2024    Referring Physician: Dr. Carlos  Rehab Diagnosis: Impaired mobility and ADL's s/p C4-C5, C5-C6 discectomy and fusion r/t cervical myelopathy    Hospital course:   Comments: 73 y.o. male who has progressively worsening problems with balance using a cane after using a walker. Patient has problems with handwriting and using buttons and opening jars and texting and typing with his fingers. He also has some blurriness of his eyes and tremor of the left hand. He has lost 20% of his hand function in the last year. He has good range of motion on cervical spine. On 24 the patient underwent a Cervical C4-C5, C5-C6 discectomy and fusion r/t Cervical myelopathy.      Restrictions  Restrictions/Precautions  Restrictions/Precautions: Fall Risk  Required Braces or Orthoses?: Yes     Patient's date of birth confirmed: Yes    SAFETY:  Safety Devices  Safety Devices in place: Yes  Type of devices: All fall risk precautions in place    SUBJECTIVE: \"I guess I'm out of here.\"    Pain at start of treatment: No    Pain at end of treatment: No    COGNITION:  Orientation  Overall Orientation Status: Within Functional Limits  Cognition  Overall Cognitive Status: WFL    OBJECTIVE:    Instrumental ADL's  Instrumental ADLs: Yes  Health Management  Health Management Level of Assistance: Minimal assistance  Health Management:   Medication Management Simulation Activity:  Patient completed simulation activity utilizing 7 provided medication bottles with written instruction to place a total of 74 beads into the provided weekly medication organizer.   Patient with MIN difficulty opening medication bottles.   Patient with MIN difficulty opening organizer boxes.   Patient placed a total of 66 beads into organizer with MIN

## 2024-06-07 NOTE — PLAN OF CARE
Problem: Safety - Adult  Goal: Free from fall injury  6/7/2024 0941 by Gerda Whitt RN  Outcome: Progressing  6/6/2024 2155 by Helen Sands RN  Outcome: Progressing     Problem: Discharge Planning  Goal: Discharge to home or other facility with appropriate resources  6/7/2024 0941 by Gerda Whitt RN  Outcome: Progressing  6/6/2024 2155 by Helen Sands RN  Outcome: Progressing     Problem: ABCDS Injury Assessment  Goal: Absence of physical injury  6/7/2024 0941 by Gerda Whitt RN  Outcome: Progressing  6/6/2024 2155 by Helen Sands RN  Outcome: Progressing     Problem: Pain  Goal: Verbalizes/displays adequate comfort level or baseline comfort level  6/7/2024 0941 by Gerda Whitt RN  Outcome: Progressing  6/6/2024 2155 by Helen Sands RN  Outcome: Progressing     Problem: Skin/Tissue Integrity  Goal: Absence of new skin breakdown  Description: 1.  Monitor for areas of redness and/or skin breakdown  2.  Assess vascular access sites hourly  3.  Every 4-6 hours minimum:  Change oxygen saturation probe site  4.  Every 4-6 hours:  If on nasal continuous positive airway pressure, respiratory therapy assess nares and determine need for appliance change or resting period.  6/7/2024 0941 by Gerda Whitt RN  Outcome: Progressing  6/6/2024 2155 by Helen Sands RN  Outcome: Progressing     Problem: Chronic Conditions and Co-morbidities  Goal: Patient's chronic conditions and co-morbidity symptoms are monitored and maintained or improved  6/7/2024 0941 by Gerda Whitt RN  Outcome: Progressing  6/6/2024 2155 by Helen Sands RN  Outcome: Progressing     Problem: Falls - Risk of:  Goal: Will remain free from falls  Description: Will remain free from falls  6/7/2024 0941 by Gerda Whitt RN  Outcome: Progressing  6/6/2024 2155 by Helen Sands RN  Outcome: Progressing     Problem: Blood Glucose:  Goal: Ability to maintain appropriate  glucose levels will improve  Description: Ability to maintain appropriate glucose levels will improve  6/7/2024 0941 by Gerda Whitt RN  Outcome: Progressing  6/6/2024 2155 by Helen Sands RN  Outcome: Progressing     Problem: Respiratory - Adult  Goal: Achieves optimal ventilation and oxygenation  6/7/2024 0941 by Gerda Whitt RN  Outcome: Progressing  6/6/2024 2155 by Helen Sands RN  Outcome: Progressing     Problem: Skin/Tissue Integrity - Adult  Goal: Skin integrity remains intact  6/7/2024 0941 by Gerda Whitt RN  Outcome: Progressing  6/6/2024 2155 by Helen Sands RN  Outcome: Progressing  Goal: Incisions, wounds, or drain sites healing without S/S of infection  6/7/2024 0941 by Gerda Whitt RN  Outcome: Progressing  6/6/2024 2155 by Helen Sands RN  Outcome: Progressing     Problem: Musculoskeletal - Adult  Goal: Return mobility to safest level of function  6/7/2024 0941 by Gerda Whitt RN  Outcome: Progressing  6/6/2024 2155 by Helen Sands RN  Outcome: Progressing  Goal: Return ADL status to a safe level of function  6/7/2024 0941 by Gerda Whitt RN  Outcome: Progressing  6/6/2024 2155 by Helen Sands RN  Outcome: Progressing     Problem: Infection - Adult  Goal: Absence of infection at discharge  6/7/2024 0941 by Gerda Whitt RN  Outcome: Progressing  6/6/2024 2155 by Helen Sands RN  Outcome: Progressing  Goal: Absence of infection during hospitalization  6/7/2024 0941 by Gerda Whitt RN  Outcome: Progressing  6/6/2024 2155 by Helen Sands RN  Outcome: Progressing

## 2024-06-07 NOTE — PROGRESS NOTES
Pt not ready for cpap machine yet. Pt stated the nurse was gonna help him with it when he is ready. Told pt if they need help to call me.

## 2024-06-07 NOTE — PROGRESS NOTES
OCCUPATIONAL THERAPY  INPATIENT REHAB TREATMENT NOTE  [facility]      NAME: Junito Harris  : 1950 (73 y.o.)  MRN: [id number]  CODE STATUS: Full Code  Room: R261/R261-01    Date of Service: 2024    Referring Physician: Dr. Carlos  Rehab Diagnosis: Impaired mobility and ADL's s/p C4-C5, C5-C6 discectomy and fusion r/t cervical myelopathy    Hospital course:   Comments: 73 y.o. male who has progressively worsening problems with balance using a cane after using a walker. Patient has problems with handwriting and using buttons and opening jars and texting and typing with his fingers. He also has some blurriness of his eyes and tremor of the left hand. He has lost 20% of his hand function in the last year. He has good range of motion on cervical spine. On 24 the patient underwent a Cervical C4-C5, C5-C6 discectomy and fusion r/t Cervical myelopathy.      Restrictions  Restrictions/Precautions  Restrictions/Precautions: Fall Risk  Required Braces or Orthoses?: Yes     Patient's date of birth confirmed: Yes    SAFETY:  Safety Devices  Safety Devices in place: Yes  Type of devices: All fall risk precautions in place    SUBJECTIVE: \"I was too lax.\" - relaxed in prior therapy sessions and not concentrating on safety    Pain at start of treatment: No    Pain at end of treatment: No    COGNITION:  Orientation  Overall Orientation Status: Within Functional Limits  Cognition  Overall Cognitive Status: WFL    OBJECTIVE:    Feeding  Assistance Level: Supervision;Verbal cues  Skilled Clinical Factors: verbal cues for initiation following verbal instructions and sequencing of activity 2° patient attempting to doff c-collar prior to t-shirt when t-shirt was too tight around c-collar to be successful - patient placed in front of mirror - patient kept eyes open while doffing/donning t-shirt/c-collar    Functional Mobility  Device: Rolling walker  Activity: Retrieve items;Transport items  Assistance Level:  Stand by assist  Skilled Clinical Factors: verbal cues for safety 2° patient attempting to reach for objects before getting closer to the items  Sit to Stand  Assistance Level: Modified independent  Stand to Sit  Assistance Level: Modified independent    ASSESSMENT: Patient pleasant and cooperative with noted increased concentration and safety awareness this morning.    Activity Tolerance: Patient tolerated treatment well      PLAN OF CARE:  Balance training, Functional mobility training, Endurance training, Patient/Caregiver education & training, Safety education & training, Pain management, Equipment evaluation, education, & procurement, Positioning, Self-Care / ADL, Coordination training    Continue per OT POC for updated planned discharge on 6-7-24.    Patient goals : \"Be playing golf by this fall.\"           Therapy Time:   Individual Group Co-Treat   Time In 1030       Time Out 1100         Minutes 30                   Therapeutic activities: 30 minutes     Electronically signed by:    VERENICE Nolan,   6/7/2024, 12:24 PM

## 2024-06-07 NOTE — CARE COORDINATION
LSW spoke with pt and sister (via phone) and relayed what was discussed at IDT meeting. Pt and sister are aware that the cervical collar will be on for 6 weeks post surgery and will be able to come off starting in early July. Both were made aware of concerns for safety awareness at home, although pt did show some improvement today following conversation held with LSW earlier this morning. Pt and sister would both like for pt to go to [facility] so he will get a week of therapy, versus discharging home from rehab on 6/12. Pt at first voiced that he would want to stay longer and then go to SNF, which LSW explained could cause an issue as he would likely not meet Medicare's criteria due to his level of function. Pt and sister verbalized understanding. Pt noted that he has a friend currently at [place], so it would be nice to have that additional support. Sister plans to  pt via family car tomorrow after 2PM if discharge plan remains [facility], as pt would not be extended if that is his end goal. LSW called and left voicemail for PMR. 7000 form will need to be completed by weekend RN Case Manager. Electronically signed by FREYA Anderson, MAURO on 6/7/2024 at 3:09 PM

## 2024-06-07 NOTE — PROGRESS NOTES
INDIVIDUALIZED OVERALL REHAB PLAN OF CARE  ADDENDUM TO REHAB PROGRESS NOTE-for audit purposes must also refer to this day's clinical note and combine the information      Date: 2024  Patient Name: Junito Harris   Room: R261/R261-01    MRN: [id number]    : 1950  (73 y.o.)  Gender: male       Today 2024 during weekly team meeting, I reviewed the patient Junito Harris in detail with the therapists and nurses involved in patient's care gathering complex physiatric data regarding current medical issues, progress in therapies, factors limiting progress, social issues, psychological issues, ongoing therapeutic plans and discharge planning.    Legend:  I= independent Im =Modified independent  S=Supervised SB=stand by VERGARA=set up CG=contact cheyanne Min= minimal Mod=Moderate Max=maximal Max of 2 =maximal assist of 2 people      CURRENT FUNCTIONAL STATUS:    73 y.o. male who has progressively worsening problems with balance using a cane after using a walker. Patient has problems with handwriting and using buttons and opening jars and texting and typing with his fingers. He also has some blurriness of his eyes and tremor of the left hand. He has lost 20% of his hand function in the last year. He has good range of motion on cervical spine. On 24 the patient underwent a Cervical C4-C5, C5-C6 discectomy and fusion r/t Cervical myelopathy.     NURSING ISSUES:   2pm scheduled pain medication, states he is not in pain at the current time.   Nursing will continue to focus on bowel and bladder continence transitioning toward independence by time of discharge.  Monitoring post void residuals monitoring for severe constipation and bowel obstruction.      Barriers to progress and discharge complex medical conditions, severe pain, complex social situations, and bowel/bladder dysfunction      Bowel function- constipation  Plans to address- laxative     Bladder function-  continent    Plans to address- schedule voids

## 2024-06-07 NOTE — PROGRESS NOTES
Physical Therapy Rehab Treatment Note  Facility/Department: [facility]  Room: [email or web address]       NAME: Junito Harris  : 1950 (73 y.o.)  MRN: [id number]  CODE STATUS: Full Code    Date of Service: 2024       Restrictions:  Restrictions/Precautions: Fall Risk  Required Braces or Orthoses  Cervical: c-collar  Position Activity Restriction  Other position/activity restrictions: \"RAMÓN Franklin states pt will need to wear C Collar for 6 weeks\"       SUBJECTIVE:   Subjective: \"You got your wish\"    Pain  Pain: denies pain pre and post tx      OBJECTIVE:     Transfers  Surface: Wheelchair;To chair with arms;From chair with arms  Additional Factors: Verbal cues;Hand placement cues  Device: Walker  Sit to Stand  Assistance Level: Supervision  Skilled Clinical Factors: improves technique and stability throughout session requiring only SBA  Stand to Sit  Assistance Level: Stand by assist  Skilled Clinical Factors: occasional cues for positioning when backing towards chair    Ambulation  Surface: Level surface;Uneven surface  Device: Rolling walker  Distance: 45' x 2, 300'  Additional Factors: Verbal cues  Assistance Level: Stand by assist  Gait Deviations: Decreased step length left;Slow pepe  Skilled Clinical Factors: decreased step through on left with fatigue, increased wb through upper extremities, mild trunk flexion. improved ability to clear L foot this session with less verbal cues provided. good tolerance to increased distance.    PT Exercises  Exercise Treatment: STS x5  PROM Exercises: seated HS/gastroc stretch x3 30 sec hold BLE  A/AROM Exercises: seated AP/LAQ/Marches/Hip Abd/Hip Add x10 BLE  Resistive Exercises: seated GTB HS curls/hip abd x10 BLE  Dynamic Standing Balance Exercises: standing bag toss with one UE support with focus on maintaining balance     Activity Tolerance  Activity Tolerance: Patient tolerated treatment well    ASSESSMENT/PROGRESS TOWARDS GOALS:   Assessment  Assessment: Pt with  good tolerance to session this PM, able to tolerate increased gait distance as well as continue focusing on BLE strengthening. Pt feeling more hopeful with DC plan this PM as opposed to previous session.  Activity Tolerance: Patient tolerated treatment well    Goals:  Long Term Goals  Long Term Goal 1: Pt to complete bed mobility with indep  Long Term Goal 2: Pt to complete transfers with indep  Long Term Goal 3: Pt to ambulate 150ft with LRD and indep  Long Term Goal 4: Pt to manage 12 steps with HR and indep  Long Term Goal 5: Pt to complete HEP    PLAN OF CARE/Safety:   Safety Devices  Type of Devices: All fall risk precautions in place;Chair alarm in place;Left in chair      Therapy Time:   Individual   Time In 1400   Time Out 1500   Minutes 60      Minutes: 60  Transfer/Bed mobility training: 15  Gait trainin  Neuro re education:  Therapeutic ex: 20      Elliott Johnson PTA, 24 at 4:05 PM

## 2024-06-07 NOTE — PROGRESS NOTES
Subjective:   The patient complains of severe acute on chronic progressive fatigue and generalized weakness and severe postop neck pain partially relieved by rest, medications and medication adjustments, PT,  OT,   SLP and rest and exacerbated by recent cervical decompression.      73 y.o. male who has progressively worsening problems with balance using a cane after using a walker. Patient has problems with handwriting and using buttons and opening jars and texting and typing with his fingers. He also has some blurriness of his eyes and tremor of the left hand. He has lost 20% of his hand function in the last year. He has good range of motion on cervical spine. On 5/24/24 the patient underwent a Cervical C4-C5, C5-C6 discectomy and fusion r/t Cervical myelopathy.     I am concerned about patient’s medical complexities and barriers to advancing in rehab goals including control pain at lowest effective dose of pain medication.        I discussed current functional, rehabilitation, medical status with other rehabilitation providers including nursing and case management.  According to recent   note,  \"not ready for cpap machine yet. Pt stated the nurse was gonna help him with it when he is ready. Told pt if they need help to call me. \"     For now patient is changed his mind about discharge wants to go to skilled not home.         ROS x10:  The patient also complains of severely impaired mobility and activities of daily living.  Otherwise no new problems with vision, hearing, nose, mouth, throat, dermal, cardiovascular, GI, , pulmonary, musculoskeletal, psychiatric or neurological. See Rehab H&P on Rehab chart dated .       Vital signs:  BP (!) 152/83   Pulse 66   Temp 98.1 °F (36.7 °C) (Oral)   Resp 18   Ht 1.829 m (6')   Wt 133.3 kg (293 lb 14 oz)   SpO2 94%   BMI 39.86 kg/m²   I/O:   PO/Intake:  fair PO intake, no problems observed or reported.    Bowel/Bladder:  continent, constipation -titrate laxative  rehab with eventual plans for-home alone with HHC.  SP weekly team meeting  every Monday to re-assess progress towards goals, discuss and address social, psychological and medical comorbidities and to address difficulties they may be having progressing in therapy.  Patient and family education is in progress.  The patient is to follow-up with their family physician after discharge.    Family  Tx    6/4/24   pt stated it went well. HHC was discussed and given freedom of choice, pt would like [facility].    Complex Active General Medical Issues that complicated care Assess & Plan:      Severe ETIENNE (obstructive sleep apnea)-Acute rehab for endurance traing with Pulse Ox to monitoring oxygen saturation and heart rate with O2 titration to lowest effective dose. Pulse oximeter checks to shift and at HS to dose and titrate oxygen and aerosol treatments monitor for nocturnal hypoxemia, monitor vital signs, oxygen prn.  Focus on energy conservation.  Difficult to control blood pressure, SSS (sick sinus syndrome),  Paroxysmal atrial fibrillation,   Pure hypercholesterolemia-Acute rehab to monitor heart rate and rhythm with the option of telemetry and the effects of chronotropic medication with respect to increasing physical activity and exercise in PT, OT, ADLs with medication titration to lowest effective dosing.  Continue blood signs every shift focusing on heart rate, rhythm and blood pressure checks with orthostatic checks-monitoring the effect of exercise, therapy and posture.  Consult hospitalist for backup medical and adjust/add medications.  Monitor heart rate and blood pressure as well as medications effects on vital signs before during and after therapy with especial focus on preventing orthostasis and falls risk.  PRN hydralazine, affective with BP returning to 148/91 after administration.      Right renal mass,  Stage 3a chronic kidney disease-Eliminate toxic medications, monitor I's and O's focusing on urine output,

## 2024-06-07 NOTE — PROGRESS NOTES
Assessment completed earlier this shift. Refused need for scheduled Percocet this am, order d/c. Cervical collar aligned and in place. Incision VERENICE, glued. BM today, continent. Up to wheelchair at this time with call light within reach. Plan to discharge tomorrow to [facility]. Electronically signed by Gerda Whitt RN on 6/7/24 at 6:25 PM EDT

## 2024-06-07 NOTE — CARE COORDINATION
LSW met with pt to discuss discharge plan. Pt is aware that there will be a team meeting today to discuss discharge recommendations. Pt did note that if he has to go to a SNF, he is now interested in discharging to [facility]. He noted that it is closer to home and wants to try it. LSW called over a referral and pt was accepted. Pt stated that at this time, he plans to appeal if he is not extended on rehab. LSW provided contact info to Mariel. LSW also called Janiya (sister) and left a voicemail per pt's request. Pt stated that his sister is not good with checking her voicemails. Electronically signed by FREYA Anderson, MAURO on 6/7/2024 at 11:19 AM

## 2024-06-08 VITALS
OXYGEN SATURATION: 94 % | DIASTOLIC BLOOD PRESSURE: 87 MMHG | HEART RATE: 72 BPM | SYSTOLIC BLOOD PRESSURE: 145 MMHG | BODY MASS INDEX: 39.8 KG/M2 | RESPIRATION RATE: 18 BRPM | TEMPERATURE: 97.4 F | HEIGHT: 72 IN | WEIGHT: 293.87 LBS

## 2024-06-08 LAB
ANION GAP SERPL CALCULATED.3IONS-SCNC: 9 MEQ/L (ref 9–15)
BASOPHILS # BLD: 0 K/UL (ref 0–0.2)
BASOPHILS NFR BLD: 0.4 %
BUN SERPL-MCNC: 17 MG/DL (ref 8–23)
CALCIUM SERPL-MCNC: 9.2 MG/DL (ref 8.5–9.9)
CHLORIDE SERPL-SCNC: 108 MEQ/L (ref 95–107)
CO2 SERPL-SCNC: 26 MEQ/L (ref 20–31)
CREAT SERPL-MCNC: 0.94 MG/DL (ref 0.7–1.2)
EOSINOPHIL # BLD: 0.1 K/UL (ref 0–0.7)
EOSINOPHIL NFR BLD: 1.4 %
ERYTHROCYTE [DISTWIDTH] IN BLOOD BY AUTOMATED COUNT: 14 % (ref 11.5–14.5)
GLUCOSE SERPL-MCNC: 94 MG/DL (ref 70–99)
HCT VFR BLD AUTO: 41.7 % (ref 42–52)
HGB BLD-MCNC: 13.5 G/DL (ref 14–18)
LYMPHOCYTES # BLD: 1.1 K/UL (ref 1–4.8)
LYMPHOCYTES NFR BLD: 14.9 %
MCH RBC QN AUTO: 28.1 PG (ref 27–31.3)
MCHC RBC AUTO-ENTMCNC: 32.4 % (ref 33–37)
MCV RBC AUTO: 86.7 FL (ref 79–92.2)
MONOCYTES # BLD: 0.6 K/UL (ref 0.2–0.8)
MONOCYTES NFR BLD: 7.6 %
NEUTROPHILS # BLD: 5.7 K/UL (ref 1.4–6.5)
NEUTS SEG NFR BLD: 74.9 %
PLATELET # BLD AUTO: 186 K/UL (ref 130–400)
POTASSIUM SERPL-SCNC: 3.8 MEQ/L (ref 3.4–4.9)
RBC # BLD AUTO: 4.81 M/UL (ref 4.7–6.1)
SODIUM SERPL-SCNC: 143 MEQ/L (ref 135–144)
WBC # BLD AUTO: 7.7 K/UL (ref 4.8–10.8)

## 2024-06-08 PROCEDURE — 97535 SELF CARE MNGMENT TRAINING: CPT

## 2024-06-08 PROCEDURE — 97116 GAIT TRAINING THERAPY: CPT

## 2024-06-08 PROCEDURE — 80048 BASIC METABOLIC PNL TOTAL CA: CPT

## 2024-06-08 PROCEDURE — 6370000000 HC RX 637 (ALT 250 FOR IP): Performed by: INTERNAL MEDICINE

## 2024-06-08 PROCEDURE — 99239 HOSP IP/OBS DSCHRG MGMT >30: CPT | Performed by: PHYSICAL MEDICINE & REHABILITATION

## 2024-06-08 PROCEDURE — 6370000000 HC RX 637 (ALT 250 FOR IP): Performed by: NEUROLOGICAL SURGERY

## 2024-06-08 PROCEDURE — 85025 COMPLETE CBC W/AUTO DIFF WBC: CPT

## 2024-06-08 PROCEDURE — 36415 COLL VENOUS BLD VENIPUNCTURE: CPT

## 2024-06-08 PROCEDURE — 6370000000 HC RX 637 (ALT 250 FOR IP): Performed by: PHYSICAL MEDICINE & REHABILITATION

## 2024-06-08 RX ORDER — OXYCODONE HYDROCHLORIDE 10 MG/1
10 TABLET ORAL EVERY 4 HOURS PRN
Qty: 10 TABLET | Refills: 0 | Status: SHIPPED | OUTPATIENT
Start: 2024-06-08 | End: 2024-06-08

## 2024-06-08 RX ORDER — OXYCODONE HYDROCHLORIDE 10 MG/1
10 TABLET ORAL EVERY 4 HOURS PRN
Qty: 10 TABLET | Refills: 0 | Status: SHIPPED | OUTPATIENT
Start: 2024-06-08 | End: 2024-06-11

## 2024-06-08 RX ADMIN — AMLODIPINE BESYLATE 2.5 MG: 2.5 TABLET ORAL at 09:30

## 2024-06-08 RX ADMIN — BUPROPION HYDROCHLORIDE 300 MG: 300 TABLET, EXTENDED RELEASE ORAL at 09:30

## 2024-06-08 RX ADMIN — METOPROLOL TARTRATE 25 MG: 25 TABLET, FILM COATED ORAL at 09:30

## 2024-06-08 RX ADMIN — VALSARTAN 160 MG: 160 TABLET, FILM COATED ORAL at 09:30

## 2024-06-08 RX ADMIN — Medication 100 MG: at 09:30

## 2024-06-08 RX ADMIN — LEVOTHYROXINE SODIUM 25 MCG: 0.03 TABLET ORAL at 06:11

## 2024-06-08 NOTE — PROGRESS NOTES
Assessment completed. VSS. Pt denies any pain. Cervical collar continues to be aligned and in place. Incision VERENICE, glued, no drainage. Medications given per MAR. Pt denies any needs or wants at this time. Bed alarms on, call light in reach, bed locked and in lowest position.

## 2024-06-08 NOTE — DISCHARGE SUMMARY
Subjective:   The patient complains of severe acute on chronic progressive fatigue and generalized weakness and severe postop neck pain partially relieved by rest, medications and medication adjustments, PT,  OT,   SLP and rest and exacerbated by recent cervical decompression.      73 y.o. male who has progressively worsening problems with balance using a cane after using a walker. Patient has problems with handwriting and using buttons and opening jars and texting and typing with his fingers. He also has some blurriness of his eyes and tremor of the left hand. He has lost 20% of his hand function in the last year. He has good range of motion on cervical spine. On 5/24/24 the patient underwent a Cervical C4-C5, C5-C6 discectomy and fusion r/t Cervical myelopathy.     I am concerned about patient’s medical complexities and barriers to advancing in rehab goals including control pain at lowest effective dose of pain medication.        I discussed current functional, rehabilitation, medical status with other rehabilitation providers including nursing and case management.  According to recent   note,  \" VSS. Pt denies any pain. Cervical collar continues to be aligned and in place. Incision VERENICE, glued, no drainage. Medications given per MAR. Pt denies any needs or wants at this time. Bed alarms on, call light in reach, bed locked and in lowest position. \"       Hospital Course: The patient was admitted to the Rehabilitation Unit to address medical, ADL and mobility deficits as detailed above and below.  The patient was enrolled in acute therapy program including but not limited to PT, OT program.  Weekly team meetings were held to assess functional progress toward their goals.  The patient's medical issues were addressed daily.  The patient progressed in the rehab program and is now ready for discharge.  Refer to FIM scores summary report for detailed functional status.  Patient was enrolled in an acute course Rehab

## 2024-06-08 NOTE — PROGRESS NOTES
Physical Therapy Rehab Treatment Note  Facility/Department: [facility]  Room: [place] [address]       NAME: Junito Harris  : 1950 (73 y.o.)  MRN: [id number]  CODE STATUS: Full Code    Date of Service: 2024       Restrictions:  Restrictions/Precautions: Fall Risk  Required Braces or Orthoses  Cervical: c-collar       SUBJECTIVE:   Subjective: \"I hope I don't stay at [facility] very long\"    Pain  Pain: denies pain      OBJECTIVE:   Orientation  Overall Orientation Status: Within Functional Limits  Cognition  Overall Cognitive Status: WFL         Bed Mobility  Overall Assistance Level: Stand By Assist  Additional Factors: Verbal cues;Head of bed flat;Increased time to complete;Without handrails  Roll Left  Assistance Level: Stand by assist  Roll Right  Assistance Level: Stand by assist  Sit to Supine  Assistance Level: Stand by assist  Supine to Sit  Assistance Level: Stand by assist  Scooting  Assistance Level: Stand by assist    Transfers  Surface: Wheelchair;To chair with arms;From chair with arms  Additional Factors: Verbal cues;Hand placement cues  Device: Walker  Sit to Stand  Assistance Level: Supervision  Stand to Sit  Assistance Level: Supervision;Stand by assist  Bed To/From Chair  Technique: Stand pivot  Assistance Level: Moderate assistance  Stand Pivot  Assistance Level: Moderate assistance    Ambulation  Surface: Level surface;Uneven surface  Device: Rolling walker  Distance: 225 feet  Additional Factors: Verbal cues  Assistance Level: Stand by assist  Gait Deviations: Decreased step length left;Slow pepe  Skilled Clinical Factors: decreased step through on left with fatigue    Stairs  Stair Height: 6''  Device: Bilateral handrails  Additional Factors: Non-reciprocal going down;Non-reciprocal going up;Verbal cues;Hand placement cues;Increased time to complete  Assistance Level: Stand by assist         Balance  Sitting Balance: Supervision  Standing Balance: Supervision

## 2024-06-08 NOTE — PROGRESS NOTES
Assessment completed. A&O x4. Denies pain at this time. C collar on and aligned. Incision to anterior neck is dry and intact. Incision VERENICE. Pt scheduled for dc this afternoon. In chair with alarm activated. Call light in reach. Electronically signed by Raul Sharif LPN on 6/8/2024 at 12:18 PM

## 2024-06-08 NOTE — PROGRESS NOTES
Assessment completed. A&O x4. Denies pain at this time. C collar on and aligned. Incision to anterior neck is dry and intact. Incision VERENICE. Pt scheduled for dc this afternoon. In chair with alarm activated. Call light in reach. Electronically signed by Raul Sharif LPN on 6/8/2024 at 12:18 PM      Report called to [place] [facility]. Pt did want this nurse to go over meds he is being dc'd with.

## 2024-06-10 NOTE — PROGRESS NOTES
cues    Orientation Status:  Orientation  Overall Orientation Status: Within Functional Limits  Orientation Level: Oriented X4  Orientation  Overall Orientation Status: Within Functional Limits    Cognition Status:  Cognition  Overall Cognitive Status: WFL  Cognition Comment: Comprehension: Supervision   Expression: IND   Social Interction: IND   Problem Solving: Supervision   Memory: Supervision    Perception Status:  Perception  Overall Perceptual Status: WFL    Sensation Status:  Sensation  Overall Sensation Status: WFL    Vision and Hearing Status:  Vision  Vision Exceptions: Wears glasses for reading  Hearing  Hearing: Exceptions to WFL  Hearing Exceptions: Bilateral hearing aid     UE Function Status:    ROM:   LUE AROM (degrees)  LUE AROM : WFL  Left Hand AROM (degrees)  Left Hand AROM: WFL  RUE AROM (degrees)  RUE AROM : WFL  Right Hand AROM (degrees)  Right Hand AROM: WFL    Strength:  LUE Strength  Gross LUE Strength: WFL  RUE Strength  Gross RUE Strength: WFL    Coordination, Tone, Quality of Movement:   Tone RUE  RUE Tone: Normotonic  Tone LUE  LUE Tone: Normotonic  Coordination  Movements Are Fluid And Coordinated: No  Coordination and Movement Description: Decreased speed    D/C Recommendations:    Equipment Recommendations:  OT D/C Equipment  Equipment Needed: No    OT Follow Up:  OT D/C RECOMMENDATIONS  REQUIRES OT FOLLOW-UP: Yes  Type: Skilled/TCU    Home Exercise Program Provided: [x] Yes [] No  If yes, type of HEP: UE strengthening HEP     Electronically signed by:    GONZALO Mcintyre/L,    6/10/2024, 8:27 AM

## 2024-06-10 NOTE — PROGRESS NOTES
Physical Therapy  Facility/Department: [facility]  Rehabilitation Discharge note    NAME: Junito Harris  : 1950  MRN: [id number]    Date of discharge: 28        Past Medical History:   Diagnosis Date    Arthritis     Colitis     COPD (chronic obstructive pulmonary disease) (HCC)     Crohn disease (HCC)     Encephalopathy     Hyperlipidemia     Hypertension     Hypothyroidism     ETIENNE (obstructive sleep apnea)     Pacemaker     Paroxysmal atrial fibrillation (HCC) 2018    Persistent atrial fibrillation (HCC) 2018    Sick sinus syndrome (HCC)     Third degree AV block (HCC)     Type II or unspecified type diabetes mellitus without mention of complication, not stated as uncontrolled      Past Surgical History:   Procedure Laterality Date    ANTERIOR CRUCIATE LIGAMENT REPAIR      CARDIAC PACEMAKER PLACEMENT      CERVICAL FUSION N/A 2024    CERVICAL 4-5 AND 5-6 ANTERIOR DISCECTOMY FUSION AUTOGRAFT, ALLOGRAFT MICRODISSECTION, FLUOROSCOPY, SPINAL MONITORING, PREOPERATIVE STEREOTACTIC PLANNING , INSERTION PLATE,  SCREWS, INTERBODY CAGES. performed by Turner Alexander MD at [facility] OR    COLONOSCOPY  2019    DR BONILLA    GALLBLADDER SURGERY      PACEMAKER PLACEMENT  07/10/2018    [facility] DO    SIGMOIDOSCOPY  2017    DR. BONILLA    SIGMOIDOSCOPY  2019    DR BONILLA    TOE SURGERY      TONSILLECTOMY      TOTAL KNEE ARTHROPLASTY Right 2023    WRIST SURGERY         Restrictions  Restrictions/Precautions  Restrictions/Precautions: Fall Risk  Required Braces or Orthoses?: Yes  Required Braces or Orthoses  Cervical: c-collar  Position Activity Restriction  Other position/activity restrictions: \"RAMÓN [place] states pt will need to wear C Collar for 6 weeks\"    Objective      Bed Mobility  Overall Assistance Level: Stand By Assist  Additional Factors: Verbal cues;Head of bed flat;Increased time to complete;Without handrails  Roll Left  Assistance Level: Stand by

## 2024-06-11 ENCOUNTER — NURSING HOME VISIT (OUTPATIENT)
Dept: POST ACUTE CARE | Facility: EXTERNAL LOCATION | Age: 74
End: 2024-06-11
Payer: COMMERCIAL

## 2024-06-11 DIAGNOSIS — N18.32 CHRONIC KIDNEY DISEASE, STAGE 3B (MULTI): ICD-10-CM

## 2024-06-11 DIAGNOSIS — E03.9 ACQUIRED HYPOTHYROIDISM: ICD-10-CM

## 2024-06-11 DIAGNOSIS — R53.81 PHYSICAL DECONDITIONING: ICD-10-CM

## 2024-06-11 DIAGNOSIS — I48.0 PAROXYSMAL ATRIAL FIBRILLATION (MULTI): ICD-10-CM

## 2024-06-11 DIAGNOSIS — I10 BENIGN ESSENTIAL HYPERTENSION: Primary | ICD-10-CM

## 2024-06-11 DIAGNOSIS — Z98.890 S/P CERVICAL DISCECTOMY: ICD-10-CM

## 2024-06-11 DIAGNOSIS — Z95.0 CARDIAC PACEMAKER: ICD-10-CM

## 2024-06-11 DIAGNOSIS — G95.9 CERVICAL MYELOPATHY (MULTI): ICD-10-CM

## 2024-06-11 DIAGNOSIS — E78.00 PURE HYPERCHOLESTEROLEMIA: ICD-10-CM

## 2024-06-11 DIAGNOSIS — K51.919 ULCERATIVE COLITIS WITH COMPLICATION, UNSPECIFIED LOCATION (MULTI): ICD-10-CM

## 2024-06-11 DIAGNOSIS — E11.22 TYPE 2 DIABETES MELLITUS WITH CHRONIC KIDNEY DISEASE, WITHOUT LONG-TERM CURRENT USE OF INSULIN, UNSPECIFIED CKD STAGE (MULTI): ICD-10-CM

## 2024-06-11 NOTE — LETTER
"Patient: Sami Hernandez  : 1950    Encounter Date: 2024    Name: Anjum Hernandez \"Ruthie"  YOB: 1950    INITIAL NURSE PRACTITIONER FOLLOW UP VISIT: SNF, Cervical Myelopathy    HPI   The patient is a 73-year-old male who is here at [facility] for skilled care after a recent hospitalization for cervical myelopathy and recent cervical C4-C5, C5-C6 discectomy and fusion on May 24, 2024.  The patient had been experiencing worsening symptoms and problems with balance using a cane and a walker.  He was having difficulty with handwriting buttoning and opening jars.  Patient also had complained of blurriness to his eyes as well as a tremor of the left hand losing 20% of his function in the hand over the last year.  After the patient's hospitalization he was admitted into the rehab unit working on ADLs and mobility deficits.  The patient is now here for PT and OT.  Past medical history of hypertension, CKD 3, hypothyroidism, anxiety, major depressive disorder, pacemaker, COPD, hyperlipidemia, Afib, obesity, ulcerative colitis, osteoarthritis, and Type 2 DM.  He is up in his room sitting in bedside chair.  Patient appears to be in no acute distress.  He denies headache, vertigo, chest pain, shortness of breath, abdominal pain, nausea vomiting, bowel or urinary symptoms.  Patient denies any surgical pain at this time.  Patient is alert and oriented and pleasant.    REVIEW OF SYSTEMS:  Review of systems are negative except where noted in HPI.    /82   Pulse 75   Temp 36.6 °C (97.9 °F)   Resp 18   Ht 1.829 m (6')   Wt 128 kg (282 lb)   SpO2 97% Comment: RA  BMI 38.25 kg/m²      Physical Exam  Constitutional:       Appearance: Normal appearance. He is normal weight.   HENT:      Head: Normocephalic.      Right Ear: External ear normal.      Left Ear: External ear normal.      Nose: Nose normal.      Mouth/Throat:      Mouth: Mucous membranes are moist.   Eyes:      Extraocular " Movements: Extraocular movements intact.      Conjunctiva/sclera: Conjunctivae normal.      Pupils: Pupils are equal, round, and reactive to light.   Cardiovascular:      Rate and Rhythm: Normal rate and regular rhythm.      Pulses: Normal pulses.      Heart sounds: Normal heart sounds.   Pulmonary:      Effort: Pulmonary effort is normal.      Breath sounds: Normal breath sounds.   Abdominal:      General: Bowel sounds are normal. There is no distension.      Palpations: Abdomen is soft.      Tenderness: There is no abdominal tenderness.   Genitourinary:     Comments: Not examined  Musculoskeletal:         General: Normal range of motion.      Cervical back: Normal range of motion and neck supple.      Comments: Generalized weakness   Skin:     General: Skin is warm and dry.      Capillary Refill: Capillary refill takes less than 2 seconds.      Comments: Surgical incision to neck clean and dry, cervical neck brace in place   Neurological:      General: No focal deficit present.      Mental Status: He is alert and oriented to person, place, and time. Mental status is at baseline.   Psychiatric:         Mood and Affect: Mood normal.         Behavior: Behavior normal.         Thought Content: Thought content normal.         Judgment: Judgment normal.          ADVANCED CARE PLANNING: Discussion done with the patient and family if available regarding code status, diagnosis, and the prognosis of the patient.     CODE STATUS: Full code    DISCHARGE PLANNING:  Patient will be discharge home when goals are met.   Discharge planner to communicate ongoing updates regarding discharge plan.     RECENT HOSPITALIZATION DATES:   [facility] and rehab  Surgery May 24, 2024  All laboratories, diagnostic tests, progress notes, consultation notes, and discharge notes reviewed from recent hospitalization.     No visits with results within 1 Week(s) from this visit.   Latest known visit with results is:   Orders Only on 05/15/2024    Component Date Value Ref Range Status   • NON- HIE Platelet Function (Aspi* 05/15/2024 648  ARU Final    Comment: VerifyNow Aspirin Test:  VerifyNow Aspirin test results are reported in Aspirin Reaction Units (ARU) and should be interpreted in conjunction with other clinical and laboratory data available.  Reference range for patients NOT on aspirin is 550-672 ARU  > or = 550 ARU ? Platelet dysfunction consistent with aspirin has NOT been detected  < 550 ARU ? Platelet dysfunction consistent with aspirin has been detected         LABORATORIES OR DIAGNOSTIC TESTS DONE/REVIEWED IN FACILITY:  6/10/24  CMP-COMPREHENSIVE METABOLIC PNL CHANCE  GLUCOSE 79 mg/dL  GLUCOSE, FASTING 65-99 mg/dL  GLUCOSE, NON-FASTING  mg/dL  SODIUM 144 136-145 mEq/L  POTASSIUM 4.1 3.5-5.3 mEq/L  CHLORIDE 105  mEq/L  CARBON DIOXIDE (CO2) 31 21-33 mEq/L  BUN (UREA NITROGEN) 13 7-25 mg/dL  CREATININE 1.0 0.6-1.2 mg/dL  BUN/CREATININE RATIO 13 6-22  GFR- 89 >60 mL/min/1.73 m2  GFR-NON-AFRICAN AMERICAN 73 >60 mL/min/1.73 m2   Stage of CKD eGFR (mL/min/1.73 square meters)   Stage 1 >/= 90 or > 90   Stage 2 60 - 89   Stage 3 30 - 59   Stage 4 15 - 29   Stage 5 </= 14 or < 15  ** GFR is reliable for adults 17 to 69 years with stable kidney   function.  CALCIUM 9.5 8.4-10.2 mg/dL  PROTEIN, TOTAL 5.7~ L 6.0-8.3 g/dL  ALBUMIN 3.6 3.5-5.5 g/dL  A/G RATIO 1.7 1.0-2.3  ALKALINE PHOS 74  IU/L  AST (SGOT) 15 4-40 IU/L  ALT (SGPT) 21 4-55 IU/L  BILIRUBIN, TOTAL 0.4 0.2-1.2 mg/dL  CBC W/DIFF CHANCE  WBC 7.2 4.5-10.8 K/cmm  RBC 4.56 4.00-6.60 M/cmm  HEMOGLOBIN 13.6~ L 14.0-18.0 g/dL  HEMATOCRIT 40.2~ L 42.0-54.0 %  MCV 88.1 80.0-100.0 fL  MCH 29.9 26.0-35.0 pg  MCHC 33.9 31.0-36.5 g/dL  RDW 15.1 11.0-16.0 %  PLATELET 192 150-450 K/cmm  MPV 8.4 6.5-12.0 fL  NEUTROPHILS 73.9 40.0-80.0 %  LYMPHS 15.1 13.0-48.0 %  MONOCYTES 8.5 2.0-12.0 %  EOS 1.8 0.0-8.0 %  BASO 0.7 0.0-2.0 %  NEUTS (ABSOLUTE) 5.40 1.50-7.60 K/uL  LYMPHS (ABSOLUTE)  1.10 0.90-5.50 K/uL  MONOCYTES (ABSOLUTE) 0.60 0.15-1.10 K/uL  EOS (ABSOLUTE) 0.10~ L 0.20-0.80 K/uL    Allergies   Allergen Reactions   • Azathioprine Diarrhea, Nausea And Vomiting, Other, Unknown and Nausea/vomiting     Vomiting and Nausea    severe nausea/vomiting   • Clindamycin Diarrhea, Other and Unknown     severe nausea/vomiting    Caused CDiff   Caused CDiff       MEDICATION LIST:  Reviewed and reconciled  Fenofibrate 67 mg nightly, atorvastatin 20 mg nightly, losartan 100 mg daily, Xeljanz 5 mg 2 tabs twice a day, CQ 10 100 mg daily, rivaroxaban 20 mg daily, vitamin D 2000 units daily, Ventolin inhaler 2 puffs every 4 hours as needed, valsartan 160 mg daily, rosuvastatin 10 mg nightly, mirtazapine 45 mg at bedtime, metoprolol tartrate 25 mg twice daily, levothyroxine 25 mcg daily, Wellbutrin 300 mg daily, oxycodone 10 mg every 4 hours as needed, Colace 100 mg daily, amlodipine 5 mg daily    MEDICATIONS REVIEWED AT THE FACILITY:  Please see Nursing facility Medication EMR for full updated list.    Assessment/Plan   Problem List Items Addressed This Visit       Acquired hypothyroidism    Benign essential hypertension - Primary    Relevant Medications    losartan (Cozaar) 100 mg tablet    Cardiac pacemaker    Pure hypercholesterolemia    Ulcerative colitis (Multi)    Paroxysmal atrial fibrillation (Multi)    Physical deconditioning    Type 2 diabetes mellitus with chronic kidney disease, without long-term current use of insulin, unspecified CKD stage (Multi)    Cervical myelopathy (Multi)    Chronic kidney disease, stage 3b (Multi)     Other Visit Diagnoses       S/P cervical discectomy                Skin Integrity:  Nursing to monitor skin integrity as patient is at risk for pressure injuries.  Wound care per nursing  Surgical neck incision  Turn and reposition Q 2 hours or more.  Air mattress and when up in chair cushion reducing device.  Dietician to evaluate and recommend.  Nutritional supplement to be  implemented if needed.  Please monitor skin integrity and other pressure areas.  Referral to wound clinic or wound team here at the facility to follow if appropriate.    PLAN:   Recent nursing evaluation and notes were reviewed.   # Cervical myelopathy, status post cervical discectomy and fusion, Weakness and physical deconditioning: PT/OT/ST to maximize strength, function, and endurance all while maintaining safety.   #Afib: Rivaroxaban and metoprolol tartrate maintained  #HTN/CKD: Monitor blood pressure readings, continue losartan, amlodipine, and metoprolol tartrate and follow labs  Labs reviewed from [place] 10, 2024  #DM2: Blood sugar to be monitored daily  Blood sugar today 91 patient is currently on no medications  # Hypothyroid: Continue levothyroxine  #CAD/HLD: Continue atorvastatin, fenofibrate, and Q enzyme 10  # Ulcerative colitis: Continue Xeljanz  #COPD/GEORGE: Continue albuterol as needed and CPAP  # Depression/anxiety: Continue bupropion and mirtazapine  Any decline or change in condition needs to be communicated with the physician or myself.    Discussion with nursing staff regarding ongoing care and management.  Communication regarding patients status, overall condition, changes with plan (medications or treatments), and any questions from family completed if present.  If family not available, would communicate in person or via phone if needed.   We will continue with the plan and medications noted above.    We will continue to follow the patient here at the facility.    *Please note that nursing facility, outside laboratory agency, and [facility] do not interface.     Completion of the note was done through Dragon voice recognition technology and may include   unintended or grammatical errors which may not have been recognized when finalizing the note.     Time: I spent 45 minutes or greater with the patient. Greater than 50% of this time was spent in counseling and or coordination of care. The time  includes prep time of reviewing vital signs, report from direct nursing staff and or therapists, hospital documentation, reviewing labs, radiographs, diagnostic tests and or consultations, time directly spent with the patient interviewing, examining, and education regarding diagnosis, treatments, and medications, as well as documentation in the electronic medical record, and reviewing the plan of care and any new orders with the patient, nursing staff and other staff directly related to the patients care.       RODDY Pierson       Electronically Signed By: RODDY Pierson   6/21/24  8:30 PM

## 2024-06-14 ENCOUNTER — TELEPHONE (OUTPATIENT)
Dept: PRIMARY CARE | Facility: CLINIC | Age: 74
End: 2024-06-14
Payer: COMMERCIAL

## 2024-06-14 ENCOUNTER — NURSING HOME VISIT (OUTPATIENT)
Dept: POST ACUTE CARE | Facility: EXTERNAL LOCATION | Age: 74
End: 2024-06-14
Payer: COMMERCIAL

## 2024-06-14 VITALS
RESPIRATION RATE: 18 BRPM | DIASTOLIC BLOOD PRESSURE: 82 MMHG | BODY MASS INDEX: 38.19 KG/M2 | SYSTOLIC BLOOD PRESSURE: 129 MMHG | OXYGEN SATURATION: 97 % | TEMPERATURE: 97.9 F | WEIGHT: 282 LBS | HEART RATE: 75 BPM | HEIGHT: 72 IN

## 2024-06-14 DIAGNOSIS — E11.22 TYPE 2 DIABETES MELLITUS WITH CHRONIC KIDNEY DISEASE, WITHOUT LONG-TERM CURRENT USE OF INSULIN, UNSPECIFIED CKD STAGE (MULTI): ICD-10-CM

## 2024-06-14 DIAGNOSIS — J44.9 COPD, MILD (MULTI): ICD-10-CM

## 2024-06-14 DIAGNOSIS — Z95.0 CARDIAC PACEMAKER: ICD-10-CM

## 2024-06-14 DIAGNOSIS — G95.9 CERVICAL MYELOPATHY (MULTI): Primary | ICD-10-CM

## 2024-06-14 DIAGNOSIS — Z98.1 S/P CERVICAL SPINAL FUSION: ICD-10-CM

## 2024-06-14 DIAGNOSIS — N18.32 CHRONIC KIDNEY DISEASE, STAGE 3B (MULTI): ICD-10-CM

## 2024-06-14 DIAGNOSIS — G47.33 OBSTRUCTIVE SLEEP APNEA, ADULT: ICD-10-CM

## 2024-06-14 DIAGNOSIS — I48.0 PAROXYSMAL ATRIAL FIBRILLATION (MULTI): ICD-10-CM

## 2024-06-14 DIAGNOSIS — I10 BENIGN ESSENTIAL HYPERTENSION: ICD-10-CM

## 2024-06-14 DIAGNOSIS — E66.01 CLASS 2 SEVERE OBESITY DUE TO EXCESS CALORIES WITH SERIOUS COMORBIDITY AND BODY MASS INDEX (BMI) OF 38.0 TO 38.9 IN ADULT (MULTI): ICD-10-CM

## 2024-06-14 DIAGNOSIS — R53.81 PHYSICAL DECONDITIONING: ICD-10-CM

## 2024-06-14 DIAGNOSIS — F32.5 MAJOR DEPRESSIVE DISORDER IN FULL REMISSION, UNSPECIFIED WHETHER RECURRENT (CMS-HCC): ICD-10-CM

## 2024-06-14 DIAGNOSIS — E03.9 ACQUIRED HYPOTHYROIDISM: ICD-10-CM

## 2024-06-14 DIAGNOSIS — K51.919 ULCERATIVE COLITIS WITH COMPLICATION, UNSPECIFIED LOCATION (MULTI): ICD-10-CM

## 2024-06-14 DIAGNOSIS — F41.9 ANXIETY: ICD-10-CM

## 2024-06-14 RX ORDER — FENOFIBRATE 48 MG/1
1 TABLET, FILM COATED ORAL DAILY
COMMUNITY
End: 2024-06-18 | Stop reason: SDUPTHER

## 2024-06-14 RX ORDER — AMLODIPINE BESYLATE 5 MG/1
1 TABLET ORAL DAILY
COMMUNITY
Start: 2024-06-06 | End: 2024-06-18 | Stop reason: SDUPTHER

## 2024-06-14 RX ORDER — NIACIN (INOSITOL NIACINATE) 400(500MG)
1 CAPSULE ORAL DAILY
COMMUNITY
Start: 2024-06-04

## 2024-06-14 RX ORDER — ATORVASTATIN CALCIUM 20 MG/1
20 TABLET, FILM COATED ORAL DAILY
COMMUNITY

## 2024-06-14 RX ORDER — LOSARTAN POTASSIUM 100 MG/1
100 TABLET ORAL DAILY
Qty: 100 TABLET | Refills: 3 | Status: SHIPPED | OUTPATIENT
Start: 2024-06-14 | End: 2025-07-19

## 2024-06-14 NOTE — LETTER
"Patient: Sami Hernandez  : 1950    Encounter Date: 2024    Name: Anjum Matson"  YOB: 1950    DISCHARGE VISIT: SNF, Cervical Myelopathy     SUBJECTIVE:  The patient is up in his room sitting up in his chair.  Patient appears to be doing well.  Patient has no complaints of chest pain or shortness of breath and denies any surgical postop pain.  Discussion with Anjum Matson" and family/friend representative if present regarding discharge - follow up with PCP and other speciality physicians as instructed or as scheduled, compliance with home medications, and safety within the home.   Nursing to review discharge instructions prior to discharging home.     REVIEW OF SYSTEMS:   All review of systems are negative unless otherwise stated above under subjective.    LABS REVIEWED AT FACILITY:  See previous visit for recent labs    Allergies   Allergen Reactions   • Azathioprine Diarrhea, Nausea And Vomiting, Other, Unknown and Nausea/vomiting     Vomiting and Nausea    severe nausea/vomiting   • Clindamycin Diarrhea, Other and Unknown     severe nausea/vomiting    Caused CDiff   Caused CDiff       Medication list for home reviewed in Med Management.    OARRS Report: If indicated, I have personally reviewed the OARRS report for Anjum Matson" and I have considered the risks of abuse, dependence, addiction, and diversion.    Living will related issues reviewed-Code status: Full code    OBJECTIVE:  /79   Pulse 89   Temp 36.5 °C (97.7 °F)   Resp 18   Ht 1.829 m (6')   Wt 128 kg (282 lb)   SpO2 97% Comment: Room air  BMI 38.25 kg/m²   Physical Exam  Constitutional:       Appearance: Normal appearance. He is normal weight.   HENT:      Head: Normocephalic.      Right Ear: External ear normal.      Left Ear: External ear normal.      Nose: Nose normal.      Mouth/Throat:      Mouth: Mucous membranes are moist.   Eyes:      Extraocular Movements: Extraocular " movements intact.      Conjunctiva/sclera: Conjunctivae normal.      Pupils: Pupils are equal, round, and reactive to light.   Cardiovascular:      Rate and Rhythm: Normal rate and regular rhythm.      Pulses: Normal pulses.      Heart sounds: Normal heart sounds.   Pulmonary:      Effort: Pulmonary effort is normal.      Breath sounds: Normal breath sounds.   Abdominal:      General: Bowel sounds are normal. There is no distension.      Palpations: Abdomen is soft.      Tenderness: There is no abdominal tenderness.   Genitourinary:     Comments: Not examined  Musculoskeletal:         General: Normal range of motion.      Cervical back: Normal range of motion and neck supple.      Comments: Generalized weakness   Skin:     General: Skin is warm and dry.      Capillary Refill: Capillary refill takes less than 2 seconds.      Comments: Surgical incision to neck clean and dry, cervical neck brace in place   Neurological:      General: No focal deficit present.      Mental Status: He is alert and oriented to person, place, and time. Mental status is at baseline.   Psychiatric:         Mood and Affect: Mood normal.         Behavior: Behavior normal.         Thought Content: Thought content normal.         Judgment: Judgment normal.      Assessment/Plan  Problem List Items Addressed This Visit       Acquired hypothyroidism    Anxiety    Benign essential hypertension    Cardiac pacemaker    COPD, mild (Multi)    Major depressive disorder in full remission (CMS-[facility])    Class 2 severe obesity due to excess calories with serious comorbidity and body mass index (BMI) of 38.0 to 38.9 in adult (Multi)    Obstructive sleep apnea, adult    Ulcerative colitis (Multi)    Paroxysmal atrial fibrillation (Multi)    Physical deconditioning    Type 2 diabetes mellitus with chronic kidney disease, without long-term current use of insulin, unspecified CKD stage (Multi)    Cervical myelopathy (Multi) - Primary    Chronic kidney disease,  "stage 3b (Multi)     Other Visit Diagnoses       S/P cervical spinal fusion                Skin integrity:  Nursing to monitor skin integrity as patient is at risk for pressure injuries.  Wound care per nursing  Cervical neck  See Facility notes for measurements/assessment of wound.  Turn and reposition Q 2 hours or more.  Air mattress and when up in chair cushion reducing device.  Dietician to evaluate and recommend.  Nutritional supplement to be implemented if needed.  Please monitor skin integrity and other pressure areas.  Referral to wound clinic or wound team at facility to follow if appropriate.    PLAN:  Pt has been seen for discharge visit.  The patient has been here at facility for PT/OT/ST to maximize strength, function, endurance and safety.  Recent therapy notes reviewed.  The patient is participating in therapy.   Anjum Hernandez \"Sami\" is making progress to the best of his/her ability.   Please see PT/OT/ST notes in the facility for detailed information regarding progression of patients progress.   Recent nursing evaluation and notes were reviewed.   Medication list reviewed here at the facility.   Overall, patient is stable despite his/her chronic conditions and ok for discharge home.   #Cervical myelopathy, status post cervical discectomy and fusion, Weakness and physical deconditioning: PT/OT/ST to maximize strength, function, and endurance all while maintaining safety.   #Afib: Rivaroxaban and metoprolol tartrate maintained  #HTN/CKD: Monitor blood pressure readings, continue losartan, amlodipine, and metoprolol tartrate and follow labs  Blood pressure 130s over 70s  Labs reviewed from Darya 10, 2024-BUN 13, creatinine 1 and GFR 73  #DM2: Blood sugar to be monitored daily  Blood sugar today 118 patient is currently on no medications  # Hypothyroid: Continue levothyroxine  #CAD/HLD: Continue atorvastatin, fenofibrate, and Q enzyme 10  # Ulcerative colitis: Continue Xeljanz  #COPD/GEORGE: Respiratory " status stable, continue albuterol as needed and CPAP  # Depression/anxiety: Mood stable, continue bupropion and mirtazapine  Any decline or change in condition needs to be communicated with the physician or myself.  Discussion with nursing staff regarding ongoing care, management, and discharge planning.   Communication regarding patients status, overall condition, changes with plan (medications or treatments), and any questions from family completed if present.  If family not available, would communicate in person or via phone if needed.   We will continue with the plan and medications noted above until discharged home.    We will continue to follow the patient here at the facility until discharged home.     Discharge planning:   Patient to be discharged home on June 16, 2024  Patient to follow up with PCP in 1-2 weeks after discharge from facility.   Patient to follow up with any Speciality physicians as directed after discharge.  If desired and in agreement, [facility] to follow after discharge from the facility for continued PT/OT and Nursing.    *Please note that nursing facility, outside laboratory agency, and [facility] do not interface.     Completion of the note was done through Dragon voice recognition technology and may include   unintended or grammatical errors which may not have been recognized when finalizing the note.     Time: I spent 31 minutes or greater with the patient reviewing discharge information which include compliance of medications, follow up appointments with PCP and or Speciality physicians. Greater than 50% of this time was spent in counseling and or coordination of care.       RODDY Pierson        Electronically Signed By: RODDY Pierson   6/21/24  8:43 PM

## 2024-06-14 NOTE — TELEPHONE ENCOUNTER
Tammi from [facility] called in stating the patient will be discharged from [facility] on Sunday 6/16. She is wondering if Dr. Lindsey would agree to follow for home health?  Please Advise

## 2024-06-14 NOTE — PROGRESS NOTES
"Name: Anjum Hernandez \"Ruthie"  YOB: 1950    INITIAL NURSE PRACTITIONER FOLLOW UP VISIT: SNF, Cervical Myelopathy    HPI   The patient is a 73-year-old male who is here at [facility] for skilled care after a recent hospitalization for cervical myelopathy and recent cervical C4-C5, C5-C6 discectomy and fusion on May 24, 2024.  The patient had been experiencing worsening symptoms and problems with balance using a cane and a walker.  He was having difficulty with handwriting buttoning and opening jars.  Patient also had complained of blurriness to his eyes as well as a tremor of the left hand losing 20% of his function in the hand over the last year.  After the patient's hospitalization he was admitted into the rehab unit working on ADLs and mobility deficits.  The patient is now here for PT and OT.  Past medical history of hypertension, CKD 3, hypothyroidism, anxiety, major depressive disorder, pacemaker, COPD, hyperlipidemia, Afib, obesity, ulcerative colitis, osteoarthritis, and Type 2 DM.  He is up in his room sitting in bedside chair.  Patient appears to be in no acute distress.  He denies headache, vertigo, chest pain, shortness of breath, abdominal pain, nausea vomiting, bowel or urinary symptoms.  Patient denies any surgical pain at this time.  Patient is alert and oriented and pleasant.    REVIEW OF SYSTEMS:  Review of systems are negative except where noted in HPI.    /82   Pulse 75   Temp 36.6 °C (97.9 °F)   Resp 18   Ht 1.829 m (6')   Wt 128 kg (282 lb)   SpO2 97% Comment: RA  BMI 38.25 kg/m²      Physical Exam  Constitutional:       Appearance: Normal appearance. He is normal weight.   HENT:      Head: Normocephalic.      Right Ear: External ear normal.      Left Ear: External ear normal.      Nose: Nose normal.      Mouth/Throat:      Mouth: Mucous membranes are moist.   Eyes:      Extraocular Movements: Extraocular movements intact.      Conjunctiva/sclera: Conjunctivae " normal.      Pupils: Pupils are equal, round, and reactive to light.   Cardiovascular:      Rate and Rhythm: Normal rate and regular rhythm.      Pulses: Normal pulses.      Heart sounds: Normal heart sounds.   Pulmonary:      Effort: Pulmonary effort is normal.      Breath sounds: Normal breath sounds.   Abdominal:      General: Bowel sounds are normal. There is no distension.      Palpations: Abdomen is soft.      Tenderness: There is no abdominal tenderness.   Genitourinary:     Comments: Not examined  Musculoskeletal:         General: Normal range of motion.      Cervical back: Normal range of motion and neck supple.      Comments: Generalized weakness   Skin:     General: Skin is warm and dry.      Capillary Refill: Capillary refill takes less than 2 seconds.      Comments: Surgical incision to neck clean and dry, cervical neck brace in place   Neurological:      General: No focal deficit present.      Mental Status: He is alert and oriented to person, place, and time. Mental status is at baseline.   Psychiatric:         Mood and Affect: Mood normal.         Behavior: Behavior normal.         Thought Content: Thought content normal.         Judgment: Judgment normal.          ADVANCED CARE PLANNING: Discussion done with the patient and family if available regarding code status, diagnosis, and the prognosis of the patient.     CODE STATUS: Full code    DISCHARGE PLANNING:  Patient will be discharge home when goals are met.   Discharge planner to communicate ongoing updates regarding discharge plan.     RECENT HOSPITALIZATION DATES:   [facility]  Surgery May 24, 2024  All laboratories, diagnostic tests, progress notes, consultation notes, and discharge notes reviewed from recent hospitalization.     No visits with results within 1 Week(s) from this visit.   Latest known visit with results is:   Orders Only on 05/15/2024   Component Date Value Ref Range Status    NON-[facility] HIE Platelet Function (Aspi* 05/15/2024  648  ARU Final    Comment: VerifyNow Aspirin Test:  VerifyNow Aspirin test results are reported in Aspirin Reaction Units (ARU) and should be interpreted in conjunction with other clinical and laboratory data available.  Reference range for patients NOT on aspirin is 550-672 ARU  > or = 550 ARU ? Platelet dysfunction consistent with aspirin has NOT been detected  < 550 ARU ? Platelet dysfunction consistent with aspirin has been detected         LABORATORIES OR DIAGNOSTIC TESTS DONE/REVIEWED IN FACILITY:  6/10/24  CMP-COMPREHENSIVE METABOLIC PNL CHANCE  GLUCOSE 79 mg/dL  GLUCOSE, FASTING 65-99 mg/dL  GLUCOSE, NON-FASTING  mg/dL  SODIUM 144 136-145 mEq/L  POTASSIUM 4.1 3.5-5.3 mEq/L  CHLORIDE 105  mEq/L  CARBON DIOXIDE (CO2) 31 21-33 mEq/L  BUN (UREA NITROGEN) 13 7-25 mg/dL  CREATININE 1.0 0.6-1.2 mg/dL  BUN/CREATININE RATIO 13 6-22  GFR- 89 >60 mL/min/1.73 m2  GFR-NON-AFRICAN AMERICAN 73 >60 mL/min/1.73 m2   Stage of CKD eGFR (mL/min/1.73 square meters)   Stage 1 >/= 90 or > 90   Stage 2 60 - 89   Stage 3 30 - 59   Stage 4 15 - 29   Stage 5 </= 14 or < 15  ** GFR is reliable for adults 17 to 69 years with stable kidney   function.  CALCIUM 9.5 8.4-10.2 mg/dL  PROTEIN, TOTAL 5.7~ L 6.0-8.3 g/dL  ALBUMIN 3.6 3.5-5.5 g/dL  A/G RATIO 1.7 1.0-2.3  ALKALINE PHOS 74  IU/L  AST (SGOT) 15 4-40 IU/L  ALT (SGPT) 21 4-55 IU/L  BILIRUBIN, TOTAL 0.4 0.2-1.2 mg/dL  CBC W/DIFF CHANCE  WBC 7.2 4.5-10.8 K/cmm  RBC 4.56 4.00-6.60 M/cmm  HEMOGLOBIN 13.6~ L 14.0-18.0 g/dL  HEMATOCRIT 40.2~ L 42.0-54.0 %  MCV 88.1 80.0-100.0 fL  MCH 29.9 26.0-35.0 pg  MCHC 33.9 31.0-36.5 g/dL  RDW 15.1 11.0-16.0 %  PLATELET 192 150-450 K/cmm  MPV 8.4 6.5-12.0 fL  NEUTROPHILS 73.9 40.0-80.0 %  LYMPHS 15.1 13.0-48.0 %  MONOCYTES 8.5 2.0-12.0 %  EOS 1.8 0.0-8.0 %  BASO 0.7 0.0-2.0 %  NEUTS (ABSOLUTE) 5.40 1.50-7.60 K/uL  LYMPHS (ABSOLUTE) 1.10 0.90-5.50 K/uL  MONOCYTES (ABSOLUTE) 0.60 0.15-1.10 K/uL  EOS (ABSOLUTE) 0.10~ L  0.20-0.80 K/uL    Allergies   Allergen Reactions    Azathioprine Diarrhea, Nausea And Vomiting, Other, Unknown and Nausea/vomiting     Vomiting and Nausea    severe nausea/vomiting    Clindamycin Diarrhea, Other and Unknown     severe nausea/vomiting    Caused CDiff   Caused CDiff       MEDICATION LIST:  Reviewed and reconciled  Fenofibrate 67 mg nightly, atorvastatin 20 mg nightly, losartan 100 mg daily, Xeljanz 5 mg 2 tabs twice a day, CQ 10 100 mg daily, rivaroxaban 20 mg daily, vitamin D 2000 units daily, Ventolin inhaler 2 puffs every 4 hours as needed, valsartan 160 mg daily, rosuvastatin 10 mg nightly, mirtazapine 45 mg at bedtime, metoprolol tartrate 25 mg twice daily, levothyroxine 25 mcg daily, Wellbutrin 300 mg daily, oxycodone 10 mg every 4 hours as needed, Colace 100 mg daily, amlodipine 5 mg daily    MEDICATIONS REVIEWED AT THE FACILITY:  Please see Nursing facility Medication EMR for full updated list.    Assessment/Plan    Problem List Items Addressed This Visit       Acquired hypothyroidism    Benign essential hypertension - Primary    Relevant Medications    losartan (Cozaar) 100 mg tablet    Cardiac pacemaker    Pure hypercholesterolemia    Ulcerative colitis (Multi)    Paroxysmal atrial fibrillation (Multi)    Physical deconditioning    Type 2 diabetes mellitus with chronic kidney disease, without long-term current use of insulin, unspecified CKD stage (Multi)    Cervical myelopathy (Multi)    Chronic kidney disease, stage 3b (Multi)     Other Visit Diagnoses       S/P cervical discectomy                Skin Integrity:  Nursing to monitor skin integrity as patient is at risk for pressure injuries.  Wound care per nursing  Surgical neck incision  Turn and reposition Q 2 hours or more.  Air mattress and when up in chair cushion reducing device.  Dietician to evaluate and recommend.  Nutritional supplement to be implemented if needed.  Please monitor skin integrity and other pressure  areas.  Referral to wound clinic or wound team here at the facility to follow if appropriate.    PLAN:   Recent nursing evaluation and notes were reviewed.   # Cervical myelopathy, status post cervical discectomy and fusion, Weakness and physical deconditioning: PT/OT/ST to maximize strength, function, and endurance all while maintaining safety.   #Afib: Rivaroxaban and metoprolol tartrate maintained  #HTN/CKD: Monitor blood pressure readings, continue losartan, amlodipine, and metoprolol tartrate and follow labs  Labs reviewed from Darya 10, 2024  #DM2: Blood sugar to be monitored daily  Blood sugar today 91 patient is currently on no medications  # Hypothyroid: Continue levothyroxine  #CAD/HLD: Continue atorvastatin, fenofibrate, and Q enzyme 10  # Ulcerative colitis: Continue Xeljanz  #COPD/GEORGE: Continue albuterol as needed and CPAP  # Depression/anxiety: Continue bupropion and mirtazapine  Any decline or change in condition needs to be communicated with the physician or myself.    Discussion with nursing staff regarding ongoing care and management.  Communication regarding patients status, overall condition, changes with plan (medications or treatments), and any questions from family completed if present.  If family not available, would communicate in person or via phone if needed.   We will continue with the plan and medications noted above.    We will continue to follow the patient here at the facility.    *Please note that nursing facility, outside laboratory agency, and [facility] do not interface.     Completion of the note was done through Dragon voice recognition technology and may include   unintended or grammatical errors which may not have been recognized when finalizing the note.     Time: I spent 45 minutes or greater with the patient. Greater than 50% of this time was spent in counseling and or coordination of care. The time includes prep time of reviewing vital signs, report from direct nursing staff  and or therapists, hospital documentation, reviewing labs, radiographs, diagnostic tests and or consultations, time directly spent with the patient interviewing, examining, and education regarding diagnosis, treatments, and medications, as well as documentation in the electronic medical record, and reviewing the plan of care and any new orders with the patient, nursing staff and other staff directly related to the patients care.       Fiona Portillo, APRN-CNP

## 2024-06-18 DIAGNOSIS — E78.00 PURE HYPERCHOLESTEROLEMIA: ICD-10-CM

## 2024-06-18 DIAGNOSIS — I10 BENIGN ESSENTIAL HYPERTENSION: Primary | ICD-10-CM

## 2024-06-18 RX ORDER — FENOFIBRATE 48 MG/1
48 TABLET, FILM COATED ORAL DAILY
Qty: 90 TABLET | Refills: 1 | Status: SHIPPED | OUTPATIENT
Start: 2024-06-18 | End: 2024-12-15

## 2024-06-18 RX ORDER — AMLODIPINE BESYLATE 5 MG/1
5 TABLET ORAL DAILY
Qty: 90 TABLET | Refills: 3 | Status: SHIPPED | OUTPATIENT
Start: 2024-06-18 | End: 2025-06-18

## 2024-06-18 NOTE — TELEPHONE ENCOUNTER
c called to let us know they went for their initial visit today and noticed two medications missing from the home and called to get refills so childress can continue them. Please advise  amLODIPine (Norvasc) 5 mg tablet   fenofibrate (Tricor) 48 mg tablet

## 2024-06-18 NOTE — TELEPHONE ENCOUNTER
amLODIPine last filled on 6/6/24, please advise  Fenofibrate start date 12/23/2020 and end date 1/22/2024, please advide  Rx pended to pts preferred pharmacy

## 2024-06-21 VITALS
HEART RATE: 89 BPM | OXYGEN SATURATION: 97 % | HEIGHT: 72 IN | TEMPERATURE: 97.7 F | WEIGHT: 282 LBS | DIASTOLIC BLOOD PRESSURE: 79 MMHG | RESPIRATION RATE: 18 BRPM | SYSTOLIC BLOOD PRESSURE: 132 MMHG | BODY MASS INDEX: 38.19 KG/M2

## 2024-06-22 NOTE — PROGRESS NOTES
"Name: Anjum Matson"  YOB: 1950    DISCHARGE VISIT: SNF, Cervical Myelopathy     SUBJECTIVE:  The patient is up in his room sitting up in his chair.  Patient appears to be doing well.  Patient has no complaints of chest pain or shortness of breath and denies any surgical postop pain.  Discussion with Anjum Matson" and family/friend representative if present regarding discharge - follow up with PCP and other speciality physicians as instructed or as scheduled, compliance with home medications, and safety within the home.   Nursing to review discharge instructions prior to discharging home.     REVIEW OF SYSTEMS:   All review of systems are negative unless otherwise stated above under subjective.    LABS REVIEWED AT FACILITY:  See previous visit for recent labs    Allergies   Allergen Reactions    Azathioprine Diarrhea, Nausea And Vomiting, Other, Unknown and Nausea/vomiting     Vomiting and Nausea    severe nausea/vomiting    Clindamycin Diarrhea, Other and Unknown     severe nausea/vomiting    Caused CDiff   Caused CDiff       Medication list for home reviewed in Med Management.    OARRS Report: If indicated, I have personally reviewed the OARRS report for Anjum Matson" and I have considered the risks of abuse, dependence, addiction, and diversion.    Living will related issues reviewed-Code status: Full code    OBJECTIVE:  /79   Pulse 89   Temp 36.5 °C (97.7 °F)   Resp 18   Ht 1.829 m (6')   Wt 128 kg (282 lb)   SpO2 97% Comment: Room air  BMI 38.25 kg/m²   Physical Exam  Constitutional:       Appearance: Normal appearance. He is normal weight.   HENT:      Head: Normocephalic.      Right Ear: External ear normal.      Left Ear: External ear normal.      Nose: Nose normal.      Mouth/Throat:      Mouth: Mucous membranes are moist.   Eyes:      Extraocular Movements: Extraocular movements intact.      Conjunctiva/sclera: Conjunctivae normal.      Pupils: " Pupils are equal, round, and reactive to light.   Cardiovascular:      Rate and Rhythm: Normal rate and regular rhythm.      Pulses: Normal pulses.      Heart sounds: Normal heart sounds.   Pulmonary:      Effort: Pulmonary effort is normal.      Breath sounds: Normal breath sounds.   Abdominal:      General: Bowel sounds are normal. There is no distension.      Palpations: Abdomen is soft.      Tenderness: There is no abdominal tenderness.   Genitourinary:     Comments: Not examined  Musculoskeletal:         General: Normal range of motion.      Cervical back: Normal range of motion and neck supple.      Comments: Generalized weakness   Skin:     General: Skin is warm and dry.      Capillary Refill: Capillary refill takes less than 2 seconds.      Comments: Surgical incision to neck clean and dry, cervical neck brace in place   Neurological:      General: No focal deficit present.      Mental Status: He is alert and oriented to person, place, and time. Mental status is at baseline.   Psychiatric:         Mood and Affect: Mood normal.         Behavior: Behavior normal.         Thought Content: Thought content normal.         Judgment: Judgment normal.      Assessment/Plan   Problem List Items Addressed This Visit       Acquired hypothyroidism    Anxiety    Benign essential hypertension    Cardiac pacemaker    COPD, mild (Multi)    Major depressive disorder in full remission (CMS-[facility])    Class 2 severe obesity due to excess calories with serious comorbidity and body mass index (BMI) of 38.0 to 38.9 in adult (Multi)    Obstructive sleep apnea, adult    Ulcerative colitis (Multi)    Paroxysmal atrial fibrillation (Multi)    Physical deconditioning    Type 2 diabetes mellitus with chronic kidney disease, without long-term current use of insulin, unspecified CKD stage (Multi)    Cervical myelopathy (Multi) - Primary    Chronic kidney disease, stage 3b (Multi)     Other Visit Diagnoses       S/P cervical spinal fusion      "           Skin integrity:  Nursing to monitor skin integrity as patient is at risk for pressure injuries.  Wound care per nursing  Cervical neck  See Facility notes for measurements/assessment of wound.  Turn and reposition Q 2 hours or more.  Air mattress and when up in chair cushion reducing device.  Dietician to evaluate and recommend.  Nutritional supplement to be implemented if needed.  Please monitor skin integrity and other pressure areas.  Referral to wound clinic or wound team at facility to follow if appropriate.    PLAN:  Pt has been seen for discharge visit.  The patient has been here at facility for PT/OT/ST to maximize strength, function, endurance and safety.  Recent therapy notes reviewed.  The patient is participating in therapy.   Anjum Hernandez \"Sami\" is making progress to the best of his/her ability.   Please see PT/OT/ST notes in the facility for detailed information regarding progression of patients progress.   Recent nursing evaluation and notes were reviewed.   Medication list reviewed here at the facility.   Overall, patient is stable despite his/her chronic conditions and ok for discharge home.   #Cervical myelopathy, status post cervical discectomy and fusion, Weakness and physical deconditioning: PT/OT/ST to maximize strength, function, and endurance all while maintaining safety.   #Afib: Rivaroxaban and metoprolol tartrate maintained  #HTN/CKD: Monitor blood pressure readings, continue losartan, amlodipine, and metoprolol tartrate and follow labs  Blood pressure 130s over 70s  Labs reviewed from Darya 10, 2024-BUN 13, creatinine 1 and GFR 73  #DM2: Blood sugar to be monitored daily  Blood sugar today 118 patient is currently on no medications  # Hypothyroid: Continue levothyroxine  #CAD/HLD: Continue atorvastatin, fenofibrate, and Q enzyme 10  # Ulcerative colitis: Continue Xeljanz  #COPD/GEORGE: Respiratory status stable, continue albuterol as needed and CPAP  # Depression/anxiety: Mood " stable, continue bupropion and mirtazapine  Any decline or change in condition needs to be communicated with the physician or myself.  Discussion with nursing staff regarding ongoing care, management, and discharge planning.   Communication regarding patients status, overall condition, changes with plan (medications or treatments), and any questions from family completed if present.  If family not available, would communicate in person or via phone if needed.   We will continue with the plan and medications noted above until discharged home.    We will continue to follow the patient here at the facility until discharged home.     Discharge planning:   Patient to be discharged home on June 16, 2024  Patient to follow up with PCP in 1-2 weeks after discharge from facility.   Patient to follow up with any Speciality physicians as directed after discharge.  If desired and in agreement, [facility] to follow after discharge from the facility for continued PT/OT and Nursing.    *Please note that nursing facility, outside laboratory agency, and  [facility] do not interface.     Completion of the note was done through Dragon voice recognition technology and may include   unintended or grammatical errors which may not have been recognized when finalizing the note.     Time: I spent 31 minutes or greater with the patient reviewing discharge information which include compliance of medications, follow up appointments with PCP and or Speciality physicians. Greater than 50% of this time was spent in counseling and or coordination of care.       Fiona Portillo, APRN-CNP

## 2024-06-24 ENCOUNTER — APPOINTMENT (OUTPATIENT)
Dept: PRIMARY CARE | Facility: CLINIC | Age: 74
End: 2024-06-24
Payer: MEDICARE

## 2024-06-24 VITALS
SYSTOLIC BLOOD PRESSURE: 116 MMHG | TEMPERATURE: 97.3 F | RESPIRATION RATE: 16 BRPM | BODY MASS INDEX: 36.57 KG/M2 | DIASTOLIC BLOOD PRESSURE: 60 MMHG | HEART RATE: 60 BPM | WEIGHT: 270 LBS | HEIGHT: 72 IN | OXYGEN SATURATION: 94 %

## 2024-06-24 DIAGNOSIS — M25.461 EFFUSION OF RIGHT KNEE: ICD-10-CM

## 2024-06-24 DIAGNOSIS — E78.00 PURE HYPERCHOLESTEROLEMIA: ICD-10-CM

## 2024-06-24 DIAGNOSIS — M25.551 HIP PAIN, ACUTE, RIGHT: ICD-10-CM

## 2024-06-24 DIAGNOSIS — E03.9 ACQUIRED HYPOTHYROIDISM: ICD-10-CM

## 2024-06-24 DIAGNOSIS — J44.9 COPD, MILD (MULTI): ICD-10-CM

## 2024-06-24 DIAGNOSIS — Z96.651 STATUS POST TOTAL RIGHT KNEE REPLACEMENT: ICD-10-CM

## 2024-06-24 DIAGNOSIS — M51.36 DDD (DEGENERATIVE DISC DISEASE), LUMBAR: ICD-10-CM

## 2024-06-24 DIAGNOSIS — Z09 S/P NECK SURGERY, FOLLOW-UP EXAM: Primary | ICD-10-CM

## 2024-06-24 DIAGNOSIS — E11.22 TYPE 2 DIABETES MELLITUS WITH CHRONIC KIDNEY DISEASE, WITHOUT LONG-TERM CURRENT USE OF INSULIN, UNSPECIFIED CKD STAGE (MULTI): ICD-10-CM

## 2024-06-24 DIAGNOSIS — G95.9 CERVICAL MYELOPATHY (MULTI): ICD-10-CM

## 2024-06-24 DIAGNOSIS — I10 BENIGN ESSENTIAL HYPERTENSION: ICD-10-CM

## 2024-06-24 PROCEDURE — 1157F ADVNC CARE PLAN IN RCRD: CPT | Performed by: FAMILY MEDICINE

## 2024-06-24 PROCEDURE — 1123F ACP DISCUSS/DSCN MKR DOCD: CPT | Performed by: FAMILY MEDICINE

## 2024-06-24 PROCEDURE — 3044F HG A1C LEVEL LT 7.0%: CPT | Performed by: FAMILY MEDICINE

## 2024-06-24 PROCEDURE — 1159F MED LIST DOCD IN RCRD: CPT | Performed by: FAMILY MEDICINE

## 2024-06-24 PROCEDURE — 1160F RVW MEDS BY RX/DR IN RCRD: CPT | Performed by: FAMILY MEDICINE

## 2024-06-24 PROCEDURE — 3074F SYST BP LT 130 MM HG: CPT | Performed by: FAMILY MEDICINE

## 2024-06-24 PROCEDURE — 3078F DIAST BP <80 MM HG: CPT | Performed by: FAMILY MEDICINE

## 2024-06-24 PROCEDURE — 1036F TOBACCO NON-USER: CPT | Performed by: FAMILY MEDICINE

## 2024-06-24 PROCEDURE — 99214 OFFICE O/P EST MOD 30 MIN: CPT | Performed by: FAMILY MEDICINE

## 2024-06-24 PROCEDURE — 3008F BODY MASS INDEX DOCD: CPT | Performed by: FAMILY MEDICINE

## 2024-06-24 PROCEDURE — 4010F ACE/ARB THERAPY RXD/TAKEN: CPT | Performed by: FAMILY MEDICINE

## 2024-06-24 ASSESSMENT — ENCOUNTER SYMPTOMS
DIAPHORESIS: 0
FEVER: 0
VISUAL CHANGE: 1
LIGHT-HEADEDNESS: 0
VOMITING: 0
ABDOMINAL PAIN: 0
MEMORY LOSS: 1
NEUROLOGIC COMPLAINT: 1
NAUSEA: 0
CLUMSINESS: 1
PALPITATIONS: 0
BACK PAIN: 1
BOWEL INCONTINENCE: 0
AURA: 0
NECK PAIN: 0
DIZZINESS: 0
FATIGUE: 0
CONFUSION: 0
HEADACHES: 0
LOSS OF BALANCE: 1
VERTIGO: 0
SHORTNESS OF BREATH: 1

## 2024-06-24 ASSESSMENT — PATIENT HEALTH QUESTIONNAIRE - PHQ9
2. FEELING DOWN, DEPRESSED OR HOPELESS: SEVERAL DAYS
1. LITTLE INTEREST OR PLEASURE IN DOING THINGS: SEVERAL DAYS
10. IF YOU CHECKED OFF ANY PROBLEMS, HOW DIFFICULT HAVE THESE PROBLEMS MADE IT FOR YOU TO DO YOUR WORK, TAKE CARE OF THINGS AT HOME, OR GET ALONG WITH OTHER PEOPLE: NOT DIFFICULT AT ALL
SUM OF ALL RESPONSES TO PHQ9 QUESTIONS 1 & 2: 2

## 2024-06-24 NOTE — PROGRESS NOTES
Subjective   Patient ID: Sami Hernandez is a 73 y.o. male who presents for Follow-up (Type 2 diabetes mellitus with chronic kidney disease, without long-term current use of insulin, unspecified CKD stage (Multi)//Benign essential hypertension//DDD (degenerative disc disease), lumbar//Cardiac pacemaker//Paroxysmal atrial fibrillation (Multi)//Pure hypercholesterolemia//Acquired hypothyroidism//Hepatic steatosis//Chronic kidney disease, stage 3b (Multi)//Major depressive disorder in full remission, unspecified whether recurrent (CMS-HCC)). I last saw the patient 3/26/2024. Sister, Alea, is here with him today.    Patient is here for a F/U  Past medical, surgical, and family history reviewed.  Reviewed and documented all medications (prescriptions, OTCs, herbal therapies and supplements)   Pt eating well, exercising as tolerated and taking medications as directed    Admitted to [facility] for cervical myelopathy on 5/24/2024    Admitted to [facility] for impaired mobility and ADLs on 5/26/2024    Pt states that he has a lot of pain in his right hip and right leg, starts in his knee and radiates up into his hip just started. Pt has tried ice, states that there is a lump on the side of his right knee that was not there before. Pain aggravates on putting the weight on.    Patient was discharged on 16 June, 2024 from [facility] rehab. He is scheduled to start home PT this week.      Acute Neurological Problem  The patient's primary symptoms include clumsiness, a loss of balance, memory loss and a visual change. This is a chronic problem. The current episode started more than 1 year ago. The neurological problem developed insidiously. The problem has been gradually worsening since onset. There was no focality noted. Associated symptoms include back pain and shortness of breath. Pertinent negatives include no abdominal pain, auditory change, aura, bladder incontinence, bowel incontinence, chest pain, confusion, diaphoresis,  dizziness, fatigue, fever, headaches, light-headedness, nausea, neck pain, palpitations, vertigo or vomiting. Past treatments include acetaminophen. The treatment provided mild relief.          Review of Systems   Constitutional:  Negative for diaphoresis, fatigue and fever.   Respiratory:  Positive for shortness of breath.    Cardiovascular:  Negative for chest pain and palpitations.   Gastrointestinal:  Negative for abdominal pain, bowel incontinence, nausea and vomiting.   Genitourinary:  Negative for bladder incontinence.   Musculoskeletal:  Positive for back pain. Negative for neck pain.   Neurological:  Positive for loss of balance. Negative for dizziness, vertigo, light-headedness and headaches.   Psychiatric/Behavioral:  Positive for memory loss. Negative for confusion.      Objective   /60 (BP Location: Left arm, Patient Position: Sitting)   Pulse 60   Temp 36.3 °C (97.3 °F) (Temporal)   Resp 16   Ht 1.829 m (6')   Wt 122 kg (270 lb)   SpO2 94%   BMI 36.62 kg/m²     Physical Exam    General Appearance - well-developed, well-nourished, 73 y.o., White male in no acute distress. Patient is ambulating with a cane.     Skin - warm, pink and dry without rash or concerning lesions. Dryness of the skin involving the LEs.     Mental Status - alert and oriented x 3. Normal mood and affect appropriate to mood.     Neck - supple without lymphadenopathy. Carotid pulses are normal without bruits. Thyroid is normal in midline without nodules.     Chest - lungs are clear to auscultation without rales, rhonchi or wheezes. Mildly diminished breath sounds at the bases bilaterally. Pacemaker in the left upper chest.     Heart - regular, rate and rhythm without murmurs, rubs or gallops.     Abdomen - soft, obese, protuberant, nontender, nondistended. No masses, hepatomegaly or splenomegaly is noted. No rebound, rigidity or guarding is noted. Bowel sounds are normoactive.     Extremities - no cyanosis, clubbing or  edema. Pedal pulses are 2+ normal at the dorsalis pedis and posterior pulses bilaterally. Right knee - Mild effusion noted. Mild pain to palpation of the lateral knee. No erythema or increased warmth of the knee noted. Patient localizes the Hip pain to the right lower buttock.    Neurological - cranial nerves II through XII grossly intact. Motor strength 5/5 at all fours bilaterally. No evidence of right dorsiflexor weakness.    Assessment/Plan   1. S/P neck surgery, follow-up exam        2. Cervical myelopathy (Multi)        3. Benign essential hypertension  Follow Up In [facility]      4. Hip pain, acute, right  XR hip right with pelvis when performed 2 or 3 views      5. Status post total right knee replacement        6. Effusion of right knee  XR knee right 4+ views      7. Pure hypercholesterolemia  Follow Up In [facility]      8. Acquired hypothyroidism  Follow Up In [facility]      9. Type 2 diabetes mellitus with chronic kidney disease, without long-term current use of insulin, unspecified CKD stage (Multi)  Follow Up In [facility]      10. DDD (degenerative disc disease), lumbar  Follow Up In [facility]      11. COPD, mild (Multi)          Patient will continue on a diabetic, low-cholesterol diet and weight reduction. Exercise as tolerated. He will continue medications as prescribed. Follow-up in 3 month(s) otherwise as needed.     Ordered X-ray right Hip and pelvis and X-ray right knee. Will call patient with results when available. He was encouraged to follow up with Dr. Perry for further evaluation of then right knee. He may take occasional oxycodone as needed for pain.    Patient will continue as scheduled.    Patient is to follow up with Dr. Garrett (neuro), Dr. Goodwin (pulm), Dr. Chávez (uro), Dr. Salcedo (cardio), Dr. Sanders (GI) as scheduled.      Scribe Attestation  By signing my name below, I, Julia Schilling , Scrbrianna   attest that this documentation has been prepared under the direction and in the presence of Rian Lindsey MD.

## 2024-06-26 ENCOUNTER — HOSPITAL ENCOUNTER (OUTPATIENT)
Dept: RADIOLOGY | Facility: HOSPITAL | Age: 74
Discharge: HOME | End: 2024-06-26
Payer: MEDICARE

## 2024-06-26 DIAGNOSIS — M25.551 HIP PAIN, ACUTE, RIGHT: ICD-10-CM

## 2024-06-26 DIAGNOSIS — M25.461 EFFUSION OF RIGHT KNEE: ICD-10-CM

## 2024-06-26 PROCEDURE — 73562 X-RAY EXAM OF KNEE 3: CPT | Mod: RT

## 2024-06-26 PROCEDURE — 73502 X-RAY EXAM HIP UNI 2-3 VIEWS: CPT | Mod: RT

## 2024-06-26 PROCEDURE — 73562 X-RAY EXAM OF KNEE 3: CPT | Mod: RIGHT SIDE | Performed by: RADIOLOGY

## 2024-06-26 PROCEDURE — 73502 X-RAY EXAM HIP UNI 2-3 VIEWS: CPT | Mod: RIGHT SIDE | Performed by: RADIOLOGY

## 2024-06-28 ENCOUNTER — TELEPHONE (OUTPATIENT)
Dept: PRIMARY CARE | Facility: CLINIC | Age: 74
End: 2024-06-28
Payer: MEDICARE

## 2024-06-28 NOTE — TELEPHONE ENCOUNTER
Patient called in stating he saw his results for the knee xray came back on MyChart. He is wondering f Dr. Lindsey can take a look at the results asap? Patient stated his knee pain is bad  Please Advise

## 2024-07-12 ENCOUNTER — APPOINTMENT (OUTPATIENT)
Dept: ORTHOPEDIC SURGERY | Facility: CLINIC | Age: 74
End: 2024-07-12
Payer: COMMERCIAL

## 2024-07-12 DIAGNOSIS — M17.11 PRIMARY OSTEOARTHRITIS OF RIGHT KNEE: ICD-10-CM

## 2024-07-12 DIAGNOSIS — Z96.651 STATUS POST TOTAL RIGHT KNEE REPLACEMENT: Primary | ICD-10-CM

## 2024-07-12 PROCEDURE — 4010F ACE/ARB THERAPY RXD/TAKEN: CPT | Performed by: PHYSICIAN ASSISTANT

## 2024-07-12 PROCEDURE — 1159F MED LIST DOCD IN RCRD: CPT | Performed by: PHYSICIAN ASSISTANT

## 2024-07-12 PROCEDURE — 3044F HG A1C LEVEL LT 7.0%: CPT | Performed by: PHYSICIAN ASSISTANT

## 2024-07-12 PROCEDURE — 1157F ADVNC CARE PLAN IN RCRD: CPT | Performed by: PHYSICIAN ASSISTANT

## 2024-07-12 PROCEDURE — 1123F ACP DISCUSS/DSCN MKR DOCD: CPT | Performed by: PHYSICIAN ASSISTANT

## 2024-07-12 PROCEDURE — 3008F BODY MASS INDEX DOCD: CPT | Performed by: PHYSICIAN ASSISTANT

## 2024-07-12 PROCEDURE — 99024 POSTOP FOLLOW-UP VISIT: CPT | Performed by: PHYSICIAN ASSISTANT

## 2024-07-25 ENCOUNTER — APPOINTMENT (OUTPATIENT)
Dept: PRIMARY CARE | Facility: CLINIC | Age: 74
End: 2024-07-25
Payer: COMMERCIAL

## 2024-07-25 VITALS
SYSTOLIC BLOOD PRESSURE: 116 MMHG | TEMPERATURE: 97.8 F | HEIGHT: 72 IN | WEIGHT: 281.8 LBS | OXYGEN SATURATION: 96 % | DIASTOLIC BLOOD PRESSURE: 72 MMHG | RESPIRATION RATE: 16 BRPM | BODY MASS INDEX: 38.17 KG/M2 | HEART RATE: 79 BPM

## 2024-07-25 DIAGNOSIS — E11.22 TYPE 2 DIABETES MELLITUS WITH CHRONIC KIDNEY DISEASE, WITHOUT LONG-TERM CURRENT USE OF INSULIN, UNSPECIFIED CKD STAGE (MULTI): Primary | ICD-10-CM

## 2024-07-25 DIAGNOSIS — E53.8 VITAMIN B12 DEFICIENCY: ICD-10-CM

## 2024-07-25 DIAGNOSIS — E55.9 VITAMIN D DEFICIENCY: ICD-10-CM

## 2024-07-25 DIAGNOSIS — K52.9 INFLAMMATORY BOWEL DISEASES (IBD): ICD-10-CM

## 2024-07-25 DIAGNOSIS — E66.01 CLASS 2 SEVERE OBESITY DUE TO EXCESS CALORIES WITH SERIOUS COMORBIDITY AND BODY MASS INDEX (BMI) OF 38.0 TO 38.9 IN ADULT (MULTI): ICD-10-CM

## 2024-07-25 DIAGNOSIS — I10 BENIGN ESSENTIAL HYPERTENSION: ICD-10-CM

## 2024-07-25 PROBLEM — R41.89 COGNITIVE DECLINE: Status: RESOLVED | Noted: 2023-08-31 | Resolved: 2024-07-25

## 2024-07-25 PROBLEM — G93.40 ENCEPHALOPATHY: Status: RESOLVED | Noted: 2023-08-31 | Resolved: 2024-07-25

## 2024-07-25 PROBLEM — R41.844 IMPAIRED EXECUTIVE FUNCTIONING: Status: RESOLVED | Noted: 2024-02-26 | Resolved: 2024-07-25

## 2024-07-25 PROCEDURE — 1159F MED LIST DOCD IN RCRD: CPT | Performed by: FAMILY MEDICINE

## 2024-07-25 PROCEDURE — 3078F DIAST BP <80 MM HG: CPT | Performed by: FAMILY MEDICINE

## 2024-07-25 PROCEDURE — 3074F SYST BP LT 130 MM HG: CPT | Performed by: FAMILY MEDICINE

## 2024-07-25 PROCEDURE — 3044F HG A1C LEVEL LT 7.0%: CPT | Performed by: FAMILY MEDICINE

## 2024-07-25 PROCEDURE — 3008F BODY MASS INDEX DOCD: CPT | Performed by: FAMILY MEDICINE

## 2024-07-25 PROCEDURE — 99214 OFFICE O/P EST MOD 30 MIN: CPT | Performed by: FAMILY MEDICINE

## 2024-07-25 PROCEDURE — 4010F ACE/ARB THERAPY RXD/TAKEN: CPT | Performed by: FAMILY MEDICINE

## 2024-07-25 PROCEDURE — 1157F ADVNC CARE PLAN IN RCRD: CPT | Performed by: FAMILY MEDICINE

## 2024-07-25 PROCEDURE — 1158F ADVNC CARE PLAN TLK DOCD: CPT | Performed by: FAMILY MEDICINE

## 2024-07-25 PROCEDURE — 1036F TOBACCO NON-USER: CPT | Performed by: FAMILY MEDICINE

## 2024-07-25 PROCEDURE — 1123F ACP DISCUSS/DSCN MKR DOCD: CPT | Performed by: FAMILY MEDICINE

## 2024-07-25 ASSESSMENT — ENCOUNTER SYMPTOMS
MEMORY LOSS: 1
AURA: 0
HEADACHES: 0
CLUMSINESS: 1
NAUSEA: 0
CONFUSION: 0
BOWEL INCONTINENCE: 0
NEUROLOGIC COMPLAINT: 1
FEVER: 0
FATIGUE: 0
VERTIGO: 0
VISUAL CHANGE: 1
SHORTNESS OF BREATH: 1
LIGHT-HEADEDNESS: 0
PALPITATIONS: 0
ABDOMINAL PAIN: 0
VOMITING: 0
BACK PAIN: 1
NECK PAIN: 0
DIAPHORESIS: 0
DIZZINESS: 0
LOSS OF BALANCE: 1

## 2024-07-25 NOTE — PROGRESS NOTES
Subjective   Patient ID: Sami Hernandez is a 74 y.o. male who presents for Follow-up (Benign essential hypertension; Pure hypercholesterolemia; Acquired hypothyroidism; Type 2 diabetes mellitus with chronic kidney disease, without long-term current use of insulin, unspecified CKD stage (Multi); DDD (degenerative disc disease), lumbar). I last saw the patient 6/24/2024      Patient is here for a F/U    Past medical, surgical, and family history reviewed.  Reviewed and documented all medications   Pt eating well, exercising as tolerated and taking medications as directed    Has no medical concerns for rooming MA    The patient mentions that his knee has been persistently painful. He mentions that he came home form the hospital after surgery and had pain in his left knee. He is to follow up in 6 months to have a bone scan done.    Acute Neurological Problem  The patient's primary symptoms include clumsiness, a loss of balance, memory loss and a visual change. This is a chronic problem. The current episode started more than 1 year ago. The neurological problem developed insidiously. The problem has been gradually worsening since onset. There was no focality noted. Associated symptoms include back pain and shortness of breath. Pertinent negatives include no abdominal pain, auditory change, aura, bladder incontinence, bowel incontinence, chest pain, confusion, diaphoresis, dizziness, fatigue, fever, headaches, light-headedness, nausea, neck pain, palpitations, vertigo or vomiting. Past treatments include acetaminophen. The treatment provided mild relief.          Review of Systems   Constitutional:  Negative for diaphoresis, fatigue and fever.   Respiratory:  Positive for shortness of breath.    Cardiovascular:  Negative for chest pain and palpitations.   Gastrointestinal:  Negative for abdominal pain, bowel incontinence, nausea and vomiting.   Genitourinary:  Negative for bladder incontinence.   Musculoskeletal:  Positive  for back pain. Negative for neck pain.   Neurological:  Positive for loss of balance. Negative for dizziness, vertigo, light-headedness and headaches.   Psychiatric/Behavioral:  Positive for memory loss. Negative for confusion.      Objective   /72 (BP Location: Right arm, Patient Position: Sitting)   Pulse 79   Temp 36.6 °C (97.8 °F)   Resp 16   Ht 1.829 m (6')   Wt 128 kg (281 lb 12.8 oz)   SpO2 96%   BMI 38.22 kg/m²     Physical Exam    General Appearance - well-developed, well-nourished, 74 y.o., White male in no acute distress. Patient is ambulating with a walker.    Skin - warm, pink and dry without rash or concerning lesions. Dryness of the skin involving the LEs.     Mental Status - alert and oriented x 3. Normal mood and affect appropriate to mood.     Neck - supple without lymphadenopathy. Carotid pulses are normal without bruits. Thyroid is normal in midline without nodules.     Chest - lungs are clear to auscultation without rales, rhonchi or wheezes. Mildly diminished breath sounds at the bases bilaterally. Improved breath sounds from previous physical exam. Pacemaker in the left upper chest.     Heart - regular, rate and rhythm without murmurs, rubs or gallops.     Abdomen - soft, obese, protuberant, nontender, nondistended. No masses, hepatomegaly or splenomegaly is noted. No rebound, rigidity or guarding is noted. Bowel sounds are normoactive.     Extremities - no cyanosis, clubbing or edema. Pedal pulses are 2+ normal at the dorsalis pedis and posterior pulses bilaterally. Right knee - no effusion noted. Mild pain to palpation of the lateral knee. No erythema or increased warmth of the knee noted. Patient localizes the Hip pain to the right lower buttock.    Neurological - cranial nerves II through XII grossly intact. Motor strength 5/5 at all fours bilaterally. No evidence of right dorsiflexor weakness.    Assessment/Plan   1. Type 2 diabetes mellitus with chronic kidney disease,  without long-term current use of insulin, unspecified CKD stage (Multi)  Hemoglobin A1c      2. Benign essential hypertension  CBC and Auto Differential    Comprehensive Metabolic Panel      3. Inflammatory bowel diseases (IBD)  Vitamin D 25-Hydroxy,Total (for eval of Vitamin D levels)      4. Vitamin D deficiency  Vitamin D 25-Hydroxy,Total (for eval of Vitamin D levels)      5. Vitamin B12 deficiency  Vitamin B12      6. Class 2 severe obesity due to excess calories with serious comorbidity and body mass index (BMI) of 38.0 to 38.9 in adult (Multi)            ACP documents are updated in the chart.    For weight management I recommend exercising and remaining physically active. Try to maintain a heathy diet that includes green leafy vegetables, fruits and proteins. You are encouraged to stay well hydrated.    Patient was advised to limit their salt intake and to not add any extra salt to their food. Patient is to avoid pretzels, chips, lunch meats, canned soups, soda pop, ham, moran, hot dogs, etc.    Please have your bone scan done that orthopedic surgery has ordered for you at your earliest convenience.     Will obtain CBC with Differential, Comprehensive Metabolic, Vitamin D, and Vitamin B12 prior to patient's next appointment. Will call patient with results when available.    Patient to continue current medications (with any exceptions as noted) and diet. Follow-up in 3 month(s) otherwise as needed.     Patient is to follow up with Dr. Garrett (neuro), Dr. Goodwin (pulm), Dr. Chávez (uro), Dr. Salcedo (cardio), Dr. Sanders (GI) as scheduled, Dr. Holman orthopedic surgery    Scribe Attestation  By signing my name below, IGlenda, Scribe   attest that this documentation has been prepared under the direction and in the presence of Rian Lindsey MD.    This note has been transcribed using a medical scribe and there is a possibility of unintentional typing misprints.

## 2024-07-30 ENCOUNTER — TELEPHONE (OUTPATIENT)
Dept: PRIMARY CARE | Facility: CLINIC | Age: 74
End: 2024-07-30

## 2024-07-31 ENCOUNTER — HOSPITAL ENCOUNTER (OUTPATIENT)
Dept: CARDIOLOGY | Facility: HOSPITAL | Age: 74
Discharge: HOME | End: 2024-07-31
Payer: MEDICARE

## 2024-07-31 ENCOUNTER — OFFICE VISIT (OUTPATIENT)
Dept: PULMONOLOGY | Age: 74
End: 2024-07-31
Payer: MEDICARE

## 2024-07-31 ENCOUNTER — APPOINTMENT (OUTPATIENT)
Dept: ORTHOPEDIC SURGERY | Facility: CLINIC | Age: 74
End: 2024-07-31
Payer: COMMERCIAL

## 2024-07-31 VITALS
WEIGHT: 284 LBS | BODY MASS INDEX: 38.52 KG/M2 | DIASTOLIC BLOOD PRESSURE: 90 MMHG | SYSTOLIC BLOOD PRESSURE: 138 MMHG | HEART RATE: 83 BPM | OXYGEN SATURATION: 93 %

## 2024-07-31 DIAGNOSIS — E66.9 OBESITY (BMI 30-39.9): ICD-10-CM

## 2024-07-31 DIAGNOSIS — G47.33 OSA (OBSTRUCTIVE SLEEP APNEA): Primary | ICD-10-CM

## 2024-07-31 DIAGNOSIS — R06.02 SHORTNESS OF BREATH: ICD-10-CM

## 2024-07-31 DIAGNOSIS — Z95.0 PACEMAKER: ICD-10-CM

## 2024-07-31 DIAGNOSIS — I44.2 ATRIOVENTRICULAR BLOCK, THIRD DEGREE (MULTI): ICD-10-CM

## 2024-07-31 PROCEDURE — G8427 DOCREV CUR MEDS BY ELIG CLIN: HCPCS | Performed by: INTERNAL MEDICINE

## 2024-07-31 PROCEDURE — 3017F COLORECTAL CA SCREEN DOC REV: CPT | Performed by: INTERNAL MEDICINE

## 2024-07-31 PROCEDURE — 3080F DIAST BP >= 90 MM HG: CPT | Performed by: INTERNAL MEDICINE

## 2024-07-31 PROCEDURE — G8417 CALC BMI ABV UP PARAM F/U: HCPCS | Performed by: INTERNAL MEDICINE

## 2024-07-31 PROCEDURE — 1036F TOBACCO NON-USER: CPT | Performed by: INTERNAL MEDICINE

## 2024-07-31 PROCEDURE — 93296 REM INTERROG EVL PM/IDS: CPT

## 2024-07-31 PROCEDURE — 99214 OFFICE O/P EST MOD 30 MIN: CPT | Performed by: INTERNAL MEDICINE

## 2024-07-31 PROCEDURE — 1123F ACP DISCUSS/DSCN MKR DOCD: CPT | Performed by: INTERNAL MEDICINE

## 2024-07-31 PROCEDURE — 3075F SYST BP GE 130 - 139MM HG: CPT | Performed by: INTERNAL MEDICINE

## 2024-07-31 RX ORDER — LOSARTAN POTASSIUM 100 MG/1
100 TABLET ORAL DAILY
COMMUNITY
Start: 2024-06-14 | End: 2025-07-19

## 2024-07-31 NOTE — PROGRESS NOTES
(sister has walker pt can borrow)    Has the patient had two or more falls in the past year or any fall with injury in the past year?: Yes (5-6 falls. Fell switching between cars, \"got my feet tied up\")    Receives Help From: Home health, Family (sister and nephew help w/ chores, feeds cat, getting groceries)    ADL Assistance: Independent (supervision for shower transfers)    Homemaking Assistance: Needs assistance (HH aid comes in 3x/wk to do laundry, cook dinner, clean. difficult to fold laundry)    Ambulation Assistance: Independent    Transfer Assistance: Independent    Active : Yes (occasional driving)    Mode of Transportation: [facility]    Occupation: Retired-[employer]-     Leisure & Hobbies: collect 2 [employer] memorabilia    Additional Comments: HH aid comes in 3x/wk to do laundry, cook dinner, clean.  Pt avoids amb on uneven surfaces         Social Determinants of Health     Financial Resource Strain: Not on file   Food Insecurity: No Food Insecurity (5/26/2024)    Hunger Vital Sign     Worried About Running Out of Food in the Last Year: Never true     Ran Out of Food in the Last Year: Never true   Transportation Needs: No Transportation Needs (5/26/2024)    PRAPARE - Transportation     Lack of Transportation (Medical): No     Lack of Transportation (Non-Medical): No   Physical Activity: Not on file   Stress: Not on file   Social Connections: Not on file   Intimate Partner Violence: Not on file   Housing Stability: Low Risk  (5/26/2024)    Housing Stability Vital Sign     Unable to Pay for Housing in the Last Year: No     Number of Places Lived in the Last Year: 1     Unstable Housing in the Last Year: No         Review of Systems   Constitutional:  Negative for chills, diaphoresis, fatigue and fever.   HENT:  Negative for congestion, mouth sores, nosebleeds, postnasal drip, rhinorrhea, sneezing, sore throat and voice change.    Eyes:  Negative for itching and

## 2024-08-14 ENCOUNTER — OFFICE VISIT (OUTPATIENT)
Dept: ORTHOPEDIC SURGERY | Facility: CLINIC | Age: 74
End: 2024-08-14
Payer: MEDICARE

## 2024-08-14 DIAGNOSIS — M25.569 CHRONIC KNEE PAIN AFTER TOTAL REPLACEMENT OF KNEE JOINT: Primary | ICD-10-CM

## 2024-08-14 DIAGNOSIS — G89.29 CHRONIC KNEE PAIN AFTER TOTAL REPLACEMENT OF KNEE JOINT: Primary | ICD-10-CM

## 2024-08-14 DIAGNOSIS — Z96.659 CHRONIC KNEE PAIN AFTER TOTAL REPLACEMENT OF KNEE JOINT: Primary | ICD-10-CM

## 2024-08-14 DIAGNOSIS — T84.032A: ICD-10-CM

## 2024-08-14 PROCEDURE — 1160F RVW MEDS BY RX/DR IN RCRD: CPT | Performed by: ORTHOPAEDIC SURGERY

## 2024-08-14 PROCEDURE — 3044F HG A1C LEVEL LT 7.0%: CPT | Performed by: ORTHOPAEDIC SURGERY

## 2024-08-14 PROCEDURE — 99213 OFFICE O/P EST LOW 20 MIN: CPT | Performed by: ORTHOPAEDIC SURGERY

## 2024-08-14 PROCEDURE — 4010F ACE/ARB THERAPY RXD/TAKEN: CPT | Performed by: ORTHOPAEDIC SURGERY

## 2024-08-14 PROCEDURE — 1157F ADVNC CARE PLAN IN RCRD: CPT | Performed by: ORTHOPAEDIC SURGERY

## 2024-08-14 PROCEDURE — 1159F MED LIST DOCD IN RCRD: CPT | Performed by: ORTHOPAEDIC SURGERY

## 2024-08-14 PROCEDURE — 1123F ACP DISCUSS/DSCN MKR DOCD: CPT | Performed by: ORTHOPAEDIC SURGERY

## 2024-08-14 PROCEDURE — 1036F TOBACCO NON-USER: CPT | Performed by: ORTHOPAEDIC SURGERY

## 2024-08-14 NOTE — PROGRESS NOTES
"  Subjective    Patient ID: Valero \"Sami\"    Chief Complaint:   Chief Complaint   Patient presents with    Right Knee - Follow-up     RT CIDNY TKR 11/21/22     History of present illness    74-year-old gentleman presented clinic today for follow-up evaluation right knee pain and imbalance.  He was placed in a fusion double upright knee brace which helps with stability and pain relief however he still feeling unsteady on his feet.  He is ambulating with the assistance of a cane today.  No numbness tingling no fevers chills.  No locking catching at this time.      Past medical , Surgical, Family and social history reviewed.      Physical exam  General: No acute distress and breathing comfortably.  Patient is pleasant and cooperative with the examination.    Extremity  Right knee is neurovascular intact.  He has good range of motion today 0 to 110 degrees.  Mild weakness right lower extremity.  Incision is well-healed at this time.  No ligamentous instability on exam.  No calf swelling or tenderness.  Compartment soft.  No DVT.    Diagnostics  [ none]  No results found.     Procedure  [ none]    Assessment  Continued right knee weakness status post right total knee replacement    Treatment plan  1.  At this time we had a long discussion regards to continued diagnostics as well as continued conservative treatment.  2.  He was encouraged to continue with weightbearing activity as tolerated with the fusion brace.  Because he is still having issues with weightbearing in regards to his balance and weakness instability we will go ahead and order a bone scan and routine lab work CBC ESR CRP to rule out early loosening of his implant.  3.  He will follow-up with us after the bone scan is obtained.  For complete plan and/or surgical details, please refer to Dr. Perry's portion of the split/shared dictation.  4.  All of the patient's questions were answered.    No orders of the defined types were placed in this " encounter.      This note was prepared using voice recognition software.  The details of this note are correct and have been reviewed, and corrected to the best of my ability.  Some grammatical areas may persist related to the Dragon software    Lemuel Holman PA-C, [facility]  [facility]th[facility] [place]    [phone]    In a face-to-face encounter performed today, I Fidel Perry MD performed a history and physical examination, discussed pertinent diagnostic studies if indicated, and discussed diagnosis and management strategies with both the patient and the midlevel provider.  I reviewed the midlevel's note and agree with the documented findings and plan of care.  Greater than 50% of the evaluation and treatment decision was performed by the physician myself during today's visit.    74-year-old gentleman had a right total knee replacement in November 2022 states that his pain seems to be better but he is still has episodes of instability of the knee brace seems to be helping but he is having issues with balance and weakness as well.  On examination knee incision is well-healed without sign infection range of motion 0-1 10 some mild varus valgus instability to stress testing.  Impression is persistent pain and instability right total knee replacement.  Plan is to going proceed with bone scan CBC sed rate CRP knees can follow-up once these results are completed they will come back negative we will continue work on physical therapy and bracing.      Patient has severe impairment related to her presenting condition.  It is significantly impairing bodily function.  We did discuss surgical and nonsurgical options.    This note was prepared using voice recognition software.  The details of this note are correct and have been reviewed, and corrected to the best of my ability.  Some grammatical areas may persist related to the Dragon software    Fidel Perry MD  Senior Attending  [facility]    [phone]

## 2024-08-21 ENCOUNTER — LAB (OUTPATIENT)
Dept: LAB | Facility: LAB | Age: 74
End: 2024-08-21
Payer: COMMERCIAL

## 2024-08-21 DIAGNOSIS — Z96.659 CHRONIC KNEE PAIN AFTER TOTAL REPLACEMENT OF KNEE JOINT: ICD-10-CM

## 2024-08-21 DIAGNOSIS — G89.29 CHRONIC KNEE PAIN AFTER TOTAL REPLACEMENT OF KNEE JOINT: ICD-10-CM

## 2024-08-21 DIAGNOSIS — T84.032A: ICD-10-CM

## 2024-08-21 DIAGNOSIS — M25.569 CHRONIC KNEE PAIN AFTER TOTAL REPLACEMENT OF KNEE JOINT: ICD-10-CM

## 2024-08-21 LAB
BASOPHILS # BLD AUTO: 0.04 X10*3/UL (ref 0–0.1)
BASOPHILS NFR BLD AUTO: 0.5 %
CRP SERPL-MCNC: 0.51 MG/DL
EOSINOPHIL # BLD AUTO: 0.1 X10*3/UL (ref 0–0.4)
EOSINOPHIL NFR BLD AUTO: 1.2 %
ERYTHROCYTE [DISTWIDTH] IN BLOOD BY AUTOMATED COUNT: 15.6 % (ref 11.5–14.5)
ERYTHROCYTE [SEDIMENTATION RATE] IN BLOOD BY WESTERGREN METHOD: 10 MM/H (ref 0–20)
HCT VFR BLD AUTO: 42.6 % (ref 41–52)
HGB BLD-MCNC: 13.5 G/DL (ref 13.5–17.5)
IMM GRANULOCYTES # BLD AUTO: 0.09 X10*3/UL (ref 0–0.5)
IMM GRANULOCYTES NFR BLD AUTO: 1.1 % (ref 0–0.9)
LYMPHOCYTES # BLD AUTO: 1.04 X10*3/UL (ref 0.8–3)
LYMPHOCYTES NFR BLD AUTO: 12.9 %
MCH RBC QN AUTO: 28.8 PG (ref 26–34)
MCHC RBC AUTO-ENTMCNC: 31.7 G/DL (ref 32–36)
MCV RBC AUTO: 91 FL (ref 80–100)
MONOCYTES # BLD AUTO: 0.7 X10*3/UL (ref 0.05–0.8)
MONOCYTES NFR BLD AUTO: 8.7 %
NEUTROPHILS # BLD AUTO: 6.11 X10*3/UL (ref 1.6–5.5)
NEUTROPHILS NFR BLD AUTO: 75.6 %
NRBC BLD-RTO: 0.2 /100 WBCS (ref 0–0)
PLATELET # BLD AUTO: 229 X10*3/UL (ref 150–450)
RBC # BLD AUTO: 4.68 X10*6/UL (ref 4.5–5.9)
WBC # BLD AUTO: 8.1 X10*3/UL (ref 4.4–11.3)

## 2024-08-21 PROCEDURE — 86140 C-REACTIVE PROTEIN: CPT

## 2024-08-21 PROCEDURE — 85025 COMPLETE CBC W/AUTO DIFF WBC: CPT

## 2024-08-21 PROCEDURE — 85652 RBC SED RATE AUTOMATED: CPT

## 2024-08-22 ENCOUNTER — LAB (OUTPATIENT)
Dept: LAB | Facility: LAB | Age: 74
End: 2024-08-22
Payer: COMMERCIAL

## 2024-08-22 DIAGNOSIS — K52.9 INFLAMMATORY BOWEL DISEASES (IBD): ICD-10-CM

## 2024-08-22 DIAGNOSIS — E53.8 VITAMIN B12 DEFICIENCY: ICD-10-CM

## 2024-08-22 DIAGNOSIS — E11.22 TYPE 2 DIABETES MELLITUS WITH CHRONIC KIDNEY DISEASE, WITHOUT LONG-TERM CURRENT USE OF INSULIN, UNSPECIFIED CKD STAGE (MULTI): ICD-10-CM

## 2024-08-22 DIAGNOSIS — E55.9 VITAMIN D DEFICIENCY: ICD-10-CM

## 2024-08-22 DIAGNOSIS — I10 BENIGN ESSENTIAL HYPERTENSION: ICD-10-CM

## 2024-08-22 LAB
25(OH)D3 SERPL-MCNC: 33 NG/ML (ref 30–100)
ALBUMIN SERPL BCP-MCNC: 4.2 G/DL (ref 3.4–5)
ALP SERPL-CCNC: 68 U/L (ref 33–136)
ALT SERPL W P-5'-P-CCNC: 25 U/L (ref 10–52)
ANION GAP SERPL CALC-SCNC: 12 MMOL/L (ref 10–20)
AST SERPL W P-5'-P-CCNC: 20 U/L (ref 9–39)
BASOPHILS # BLD AUTO: 0.03 X10*3/UL (ref 0–0.1)
BASOPHILS NFR BLD AUTO: 0.4 %
BILIRUB SERPL-MCNC: 0.5 MG/DL (ref 0–1.2)
BUN SERPL-MCNC: 19 MG/DL (ref 6–23)
CALCIUM SERPL-MCNC: 10 MG/DL (ref 8.6–10.3)
CHLORIDE SERPL-SCNC: 106 MMOL/L (ref 98–107)
CO2 SERPL-SCNC: 29 MMOL/L (ref 21–32)
CREAT SERPL-MCNC: 1.33 MG/DL (ref 0.5–1.3)
EGFRCR SERPLBLD CKD-EPI 2021: 56 ML/MIN/1.73M*2
EOSINOPHIL # BLD AUTO: 0.12 X10*3/UL (ref 0–0.4)
EOSINOPHIL NFR BLD AUTO: 1.6 %
ERYTHROCYTE [DISTWIDTH] IN BLOOD BY AUTOMATED COUNT: 15.4 % (ref 11.5–14.5)
EST. AVERAGE GLUCOSE BLD GHB EST-MCNC: 128 MG/DL
GLUCOSE SERPL-MCNC: 103 MG/DL (ref 74–99)
HBA1C MFR BLD: 6.1 %
HCT VFR BLD AUTO: 45.1 % (ref 41–52)
HGB BLD-MCNC: 13.9 G/DL (ref 13.5–17.5)
IMM GRANULOCYTES # BLD AUTO: 0.08 X10*3/UL (ref 0–0.5)
IMM GRANULOCYTES NFR BLD AUTO: 1.1 % (ref 0–0.9)
LYMPHOCYTES # BLD AUTO: 0.97 X10*3/UL (ref 0.8–3)
LYMPHOCYTES NFR BLD AUTO: 13 %
MCH RBC QN AUTO: 28.4 PG (ref 26–34)
MCHC RBC AUTO-ENTMCNC: 30.8 G/DL (ref 32–36)
MCV RBC AUTO: 92 FL (ref 80–100)
MONOCYTES # BLD AUTO: 0.61 X10*3/UL (ref 0.05–0.8)
MONOCYTES NFR BLD AUTO: 8.1 %
NEUTROPHILS # BLD AUTO: 5.68 X10*3/UL (ref 1.6–5.5)
NEUTROPHILS NFR BLD AUTO: 75.8 %
NRBC BLD-RTO: 0 /100 WBCS (ref 0–0)
PLATELET # BLD AUTO: 230 X10*3/UL (ref 150–450)
POTASSIUM SERPL-SCNC: 4.5 MMOL/L (ref 3.5–5.3)
PROT SERPL-MCNC: 6.9 G/DL (ref 6.4–8.2)
RBC # BLD AUTO: 4.89 X10*6/UL (ref 4.5–5.9)
SODIUM SERPL-SCNC: 142 MMOL/L (ref 136–145)
VIT B12 SERPL-MCNC: 181 PG/ML (ref 211–911)
WBC # BLD AUTO: 7.5 X10*3/UL (ref 4.4–11.3)

## 2024-08-22 PROCEDURE — 80053 COMPREHEN METABOLIC PANEL: CPT

## 2024-08-22 PROCEDURE — 82306 VITAMIN D 25 HYDROXY: CPT

## 2024-08-22 PROCEDURE — 83036 HEMOGLOBIN GLYCOSYLATED A1C: CPT

## 2024-08-22 PROCEDURE — 85025 COMPLETE CBC W/AUTO DIFF WBC: CPT

## 2024-08-22 PROCEDURE — 82607 VITAMIN B-12: CPT

## 2024-08-23 ENCOUNTER — HOSPITAL ENCOUNTER (OUTPATIENT)
Dept: RADIOLOGY | Facility: HOSPITAL | Age: 74
Discharge: HOME | End: 2024-08-23
Payer: MEDICARE

## 2024-08-23 DIAGNOSIS — G89.29 CHRONIC KNEE PAIN AFTER TOTAL REPLACEMENT OF KNEE JOINT: ICD-10-CM

## 2024-08-23 DIAGNOSIS — Z96.659 CHRONIC KNEE PAIN AFTER TOTAL REPLACEMENT OF KNEE JOINT: ICD-10-CM

## 2024-08-23 DIAGNOSIS — T84.032A: ICD-10-CM

## 2024-08-23 DIAGNOSIS — M25.569 CHRONIC KNEE PAIN AFTER TOTAL REPLACEMENT OF KNEE JOINT: ICD-10-CM

## 2024-08-23 PROCEDURE — 78315 BONE IMAGING 3 PHASE: CPT

## 2024-08-23 PROCEDURE — A9503 TC99M MEDRONATE: HCPCS | Performed by: ORTHOPAEDIC SURGERY

## 2024-08-23 PROCEDURE — 3430000001 HC RX 343 DIAGNOSTIC RADIOPHARMACEUTICALS: Performed by: ORTHOPAEDIC SURGERY

## 2024-08-26 ASSESSMENT — ENCOUNTER SYMPTOMS
FEVER: 0
DIAPHORESIS: 0
MEMORY LOSS: 1
VISUAL CHANGE: 1
LIGHT-HEADEDNESS: 0
PALPITATIONS: 0
BACK PAIN: 0
ABDOMINAL PAIN: 0
VOMITING: 0
VERTIGO: 0
SHORTNESS OF BREATH: 0
FOCAL WEAKNESS: 1
CLUMSINESS: 1
CONFUSION: 1
NEUROLOGIC COMPLAINT: 1
LOSS OF BALANCE: 1
FATIGUE: 1
NECK PAIN: 0
WEAKNESS: 1
BOWEL INCONTINENCE: 0
AURA: 0
HEADACHES: 0
ALTERED MENTAL STATUS: 1
NAUSEA: 0
DIZZINESS: 0

## 2024-08-27 ENCOUNTER — APPOINTMENT (OUTPATIENT)
Dept: PRIMARY CARE | Facility: CLINIC | Age: 74
End: 2024-08-27
Payer: MEDICARE

## 2024-08-27 VITALS
DIASTOLIC BLOOD PRESSURE: 66 MMHG | WEIGHT: 278 LBS | HEART RATE: 70 BPM | TEMPERATURE: 97.3 F | SYSTOLIC BLOOD PRESSURE: 114 MMHG | OXYGEN SATURATION: 97 % | RESPIRATION RATE: 16 BRPM | BODY MASS INDEX: 37.65 KG/M2 | HEIGHT: 72 IN

## 2024-08-27 DIAGNOSIS — G95.9 CERVICAL MYELOPATHY (MULTI): Primary | ICD-10-CM

## 2024-08-27 DIAGNOSIS — E03.9 ACQUIRED HYPOTHYROIDISM: ICD-10-CM

## 2024-08-27 DIAGNOSIS — E53.8 VITAMIN B12 DEFICIENCY: ICD-10-CM

## 2024-08-27 DIAGNOSIS — E78.00 PURE HYPERCHOLESTEROLEMIA: ICD-10-CM

## 2024-08-27 DIAGNOSIS — J44.9 COPD, MILD (MULTI): ICD-10-CM

## 2024-08-27 DIAGNOSIS — I10 BENIGN ESSENTIAL HYPERTENSION: ICD-10-CM

## 2024-08-27 DIAGNOSIS — Z13.220 LIPID SCREENING: ICD-10-CM

## 2024-08-27 LAB
CHOLEST SERPL-MCNC: 240 MG/DL (ref 0–199)
CHOLESTEROL/HDL RATIO: 4.3
HDLC SERPL-MCNC: 55.5 MG/DL
LDLC SERPL CALC-MCNC: 153 MG/DL
NON HDL CHOLESTEROL: 185 MG/DL (ref 0–149)
TRIGL SERPL-MCNC: 159 MG/DL (ref 0–149)
VLDL: 32 MG/DL (ref 0–40)

## 2024-08-27 PROCEDURE — 1123F ACP DISCUSS/DSCN MKR DOCD: CPT | Performed by: FAMILY MEDICINE

## 2024-08-27 PROCEDURE — 99214 OFFICE O/P EST MOD 30 MIN: CPT | Performed by: FAMILY MEDICINE

## 2024-08-27 PROCEDURE — 3008F BODY MASS INDEX DOCD: CPT | Performed by: FAMILY MEDICINE

## 2024-08-27 PROCEDURE — 1157F ADVNC CARE PLAN IN RCRD: CPT | Performed by: FAMILY MEDICINE

## 2024-08-27 PROCEDURE — 1159F MED LIST DOCD IN RCRD: CPT | Performed by: FAMILY MEDICINE

## 2024-08-27 PROCEDURE — 3050F LDL-C >= 130 MG/DL: CPT | Performed by: FAMILY MEDICINE

## 2024-08-27 PROCEDURE — 3044F HG A1C LEVEL LT 7.0%: CPT | Performed by: FAMILY MEDICINE

## 2024-08-27 PROCEDURE — 3074F SYST BP LT 130 MM HG: CPT | Performed by: FAMILY MEDICINE

## 2024-08-27 PROCEDURE — 4010F ACE/ARB THERAPY RXD/TAKEN: CPT | Performed by: FAMILY MEDICINE

## 2024-08-27 PROCEDURE — 1160F RVW MEDS BY RX/DR IN RCRD: CPT | Performed by: FAMILY MEDICINE

## 2024-08-27 PROCEDURE — 3078F DIAST BP <80 MM HG: CPT | Performed by: FAMILY MEDICINE

## 2024-08-27 PROCEDURE — 96372 THER/PROPH/DIAG INJ SC/IM: CPT | Performed by: FAMILY MEDICINE

## 2024-08-27 PROCEDURE — 1036F TOBACCO NON-USER: CPT | Performed by: FAMILY MEDICINE

## 2024-08-27 PROCEDURE — 1158F ADVNC CARE PLAN TLK DOCD: CPT | Performed by: FAMILY MEDICINE

## 2024-08-27 RX ORDER — CYANOCOBALAMIN 1000 UG/ML
1000 INJECTION, SOLUTION INTRAMUSCULAR; SUBCUTANEOUS ONCE
Status: COMPLETED | OUTPATIENT
Start: 2024-08-27 | End: 2024-08-27

## 2024-08-27 ASSESSMENT — ENCOUNTER SYMPTOMS
ABDOMINAL PAIN: 0
LIGHT-HEADEDNESS: 0
HEADACHES: 0
WEAKNESS: 1
FEVER: 0
NECK PAIN: 0
BACK PAIN: 1
CONFUSION: 0
SHORTNESS OF BREATH: 1
PALPITATIONS: 0
NAUSEA: 0
DIZZINESS: 0
DIAPHORESIS: 0
VOMITING: 0
FATIGUE: 0

## 2024-08-27 ASSESSMENT — PATIENT HEALTH QUESTIONNAIRE - PHQ9
SUM OF ALL RESPONSES TO PHQ9 QUESTIONS 1 AND 2: 0
1. LITTLE INTEREST OR PLEASURE IN DOING THINGS: NOT AT ALL
2. FEELING DOWN, DEPRESSED OR HOPELESS: NOT AT ALL

## 2024-08-27 NOTE — PROGRESS NOTES
Subjective   Patient ID: Sami Hernandez is a 74 y.o. male who presents for Follow-up (Benign essential hypertension; Pure hypercholesterolemia; Acquired hypothyroidism; Type 2 diabetes mellitus with chronic kidney disease, without long-term current use of insulin, unspecified CKD stage (Multi); DDD (degenerative disc disease), lumbar/). I last saw the patient 7/25/2024      Patient is here for a F/U  Last A1C= 6.1%  Review labs and NM bone 3 phase test    Mentions he has been having balance issues for several months.    Patient does notes that dexterity with left hand is poor and only slightly better on the right. He frequently drop things with his hands. He just started PT.    Past medical, surgical, and family history reviewed.  Reviewed and documented all medications   Pt eating well, exercising as tolerated and taking medications as directed    Answers submitted by the patient for this visit:  Neurological Problem Questionnaire (Submitted on 8/26/2024)  Chief Complaint: Neurologic complaint  clumsiness: Yes  altered mental status: Yes  loss of balance: Yes  memory loss: Yes  visual change: Yes  focal weakness: Yes  Chronicity: chronic  Onset quality: gradually  Progression since onset: gradually worsening  Focality: left-sided  aura: No  bladder incontinence: No  bowel incontinence: No  vertigo: No  auditory change: No  Treatments tried: acetaminophen, drinking, medication  Improvement on treatment: mild        Review of Systems   Constitutional:  Negative for diaphoresis, fatigue and fever.   Respiratory:  Positive for shortness of breath.    Cardiovascular:  Negative for chest pain and palpitations.   Gastrointestinal:  Negative for abdominal pain, nausea and vomiting.   Musculoskeletal:  Positive for back pain. Negative for neck pain.   Neurological:  Positive for weakness. Negative for dizziness, light-headedness and headaches.   Psychiatric/Behavioral:  Negative for confusion.      Objective   /66  (BP Location: Right arm, Patient Position: Sitting, BP Cuff Size: Adult long)   Pulse 70   Temp 36.3 °C (97.3 °F)   Resp 16   Ht 1.829 m (6')   Wt 126 kg (278 lb)   SpO2 97%   BMI 37.70 kg/m²     Physical Exam    114/66 on recheck of BP in the right arm.     General Appearance - well-developed, well-nourished, 74 y.o., White male in no acute distress. Patient is ambulating with a walker.    Skin - warm, pink and dry without rash or concerning lesions. Dryness of the skin involving the LEs.     Mental Status - alert and oriented x 3. Normal mood and affect appropriate to mood.     Neck - supple without lymphadenopathy. Carotid pulses are normal without bruits. Thyroid is normal in midline without nodules.     Chest - lungs are clear to auscultation without rales, rhonchi or wheezes. Mildly diminished breath sounds at the bases bilaterally. Improved breath sounds from previous physical exam. Pacemaker in the left upper chest.     Heart - regular, rate and rhythm without murmurs, rubs or gallops.     Abdomen - soft, obese, protuberant, nontender, nondistended. No masses, hepatomegaly or splenomegaly is noted. No rebound, rigidity or guarding is noted. Bowel sounds are normoactive.     Extremities - no cyanosis, clubbing or edema. Pedal pulses are 2+ normal at the dorsalis pedis and posterior pulses bilaterally. Right knee - no effusion noted. Mild pain to palpation of the lateral knee. No erythema or increased warmth of the knee noted. Patient localizes the Hip pain to the right lower buttock.    Neurological - cranial nerves II through XII grossly intact. Motor strength 5/5 at all fours bilaterally. DTRs 2+ normal at all fours bilaterally and symmetrically. Cerebellar functioning is normal with normal finger to nose touching.    Assessment/Plan   1. Cervical myelopathy (Multi)  EMG & nerve conduction      2. Vitamin B12 deficiency  cyanocobalamin (Vitamin B-12) injection 1,000 mcg      3. Lipid screening  Lipid  Panel      4. Benign essential hypertension        5. Pure hypercholesterolemia        6. Acquired hypothyroidism        7. COPD, mild (Multi)          Patient to continue current medications (with any exceptions as noted) and diet. Follow-up in 2 month(s) otherwise as needed.      Patient is to return for fasting lipid profile labs at their convenience prior to their next appointment. Fasting is nothing to eat or drink except water or black coffee for 8-12 hours. Will call patient with results when available.     Ordered EMG & nerve conduction. Will call patient with results when available.      Cyanocobalamin (Vitamin B-12) injection 1,000 mcg given today. Patient is to return in 1-2 weeks for second vitamin B 12 injection.    Patient is to follow up with Dr. Perry (Neuro), Dr. Garrett (neuro), Dr. Goodwin (pulm), Dr. Chávez (uro), Dr. Salcedo (cardio), Dr. Sanders (GI), Dr. Holman (orthopedic surgery ) as scheduled.    Scribe Attestation  By signing my name below, Julia SERRANO , Rupert   attest that this documentation has been prepared under the direction and in the presence of Rian Lindsey MD.

## 2024-09-05 ENCOUNTER — APPOINTMENT (OUTPATIENT)
Dept: UROLOGY | Facility: CLINIC | Age: 74
End: 2024-09-05
Payer: MEDICARE

## 2024-09-10 ENCOUNTER — APPOINTMENT (OUTPATIENT)
Dept: PRIMARY CARE | Facility: CLINIC | Age: 74
End: 2024-09-10
Payer: MEDICARE

## 2024-09-10 DIAGNOSIS — E53.8 VITAMIN B12 DEFICIENCY: ICD-10-CM

## 2024-09-10 PROCEDURE — 96372 THER/PROPH/DIAG INJ SC/IM: CPT | Performed by: FAMILY MEDICINE

## 2024-09-10 RX ORDER — CYANOCOBALAMIN 1000 UG/ML
1000 INJECTION, SOLUTION INTRAMUSCULAR; SUBCUTANEOUS ONCE
Status: COMPLETED | OUTPATIENT
Start: 2024-09-10 | End: 2024-09-10

## 2024-09-12 DIAGNOSIS — F32.5 MAJOR DEPRESSIVE DISORDER IN FULL REMISSION, UNSPECIFIED WHETHER RECURRENT (CMS-HCC): ICD-10-CM

## 2024-09-12 RX ORDER — DOCUSATE SODIUM 100 MG/1
100 CAPSULE, LIQUID FILLED ORAL DAILY
Qty: 60 CAPSULE | Refills: 0 | OUTPATIENT
Start: 2024-09-12

## 2024-09-12 NOTE — TELEPHONE ENCOUNTER
Rx Refill Request     Name: Anjum Hernandez  :  1950   Medication Name:  WELLBUTERIN XR 300MG  Specific Pharmacy location:  [facility]  Date of last appointment:  2024   Date of next appointment:  10/14/2024   Best number to reach patient:  [phone]

## 2024-09-14 RX ORDER — BUPROPION HYDROCHLORIDE 300 MG/1
TABLET ORAL
Qty: 90 TABLET | Refills: 1 | Status: SHIPPED | OUTPATIENT
Start: 2024-09-14

## 2024-10-14 ENCOUNTER — APPOINTMENT (OUTPATIENT)
Dept: PRIMARY CARE | Facility: CLINIC | Age: 74
End: 2024-10-14
Payer: MEDICARE

## 2024-10-14 ENCOUNTER — HOSPITAL ENCOUNTER (OUTPATIENT)
Dept: NEUROLOGY | Facility: HOSPITAL | Age: 74
Discharge: HOME | End: 2024-10-14
Payer: MEDICARE

## 2024-10-14 VITALS
HEART RATE: 61 BPM | SYSTOLIC BLOOD PRESSURE: 130 MMHG | RESPIRATION RATE: 16 BRPM | BODY MASS INDEX: 36.84 KG/M2 | OXYGEN SATURATION: 91 % | TEMPERATURE: 97.2 F | DIASTOLIC BLOOD PRESSURE: 82 MMHG | WEIGHT: 272 LBS | HEIGHT: 72 IN

## 2024-10-14 DIAGNOSIS — E03.9 ACQUIRED HYPOTHYROIDISM: ICD-10-CM

## 2024-10-14 DIAGNOSIS — E66.812 CLASS 2 SEVERE OBESITY DUE TO EXCESS CALORIES WITH SERIOUS COMORBIDITY AND BODY MASS INDEX (BMI) OF 36.0 TO 36.9 IN ADULT: ICD-10-CM

## 2024-10-14 DIAGNOSIS — E53.8 VITAMIN B12 DEFICIENCY: ICD-10-CM

## 2024-10-14 DIAGNOSIS — R53.81 PHYSICAL DECONDITIONING: ICD-10-CM

## 2024-10-14 DIAGNOSIS — G95.9 CERVICAL MYELOPATHY: ICD-10-CM

## 2024-10-14 DIAGNOSIS — R53.1 WEAKNESS: ICD-10-CM

## 2024-10-14 DIAGNOSIS — I48.0 PAROXYSMAL ATRIAL FIBRILLATION (MULTI): ICD-10-CM

## 2024-10-14 DIAGNOSIS — I10 BENIGN ESSENTIAL HYPERTENSION: Primary | ICD-10-CM

## 2024-10-14 DIAGNOSIS — E66.01 CLASS 2 SEVERE OBESITY DUE TO EXCESS CALORIES WITH SERIOUS COMORBIDITY AND BODY MASS INDEX (BMI) OF 36.0 TO 36.9 IN ADULT: ICD-10-CM

## 2024-10-14 DIAGNOSIS — L72.0 EPIDERMAL INCLUSION CYST: ICD-10-CM

## 2024-10-14 DIAGNOSIS — E78.00 PURE HYPERCHOLESTEROLEMIA: ICD-10-CM

## 2024-10-14 DIAGNOSIS — E11.22 TYPE 2 DIABETES MELLITUS WITH CHRONIC KIDNEY DISEASE, WITHOUT LONG-TERM CURRENT USE OF INSULIN, UNSPECIFIED CKD STAGE (MULTI): ICD-10-CM

## 2024-10-14 DIAGNOSIS — R31.0 GROSS HEMATURIA: ICD-10-CM

## 2024-10-14 DIAGNOSIS — N18.32 CHRONIC KIDNEY DISEASE, STAGE 3B (MULTI): ICD-10-CM

## 2024-10-14 DIAGNOSIS — Z23 FLU VACCINE NEED: ICD-10-CM

## 2024-10-14 DIAGNOSIS — E78.00 PURE HYPERCHOLESTEROLEMIA: Primary | ICD-10-CM

## 2024-10-14 LAB
APPEARANCE UR: CLEAR
BILIRUB UR STRIP.AUTO-MCNC: NEGATIVE MG/DL
COLOR UR: YELLOW
GLUCOSE UR STRIP.AUTO-MCNC: NORMAL MG/DL
KETONES UR STRIP.AUTO-MCNC: NEGATIVE MG/DL
LEUKOCYTE ESTERASE UR QL STRIP.AUTO: ABNORMAL
NITRITE UR QL STRIP.AUTO: NEGATIVE
PH UR STRIP.AUTO: 6.5 [PH]
PROT UR STRIP.AUTO-MCNC: ABNORMAL MG/DL
RBC # UR STRIP.AUTO: NEGATIVE /UL
RBC #/AREA URNS AUTO: ABNORMAL /HPF
SP GR UR STRIP.AUTO: 1.02
UROBILINOGEN UR STRIP.AUTO-MCNC: NORMAL MG/DL
WBC #/AREA URNS AUTO: >50 /HPF

## 2024-10-14 PROCEDURE — 1160F RVW MEDS BY RX/DR IN RCRD: CPT | Performed by: FAMILY MEDICINE

## 2024-10-14 PROCEDURE — 3050F LDL-C >= 130 MG/DL: CPT | Performed by: FAMILY MEDICINE

## 2024-10-14 PROCEDURE — 1123F ACP DISCUSS/DSCN MKR DOCD: CPT | Performed by: FAMILY MEDICINE

## 2024-10-14 PROCEDURE — 96372 THER/PROPH/DIAG INJ SC/IM: CPT | Performed by: FAMILY MEDICINE

## 2024-10-14 PROCEDURE — 1157F ADVNC CARE PLAN IN RCRD: CPT | Performed by: FAMILY MEDICINE

## 2024-10-14 PROCEDURE — 3075F SYST BP GE 130 - 139MM HG: CPT | Performed by: FAMILY MEDICINE

## 2024-10-14 PROCEDURE — 99214 OFFICE O/P EST MOD 30 MIN: CPT | Performed by: FAMILY MEDICINE

## 2024-10-14 PROCEDURE — 1170F FXNL STATUS ASSESSED: CPT | Performed by: FAMILY MEDICINE

## 2024-10-14 PROCEDURE — 3008F BODY MASS INDEX DOCD: CPT | Performed by: FAMILY MEDICINE

## 2024-10-14 PROCEDURE — 90662 IIV NO PRSV INCREASED AG IM: CPT | Performed by: FAMILY MEDICINE

## 2024-10-14 PROCEDURE — 3044F HG A1C LEVEL LT 7.0%: CPT | Performed by: FAMILY MEDICINE

## 2024-10-14 PROCEDURE — 87086 URINE CULTURE/COLONY COUNT: CPT

## 2024-10-14 PROCEDURE — 4010F ACE/ARB THERAPY RXD/TAKEN: CPT | Performed by: FAMILY MEDICINE

## 2024-10-14 PROCEDURE — G0008 ADMIN INFLUENZA VIRUS VAC: HCPCS | Performed by: FAMILY MEDICINE

## 2024-10-14 PROCEDURE — 81001 URINALYSIS AUTO W/SCOPE: CPT

## 2024-10-14 PROCEDURE — 1036F TOBACCO NON-USER: CPT | Performed by: FAMILY MEDICINE

## 2024-10-14 PROCEDURE — 95886 MUSC TEST DONE W/N TEST COMP: CPT

## 2024-10-14 PROCEDURE — 1158F ADVNC CARE PLAN TLK DOCD: CPT | Performed by: FAMILY MEDICINE

## 2024-10-14 PROCEDURE — 1159F MED LIST DOCD IN RCRD: CPT | Performed by: FAMILY MEDICINE

## 2024-10-14 PROCEDURE — 3079F DIAST BP 80-89 MM HG: CPT | Performed by: FAMILY MEDICINE

## 2024-10-14 RX ORDER — CYANOCOBALAMIN 1000 UG/ML
1000 INJECTION, SOLUTION INTRAMUSCULAR; SUBCUTANEOUS ONCE
Status: COMPLETED | OUTPATIENT
Start: 2024-10-14 | End: 2024-10-14

## 2024-10-14 RX ORDER — FENOFIBRATE 48 MG/1
48 TABLET, FILM COATED ORAL DAILY
Qty: 90 TABLET | Refills: 1 | Status: SHIPPED | OUTPATIENT
Start: 2024-10-14 | End: 2025-04-12

## 2024-10-14 ASSESSMENT — ENCOUNTER SYMPTOMS
FEVER: 0
VOMITING: 0
LIGHT-HEADEDNESS: 0
SHORTNESS OF BREATH: 1
NEUROLOGIC COMPLAINT: 1
NECK PAIN: 0
NAUSEA: 0
PALPITATIONS: 0
CONFUSION: 0
DIAPHORESIS: 0
ABDOMINAL PAIN: 0
HEADACHES: 0
DIZZINESS: 0
BACK PAIN: 1
FATIGUE: 0

## 2024-10-14 ASSESSMENT — PATIENT HEALTH QUESTIONNAIRE - PHQ9
2. FEELING DOWN, DEPRESSED OR HOPELESS: NOT AT ALL
SUM OF ALL RESPONSES TO PHQ9 QUESTIONS 1 AND 2: 0
2. FEELING DOWN, DEPRESSED OR HOPELESS: NOT AT ALL
1. LITTLE INTEREST OR PLEASURE IN DOING THINGS: NOT AT ALL
1. LITTLE INTEREST OR PLEASURE IN DOING THINGS: NOT AT ALL
SUM OF ALL RESPONSES TO PHQ9 QUESTIONS 1 AND 2: 0

## 2024-10-14 ASSESSMENT — ACTIVITIES OF DAILY LIVING (ADL)
GROCERY_SHOPPING: INDEPENDENT
MANAGING_FINANCES: INDEPENDENT
BATHING: INDEPENDENT
DOING_HOUSEWORK: NEEDS ASSISTANCE
DRESSING: INDEPENDENT
TAKING_MEDICATION: NEEDS ASSISTANCE

## 2024-10-14 NOTE — PROGRESS NOTES
Subjective   Patient ID: Sami Hernandez is a 74 y.o. male who presents for Follow-up (Benign essential hypertension; Pure hypercholesterolemia; Acquired hypothyroidism; Type 2 diabetes mellitus with chronic kidney disease, without long-term current use of insulin, unspecified CKD stage (Multi); DDD (degenerative disc disease), lumbar/). I last saw the patient 8/27/2024    Patient is here for a F/U  Pt said yes to flu vaccine, rooming MA administered    Past medical, surgical, and family history reviewed.  Reviewed and documented all medications   Pt eating well, exercising as tolerated and taking medications as directed    Patient mentions of hematuria on 2 occasions. He continue to perform physical therapy once weekly and is exercising at home daily. He is frustrated with his slow improvement. He continues to note weakness in his legs and thighs. His balance is poor as well but he continues to work on it.      Review of Systems   Constitutional:  Negative for diaphoresis, fatigue and fever.   Respiratory:  Positive for shortness of breath.    Cardiovascular:  Negative for chest pain and palpitations.   Gastrointestinal:  Negative for abdominal pain, nausea and vomiting.   Musculoskeletal:  Positive for back pain. Negative for neck pain.   Neurological:  Negative for dizziness, light-headedness and headaches.   Psychiatric/Behavioral:  Negative for confusion.      Objective   /82 (BP Location: Right arm, Patient Position: Sitting, BP Cuff Size: Adult long)   Pulse 61   Temp 36.2 °C (97.2 °F)   Resp 16   Ht 1.829 m (6')   Wt 123 kg (272 lb)   SpO2 91%   BMI 36.89 kg/m²     Physical Exam    130/82 on recheck of BP in the right arm.     General Appearance - well-developed, well-nourished, 74 y.o., White male in no acute distress. Patient is ambulating with a walker.    Skin - warm, pink and dry without rash or concerning lesions. Dryness of the skin involving the LEs.     Mental Status - alert and oriented  x 3. Normal mood and affect appropriate to mood.     Neck - supple without lymphadenopathy. Carotid pulses are normal without bruits. Thyroid is normal in midline without nodules.     Chest - lungs are clear to auscultation without rales, rhonchi or wheezes. Mildly diminished breath sounds at the bases bilaterally. Improved breath sounds from previous physical exam. Pacemaker in the left upper chest. Subcutaneous cyst of 1 cm in the left lower chest.    Heart - regular, rate and rhythm without murmurs, rubs or gallops.     Abdomen - soft, obese, protuberant, nontender, nondistended. No masses, hepatomegaly or splenomegaly is noted. No rebound, rigidity or guarding is noted. Bowel sounds are normoactive.     Extremities - no cyanosis, clubbing or edema. Pedal pulses are 2+ normal at the dorsalis pedis and posterior pulses bilaterally.     Neurological - cranial nerves II through XII grossly intact. Motor strength 5/5 at all fours bilaterally. No evidence of right dorsiflexor weakness.    Assessment/Plan   1. Benign essential hypertension        2. Type 2 diabetes mellitus with chronic kidney disease, without long-term current use of insulin, unspecified CKD stage (Multi)        3. Vitamin B12 deficiency  1 Year Follow Up In [facility] - PCP - Wellness Exam    Follow Up In [facility] - PCP - Medicare Annual    cyanocobalamin (Vitamin B-12) injection 1,000 mcg      4. Gross hematuria  Urinalysis with Reflex Microscopic    Urine Culture      5. Cervical myelopathy        6. Epidermal inclusion cyst        7. Physical deconditioning        8. Paroxysmal atrial fibrillation (Multi)        9. Weakness        10. Pure hypercholesterolemia        11. Acquired hypothyroidism        12. Chronic kidney disease, stage 3b (Multi)        13. Class 2 severe obesity due to excess calories with serious comorbidity and body mass index (BMI) of 36.0 to 36.9 in adult        14. Flu vaccine need  Flu vaccine,  trivalent, preservative free, HIGH-DOSE, age 65y+ (Fluzone)        Patient to continue current medications (with any exceptions as noted) and diet. Follow-up in 1-2 month(s) for his AWV, otherwise as needed.      Cyanocobalamin (Vitamin B-12) injection 1,000 mcg given in the office today. Patient may continue to receive monthly B12 injections.     Ordered Urinalysis with Reflex Microscopic and Urine Culture. Will call patient with results when available.      High-dose flu vaccine given in the office today.    Patient has an EMG and NCV scheduled for today.    Patient is to follow up with Dr. Garrett (neuro), Dr. Goodwin (pulm), Dr. Chávez (uro), Dr. Salcedo (cardio), Dr. Sanders (GI) and Dr. Holman orthopedic surgery as scheduled.    Scribe Attestation  By signing my name below, Julia SERRANO , Rupert   attest that this documentation has been prepared under the direction and in the presence of Rian Lindsey MD.

## 2024-10-16 DIAGNOSIS — I48.0 PAROXYSMAL ATRIAL FIBRILLATION (HCC): ICD-10-CM

## 2024-10-16 DIAGNOSIS — F33.1 MODERATE EPISODE OF RECURRENT MAJOR DEPRESSIVE DISORDER: Primary | ICD-10-CM

## 2024-10-16 LAB — BACTERIA UR CULT: NORMAL

## 2024-10-16 RX ORDER — MIRTAZAPINE 45 MG/1
45 TABLET, FILM COATED ORAL NIGHTLY
Qty: 90 TABLET | Refills: 3 | Status: SHIPPED | OUTPATIENT
Start: 2024-10-16

## 2024-10-16 RX ORDER — RIVAROXABAN 20 MG/1
20 TABLET, FILM COATED ORAL
Qty: 90 TABLET | Refills: 3 | Status: SHIPPED | OUTPATIENT
Start: 2024-10-16

## 2024-10-16 NOTE — TELEPHONE ENCOUNTER
Requesting medication refill. Please approve or deny this request.    Rx requested:  Requested Prescriptions     Pending Prescriptions Disp Refills    XARELTO 20 MG TABS tablet [Pharmacy Med Name: Xarelto 20 mg tablet] 90 tablet 3     Sig: TAKE 1 TABLET AT BEDTIME         Last Office Visit:   10/17/2023      Next Visit Date:  Future Appointments   Date Time Provider Department Center   10/22/2024 10:30 AM [facility]   12/18/2024 11:30 AM Thomas Zamorano MD [facility]               Last refill 12/08/2023. Please approve or deny.

## 2024-10-21 DIAGNOSIS — M51.362 DEGENERATION OF INTERVERTEBRAL DISC OF LUMBAR REGION WITH DISCOGENIC BACK PAIN AND LOWER EXTREMITY PAIN: Primary | ICD-10-CM

## 2024-10-21 DIAGNOSIS — R53.1 WEAKNESS: ICD-10-CM

## 2024-10-22 ENCOUNTER — OFFICE VISIT (OUTPATIENT)
Dept: CARDIOLOGY CLINIC | Age: 74
End: 2024-10-22
Payer: MEDICARE

## 2024-10-22 VITALS
BODY MASS INDEX: 37.84 KG/M2 | WEIGHT: 279 LBS | HEART RATE: 48 BPM | OXYGEN SATURATION: 95 % | SYSTOLIC BLOOD PRESSURE: 120 MMHG | DIASTOLIC BLOOD PRESSURE: 76 MMHG

## 2024-10-22 DIAGNOSIS — G47.33 OSA ON CPAP: ICD-10-CM

## 2024-10-22 DIAGNOSIS — E78.00 PURE HYPERCHOLESTEROLEMIA: ICD-10-CM

## 2024-10-22 DIAGNOSIS — E66.9 OBESITY (BMI 30-39.9): ICD-10-CM

## 2024-10-22 DIAGNOSIS — I48.0 PAROXYSMAL ATRIAL FIBRILLATION (HCC): Primary | ICD-10-CM

## 2024-10-22 DIAGNOSIS — I10 PRIMARY HYPERTENSION: ICD-10-CM

## 2024-10-22 DIAGNOSIS — I49.5 SSS (SICK SINUS SYNDROME) (HCC): ICD-10-CM

## 2024-10-22 DIAGNOSIS — Z95.0 PRESENCE OF PERMANENT CARDIAC PACEMAKER: ICD-10-CM

## 2024-10-22 PROCEDURE — 1036F TOBACCO NON-USER: CPT | Performed by: INTERNAL MEDICINE

## 2024-10-22 PROCEDURE — 99214 OFFICE O/P EST MOD 30 MIN: CPT | Performed by: INTERNAL MEDICINE

## 2024-10-22 PROCEDURE — 3078F DIAST BP <80 MM HG: CPT | Performed by: INTERNAL MEDICINE

## 2024-10-22 PROCEDURE — 3074F SYST BP LT 130 MM HG: CPT | Performed by: INTERNAL MEDICINE

## 2024-10-22 PROCEDURE — 1123F ACP DISCUSS/DSCN MKR DOCD: CPT | Performed by: INTERNAL MEDICINE

## 2024-10-22 PROCEDURE — G8484 FLU IMMUNIZE NO ADMIN: HCPCS | Performed by: INTERNAL MEDICINE

## 2024-10-22 PROCEDURE — G8417 CALC BMI ABV UP PARAM F/U: HCPCS | Performed by: INTERNAL MEDICINE

## 2024-10-22 PROCEDURE — 93000 ELECTROCARDIOGRAM COMPLETE: CPT | Performed by: INTERNAL MEDICINE

## 2024-10-22 PROCEDURE — 3017F COLORECTAL CA SCREEN DOC REV: CPT | Performed by: INTERNAL MEDICINE

## 2024-10-22 PROCEDURE — G8427 DOCREV CUR MEDS BY ELIG CLIN: HCPCS | Performed by: INTERNAL MEDICINE

## 2024-10-22 NOTE — PROGRESS NOTES
Chief Complaint   Patient presents with    1 Year Follow Up       7-5-18: Patient presents for initial medical evaluation. Patient is followed on a regular basis by Angelo Strickland MD. S/p hospitalization for weakness, fatigue and near syncope. 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 Echo with normal LVF. He continues to have symptoms today. Does not feel good, has dry heaves and cough. His stools are dark. He has a hx of UC. Pt denies chest pain, dyspnea, dyspnea on exertion, change in exercise capacity, fatigue,  nausea, vomiting, diarrhea, constipation, motor weakness, insomnia, weight loss, syncope,PND, orthopnea, or claudication. Had recent colitis flare up. Was placed on ABX in hospital. His WBC was high  and noted to have ARF. No hx of MI, CHF or arrhythmia. No hx of stress test or LHC.       9-4-18: s/p PPM insertion on 7-10-18. S/p normal nuclear stress test. States LH/Dizz has resolved. Does have some fatigue. Does have hx of ulcerative colitis. S/p recent C.diff infection. Dealing with depression.  Pt denies chest pain, dyspnea, dyspnea on exertion, change in exercise capacity,   nausea, vomiting, diarrhea, constipation, motor weakness, insomnia, weight loss, syncope, dizziness, lightheadedness, palpitations, PND, orthopnea, or claudication. No nitro use. BP and hr are good. CAD is stable. No LE discoloration or ulcers. No LE edema. No CHF type symptoms. Lipid profile is normal.       11-1-18: noted to have episodes of New onset afibb on PPM check, 16% afibb burden. Started on NOAC but has not picked it up. States he has a hx of colitis, following with  GI, ? chrons disease, he is having some GIB episodes. EKG with Afibb today, V paced. Pt denies chest pain, dyspnea, dyspnea on exertion, change in exercise capacity, fatigue,  nausea, vomiting, diarrhea, constipation, motor weakness, insomnia, weight loss, syncope, dizziness, lightheadedness,

## 2024-10-24 ENCOUNTER — TELEPHONE (OUTPATIENT)
Dept: PRIMARY CARE | Facility: CLINIC | Age: 74
End: 2024-10-24

## 2024-10-24 NOTE — TELEPHONE ENCOUNTER
Kiki been taking coenzyme Q10-vitamin E 100-5 mg-unit capsule  and he's out but dr. Lindsey isnt the one who prescribed it. He was wondering if dr. Lindsey thinks he needs to be on it still and if so can he take over prescribing and filling this medication please advise

## 2024-10-25 DIAGNOSIS — E78.00 PURE HYPERCHOLESTEROLEMIA: Primary | ICD-10-CM

## 2024-10-25 RX ORDER — NIACIN (INOSITOL NIACINATE) 400(500MG)
1 CAPSULE ORAL DAILY
Qty: 90 CAPSULE | Refills: 1 | Status: SHIPPED | OUTPATIENT
Start: 2024-10-25

## 2024-11-06 ENCOUNTER — HOSPITAL ENCOUNTER (OUTPATIENT)
Dept: CARDIOLOGY | Facility: HOSPITAL | Age: 74
Discharge: HOME | End: 2024-11-06
Payer: MEDICARE

## 2024-11-06 DIAGNOSIS — I44.2 ATRIOVENTRICULAR BLOCK, THIRD DEGREE (MULTI): ICD-10-CM

## 2024-11-06 DIAGNOSIS — Z95.0 PACEMAKER: ICD-10-CM

## 2024-11-06 PROCEDURE — 93296 REM INTERROG EVL PM/IDS: CPT

## 2024-11-14 ENCOUNTER — APPOINTMENT (OUTPATIENT)
Dept: PRIMARY CARE | Facility: CLINIC | Age: 74
End: 2024-11-14
Payer: MEDICARE

## 2024-11-14 VITALS
HEART RATE: 94 BPM | WEIGHT: 281 LBS | DIASTOLIC BLOOD PRESSURE: 72 MMHG | OXYGEN SATURATION: 93 % | TEMPERATURE: 97.2 F | HEIGHT: 72 IN | BODY MASS INDEX: 38.06 KG/M2 | RESPIRATION RATE: 16 BRPM | SYSTOLIC BLOOD PRESSURE: 124 MMHG

## 2024-11-14 DIAGNOSIS — I10 BENIGN ESSENTIAL HYPERTENSION: ICD-10-CM

## 2024-11-14 DIAGNOSIS — E03.9 ACQUIRED HYPOTHYROIDISM: ICD-10-CM

## 2024-11-14 DIAGNOSIS — E78.00 PURE HYPERCHOLESTEROLEMIA: ICD-10-CM

## 2024-11-14 DIAGNOSIS — E11.22 TYPE 2 DIABETES MELLITUS WITH CHRONIC KIDNEY DISEASE, WITHOUT LONG-TERM CURRENT USE OF INSULIN, UNSPECIFIED CKD STAGE (MULTI): ICD-10-CM

## 2024-11-14 DIAGNOSIS — J44.9 COPD, MILD (MULTI): ICD-10-CM

## 2024-11-14 DIAGNOSIS — E53.8 VITAMIN B12 DEFICIENCY: ICD-10-CM

## 2024-11-14 DIAGNOSIS — Z23 FLU VACCINE NEED: ICD-10-CM

## 2024-11-14 DIAGNOSIS — Z00.00 ENCOUNTER FOR ANNUAL WELLNESS VISIT (AWV) IN MEDICARE PATIENT: Primary | ICD-10-CM

## 2024-11-14 DIAGNOSIS — R53.81 PHYSICAL DECONDITIONING: ICD-10-CM

## 2024-11-14 PROCEDURE — 3050F LDL-C >= 130 MG/DL: CPT | Performed by: FAMILY MEDICINE

## 2024-11-14 PROCEDURE — 1123F ACP DISCUSS/DSCN MKR DOCD: CPT | Performed by: FAMILY MEDICINE

## 2024-11-14 PROCEDURE — 1158F ADVNC CARE PLAN TLK DOCD: CPT | Performed by: FAMILY MEDICINE

## 2024-11-14 PROCEDURE — 1036F TOBACCO NON-USER: CPT | Performed by: FAMILY MEDICINE

## 2024-11-14 PROCEDURE — G0439 PPPS, SUBSEQ VISIT: HCPCS | Performed by: FAMILY MEDICINE

## 2024-11-14 PROCEDURE — 1157F ADVNC CARE PLAN IN RCRD: CPT | Performed by: FAMILY MEDICINE

## 2024-11-14 PROCEDURE — 3044F HG A1C LEVEL LT 7.0%: CPT | Performed by: FAMILY MEDICINE

## 2024-11-14 PROCEDURE — 1159F MED LIST DOCD IN RCRD: CPT | Performed by: FAMILY MEDICINE

## 2024-11-14 PROCEDURE — 3008F BODY MASS INDEX DOCD: CPT | Performed by: FAMILY MEDICINE

## 2024-11-14 PROCEDURE — 3078F DIAST BP <80 MM HG: CPT | Performed by: FAMILY MEDICINE

## 2024-11-14 PROCEDURE — 1170F FXNL STATUS ASSESSED: CPT | Performed by: FAMILY MEDICINE

## 2024-11-14 PROCEDURE — 3074F SYST BP LT 130 MM HG: CPT | Performed by: FAMILY MEDICINE

## 2024-11-14 PROCEDURE — 4010F ACE/ARB THERAPY RXD/TAKEN: CPT | Performed by: FAMILY MEDICINE

## 2024-11-14 RX ORDER — ATORVASTATIN CALCIUM 20 MG/1
20 TABLET, FILM COATED ORAL DAILY
Qty: 100 TABLET | Refills: 3 | Status: SHIPPED | OUTPATIENT
Start: 2024-11-14 | End: 2025-12-19

## 2024-11-14 ASSESSMENT — PATIENT HEALTH QUESTIONNAIRE - PHQ9
1. LITTLE INTEREST OR PLEASURE IN DOING THINGS: NOT AT ALL
2. FEELING DOWN, DEPRESSED OR HOPELESS: NOT AT ALL
SUM OF ALL RESPONSES TO PHQ9 QUESTIONS 1 AND 2: 0
1. LITTLE INTEREST OR PLEASURE IN DOING THINGS: NOT AT ALL
SUM OF ALL RESPONSES TO PHQ9 QUESTIONS 1 AND 2: 0
2. FEELING DOWN, DEPRESSED OR HOPELESS: NOT AT ALL

## 2024-11-14 ASSESSMENT — ENCOUNTER SYMPTOMS
PALPITATIONS: 0
FATIGUE: 0
NAUSEA: 0
OCCASIONAL FEELINGS OF UNSTEADINESS: 1
NECK PAIN: 0
DEPRESSION: 0
DIAPHORESIS: 0
CONFUSION: 0
FEVER: 0
DIZZINESS: 0
BACK PAIN: 1
ABDOMINAL PAIN: 0
LIGHT-HEADEDNESS: 0
LOSS OF SENSATION IN FEET: 0
VOMITING: 0
SHORTNESS OF BREATH: 1
HEADACHES: 0

## 2024-11-14 ASSESSMENT — ACTIVITIES OF DAILY LIVING (ADL)
GROCERY_SHOPPING: INDEPENDENT
DRESSING: INDEPENDENT
DOING_HOUSEWORK: INDEPENDENT
BATHING: INDEPENDENT
TAKING_MEDICATION: INDEPENDENT
MANAGING_FINANCES: INDEPENDENT

## 2024-11-14 NOTE — PROGRESS NOTES
Subjective   Patient ID: Sami Hernandez is a 74 y.o. male who presents for Medicare Annual Wellness Visit Subsequent. I last saw the patient 10/14/2024    Patient is here for a AWV  Past medical, surgical, and family history reviewed.  Reviewed and documented all medications   Pt eating well, exercising as tolerated and taking medications as directed  Has no medical concerns for rooming MA    He reports having back pain. He has done an EMG & nerve conduction testing for his arms previously, has a carpel tunnel syndrome and does admits to occasionally dropping things. He is scheduled for an EMG testing for legs on Dec 3rd 2024.  He has been exercising at home and is ambulating with a cane today. He is getting stronger.     He has not been taking his atorvastatin.     Review of Systems   Constitutional:  Negative for diaphoresis, fatigue and fever.   Respiratory:  Positive for shortness of breath.    Cardiovascular:  Negative for chest pain and palpitations.   Gastrointestinal:  Negative for abdominal pain, nausea and vomiting.   Musculoskeletal:  Positive for back pain. Negative for neck pain.   Neurological:  Negative for dizziness, light-headedness and headaches.   Psychiatric/Behavioral:  Negative for confusion.      Objective   /72 (BP Location: Right arm)   Pulse 94   Temp 36.2 °C (97.2 °F)   Resp 16   Ht 1.829 m (6')   Wt 127 kg (281 lb)   SpO2 93%   BMI 38.11 kg/m²     Physical Exam    124/72 on recheck of BP in the right arm.     General Appearance - well-developed, well-nourished, 74 y.o., White male in no acute distress. Patient is ambulating with a cane today.    Skin - warm, pink and dry without rash or concerning lesions. Dryness of the skin involving the LEs.     Mental Status - alert and oriented x 3. Normal mood and affect appropriate to mood.     Neck - supple without lymphadenopathy. Carotid pulses are normal without bruits. Thyroid is normal in midline without nodules.     Chest - lungs  are clear to auscultation without rales, rhonchi or wheezes. Pacemaker in the left upper chest. Subcutaneous cyst of 1 cm in the left lower chest.    Heart - regular, rate and rhythm without murmurs, rubs or gallops.     Extremities - no cyanosis, clubbing or edema. Pedal pulses are 2+ normal at the dorsalis pedis and posterior pulses bilaterally.     Neurological - cranial nerves II through XII grossly intact. Motor strength 5/5 at all fours bilaterally. No evidence of right dorsiflexor weakness.    Assessment/Plan   1. Encounter for annual wellness visit (AWV) in Medicare patient  Follow Up In [facility] - PCP - Medicare Annual      2. Benign essential hypertension        3. Acquired hypothyroidism        4. Vitamin B12 deficiency  Follow Up In [facility] - [facility] - Medicare Annual    Vitamin B12      5. Physical deconditioning        6. Type 2 diabetes mellitus with chronic kidney disease, without long-term current use of insulin, unspecified CKD stage (Multi)  Hemoglobin A1C      7. COPD, mild (Multi)        8. Pure hypercholesterolemia  atorvastatin (Lipitor) 20 mg tablet    Lipid Panel      9. Flu vaccine need          Patient to continue current medications (with any exceptions as noted) and diet. Follow-up in 3 month(s) otherwise as needed.     We reviewed his labs which appeared stable.    Patient has an EMG and NCV of the LE scheduled on 3rd dec 2024.    For weight management I recommend exercising and remaining physically active. Try to maintain a heathy diet that includes green leafy vegetables, fruits and proteins. You are encouraged to stay well hydrated.    Patient was advised to limit their salt intake and to not add any extra salt to their food. Patient is to avoid pretzels, chips, lunch meats, canned soups, soda pop, ham, moran, hot dogs, etc.    Patient will continue on a low-cholesterol diet and weight reduction. Exercise as tolerated.    Patient was given refill(s) on:    Atorvastatin Calcium 20 mg, TAKE 1 TAB BY MOUTH DAILY    Rx(s) sent to pharmacy.    Will obtain Lipid, Hemoglobin A1c, and Vitamin B12 prior to patient's next appointment. Will call patient with results when available.    Patient is to follow up with Dr. Garrett (neuro), Dr. Goodwin (pulm), Dr. Chávez (uro), Dr. Salcedo (cardio), Dr. Sanders (GI) and Dr. Holman orthopedic surgery as scheduled.     Scribe Attestation  By signing my name below, [email or web address], Scribe   attest that this documentation has been prepared under the direction and in the presence of Rian Lindsey MD.  This note has been transcribed using a medical scribe and there is a possibility of unintentional typing misprints.

## 2024-11-14 NOTE — PROGRESS NOTES
Subjective   Reason for Visit: Anjum Hernandez is an 74 y.o. male here for a Medicare Wellness visit.     Past Medical, Surgical, and Family History reviewed and updated in chart.    Reviewed all medications by prescribing practitioner or clinical pharmacist (such as prescriptions, OTCs, herbal therapies and supplements) and documented in the medical record.    HPI    Patient Care Team:  Rian Lindsey MD as PCP - General  Rian Lindsey MD as PCP - [facility] Attributed Provider  MAHAMED Roland-CNP as Nurse Practitioner (Neurology)  Kai Garrett MD as Consulting Physician (Neurology)  Audrey Khalil MD as Surgeon (Neurosurgery)     Review of Systems    Objective   Vitals:  /72 (BP Location: Right arm)   Pulse 94   Temp 36.2 °C (97.2 °F)   Resp 16   Ht 1.829 m (6')   Wt 127 kg (281 lb)   SpO2 93%   BMI 38.11 kg/m²       Physical Exam    Assessment & Plan  Encounter for annual wellness visit (AWV) in Medicare patient    Orders:    Follow Up In [facility] - PCP - Medicare Annual; Future    Benign essential hypertension         Acquired hypothyroidism         Vitamin B12 deficiency    Orders:    Follow Up In [facility] - [facility] - Medicare Annual    Vitamin B12; Future    Type 2 diabetes mellitus with chronic kidney disease, without long-term current use of insulin, unspecified CKD stage (Multi)    Orders:    Hemoglobin A1C; Future    Pure hypercholesterolemia    Orders:    atorvastatin (Lipitor) 20 mg tablet; Take 1 tablet (20 mg) by mouth once daily.    Lipid Panel; Future    Flu vaccine need         COPD, mild (Multi)         Physical deconditioning

## 2024-11-19 DIAGNOSIS — R82.71 BACTERIURIA: ICD-10-CM

## 2024-11-19 DIAGNOSIS — R31.0 GROSS HEMATURIA: Primary | ICD-10-CM

## 2024-11-21 ENCOUNTER — LAB (OUTPATIENT)
Dept: LAB | Facility: LAB | Age: 74
End: 2024-11-21
Payer: COMMERCIAL

## 2024-11-21 DIAGNOSIS — R31.0 GROSS HEMATURIA: ICD-10-CM

## 2024-11-21 LAB
APPEARANCE UR: ABNORMAL
BACTERIA #/AREA URNS AUTO: ABNORMAL /HPF
BILIRUB UR STRIP.AUTO-MCNC: NEGATIVE MG/DL
COLOR UR: YELLOW
GLUCOSE UR STRIP.AUTO-MCNC: NORMAL MG/DL
KETONES UR STRIP.AUTO-MCNC: NEGATIVE MG/DL
LEUKOCYTE ESTERASE UR QL STRIP.AUTO: NEGATIVE
MUCOUS THREADS #/AREA URNS AUTO: ABNORMAL /LPF
NITRITE UR QL STRIP.AUTO: ABNORMAL
PH UR STRIP.AUTO: 7 [PH]
PROT UR STRIP.AUTO-MCNC: NEGATIVE MG/DL
RBC # UR STRIP.AUTO: NEGATIVE /UL
RBC #/AREA URNS AUTO: ABNORMAL /HPF
SP GR UR STRIP.AUTO: 1.02
UROBILINOGEN UR STRIP.AUTO-MCNC: NORMAL MG/DL
WBC #/AREA URNS AUTO: ABNORMAL /HPF

## 2024-11-21 PROCEDURE — 87086 URINE CULTURE/COLONY COUNT: CPT

## 2024-11-21 PROCEDURE — 81001 URINALYSIS AUTO W/SCOPE: CPT

## 2024-11-22 DIAGNOSIS — J44.9 COPD, MILD (MULTI): Primary | ICD-10-CM

## 2024-11-22 LAB — BACTERIA UR CULT: ABNORMAL

## 2024-11-23 NOTE — RESULT ENCOUNTER NOTE
Please call the patient regarding his abnormal result.  Urine culture reveals multiple organisms, probable contamination.  Recommend repeat urine culture for evaluation.

## 2024-11-26 ENCOUNTER — LAB (OUTPATIENT)
Dept: LAB | Facility: LAB | Age: 74
End: 2024-11-26
Payer: COMMERCIAL

## 2024-11-26 DIAGNOSIS — R82.71 BACTERIURIA: ICD-10-CM

## 2024-11-26 PROCEDURE — 87086 URINE CULTURE/COLONY COUNT: CPT

## 2024-11-28 LAB — BACTERIA UR CULT: NORMAL

## 2024-11-29 ENCOUNTER — TELEMEDICINE (OUTPATIENT)
Dept: PHARMACY | Facility: HOSPITAL | Age: 74
End: 2024-11-29
Payer: COMMERCIAL

## 2024-11-29 DIAGNOSIS — J44.9 COPD, MILD (MULTI): ICD-10-CM

## 2024-11-29 NOTE — PROGRESS NOTES
"Subjective   Patient ID: Anjum Hernandez \"Ruthie" is a 74 y.o. male who presents for COPD.    Referring Provider: Rian Lindsey MD     PULMONARY ASSESSMENT  Patient has been diagnosed with: Patient denies having COPD now, however did have it in the past  does see a pulmonologist / [facility] (next appointment on 12/16/24)  has not had PFT's completed in last 2 years    Current Regimen:  Albuterol HFA inhaler: 2 puffs Q4H PRN    Historical Treatment:  None    Symptom Management:  Current symptoms:  denies  Triggers: none  Alleviating factors: rescue inhaler, if needed    Exacerbation Hx:  When was your last hospitalization for an exacerbation? N/A  When was the last time you were treated with antibiotics and/or steroids? N/A     Rescue Inhaler Use:  How often do you use your rescue inhaler? Denies using     Does patient have appropriate inhaler technique? N/A    Vaccines:  Influenza: Date [10/14/24]  PCV13: Date [11/11/20]  PPSV23: Date [10/9/07, 4/16/18]  COVID: Date [10/21/22]    Smoking history  He quit smoking approximately 6 years ago.      Objective     There were no vitals taken for this visit.     Labs  Lab Results   Component Value Date    BILITOT 0.5 08/22/2024    CALCIUM 10.0 08/22/2024    CO2 29 08/22/2024     08/22/2024    CREATININE 1.33 (H) 08/22/2024    GLUCOSE 103 (H) 08/22/2024    ALKPHOS 68 08/22/2024    K 4.5 08/22/2024    PROT 6.9 08/22/2024     08/22/2024    AST 20 08/22/2024    ALT 25 08/22/2024    BUN 19 08/22/2024    ANIONGAP 12 08/22/2024    MG 2.10 08/25/2023    PHOS 2.2 (L) 04/07/2023    ALBUMIN 4.2 08/22/2024    LIPASE 13 08/23/2023    GFRMALE 63 08/25/2023     Lab Results   Component Value Date    TRIG 159 (H) 08/22/2024    CHOL 240 (H) 08/22/2024    LDLCALC 153 (H) 08/22/2024    HDL 55.5 08/22/2024     Lab Results   Component Value Date    HGBA1C 6.1 (H) 08/22/2024       Current Outpatient Medications on File Prior to Visit   Medication Sig Dispense Refill    " albuterol (Ventolin HFA) 90 mcg/actuation inhaler Inhale 2 puffs every 4 hours if needed for shortness of breath. 18 g 5    amLODIPine (Norvasc) 5 mg tablet Take 1 tablet (5 mg) by mouth once daily. 90 tablet 3    atorvastatin (Lipitor) 20 mg tablet Take 1 tablet (20 mg) by mouth once daily. 100 tablet 3    buPROPion XL (Wellbutrin XL) 300 mg 24 hr tablet TAKE 1 TABLET EVERY MORNING 90 tablet 1    cholecalciferol (Vitamin D-3) 50 mcg (2,000 unit) capsule Take 1 tablet by mouth 1 (one) time each day.      coenzyme Q10-vitamin E 100-5 mg-unit capsule Take 1 capsule by mouth once daily. 90 capsule 1    fenofibrate (Tricor) 48 mg tablet Take 1 tablet (48 mg) by mouth once daily. 90 tablet 1    levothyroxine (Synthroid, Levoxyl) 25 mcg tablet TAKE 1 TABLET IN THE MORNING 90 tablet 3    losartan (Cozaar) 100 mg tablet Take 1 tablet (100 mg) by mouth once daily. 100 tablet 3    metoprolol tartrate (Lopressor) 25 mg tablet Take 1 tablet (25 mg) by mouth 2 times a day. 180 tablet 3    mirtazapine (Remeron) 45 mg tablet Take 1 tablet (45 mg) by mouth once daily at bedtime. 90 tablet 3    rivaroxaban (Xarelto) 20 mg tablet Take 1 tablet (20 mg) by mouth once daily.      tofacitinib (Xeljanz) 5 mg tablet Take 1 tablet (5 mg) by mouth 2 times a day.       No current facility-administered medications on file prior to visit.        Assessment/Plan   Problem List Items Addressed This Visit             ICD-10-CM    COPD, mild (Multi) J44.9     Patient with history of COPD that is currently asymptomatic. Patient denies currently having COPD however did have it in the past; not currently on any therapy besides rescue inhaler. Reports using rescue inhaler only as needed which is not often.     Discussed checking expiration date on rescue inhaler and to discard if . Patient notes expiration date of 2025.    Medication Changes: None    CONTINUE  Albuterol HFA inhaler: 2 puffs Q4H PRN    Monitoring and Education:  -Continue to  monitor for s/sx of COPD. Previous PFTs that were performed in 2021 indicated mild COPD; patient may need repeat tests performed to see if still present.         Other Patient Discussion:   -Counseled on timing of medications:  Levothyroxine should be taken first thing in the morning, at least 30-60 minutes prior to first meal of the day  Xarelto should be taken with largest meal of the day, usually dinner for most patients  -Patient noted having issues with cost of Xeljanz and Xarelto. Patient in the process of applying for  PAP for Xeljanz.   Discussed [facility] program for Xarelto. Patient will drop off tax information required for application to office.      Katrina Calabrese PharmD  Clinical Ambulatory Care Pharmacist  Ph: [phone]    Continue all meds under the continuation of care with the referring provider and clinical pharmacy team.    Clinical Pharmacist follow up: As needed for patient assistance based on clinical intervention  Type of Encounter:  Telephone    Verbal consent to manage patient's drug therapy was obtained from the patient. They were informed they may decline to participate or withdraw from participation in pharmacy services at any time.

## 2024-11-29 NOTE — ASSESSMENT & PLAN NOTE
Patient with history of COPD that is currently asymptomatic. Patient denies currently having COPD however did have it in the past; not currently on any therapy besides rescue inhaler. Reports using rescue inhaler only as needed which is not often.     Discussed checking expiration date on rescue inhaler and to discard if . Patient notes expiration date of 2025.    Medication Changes: None    CONTINUE  Albuterol HFA inhaler: 2 puffs Q4H PRN    Monitoring and Education:  -Continue to monitor for s/sx of COPD. Previous PFTs that were performed in  indicated mild COPD; patient may need repeat tests performed to see if still present.

## 2024-11-29 NOTE — Clinical Note
Germán Lindsey-patient having issues with cost of Xarelto. We can check for eligibility for [facility] patient assistance if that is okay with you. Thank you!

## 2024-12-10 ENCOUNTER — HOSPITAL ENCOUNTER (OUTPATIENT)
Dept: CT IMAGING | Age: 74
Discharge: HOME | End: 2024-12-12
Attending: NEUROLOGICAL SURGERY
Payer: MEDICARE

## 2024-12-10 ENCOUNTER — HOSPITAL ENCOUNTER (OUTPATIENT)
Dept: MRI IMAGING | Age: 74
Discharge: HOME | End: 2024-12-12
Attending: NEUROLOGICAL SURGERY
Payer: MEDICARE

## 2024-12-10 VITALS
OXYGEN SATURATION: 94 % | RESPIRATION RATE: 16 BRPM | DIASTOLIC BLOOD PRESSURE: 84 MMHG | SYSTOLIC BLOOD PRESSURE: 123 MMHG | HEART RATE: 68 BPM

## 2024-12-10 DIAGNOSIS — G95.9 CERVICAL MYELOPATHY (HCC): ICD-10-CM

## 2024-12-10 DIAGNOSIS — M48.062 LUMBAR STENOSIS WITH NEUROGENIC CLAUDICATION: ICD-10-CM

## 2024-12-10 PROCEDURE — 72125 CT NECK SPINE W/O DYE: CPT

## 2024-12-10 PROCEDURE — 72148 MRI LUMBAR SPINE W/O DYE: CPT

## 2024-12-10 NOTE — FLOWSHEET NOTE
1315 Pt placed into Sure Scan mode remotely via [employer] iPad  device and MRI tech. (Mode DOO 85)  Pt transferred onto MRI table and placed onto monitor.  Pt alert and oriented.  VSS    1328 Exam started, pt given call ball.     1335 Tolerating exam without any issues.     1343 Exam complete, pt transferred off of MRI table.  Remotely taken out of Sure Scan mode and into original setting via [employer] and MRI tech.     1350 tolerated exam well. Pt assisted into a w/c with a steady gait, transported to CT waiting area for additional imaging.

## 2024-12-12 ENCOUNTER — APPOINTMENT (OUTPATIENT)
Dept: NEUROLOGY | Facility: HOSPITAL | Age: 74
End: 2024-12-12
Payer: MEDICARE

## 2024-12-18 ENCOUNTER — OFFICE VISIT (OUTPATIENT)
Dept: PULMONOLOGY | Age: 74
End: 2024-12-18
Payer: MEDICARE

## 2024-12-18 VITALS
DIASTOLIC BLOOD PRESSURE: 92 MMHG | HEART RATE: 94 BPM | WEIGHT: 278 LBS | SYSTOLIC BLOOD PRESSURE: 142 MMHG | OXYGEN SATURATION: 96 % | BODY MASS INDEX: 37.7 KG/M2

## 2024-12-18 DIAGNOSIS — R06.02 SHORTNESS OF BREATH: ICD-10-CM

## 2024-12-18 DIAGNOSIS — G47.33 OSA (OBSTRUCTIVE SLEEP APNEA): Primary | ICD-10-CM

## 2024-12-18 DIAGNOSIS — E66.9 OBESITY (BMI 30-39.9): ICD-10-CM

## 2024-12-18 PROCEDURE — G8484 FLU IMMUNIZE NO ADMIN: HCPCS | Performed by: INTERNAL MEDICINE

## 2024-12-18 PROCEDURE — 1123F ACP DISCUSS/DSCN MKR DOCD: CPT | Performed by: INTERNAL MEDICINE

## 2024-12-18 PROCEDURE — 1159F MED LIST DOCD IN RCRD: CPT | Performed by: INTERNAL MEDICINE

## 2024-12-18 PROCEDURE — 1036F TOBACCO NON-USER: CPT | Performed by: INTERNAL MEDICINE

## 2024-12-18 PROCEDURE — 3017F COLORECTAL CA SCREEN DOC REV: CPT | Performed by: INTERNAL MEDICINE

## 2024-12-18 PROCEDURE — G8417 CALC BMI ABV UP PARAM F/U: HCPCS | Performed by: INTERNAL MEDICINE

## 2024-12-18 PROCEDURE — G8427 DOCREV CUR MEDS BY ELIG CLIN: HCPCS | Performed by: INTERNAL MEDICINE

## 2024-12-18 PROCEDURE — 3077F SYST BP >= 140 MM HG: CPT | Performed by: INTERNAL MEDICINE

## 2024-12-18 PROCEDURE — 3080F DIAST BP >= 90 MM HG: CPT | Performed by: INTERNAL MEDICINE

## 2024-12-18 PROCEDURE — 99214 OFFICE O/P EST MOD 30 MIN: CPT | Performed by: INTERNAL MEDICINE

## 2024-12-18 RX ORDER — ATORVASTATIN CALCIUM 20 MG/1
TABLET, FILM COATED ORAL
COMMUNITY
Start: 2024-11-14

## 2024-12-18 NOTE — PROGRESS NOTES
Subjective:             Junito Harris is a 74 y.o. male who complains today of:     Chief Complaint   Patient presents with    Follow-up     5m f/u on ETIENNE       HPI  He is using CPAP with  5-10  centimeters of H2O with heated humidity.  He is using CPAP for about  5 hours every night.  He is using CPAP with  full face  Mask.  He said  sleep is restful with the CPAP use.  He is compliant with CPAP therapy and benefiting with CPAP use.  No snoring with CPAP use.  C/o  daytime tiredness even with CPAP use.  He think he is depress. He denies taking naps.  No sleepiness with driving.   He denies difficulty falling asleep or staying asleep.     I reviewed compliance report with patient regarding CPAP therapy. He is using  CPAP for 29 days out of 30 days. Average usage of days used is 7 hours and 31 min , average AHI 0.7 with CPAP use.      He had neck surgery in 5/24/24 he is on bone stimulator.   No  shortness of breath  with exertion and having  no  Wheezing. No Cough with  Sputum.No Chest tightness. He has  albuterol HFA prn if wheezing or SOB    Allergies:  Azathioprine and Clindamycin  Past Medical History:   Diagnosis Date    Arthritis     Colitis     COPD (chronic obstructive pulmonary disease) (HCC)     Crohn disease (HCC)     Encephalopathy     Hyperlipidemia     Hypertension     Hypothyroidism     ETIENNE (obstructive sleep apnea)     Pacemaker     Paroxysmal atrial fibrillation ([facility]) 11/01/2018    Persistent atrial fibrillation ([facility]) 11/01/2018    Sick sinus syndrome ([facility])     Third degree AV block ([facility])     Type II or unspecified type diabetes mellitus without mention of complication, not stated as uncontrolled      Past Surgical History:   Procedure Laterality Date    ANTERIOR CRUCIATE LIGAMENT REPAIR      CARDIAC PACEMAKER PLACEMENT      CERVICAL FUSION N/A 5/24/2024    CERVICAL 4-5 AND 5-6 ANTERIOR DISCECTOMY FUSION AUTOGRAFT, ALLOGRAFT MICRODISSECTION, FLUOROSCOPY, SPINAL MONITORING, PREOPERATIVE

## 2025-01-22 DIAGNOSIS — I10 BENIGN ESSENTIAL HYPERTENSION: Primary | ICD-10-CM

## 2025-01-22 RX ORDER — CANDESARTAN 16 MG/1
16 TABLET ORAL DAILY
Qty: 30 TABLET | Refills: 5 | Status: SHIPPED | OUTPATIENT
Start: 2025-01-22

## 2025-01-22 NOTE — TELEPHONE ENCOUNTER
Rx Refill Request     Name: Anjum Hernandez  :  1950     Date of last appointment:  2024   Date of next appointment:  2025   Best number to reach patient:  [phone]

## 2025-02-06 ASSESSMENT — ENCOUNTER SYMPTOMS
BACK PAIN: 1
ABDOMINAL PAIN: 0
PERIANAL NUMBNESS: 0

## 2025-02-09 DIAGNOSIS — I10 BENIGN ESSENTIAL HYPERTENSION: Primary | ICD-10-CM

## 2025-02-10 RX ORDER — METOPROLOL TARTRATE 25 MG/1
25 TABLET, FILM COATED ORAL 2 TIMES DAILY
Qty: 180 TABLET | Refills: 3 | Status: SHIPPED | OUTPATIENT
Start: 2025-02-10

## 2025-02-12 ENCOUNTER — APPOINTMENT (OUTPATIENT)
Dept: PRIMARY CARE | Facility: CLINIC | Age: 75
End: 2025-02-12
Payer: MEDICARE

## 2025-02-12 VITALS
OXYGEN SATURATION: 94 % | DIASTOLIC BLOOD PRESSURE: 80 MMHG | TEMPERATURE: 97.4 F | HEIGHT: 72 IN | BODY MASS INDEX: 38.6 KG/M2 | WEIGHT: 285 LBS | HEART RATE: 77 BPM | SYSTOLIC BLOOD PRESSURE: 132 MMHG | RESPIRATION RATE: 16 BRPM

## 2025-02-12 DIAGNOSIS — N18.32 TYPE 2 DIABETES MELLITUS WITH STAGE 3B CHRONIC KIDNEY DISEASE, WITHOUT LONG-TERM CURRENT USE OF INSULIN (MULTI): ICD-10-CM

## 2025-02-12 DIAGNOSIS — E11.22 TYPE 2 DIABETES MELLITUS WITH STAGE 3B CHRONIC KIDNEY DISEASE, WITHOUT LONG-TERM CURRENT USE OF INSULIN (MULTI): ICD-10-CM

## 2025-02-12 DIAGNOSIS — G95.9 CERVICAL MYELOPATHY: ICD-10-CM

## 2025-02-12 DIAGNOSIS — M51.360 DEGENERATION OF INTERVERTEBRAL DISC OF LUMBAR REGION WITH DISCOGENIC BACK PAIN: ICD-10-CM

## 2025-02-12 DIAGNOSIS — K50.919 CROHN'S DISEASE WITH COMPLICATION, UNSPECIFIED GASTROINTESTINAL TRACT LOCATION: ICD-10-CM

## 2025-02-12 DIAGNOSIS — N18.32 CHRONIC KIDNEY DISEASE, STAGE 3B (MULTI): ICD-10-CM

## 2025-02-12 DIAGNOSIS — E66.812 CLASS 2 SEVERE OBESITY DUE TO EXCESS CALORIES WITH SERIOUS COMORBIDITY AND BODY MASS INDEX (BMI) OF 38.0 TO 38.9 IN ADULT: Primary | ICD-10-CM

## 2025-02-12 DIAGNOSIS — R82.998 DARK URINE: ICD-10-CM

## 2025-02-12 DIAGNOSIS — L72.0 EPIDERMAL INCLUSION CYST: ICD-10-CM

## 2025-02-12 DIAGNOSIS — R35.0 URINARY FREQUENCY: Primary | ICD-10-CM

## 2025-02-12 DIAGNOSIS — E66.01 CLASS 2 SEVERE OBESITY DUE TO EXCESS CALORIES WITH SERIOUS COMORBIDITY AND BODY MASS INDEX (BMI) OF 38.0 TO 38.9 IN ADULT: Primary | ICD-10-CM

## 2025-02-12 DIAGNOSIS — G81.94 LEFT HEMIPARESIS (MULTI): ICD-10-CM

## 2025-02-12 DIAGNOSIS — R29.898 WEAKNESS OF BOTH LOWER EXTREMITIES: ICD-10-CM

## 2025-02-12 PROBLEM — I48.0 PAROXYSMAL ATRIAL FIBRILLATION (MULTI): Status: RESOLVED | Noted: 2018-11-01 | Resolved: 2025-02-12

## 2025-02-12 PROCEDURE — 4010F ACE/ARB THERAPY RXD/TAKEN: CPT | Performed by: FAMILY MEDICINE

## 2025-02-12 PROCEDURE — 1157F ADVNC CARE PLAN IN RCRD: CPT | Performed by: FAMILY MEDICINE

## 2025-02-12 PROCEDURE — 1159F MED LIST DOCD IN RCRD: CPT | Performed by: FAMILY MEDICINE

## 2025-02-12 PROCEDURE — 3008F BODY MASS INDEX DOCD: CPT | Performed by: FAMILY MEDICINE

## 2025-02-12 PROCEDURE — 3079F DIAST BP 80-89 MM HG: CPT | Performed by: FAMILY MEDICINE

## 2025-02-12 PROCEDURE — 3075F SYST BP GE 130 - 139MM HG: CPT | Performed by: FAMILY MEDICINE

## 2025-02-12 PROCEDURE — G2211 COMPLEX E/M VISIT ADD ON: HCPCS | Performed by: FAMILY MEDICINE

## 2025-02-12 PROCEDURE — 1160F RVW MEDS BY RX/DR IN RCRD: CPT | Performed by: FAMILY MEDICINE

## 2025-02-12 PROCEDURE — 99214 OFFICE O/P EST MOD 30 MIN: CPT | Performed by: FAMILY MEDICINE

## 2025-02-12 PROCEDURE — 1123F ACP DISCUSS/DSCN MKR DOCD: CPT | Performed by: FAMILY MEDICINE

## 2025-02-12 PROCEDURE — 1036F TOBACCO NON-USER: CPT | Performed by: FAMILY MEDICINE

## 2025-02-12 ASSESSMENT — ENCOUNTER SYMPTOMS
DIZZINESS: 0
PALPITATIONS: 0
ABDOMINAL PAIN: 0
HEADACHES: 0
FEVER: 0
SHORTNESS OF BREATH: 1
LIGHT-HEADEDNESS: 0
VOMITING: 0
DIAPHORESIS: 0
BACK PAIN: 1
FATIGUE: 0
NAUSEA: 0
NECK PAIN: 0
CONFUSION: 0

## 2025-02-12 ASSESSMENT — PATIENT HEALTH QUESTIONNAIRE - PHQ9
1. LITTLE INTEREST OR PLEASURE IN DOING THINGS: NOT AT ALL
SUM OF ALL RESPONSES TO PHQ9 QUESTIONS 1 AND 2: 0
2. FEELING DOWN, DEPRESSED OR HOPELESS: NOT AT ALL

## 2025-02-12 NOTE — PROGRESS NOTES
Subjective   Patient ID: Sami Hernandez is a 74 y.o. male who presents for Follow-up (Benign essential hypertension//). I last saw the patient 11/14/2024    He is having stiffness in his right knee, he had knee replacement surgery 2 years ago. He continues to complain of leg weakness bilaterally which is not improving. He is seeing Dr. Mejia who believes that his back surgery may help with his leg weakness.    He notes prominent mass in the right axilla. He would like it be checked.    Past medical, surgical, and family history reviewed.  Reviewed and documented all medications   Pt eating well, exercising as tolerated and taking medications as directed    Has no medical concerns or refill requests for rooming MA      Review of Systems   Constitutional:  Negative for diaphoresis, fatigue and fever.   Respiratory:  Positive for shortness of breath.    Cardiovascular:  Negative for chest pain and palpitations.   Gastrointestinal:  Negative for abdominal pain, nausea and vomiting.   Musculoskeletal:  Positive for back pain. Negative for neck pain.   Neurological:  Negative for dizziness, light-headedness and headaches.   Psychiatric/Behavioral:  Negative for confusion.      Objective   /80 (BP Location: Right arm, Patient Position: Sitting, BP Cuff Size: Large adult)   Pulse 77   Temp 36.3 °C (97.4 °F)   Resp 16   Ht 1.829 m (6')   Wt 129 kg (285 lb)   SpO2 94%   BMI 38.65 kg/m²     Physical Exam  General Appearance - well-developed, well-nourished, 74 y.o., White male in no acute distress. Patient is ambulating with a cane today.    Skin - warm, pink and dry without rash or concerning lesions. Dryness of the skin involving the LEs.     Mental Status - alert and oriented x 3. Normal mood and affect appropriate to mood.     Neck - supple without lymphadenopathy. Carotid pulses are normal without bruits. Thyroid is normal in midline without nodules.     Chest - lungs are clear to auscultation without rales,  rhonchi or wheezes. Pacemaker in the left upper chest. Subcutaneous cyst of 1 cm in the left lower chest.    Right axilla - firm, subcutaneous mass measuring 4 x 2 cms. Prominent pore noted centrally.    Heart - regular, rate and rhythm without murmurs, rubs or gallops.     Extremities - no cyanosis, clubbing or edema. Pedal pulses are 2+ normal at the dorsalis pedis and posterior pulses bilaterally.     Neurological - cranial nerves II through XII grossly intact. Motor strength 5/5 at all fours bilaterally. No evidence of right dorsiflexor weakness.    Assessment/Plan   1. Urinary frequency  Urinalysis with Reflex Microscopic    Urine Culture    Urine Culture      2. Dark urine  Urinalysis with Reflex Microscopic    Urine Culture    Urine Culture      3. Degeneration of intervertebral disc of lumbar region with discogenic back pain  Referral to Physical Therapy      4. Cervical myelopathy  Referral to Physical Therapy      5. Weakness of both lower extremities  Referral to Physical Therapy      6. Epidermal inclusion cyst  Referral to General Surgery      7. Crohn's disease with complication, unspecified gastrointestinal tract location        8. Left hemiparesis (Multi)        9. Type 2 diabetes mellitus with stage 3b chronic kidney disease, without long-term current use of insulin (Multi)        10. Chronic kidney disease, stage 3b (Multi)            Patient to continue current medications (with any exceptions as noted) and diet. Follow-up in 2 month(s) otherwise as needed.     Ordered Urinalysis with Reflex Microscopic and Urine Culture. Will call patient with results when available.      Refer patient to PT  has been sent for further evaluation and treatment of weakness of both lower extremities.     Refer patient to surgeon for further evaluation and treatment of epidermal inclusion cyst .     Scribe Attestation  By signing my name below, IJulia Scribe   attest that this documentation has been  prepared under the direction and in the presence of Rian Lindsey MD.       Answers submitted by the patient for this visit:  Back Pain Questionnaire (Submitted on 2/6/2025)  Chief Complaint: Back pain  Chronicity: chronic  Onset: more than 1 year ago  Frequency: daily  Progression since onset: gradually worsening  Pain location: lumbar spine  Pain quality: aching  Radiates to: does not radiate  Pain - numeric: 9/10  Pain is: worse during the day  Aggravated by: standing  Stiffness is present: all day  perianal numbness: No  Risk factors: lack of exercise, obesity, sedentary lifestyle

## 2025-02-13 ENCOUNTER — TELEPHONE (OUTPATIENT)
Dept: PRIMARY CARE | Facility: CLINIC | Age: 75
End: 2025-02-13
Payer: COMMERCIAL

## 2025-02-13 PROBLEM — R29.818 NEUROGENIC CLAUDICATION: Status: ACTIVE | Noted: 2025-02-13

## 2025-02-13 PROBLEM — F41.9 ANXIETY: Status: RESOLVED | Noted: 2023-02-01 | Resolved: 2025-02-13

## 2025-02-13 PROBLEM — G72.9 CERVICAL MYOPATHY: Status: ACTIVE | Noted: 2024-05-25

## 2025-02-13 PROBLEM — R35.0 INCREASED FREQUENCY OF URINATION: Status: ACTIVE | Noted: 2023-02-01

## 2025-02-13 PROBLEM — J20.9 ACUTE BRONCHITIS: Status: ACTIVE | Noted: 2025-02-13

## 2025-02-13 PROBLEM — M43.10 ACQUIRED SPONDYLOLISTHESIS: Status: ACTIVE | Noted: 2025-02-13

## 2025-02-13 PROBLEM — M25.561 PAIN IN JOINT OF RIGHT KNEE: Status: ACTIVE | Noted: 2024-06-24

## 2025-02-13 LAB
CHOLEST SERPL-MCNC: 183 MG/DL
CHOLEST/HDLC SERPL: 3.3 (CALC)
EST. AVERAGE GLUCOSE BLD GHB EST-MCNC: 137 MG/DL
EST. AVERAGE GLUCOSE BLD GHB EST-SCNC: 7.6 MMOL/L
HBA1C MFR BLD: 6.4 % OF TOTAL HGB
HDLC SERPL-MCNC: 56 MG/DL
LDLC SERPL CALC-MCNC: 99 MG/DL (CALC)
NONHDLC SERPL-MCNC: 127 MG/DL (CALC)
TRIGL SERPL-MCNC: 185 MG/DL
VIT B12 SERPL-MCNC: 264 PG/ML (ref 200–1100)

## 2025-02-13 NOTE — ASSESSMENT & PLAN NOTE
Type 2 diabetes mellitus with diabetic chronic kidney disease is stable.  Patient to continue with current medications and treatment plan.  Follow-up at least annually.

## 2025-02-13 NOTE — TELEPHONE ENCOUNTER
Prior Authorization for semaglutide, weight loss, (WEGOVY) 0.25 mg/0.5 mL pen injector  has been APPROVED.    Approval valid until further notice,  Approval letter scanned into media on 02.13.2025

## 2025-02-13 NOTE — ASSESSMENT & PLAN NOTE
Left hemiparesis is stable.  Patient to continue with current medications and treatment plan.  Follow-up at least annually.  Follow-up with neurosurgery as scheduled.

## 2025-02-13 NOTE — ASSESSMENT & PLAN NOTE
Crohn's disease, unspecified, with unspecified complications is stable.  Patient to continue with current medications and treatment plan.  Follow-up at least annually.  Follow-up with gastroenterology as scheduled.

## 2025-02-13 NOTE — TELEPHONE ENCOUNTER
We received a request for prior authorization on the patient's semaglutide, weight loss, (WEGOVY) 0.25 mg/0.5 mL pen injector  from their pharmacy. Prior authorization was submitted to insurance today. We will await their determination.

## 2025-02-14 LAB — BACTERIA UR CULT: NORMAL

## 2025-02-16 NOTE — RESULT ENCOUNTER NOTE
Please call the patient regarding his abnormal result.  T.Chol 183, LDL 99, HDL 56, . Follow low cholesterol, low fat diet and exercise as tolerated.  Hemoglobin A1c is in prediabetic range at 6.4.  Patient does need to follow a low sugar/low carbohydrate diet  Vitamin B12 level is low normal at 264.  Recommend that he take vitamin B12 500 mcg p.o. daily OTC.

## 2025-02-17 ENCOUNTER — TELEPHONE (OUTPATIENT)
Dept: PRIMARY CARE | Facility: CLINIC | Age: 75
End: 2025-02-17
Payer: COMMERCIAL

## 2025-02-17 NOTE — TELEPHONE ENCOUNTER
Patient called in stating after his lab work, he was advised to take B12 500 mcg p.o. daily. He stated he had sent in a [employer] message and was advised to take 1000 mcg daily. Patient wondering which instructions are correct?  Please Advise

## 2025-02-24 ENCOUNTER — APPOINTMENT (OUTPATIENT)
Dept: SURGERY | Facility: CLINIC | Age: 75
End: 2025-02-24
Payer: MEDICARE

## 2025-02-24 VITALS
OXYGEN SATURATION: 91 % | DIASTOLIC BLOOD PRESSURE: 75 MMHG | BODY MASS INDEX: 37.93 KG/M2 | WEIGHT: 280 LBS | SYSTOLIC BLOOD PRESSURE: 119 MMHG | HEART RATE: 78 BPM | HEIGHT: 72 IN

## 2025-02-24 DIAGNOSIS — L72.0 EPIDERMAL INCLUSION CYST: ICD-10-CM

## 2025-02-24 PROCEDURE — 3008F BODY MASS INDEX DOCD: CPT | Performed by: SURGERY

## 2025-02-24 PROCEDURE — 1159F MED LIST DOCD IN RCRD: CPT | Performed by: SURGERY

## 2025-02-24 PROCEDURE — 1157F ADVNC CARE PLAN IN RCRD: CPT | Performed by: SURGERY

## 2025-02-24 PROCEDURE — 1160F RVW MEDS BY RX/DR IN RCRD: CPT | Performed by: SURGERY

## 2025-02-24 PROCEDURE — 3078F DIAST BP <80 MM HG: CPT | Performed by: SURGERY

## 2025-02-24 PROCEDURE — 1036F TOBACCO NON-USER: CPT | Performed by: SURGERY

## 2025-02-24 PROCEDURE — 4010F ACE/ARB THERAPY RXD/TAKEN: CPT | Performed by: SURGERY

## 2025-02-24 PROCEDURE — 3074F SYST BP LT 130 MM HG: CPT | Performed by: SURGERY

## 2025-02-24 PROCEDURE — 1123F ACP DISCUSS/DSCN MKR DOCD: CPT | Performed by: SURGERY

## 2025-02-24 PROCEDURE — 99202 OFFICE O/P NEW SF 15 MIN: CPT | Performed by: SURGERY

## 2025-02-24 NOTE — PROGRESS NOTES
"Subjective   Patient ID: Anjum Hernandez \"Ruthie" is a 74 y.o. male who presents for No chief complaint on file..  HPI  74-year-old male referred to me for right axillary cyst.  Denies trauma. He noticed the cyst a year ago and it has increased in size. Denies pain. No previous history of cyst like this. No history of skin or soft tissue malignancy.    Objective   Physical Exam  Gen: no acute distress  CV: RRR  Pulm: CTAB  Right axilla: 3x4 cm sebaceous cyst  Assessment/Plan     74-year-old morbidly obese male patient with right axillary sebaceous cyst.  Patient wants to proceed with excision given the size of it.  He is consented for excision in the office.  I explained to him the procedure, risks and complications; all questions answered and willing to proceed.  He will continue his Xarelto       Chirag Acosta MD 02/24/25 9:30 AM   "

## 2025-03-05 ENCOUNTER — TELEPHONE (OUTPATIENT)
Dept: CARDIOLOGY CLINIC | Age: 75
End: 2025-03-05

## 2025-03-05 DIAGNOSIS — I48.0 PAROXYSMAL ATRIAL FIBRILLATION (HCC): ICD-10-CM

## 2025-03-05 NOTE — TELEPHONE ENCOUNTER
Pt calling to let us know that Xarelto costs too much, $400.     Pt aware that we have pt assistance for Xarelto.     Pt will come into the office to sign forms with proof of income tomorrow.

## 2025-03-06 ENCOUNTER — HOSPITAL ENCOUNTER (OUTPATIENT)
Dept: CARDIOLOGY | Facility: HOSPITAL | Age: 75
Discharge: HOME | End: 2025-03-06
Payer: MEDICARE

## 2025-03-06 DIAGNOSIS — Z95.0 PACEMAKER: ICD-10-CM

## 2025-03-06 DIAGNOSIS — I44.2 ATRIOVENTRICULAR BLOCK, THIRD DEGREE (MULTI): ICD-10-CM

## 2025-03-06 PROCEDURE — 93296 REM INTERROG EVL PM/IDS: CPT

## 2025-03-09 ENCOUNTER — PATIENT MESSAGE (OUTPATIENT)
Dept: PRIMARY CARE | Facility: CLINIC | Age: 75
End: 2025-03-09
Payer: COMMERCIAL

## 2025-03-09 DIAGNOSIS — E66.01 CLASS 2 SEVERE OBESITY DUE TO EXCESS CALORIES WITH SERIOUS COMORBIDITY AND BODY MASS INDEX (BMI) OF 38.0 TO 38.9 IN ADULT: Primary | ICD-10-CM

## 2025-03-09 DIAGNOSIS — E66.812 CLASS 2 SEVERE OBESITY DUE TO EXCESS CALORIES WITH SERIOUS COMORBIDITY AND BODY MASS INDEX (BMI) OF 38.0 TO 38.9 IN ADULT: Primary | ICD-10-CM

## 2025-03-21 ENCOUNTER — APPOINTMENT (OUTPATIENT)
Dept: SURGERY | Facility: CLINIC | Age: 75
End: 2025-03-21
Payer: MEDICARE

## 2025-03-21 VITALS — WEIGHT: 280 LBS | HEIGHT: 72 IN | BODY MASS INDEX: 37.93 KG/M2

## 2025-03-21 DIAGNOSIS — L72.0 EPIDERMAL INCLUSION CYST: Primary | ICD-10-CM

## 2025-03-21 PROCEDURE — 1159F MED LIST DOCD IN RCRD: CPT | Performed by: SURGERY

## 2025-03-21 PROCEDURE — 88304 TISSUE EXAM BY PATHOLOGIST: CPT | Performed by: PATHOLOGY

## 2025-03-21 PROCEDURE — 12032 INTMD RPR S/A/T/EXT 2.6-7.5: CPT | Performed by: SURGERY

## 2025-03-21 PROCEDURE — 88304 TISSUE EXAM BY PATHOLOGIST: CPT

## 2025-03-21 PROCEDURE — 1123F ACP DISCUSS/DSCN MKR DOCD: CPT | Performed by: SURGERY

## 2025-03-21 PROCEDURE — 4010F ACE/ARB THERAPY RXD/TAKEN: CPT | Performed by: SURGERY

## 2025-03-21 PROCEDURE — 11406 EXC TR-EXT B9+MARG >4.0 CM: CPT | Performed by: SURGERY

## 2025-03-21 PROCEDURE — 1157F ADVNC CARE PLAN IN RCRD: CPT | Performed by: SURGERY

## 2025-03-21 PROCEDURE — 3008F BODY MASS INDEX DOCD: CPT | Performed by: SURGERY

## 2025-03-21 PROCEDURE — 0752T DGTZ GLS MCRSCP SLD LVL III: CPT

## 2025-03-21 NOTE — PROGRESS NOTES
"Patient ID: Anjum Hernandez \"Ruthie" is a 74 y.o. male.    Procedures  Procedure: excision of right axillary sebaceous cyst  Indication: enlarging cyst  Alternatives, risks and complications including but not limited to bleeding and infection were discussed with the patient. Written consent was obtained prior to the procedure.  After informed consent was obtained, the surrounding skin was prepped and draped in the usual sterile fashion using chloroprep. 6x5 cm right axillary sebaceous cyst. The surrounding skin was anesthetized with 1% lidocaine with epi (20 ml). Using the 15 blade, a 6 cm transverse incision was made through skin and subQ tissue down to the cyst which was dissected from surrounding tissue. Specimen measures 5 x 5 cm. The specimen was sent for pathologic assessment. Hemostasis was obtained with 3-0 vicryl in simple interrupted fashion. The 6 cm defect closed in layers: deep dermal tissue approximated with multiple 3-0 vicryl in simple interrupted fashion. Skin approximated with 3-0 vicryl in subcuticular fashion and multiple vertical mattress. Mastisol, sterile strips, 4x4 gauze and Tegaderm. There was minimal bleeding and patient tolerated the procedure with no immediate complication. The patient is given instruction about wound care. Remove the dressing in 2 days; remove steri-strips in 1 week. Call for redness, drainage, fevers or worsening pain.  "

## 2025-03-31 LAB
LABORATORY COMMENT REPORT: NORMAL
PATH REPORT.FINAL DX SPEC: NORMAL
PATH REPORT.GROSS SPEC: NORMAL
PATH REPORT.RELEVANT HX SPEC: NORMAL
PATH REPORT.TOTAL CANCER: NORMAL

## 2025-04-03 ENCOUNTER — APPOINTMENT (OUTPATIENT)
Dept: SURGERY | Facility: CLINIC | Age: 75
End: 2025-04-03
Payer: MEDICARE

## 2025-04-03 DIAGNOSIS — Z51.89 VISIT FOR WOUND CHECK: Primary | ICD-10-CM

## 2025-04-03 PROCEDURE — 1123F ACP DISCUSS/DSCN MKR DOCD: CPT | Performed by: SURGERY

## 2025-04-03 PROCEDURE — 1160F RVW MEDS BY RX/DR IN RCRD: CPT | Performed by: SURGERY

## 2025-04-03 PROCEDURE — 1157F ADVNC CARE PLAN IN RCRD: CPT | Performed by: SURGERY

## 2025-04-03 PROCEDURE — 99024 POSTOP FOLLOW-UP VISIT: CPT | Performed by: SURGERY

## 2025-04-03 PROCEDURE — 1159F MED LIST DOCD IN RCRD: CPT | Performed by: SURGERY

## 2025-04-03 PROCEDURE — 4010F ACE/ARB THERAPY RXD/TAKEN: CPT | Performed by: SURGERY

## 2025-04-03 NOTE — PROGRESS NOTES
"Subjective   Patient ID: Anjum Hernandez \"Ruthie" is a 74 y.o. male who presents for No chief complaint on file..  HPI  74-year-old male patient who underwent excision of left axillary cyst on March 21.  Pathology shows epidermal inclusion cyst; here in follow-up and doing well      Objective   Physical Exam  Left axila: incision approximated, no infection; scant bleeding    Assessment/Plan   Satisfactory postoperative course. No infection. Follow-up as needed         Chirag Acosta MD 04/03/25 11:20 AM   "

## 2025-04-12 DIAGNOSIS — E78.00 PURE HYPERCHOLESTEROLEMIA, UNSPECIFIED: Primary | ICD-10-CM

## 2025-04-12 DIAGNOSIS — F32.5 MAJOR DEPRESSIVE DISORDER IN FULL REMISSION, UNSPECIFIED WHETHER RECURRENT: ICD-10-CM

## 2025-04-12 DIAGNOSIS — E78.00 PURE HYPERCHOLESTEROLEMIA: ICD-10-CM

## 2025-04-12 DIAGNOSIS — E03.9 ACQUIRED HYPOTHYROIDISM: ICD-10-CM

## 2025-04-12 DIAGNOSIS — I10 BENIGN ESSENTIAL HYPERTENSION: ICD-10-CM

## 2025-04-14 ENCOUNTER — APPOINTMENT (OUTPATIENT)
Dept: PRIMARY CARE | Facility: CLINIC | Age: 75
End: 2025-04-14
Payer: COMMERCIAL

## 2025-04-14 RX ORDER — BUPROPION HYDROCHLORIDE 300 MG/1
TABLET ORAL
Qty: 90 TABLET | Refills: 1 | Status: SHIPPED | OUTPATIENT
Start: 2025-04-14

## 2025-04-14 RX ORDER — UBIDECARENONE 100 MG
100 CAPSULE ORAL DAILY
Qty: 90 CAPSULE | Refills: 1 | Status: SHIPPED | OUTPATIENT
Start: 2025-04-14

## 2025-04-14 RX ORDER — AMLODIPINE BESYLATE 5 MG/1
5 TABLET ORAL DAILY
Qty: 90 TABLET | Refills: 3 | Status: SHIPPED | OUTPATIENT
Start: 2025-04-14 | End: 2026-04-14

## 2025-04-14 RX ORDER — FENOFIBRATE 48 MG/1
48 TABLET, FILM COATED ORAL DAILY
Qty: 90 TABLET | Refills: 1 | Status: SHIPPED | OUTPATIENT
Start: 2025-04-14 | End: 2025-10-11

## 2025-04-14 RX ORDER — LEVOTHYROXINE SODIUM 25 UG/1
25 TABLET ORAL
Qty: 90 TABLET | Refills: 3 | Status: SHIPPED | OUTPATIENT
Start: 2025-04-14

## 2025-04-14 NOTE — TELEPHONE ENCOUNTER
Rx Refill Request     Name: Anjum Hernadnez  :  1950   Medication Name:  levothyroxine (Synthroid, Levoxyl) 25 mcg tablet   Specific Pharmacy location:  [facility] [address] [place], [place]   Date of last appointment:  2025   Date of next appointment:  2025  Best number to reach patient:  [phone]

## 2025-04-23 ENCOUNTER — APPOINTMENT (OUTPATIENT)
Dept: PRIMARY CARE | Facility: CLINIC | Age: 75
End: 2025-04-23
Payer: COMMERCIAL

## 2025-05-05 ASSESSMENT — ENCOUNTER SYMPTOMS
CONFUSION: 1
FEVER: 0
DIZZINESS: 0
AURA: 0
SHORTNESS OF BREATH: 1
LIGHT-HEADEDNESS: 0
NECK PAIN: 0
WEAKNESS: 1
PALPITATIONS: 0
HEADACHES: 0
ABDOMINAL PAIN: 0
CLUMSINESS: 1
NEUROLOGIC COMPLAINT: 1
VERTIGO: 0
FATIGUE: 1
VOMITING: 0
MEMORY LOSS: 1
DIAPHORESIS: 0
NAUSEA: 0
BACK PAIN: 1
BOWEL INCONTINENCE: 0
LOSS OF BALANCE: 1
ALTERED MENTAL STATUS: 1

## 2025-05-06 ENCOUNTER — APPOINTMENT (OUTPATIENT)
Dept: PRIMARY CARE | Facility: CLINIC | Age: 75
End: 2025-05-06
Payer: COMMERCIAL

## 2025-05-06 VITALS
SYSTOLIC BLOOD PRESSURE: 130 MMHG | RESPIRATION RATE: 16 BRPM | BODY MASS INDEX: 37.65 KG/M2 | DIASTOLIC BLOOD PRESSURE: 80 MMHG | WEIGHT: 278 LBS | HEART RATE: 75 BPM | TEMPERATURE: 98.2 F | HEIGHT: 72 IN | OXYGEN SATURATION: 99 %

## 2025-05-06 DIAGNOSIS — J44.9 COPD, MILD (MULTI): ICD-10-CM

## 2025-05-06 DIAGNOSIS — T16.2XXA FOREIGN BODY OF LEFT EAR, INITIAL ENCOUNTER: ICD-10-CM

## 2025-05-06 DIAGNOSIS — H91.92 DECREASED HEARING OF LEFT EAR: ICD-10-CM

## 2025-05-06 DIAGNOSIS — H61.21 IMPACTED CERUMEN OF RIGHT EAR: ICD-10-CM

## 2025-05-06 DIAGNOSIS — I10 BENIGN ESSENTIAL HYPERTENSION: ICD-10-CM

## 2025-05-06 DIAGNOSIS — R53.81 PHYSICAL DECONDITIONING: ICD-10-CM

## 2025-05-06 DIAGNOSIS — E11.22 TYPE 2 DIABETES MELLITUS WITH CHRONIC KIDNEY DISEASE, WITHOUT LONG-TERM CURRENT USE OF INSULIN, UNSPECIFIED CKD STAGE (MULTI): Primary | ICD-10-CM

## 2025-05-06 DIAGNOSIS — G25.9 MOVEMENT DISORDER: ICD-10-CM

## 2025-05-06 DIAGNOSIS — G47.33 OBSTRUCTIVE SLEEP APNEA, ADULT: ICD-10-CM

## 2025-05-06 PROCEDURE — 3008F BODY MASS INDEX DOCD: CPT | Performed by: FAMILY MEDICINE

## 2025-05-06 PROCEDURE — 4010F ACE/ARB THERAPY RXD/TAKEN: CPT | Performed by: FAMILY MEDICINE

## 2025-05-06 PROCEDURE — 69210 REMOVE IMPACTED EAR WAX UNI: CPT | Performed by: FAMILY MEDICINE

## 2025-05-06 PROCEDURE — 69200 CLEAR OUTER EAR CANAL: CPT | Performed by: FAMILY MEDICINE

## 2025-05-06 PROCEDURE — 3075F SYST BP GE 130 - 139MM HG: CPT | Performed by: FAMILY MEDICINE

## 2025-05-06 PROCEDURE — 99214 OFFICE O/P EST MOD 30 MIN: CPT | Performed by: FAMILY MEDICINE

## 2025-05-06 PROCEDURE — 3079F DIAST BP 80-89 MM HG: CPT | Performed by: FAMILY MEDICINE

## 2025-05-06 PROCEDURE — 1159F MED LIST DOCD IN RCRD: CPT | Performed by: FAMILY MEDICINE

## 2025-05-06 PROCEDURE — 1036F TOBACCO NON-USER: CPT | Performed by: FAMILY MEDICINE

## 2025-05-06 RX ORDER — CYANOCOBALAMIN (VITAMIN B-12) 250 MCG
250 TABLET ORAL DAILY
COMMUNITY

## 2025-05-06 RX ORDER — SEMAGLUTIDE 0.5 MG/.5ML
0.5 INJECTION, SOLUTION SUBCUTANEOUS WEEKLY
Qty: 6 ML | Refills: 0 | Status: SHIPPED | OUTPATIENT
Start: 2025-05-06 | End: 2025-07-23

## 2025-05-06 ASSESSMENT — ENCOUNTER SYMPTOMS
WEAKNESS: 1
ABDOMINAL PAIN: 0
SHORTNESS OF BREATH: 1
VERTIGO: 0
LIGHT-HEADEDNESS: 0
DIAPHORESIS: 0
VOMITING: 0
MEMORY LOSS: 1
AURA: 0
LOSS OF BALANCE: 1
NAUSEA: 0
BACK PAIN: 1
CONFUSION: 1
CLUMSINESS: 1
OCCASIONAL FEELINGS OF UNSTEADINESS: 0
HEADACHES: 0
FATIGUE: 1
NECK PAIN: 0
FEVER: 0
PALPITATIONS: 0
ALTERED MENTAL STATUS: 1
DIZZINESS: 0
DEPRESSION: 0
BOWEL INCONTINENCE: 0
LOSS OF SENSATION IN FEET: 0
NEUROLOGIC COMPLAINT: 1

## 2025-05-06 ASSESSMENT — ANXIETY QUESTIONNAIRES
GAD7 TOTAL SCORE: 21
4. TROUBLE RELAXING: NEARLY EVERY DAY
2. NOT BEING ABLE TO STOP OR CONTROL WORRYING: NEARLY EVERY DAY
7. FEELING AFRAID AS IF SOMETHING AWFUL MIGHT HAPPEN: NEARLY EVERY DAY
1. FEELING NERVOUS, ANXIOUS, OR ON EDGE: NEARLY EVERY DAY
5. BEING SO RESTLESS THAT IT IS HARD TO SIT STILL: NEARLY EVERY DAY
IF YOU CHECKED OFF ANY PROBLEMS ON THIS QUESTIONNAIRE, HOW DIFFICULT HAVE THESE PROBLEMS MADE IT FOR YOU TO DO YOUR WORK, TAKE CARE OF THINGS AT HOME, OR GET ALONG WITH OTHER PEOPLE: VERY DIFFICULT
3. WORRYING TOO MUCH ABOUT DIFFERENT THINGS: NEARLY EVERY DAY
6. BECOMING EASILY ANNOYED OR IRRITABLE: NEARLY EVERY DAY

## 2025-05-06 ASSESSMENT — PATIENT HEALTH QUESTIONNAIRE - PHQ9
SUM OF ALL RESPONSES TO PHQ QUESTIONS 1-9: 5
2. FEELING DOWN, DEPRESSED OR HOPELESS: MORE THAN HALF THE DAYS
SUM OF ALL RESPONSES TO PHQ9 QUESTIONS 1 & 2: 2
5. POOR APPETITE OR OVEREATING: SEVERAL DAYS
9. THOUGHTS THAT YOU WOULD BE BETTER OFF DEAD, OR OF HURTING YOURSELF: NOT AT ALL
3. TROUBLE FALLING OR STAYING ASLEEP: NOT AT ALL
7. TROUBLE CONCENTRATING ON THINGS, SUCH AS READING THE NEWSPAPER OR WATCHING TELEVISION: SEVERAL DAYS
6. FEELING BAD ABOUT YOURSELF - OR THAT YOU ARE A FAILURE OR HAVE LET YOURSELF OR YOUR FAMILY DOWN: SEVERAL DAYS
10. IF YOU CHECKED OFF ANY PROBLEMS, HOW DIFFICULT HAVE THESE PROBLEMS MADE IT FOR YOU TO DO YOUR WORK, TAKE CARE OF THINGS AT HOME, OR GET ALONG WITH OTHER PEOPLE: SOMEWHAT DIFFICULT
8. MOVING OR SPEAKING SO SLOWLY THAT OTHER PEOPLE COULD HAVE NOTICED. OR THE OPPOSITE, BEING SO FIGETY OR RESTLESS THAT YOU HAVE BEEN MOVING AROUND A LOT MORE THAN USUAL: NOT AT ALL
2. FEELING DOWN, DEPRESSED OR HOPELESS: SEVERAL DAYS
4. FEELING TIRED OR HAVING LITTLE ENERGY: NOT AT ALL
1. LITTLE INTEREST OR PLEASURE IN DOING THINGS: MORE THAN HALF THE DAYS
1. LITTLE INTEREST OR PLEASURE IN DOING THINGS: SEVERAL DAYS
SUM OF ALL RESPONSES TO PHQ9 QUESTIONS 1 & 2: 4
3. TROUBLE FALLING OR STAYING ASLEEP: NOT AT ALL
5. POOR APPETITE OR OVEREATING: SEVERAL DAYS

## 2025-05-06 NOTE — PROGRESS NOTES
Subjective   Patient ID: Anjum Hernandez is a 74 y.o. male who presents for Follow Up of  Diabetes, CKD and Cervical Myelopathy . I last saw the patient on 2/12/2025  .     Acute Neurological Problem  The patient's primary symptoms include an altered mental status, clumsiness, a loss of balance, memory loss and weakness. This is a chronic problem. The current episode started more than 1 year ago. The neurological problem developed gradually. The problem has been waxing and waning since onset. There was lower extremity focality noted. Associated symptoms include back pain, confusion, fatigue and shortness of breath. Pertinent negatives include no abdominal pain, auditory change, aura, bladder incontinence, bowel incontinence, chest pain, diaphoresis, dizziness, fever, headaches, light-headedness, nausea, neck pain, palpitations, vertigo or vomiting. Past treatments include acetaminophen. The treatment provided mild relief.     Patient had Epidermal inclusion cyst removed by Dr. Acosta on 3/21.     Patient c/o balance issues and back pain. He also notes that his handwriting has worsened and he often drops things. He states that his left hand shakes. Patient has been doing PT regularly. Patient has seen a neurologist and was referred to a movement disorder specialist.    He also notes that he has been having feelings of depression/anxiety and cannot think of anything that is causing it. He is wondering if this could be from one of his medications.     Patient has lost few pounds and he mentions cutting down on his sugars. Patient is tolerating wegovy well at his current dose. He denies any side effects. Patient notes reduction in his appetite.    Past medical, surgical, and family history reviewed.  Reviewed and documented all medications   Pt eating well, exercising as tolerated and taking medications as directed.      Review of Systems   Constitutional:  Positive for fatigue. Negative for diaphoresis and fever.    Respiratory:  Positive for shortness of breath.    Cardiovascular:  Negative for chest pain and palpitations.   Gastrointestinal:  Negative for abdominal pain, bowel incontinence, nausea and vomiting.   Genitourinary:  Negative for bladder incontinence.   Musculoskeletal:  Positive for back pain. Negative for neck pain.   Neurological:  Positive for weakness and loss of balance. Negative for dizziness, vertigo, light-headedness and headaches.   Psychiatric/Behavioral:  Positive for confusion and memory loss.      Objective   Visit Vitals  /80   Pulse 75   Temp 36.8 °C (98.2 °F)   Resp 16   Ht 1.829 m (6')   Wt 126 kg (278 lb)   SpO2 99%   BMI 37.70 kg/m²   Smoking Status Former   BSA 2.53 m²       Physical Exam    General Appearance - well-developed, well-nourished, 74 y.o., White male in no acute distress. Patient is ambulating with a cane today.     Skin - warm, pink and dry without rash or concerning lesions. Dryness of the skin involving the LEs.      Mental Status - alert and oriented x 3. Normal mood and affect appropriate to mood.     Right ear - Right ear was impacted with cerumen. Procedure: Using irrigation, cerumen was removed atraumatically from right ear with moderate difficulty. Patient tolerated procedure well. Afterwards, the right TM appeared normal and moved well with insufflation.      Left ear - removed foreign body from the left ear with alligator forceps without difficulty. The foreign was the tip to his hearing aids.     Neck - supple without lymphadenopathy. Carotid pulses are normal without bruits. Thyroid is normal in midline without nodules.      Chest - lungs are clear to auscultation without rales, rhonchi or wheezes. Pacemaker in the left upper chest. Subcutaneous cyst of 1 cm in the left lower chest.     Right axilla - residual scar after cyst excision noted.     Heart - regular, rate and rhythm without murmurs, rubs or gallops.      Extremities - no cyanosis, clubbing or  edema. Pedal pulses are 2+ normal at the dorsalis pedis and posterior pulses bilaterally.      Neurological - cranial nerves II through XII grossly intact. Motor strength 5/5 at all fours bilaterally. No evidence of right dorsiflexor weakness.    Assessment   1. Type 2 diabetes mellitus with chronic kidney disease, without long-term current use of insulin, unspecified CKD stage (Multi)  semaglutide, weight loss, (Wegovy) 0.5 mg/0.5 mL pen injector      2. Benign essential hypertension        3. Impacted cerumen of right ear [H61.21]        4. Foreign body of left ear, initial encounter [T16.2XXA]        5. Decreased hearing of left ear        6. COPD, mild (Multi)        7. Movement disorder        8. Physical deconditioning        9. Obstructive sleep apnea, adult            Patient to continue current medications (with any exceptions as noted) and diet. Follow-up in 2 months, otherwise as needed.      Increased Wegovy from 0.25 mg to 0.5 mg.    Patient was given refill(s) on:   Wegovy 0.5 mg/0.5 ml, INJECT 1 PEN ONCE WEEKLY     Rx(s) sent to pharmacy.      Patient will be establishing with a neurologist for the treatment of possible parkinson's disease.    Patient is to follow up with cardiology and pulmonology as scheduled.     Scribe Attestation  By signing my name below, IJulia Scribe   attest that this documentation has been prepared under the direction and in the presence of Magali Lindsey MD.      This note has been transcribed using a medical scribe and there is a possibility of unintentional typing misprints.     All medical record entries made by the scribe were at my direction and personally dictated by me. I have reviewed the chart and agree that the record accurately reflects my personal performance of the history, physical exam, discussion, and plan.     MAGALI LINDSEY M.D.

## 2025-05-21 ENCOUNTER — OFFICE VISIT (OUTPATIENT)
Dept: PULMONOLOGY | Age: 75
End: 2025-05-21
Payer: MEDICARE

## 2025-05-21 VITALS
HEART RATE: 79 BPM | BODY MASS INDEX: 36.62 KG/M2 | DIASTOLIC BLOOD PRESSURE: 84 MMHG | WEIGHT: 270 LBS | SYSTOLIC BLOOD PRESSURE: 114 MMHG | OXYGEN SATURATION: 94 %

## 2025-05-21 DIAGNOSIS — R06.02 SHORTNESS OF BREATH: ICD-10-CM

## 2025-05-21 DIAGNOSIS — G47.33 OSA (OBSTRUCTIVE SLEEP APNEA): Primary | ICD-10-CM

## 2025-05-21 DIAGNOSIS — E66.9 OBESITY (BMI 30-39.9): ICD-10-CM

## 2025-05-21 DIAGNOSIS — F32.A DEPRESSION, UNSPECIFIED DEPRESSION TYPE: ICD-10-CM

## 2025-05-21 PROCEDURE — 3074F SYST BP LT 130 MM HG: CPT | Performed by: INTERNAL MEDICINE

## 2025-05-21 PROCEDURE — 99214 OFFICE O/P EST MOD 30 MIN: CPT | Performed by: INTERNAL MEDICINE

## 2025-05-21 PROCEDURE — G8417 CALC BMI ABV UP PARAM F/U: HCPCS | Performed by: INTERNAL MEDICINE

## 2025-05-21 PROCEDURE — 1159F MED LIST DOCD IN RCRD: CPT | Performed by: INTERNAL MEDICINE

## 2025-05-21 PROCEDURE — 1036F TOBACCO NON-USER: CPT | Performed by: INTERNAL MEDICINE

## 2025-05-21 PROCEDURE — 1123F ACP DISCUSS/DSCN MKR DOCD: CPT | Performed by: INTERNAL MEDICINE

## 2025-05-21 PROCEDURE — 3079F DIAST BP 80-89 MM HG: CPT | Performed by: INTERNAL MEDICINE

## 2025-05-21 PROCEDURE — G8427 DOCREV CUR MEDS BY ELIG CLIN: HCPCS | Performed by: INTERNAL MEDICINE

## 2025-05-21 PROCEDURE — 3017F COLORECTAL CA SCREEN DOC REV: CPT | Performed by: INTERNAL MEDICINE

## 2025-05-21 RX ORDER — SEMAGLUTIDE 0.5 MG/.5ML
0.5 INJECTION, SOLUTION SUBCUTANEOUS WEEKLY
COMMUNITY
Start: 2025-05-06 | End: 2025-07-23

## 2025-05-21 RX ORDER — ALBUTEROL SULFATE 90 UG/1
2 INHALANT RESPIRATORY (INHALATION) EVERY 6 HOURS PRN
Qty: 18 G | Refills: 3 | Status: SHIPPED | OUTPATIENT
Start: 2025-05-21

## 2025-05-21 NOTE — PROGRESS NOTES
Subjective:             Junito Harris is a 74 y.o. male who complains today of:     Chief Complaint   Patient presents with    Follow-up     5m f/u on ETIENNE       HPI  He is using CPAP with  5-10  centimeters of H2O with heated humidity.  He is using CPAP for about  5 hours every night.  He is using CPAP with  full face  Mask.  He said  sleep is restful with the CPAP use.  He is compliant with CPAP therapy and benefiting with CPAP use.  No snoring with CPAP use.  C/o  daytime tiredness even with CPAP use.  He think he is depress. He denies taking naps.  No sleepiness with driving.   He denies difficulty falling asleep or staying asleep.     I reviewed compliance report with patient regarding CPAP therapy. He is using  CPAP for 30 days out of 30 days. Average usage of days used is 6 hours and 34 min , average AHI 1.4 with CPAP use.      Very mild shortness of breath  with exertion , still having difficulty walking so does not walk  much  . Occasional  Wheezing. No Cough with  Sputum.No Chest tightness. He has  albuterol HFA prn if wheezing or SOB.    Allergies:  Azathioprine and Clindamycin  Past Medical History:   Diagnosis Date    Arthritis     Colitis     COPD (chronic obstructive pulmonary disease) (HCC)     Crohn disease (HCC)     Encephalopathy     Hyperlipidemia     Hypertension     Hypothyroidism     ETIENNE (obstructive sleep apnea)     Pacemaker     Paroxysmal atrial fibrillation ([facility]) 11/01/2018    Persistent atrial fibrillation ([facility]) 11/01/2018    Sick sinus syndrome ([facility])     Third degree AV block ([facility])     Type II or unspecified type diabetes mellitus without mention of complication, not stated as uncontrolled      Past Surgical History:   Procedure Laterality Date    ANTERIOR CRUCIATE LIGAMENT REPAIR      CARDIAC PACEMAKER PLACEMENT      CERVICAL FUSION N/A 5/24/2024    CERVICAL 4-5 AND 5-6 ANTERIOR DISCECTOMY FUSION AUTOGRAFT, ALLOGRAFT MICRODISSECTION, FLUOROSCOPY, SPINAL MONITORING, PREOPERATIVE

## 2025-06-12 ENCOUNTER — HOSPITAL ENCOUNTER (OUTPATIENT)
Dept: CARDIOLOGY | Facility: HOSPITAL | Age: 75
Discharge: HOME | End: 2025-06-12
Payer: MEDICARE

## 2025-06-12 ENCOUNTER — APPOINTMENT (OUTPATIENT)
Dept: CARDIOLOGY | Facility: HOSPITAL | Age: 75
End: 2025-06-12
Payer: COMMERCIAL

## 2025-06-12 DIAGNOSIS — I44.2 ATRIOVENTRICULAR BLOCK, COMPLETE (MULTI): ICD-10-CM

## 2025-06-12 DIAGNOSIS — Z95.0 CARDIAC PACEMAKER IN SITU: ICD-10-CM

## 2025-06-12 PROCEDURE — 93280 PM DEVICE PROGR EVAL DUAL: CPT

## 2025-06-30 ASSESSMENT — ENCOUNTER SYMPTOMS
NEUROLOGIC COMPLAINT: 1
CLUMSINESS: 1
LOSS OF BALANCE: 1
BACK PAIN: 1
AURA: 0
BOWEL INCONTINENCE: 1
MEMORY LOSS: 1
VISUAL CHANGE: 1
ABDOMINAL PAIN: 0

## 2025-07-07 ENCOUNTER — APPOINTMENT (OUTPATIENT)
Dept: PRIMARY CARE | Facility: CLINIC | Age: 75
End: 2025-07-07
Payer: MEDICARE

## 2025-07-07 VITALS
WEIGHT: 268.6 LBS | HEART RATE: 70 BPM | SYSTOLIC BLOOD PRESSURE: 106 MMHG | OXYGEN SATURATION: 97 % | TEMPERATURE: 97.2 F | DIASTOLIC BLOOD PRESSURE: 64 MMHG | BODY MASS INDEX: 36.38 KG/M2 | HEIGHT: 72 IN | RESPIRATION RATE: 16 BRPM

## 2025-07-07 DIAGNOSIS — E11.22 TYPE 2 DIABETES MELLITUS WITH CHRONIC KIDNEY DISEASE, WITHOUT LONG-TERM CURRENT USE OF INSULIN, UNSPECIFIED CKD STAGE (MULTI): ICD-10-CM

## 2025-07-07 DIAGNOSIS — I10 BENIGN ESSENTIAL HYPERTENSION: ICD-10-CM

## 2025-07-07 DIAGNOSIS — I44.2 ATRIOVENTRICULAR BLOCK, COMPLETE (MULTI): ICD-10-CM

## 2025-07-07 DIAGNOSIS — I49.5 SSS (SICK SINUS SYNDROME) (MULTI): ICD-10-CM

## 2025-07-07 DIAGNOSIS — R29.898 WEAKNESS OF BOTH LOWER EXTREMITIES: ICD-10-CM

## 2025-07-07 DIAGNOSIS — R53.81 PHYSICAL DECONDITIONING: ICD-10-CM

## 2025-07-07 DIAGNOSIS — K52.9 INFLAMMATORY BOWEL DISEASES (IBD): ICD-10-CM

## 2025-07-07 DIAGNOSIS — E03.9 ACQUIRED HYPOTHYROIDISM: ICD-10-CM

## 2025-07-07 DIAGNOSIS — E55.9 VITAMIN D DEFICIENCY: ICD-10-CM

## 2025-07-07 DIAGNOSIS — G25.9 MOVEMENT DISORDER: Primary | ICD-10-CM

## 2025-07-07 DIAGNOSIS — J44.9 COPD, MILD (MULTI): ICD-10-CM

## 2025-07-07 PROCEDURE — 3008F BODY MASS INDEX DOCD: CPT | Performed by: FAMILY MEDICINE

## 2025-07-07 PROCEDURE — 99214 OFFICE O/P EST MOD 30 MIN: CPT | Performed by: FAMILY MEDICINE

## 2025-07-07 PROCEDURE — G2211 COMPLEX E/M VISIT ADD ON: HCPCS | Performed by: FAMILY MEDICINE

## 2025-07-07 PROCEDURE — 3078F DIAST BP <80 MM HG: CPT | Performed by: FAMILY MEDICINE

## 2025-07-07 PROCEDURE — 3074F SYST BP LT 130 MM HG: CPT | Performed by: FAMILY MEDICINE

## 2025-07-07 PROCEDURE — 4010F ACE/ARB THERAPY RXD/TAKEN: CPT | Performed by: FAMILY MEDICINE

## 2025-07-07 PROCEDURE — 1159F MED LIST DOCD IN RCRD: CPT | Performed by: FAMILY MEDICINE

## 2025-07-07 PROCEDURE — 1036F TOBACCO NON-USER: CPT | Performed by: FAMILY MEDICINE

## 2025-07-07 RX ORDER — CANDESARTAN 16 MG/1
16 TABLET ORAL DAILY
Qty: 90 TABLET | Refills: 3 | Status: SHIPPED | OUTPATIENT
Start: 2025-07-07

## 2025-07-07 ASSESSMENT — ENCOUNTER SYMPTOMS
CLUMSINESS: 1
MEMORY LOSS: 1
OCCASIONAL FEELINGS OF UNSTEADINESS: 1
BACK PAIN: 1
DEPRESSION: 0
AURA: 0
BOWEL INCONTINENCE: 0
ABDOMINAL PAIN: 0
LOSS OF SENSATION IN FEET: 0
NEUROLOGIC COMPLAINT: 1
LOSS OF BALANCE: 1
VISUAL CHANGE: 1

## 2025-07-07 ASSESSMENT — ANXIETY QUESTIONNAIRES
IF YOU CHECKED OFF ANY PROBLEMS ON THIS QUESTIONNAIRE, HOW DIFFICULT HAVE THESE PROBLEMS MADE IT FOR YOU TO DO YOUR WORK, TAKE CARE OF THINGS AT HOME, OR GET ALONG WITH OTHER PEOPLE: NOT DIFFICULT AT ALL
6. BECOMING EASILY ANNOYED OR IRRITABLE: NOT AT ALL
5. BEING SO RESTLESS THAT IT IS HARD TO SIT STILL: NOT AT ALL
7. FEELING AFRAID AS IF SOMETHING AWFUL MIGHT HAPPEN: NOT AT ALL
2. NOT BEING ABLE TO STOP OR CONTROL WORRYING: NOT AT ALL
3. WORRYING TOO MUCH ABOUT DIFFERENT THINGS: NOT AT ALL
4. TROUBLE RELAXING: NOT AT ALL
1. FEELING NERVOUS, ANXIOUS, OR ON EDGE: NOT AT ALL
GAD7 TOTAL SCORE: 0

## 2025-07-07 NOTE — PROGRESS NOTES
Subjective   Patient ID: Anjum Hernandez is a 74 y.o. male who presents for Follow Up of Hypertension, Hypercholesterolemia and Hypothyroidism . I last saw the patient on 5/6/2025  .     Acute Neurological Problem  The patient's primary symptoms include clumsiness, a loss of balance, memory loss and a visual change. This is a chronic problem. The current episode started more than 1 year ago. The neurological problem developed gradually. The problem has been waxing and waning since onset. There was lower extremity focality noted. Associated symptoms include back pain. Pertinent negatives include no abdominal pain, aura, bladder incontinence or bowel incontinence. Past treatments include acetaminophen. The treatment provided mild relief.     Patient had a follow up appointment with neurologist in April, 2025 who referred him to a movement disorder specialist.    Patient would like to restart PT for his leg weakness and to improve his ambulation.    Past medical, surgical, and family history reviewed.  Reviewed and documented all medications   Pt eating well, exercising as tolerated and taking medications as directed.      Review of Systems   Gastrointestinal:  Negative for abdominal pain and bowel incontinence.   Genitourinary:  Negative for bladder incontinence.   Musculoskeletal:  Positive for back pain.   Neurological:  Positive for loss of balance.   Psychiatric/Behavioral:  Positive for memory loss.      Except positives as noted in the CC & HPI      Constitutional: Denies fevers, chills, night sweats, fatigue, weight changes, change in appetite    Eyes: Denies blurry vision, double vision    ENT: Denies otalgia, trouble hearing, tinnitus, vertigo, nasal congestion, rhinorrhea, sore throat    Neck: Denies swelling, masses    Cardiovascular: Denies chest pain, palpitations, edema, orthopnea, syncope    Respiratory: Denies dyspnea, cough, wheezing, postural nocturnal dyspnea    Gastrointestinal: Denies abdominal  pain, nausea, vomiting, diarrhea, constipation, melena, hematochezia    Genitourinary: Denies dysuria, hematuria  Musculoskeletal: Denies back pain, neck pain, arthralgias, myalgias    Integumentary: Denies skin lesions, rashes, masses    Neurological: Denies dizziness, headaches, confusion, limb weakness, paresthesias, syncope, convulsions    Psychiatric: Denies depression, anxiety, homicidal ideations, suicidal ideations, sleep disturbances    Endocrine: Denies polyphagia, polydipsia, polyuria, weakness, hair thinning, heat intolerance, cold intolerance, weight changes    Heme/Lymph: Denies easy bruising, easy bleeding, swollen glands    Objective   Visit Vitals  /64 (BP Location: Right arm, Patient Position: Sitting, BP Cuff Size: Large adult)   Pulse 70   Temp 36.2 °C (97.2 °F)   Resp 16   Ht 1.829 m (6')   Wt 122 kg (268 lb 9.6 oz)   SpO2 97%   BMI 36.43 kg/m²   Smoking Status Former   BSA 2.49 m²       Physical Exam    General Appearance - well-developed, well-nourished, 74 y.o., White male in no acute distress. Patient is ambulating with a cane today.     Skin - warm, pink and dry without rash or concerning lesions. Dryness of the skin involving the LEs.      Mental Status - alert and oriented x 3. Normal mood and affect appropriate to mood.     Neck - supple without lymphadenopathy. Carotid pulses are normal without bruits. Thyroid is normal in midline without nodules.      Chest - lungs are clear to auscultation without rales, rhonchi or wheezes. Pacemaker in the left upper chest. Subcutaneous cyst of 1 cm in the left lower chest.     Heart - regular, rate and rhythm without murmurs, rubs or gallops.     Abdomen - soft, obese, protuberant, nontender, nondistended. No masses, hepatomegaly or splenomegaly is noted. No rebound, rigidity or guarding is noted. Bowel sounds are normoactive.       Extremities - no cyanosis, clubbing or edema. Pedal pulses are 2+ normal at the dorsalis pedis and posterior  pulses bilaterally.      Neurological - cranial nerves II through XII grossly intact. Motor strength 5/5 at all fours bilaterally.    Assessment   1. Movement disorder  Referral to Physical Therapy      2. Type 2 diabetes mellitus with chronic kidney disease, without long-term current use of insulin, unspecified CKD stage (Multi)  Hemoglobin A1c    Hemoglobin A1c      3. Benign essential hypertension  candesartan (Atacand) 16 mg tablet      4. COPD, mild (Multi)        5. Acquired hypothyroidism  Tsh With Reflex To Free T4 If Abnormal    Tsh With Reflex To Free T4 If Abnormal      6. Physical deconditioning  Referral to Physical Therapy      7. Weakness of both lower extremities  Referral to Physical Therapy      8. Inflammatory bowel diseases (IBD)  Vitamin D 25-Hydroxy,Total (for eval of Vitamin D levels)    Vitamin D 25-Hydroxy,Total (for eval of Vitamin D levels)      9. Vitamin D deficiency  Vitamin D 25-Hydroxy,Total (for eval of Vitamin D levels)    Vitamin D 25-Hydroxy,Total (for eval of Vitamin D levels)      10. SSS (sick sinus syndrome) (Multi)        11. Atrioventricular block, complete (Multi)            Patient to continue current medications (with any exceptions as noted) and diet. Follow-up in 3 month(s) otherwise as needed.      Will obtain Hemoglobin A1C, TSH with reflex free T4 if abnormal, Vitamin D 25-Hydroxy,Total (for eval of Vitamin D levels) prior to patient's next appointment. Will call patient with results when available.      Patient was given refill(s) on:   Candesartan (Atacand) 16 mg tablet, TAKE 1 TABLET (16 MG) BY MOUTH ONCE DAILY.     Rx(s) sent to pharmacy.      Refer patient to physical therapy for further management of Weakness of both lower extremities.     Patient has to follow up with movement disorder neurologist as well to evaluate for parkinson's disease.    Patient is to follow up with cardiology and pulmonology as scheduled.     Scribe Attestation  By signing my name  below, I, Julia Schilling Scribe   attest that this documentation has been prepared under the direction and in the presence of Magali Lindsey MD.      This note has been transcribed using a medical scribe and there is a possibility of unintentional typing misprints.     All medical record entries made by the scribe were at my direction and personally dictated by me. I have reviewed the chart and agree that the record accurately reflects my personal performance of the history, physical exam, discussion, and plan.     MAGALI LINDSEY M.D.

## 2025-07-08 NOTE — ASSESSMENT & PLAN NOTE
Sick sinus syndrome is stable.  Patient to continue with current medications and treatment plan.  Follow-up at least annually.  Follow-up with cardiology as scheduled.

## 2025-07-08 NOTE — ASSESSMENT & PLAN NOTE
Atrioventricular block, complete is stable.  Patient to continue with current medications and treatment plan.  Follow-up at least annually.  Follow-up with cardiology as scheduled.

## 2025-07-18 LAB
25(OH)D3+25(OH)D2 SERPL-MCNC: 40 NG/ML (ref 30–100)
EST. AVERAGE GLUCOSE BLD GHB EST-MCNC: 126 MG/DL
EST. AVERAGE GLUCOSE BLD GHB EST-SCNC: 7 MMOL/L
HBA1C MFR BLD: 6 %
TSH SERPL-ACNC: 2.02 MIU/L (ref 0.4–4.5)

## 2025-07-21 DIAGNOSIS — I10 BENIGN ESSENTIAL HYPERTENSION: ICD-10-CM

## 2025-07-21 RX ORDER — LOSARTAN POTASSIUM 100 MG/1
100 TABLET ORAL DAILY
Qty: 100 TABLET | Refills: 3 | OUTPATIENT
Start: 2025-07-21 | End: 2026-08-25

## 2025-07-21 NOTE — TELEPHONE ENCOUNTER
Rx Refill Request Telephone Encounter    Name:  Anjum Hernandez  :  [id number]  Medication Name:  losartan (Cozaar) 100 mg tablet    PT ASKED IF DR. SAHU COULD TAKE OVER THIS RX. IT WAS PRESCRIBED TO HIM LAST YEAR WHEN HE WAS IN THE HOSPITAL. PLEASE ADVISE.    Specific Pharmacy location: [facility]  Date of last appointment:  25  Date of next appointment:  10/8/25  Best number to reach patient:  [phone]

## 2025-07-31 DIAGNOSIS — E11.22 TYPE 2 DIABETES MELLITUS WITH CHRONIC KIDNEY DISEASE, WITHOUT LONG-TERM CURRENT USE OF INSULIN, UNSPECIFIED CKD STAGE (MULTI): ICD-10-CM

## 2025-08-01 RX ORDER — SEMAGLUTIDE 0.5 MG/.5ML
0.5 INJECTION, SOLUTION SUBCUTANEOUS WEEKLY
Qty: 6 ML | Refills: 0 | Status: SHIPPED | OUTPATIENT
Start: 2025-08-01 | End: 2025-10-18

## 2025-08-01 NOTE — TELEPHONE ENCOUNTER
Rx Refill Request Telephone Encounter    Name:  Anjum Hernandez  :  [id number]    Specific Pharmacy location:  [facility] pharmacy in [place]   Date of last appointment:  25  Date of next appointment:  10/8/25

## 2025-08-12 ENCOUNTER — OFFICE VISIT (OUTPATIENT)
Dept: ORTHOPEDIC SURGERY | Facility: CLINIC | Age: 75
End: 2025-08-12
Payer: MEDICARE

## 2025-08-12 DIAGNOSIS — Z96.659 CHRONIC KNEE PAIN AFTER TOTAL REPLACEMENT OF KNEE JOINT: Primary | ICD-10-CM

## 2025-08-12 DIAGNOSIS — G89.29 CHRONIC KNEE PAIN AFTER TOTAL REPLACEMENT OF KNEE JOINT: Primary | ICD-10-CM

## 2025-08-12 DIAGNOSIS — M25.569 CHRONIC KNEE PAIN AFTER TOTAL REPLACEMENT OF KNEE JOINT: Primary | ICD-10-CM

## 2025-08-12 PROCEDURE — 99212 OFFICE O/P EST SF 10 MIN: CPT | Performed by: ORTHOPAEDIC SURGERY

## 2025-08-12 PROCEDURE — 4010F ACE/ARB THERAPY RXD/TAKEN: CPT | Performed by: ORTHOPAEDIC SURGERY

## 2025-08-12 PROCEDURE — 99213 OFFICE O/P EST LOW 20 MIN: CPT | Performed by: ORTHOPAEDIC SURGERY

## 2025-08-25 ENCOUNTER — OFFICE VISIT (OUTPATIENT)
Dept: FAMILY MEDICINE CLINIC | Age: 75
End: 2025-08-25
Payer: MEDICARE

## 2025-08-25 VITALS
OXYGEN SATURATION: 97 % | TEMPERATURE: 97.5 F | HEIGHT: 72 IN | SYSTOLIC BLOOD PRESSURE: 122 MMHG | BODY MASS INDEX: 36.16 KG/M2 | HEART RATE: 63 BPM | WEIGHT: 267 LBS | DIASTOLIC BLOOD PRESSURE: 74 MMHG

## 2025-08-25 DIAGNOSIS — L02.91 ABSCESS: Primary | ICD-10-CM

## 2025-08-25 PROCEDURE — 1159F MED LIST DOCD IN RCRD: CPT | Performed by: NURSE PRACTITIONER

## 2025-08-25 PROCEDURE — 1036F TOBACCO NON-USER: CPT | Performed by: NURSE PRACTITIONER

## 2025-08-25 PROCEDURE — 3017F COLORECTAL CA SCREEN DOC REV: CPT | Performed by: NURSE PRACTITIONER

## 2025-08-25 PROCEDURE — G8417 CALC BMI ABV UP PARAM F/U: HCPCS | Performed by: NURSE PRACTITIONER

## 2025-08-25 PROCEDURE — 3078F DIAST BP <80 MM HG: CPT | Performed by: NURSE PRACTITIONER

## 2025-08-25 PROCEDURE — 3074F SYST BP LT 130 MM HG: CPT | Performed by: NURSE PRACTITIONER

## 2025-08-25 PROCEDURE — G8427 DOCREV CUR MEDS BY ELIG CLIN: HCPCS | Performed by: NURSE PRACTITIONER

## 2025-08-25 PROCEDURE — 99203 OFFICE O/P NEW LOW 30 MIN: CPT | Performed by: NURSE PRACTITIONER

## 2025-08-25 PROCEDURE — 1123F ACP DISCUSS/DSCN MKR DOCD: CPT | Performed by: NURSE PRACTITIONER

## 2025-08-25 RX ORDER — CARBIDOPA AND LEVODOPA 25; 100 MG/1; MG/1
1 TABLET ORAL 3 TIMES DAILY
COMMUNITY
Start: 2025-07-23 | End: 2026-07-23

## 2025-08-25 RX ORDER — SULFAMETHOXAZOLE AND TRIMETHOPRIM 800; 160 MG/1; MG/1
1 TABLET ORAL 2 TIMES DAILY
Qty: 20 TABLET | Refills: 0 | Status: SHIPPED | OUTPATIENT
Start: 2025-08-25 | End: 2025-09-04

## 2025-08-25 RX ORDER — CANDESARTAN 16 MG/1
TABLET ORAL
COMMUNITY
Start: 2025-07-20

## 2025-08-25 SDOH — ECONOMIC STABILITY: FOOD INSECURITY: WITHIN THE PAST 12 MONTHS, THE FOOD YOU BOUGHT JUST DIDN'T LAST AND YOU DIDN'T HAVE MONEY TO GET MORE.: NEVER TRUE

## 2025-08-25 SDOH — ECONOMIC STABILITY: FOOD INSECURITY: WITHIN THE PAST 12 MONTHS, YOU WORRIED THAT YOUR FOOD WOULD RUN OUT BEFORE YOU GOT MONEY TO BUY MORE.: NEVER TRUE

## 2025-08-25 ASSESSMENT — PATIENT HEALTH QUESTIONNAIRE - PHQ9
1. LITTLE INTEREST OR PLEASURE IN DOING THINGS: NOT AT ALL
6. FEELING BAD ABOUT YOURSELF - OR THAT YOU ARE A FAILURE OR HAVE LET YOURSELF OR YOUR FAMILY DOWN: NOT AT ALL
5. POOR APPETITE OR OVEREATING: NOT AT ALL
7. TROUBLE CONCENTRATING ON THINGS, SUCH AS READING THE NEWSPAPER OR WATCHING TELEVISION: SEVERAL DAYS
3. TROUBLE FALLING OR STAYING ASLEEP: NOT AT ALL
SUM OF ALL RESPONSES TO PHQ QUESTIONS 1-9: 1
8. MOVING OR SPEAKING SO SLOWLY THAT OTHER PEOPLE COULD HAVE NOTICED. OR THE OPPOSITE, BEING SO FIGETY OR RESTLESS THAT YOU HAVE BEEN MOVING AROUND A LOT MORE THAN USUAL: NOT AT ALL
2. FEELING DOWN, DEPRESSED OR HOPELESS: NOT AT ALL
9. THOUGHTS THAT YOU WOULD BE BETTER OFF DEAD, OR OF HURTING YOURSELF: NOT AT ALL
SUM OF ALL RESPONSES TO PHQ QUESTIONS 1-9: 1
10. IF YOU CHECKED OFF ANY PROBLEMS, HOW DIFFICULT HAVE THESE PROBLEMS MADE IT FOR YOU TO DO YOUR WORK, TAKE CARE OF THINGS AT HOME, OR GET ALONG WITH OTHER PEOPLE: NOT DIFFICULT AT ALL
SUM OF ALL RESPONSES TO PHQ QUESTIONS 1-9: 1
4. FEELING TIRED OR HAVING LITTLE ENERGY: NOT AT ALL
SUM OF ALL RESPONSES TO PHQ QUESTIONS 1-9: 1

## 2025-08-25 ASSESSMENT — ENCOUNTER SYMPTOMS
FACIAL SWELLING: 0
SORE THROAT: 0
TROUBLE SWALLOWING: 0
COUGH: 0
WHEEZING: 0
CHEST TIGHTNESS: 0
SHORTNESS OF BREATH: 0

## 2025-08-26 ENCOUNTER — TELEPHONE (OUTPATIENT)
Dept: INTERNAL MEDICINE | Age: 75
End: 2025-08-26

## 2025-08-26 ENCOUNTER — HOSPITAL ENCOUNTER (INPATIENT)
Facility: HOSPITAL | Age: 75
LOS: 1 days | Discharge: HOME HEALTH | DRG: 606 | End: 2025-08-28
Attending: STUDENT IN AN ORGANIZED HEALTH CARE EDUCATION/TRAINING PROGRAM | Admitting: STUDENT IN AN ORGANIZED HEALTH CARE EDUCATION/TRAINING PROGRAM
Payer: MEDICARE

## 2025-08-26 DIAGNOSIS — L02.91 ABSCESS: Primary | ICD-10-CM

## 2025-08-26 DIAGNOSIS — L02.91 ABSCESS: ICD-10-CM

## 2025-08-26 ASSESSMENT — PAIN - FUNCTIONAL ASSESSMENT
PAIN_FUNCTIONAL_ASSESSMENT: 0-10
PAIN_FUNCTIONAL_ASSESSMENT: 0-10

## 2025-08-26 ASSESSMENT — PAIN SCALES - GENERAL
PAINLEVEL_OUTOF10: 0 - NO PAIN

## 2025-08-26 ASSESSMENT — PAIN DESCRIPTION - PROGRESSION: CLINICAL_PROGRESSION: NOT CHANGED

## 2025-08-27 ENCOUNTER — TELEPHONE (OUTPATIENT)
Dept: INTERNAL MEDICINE | Age: 75
End: 2025-08-27

## 2025-08-27 ENCOUNTER — APPOINTMENT (OUTPATIENT)
Dept: RADIOLOGY | Facility: HOSPITAL | Age: 75
DRG: 606 | End: 2025-08-27
Payer: MEDICARE

## 2025-08-27 PROBLEM — Z79.01 CURRENT USE OF LONG TERM ANTICOAGULATION: Status: ACTIVE | Noted: 2025-08-27

## 2025-08-27 PROBLEM — J44.1 COPD EXACERBATION (MULTI): Status: ACTIVE | Noted: 2025-08-27

## 2025-08-27 PROBLEM — N17.9 AKI (ACUTE KIDNEY INJURY): Status: ACTIVE | Noted: 2025-08-27

## 2025-08-27 PROBLEM — R50.9 FEVER: Status: ACTIVE | Noted: 2025-08-27

## 2025-08-27 PROBLEM — L02.212 ABSCESS OF BACK: Status: ACTIVE | Noted: 2025-08-27

## 2025-08-27 PROBLEM — J96.01 ACUTE HYPOXIC RESPIRATORY FAILURE: Status: ACTIVE | Noted: 2025-08-27

## 2025-08-27 PROBLEM — E78.2 MIXED HYPERLIPIDEMIA: Status: ACTIVE | Noted: 2025-08-27

## 2025-08-27 SDOH — ECONOMIC STABILITY: FOOD INSECURITY: WITHIN THE PAST 12 MONTHS, THE FOOD YOU BOUGHT JUST DIDN'T LAST AND YOU DIDN'T HAVE MONEY TO GET MORE.: NEVER TRUE

## 2025-08-27 SDOH — ECONOMIC STABILITY: FOOD INSECURITY: WITHIN THE PAST 12 MONTHS, YOU WORRIED THAT YOUR FOOD WOULD RUN OUT BEFORE YOU GOT THE MONEY TO BUY MORE.: NEVER TRUE

## 2025-08-27 SDOH — SOCIAL STABILITY: SOCIAL INSECURITY
WITHIN THE LAST YEAR, HAVE YOU BEEN RAPED OR FORCED TO HAVE ANY KIND OF SEXUAL ACTIVITY BY YOUR PARTNER OR EX-PARTNER?: NO

## 2025-08-27 SDOH — ECONOMIC STABILITY: INCOME INSECURITY: IN THE PAST 12 MONTHS HAS THE ELECTRIC, GAS, OIL, OR WATER COMPANY THREATENED TO SHUT OFF SERVICES IN YOUR HOME?: NO

## 2025-08-27 SDOH — SOCIAL STABILITY: SOCIAL INSECURITY: WITHIN THE LAST YEAR, HAVE YOU BEEN AFRAID OF YOUR PARTNER OR EX-PARTNER?: NO

## 2025-08-27 SDOH — SOCIAL STABILITY: SOCIAL INSECURITY
WITHIN THE LAST YEAR, HAVE YOU BEEN KICKED, HIT, SLAPPED, OR OTHERWISE PHYSICALLY HURT BY YOUR PARTNER OR EX-PARTNER?: NO

## 2025-08-27 SDOH — SOCIAL STABILITY: SOCIAL INSECURITY: WERE YOU ABLE TO COMPLETE ALL THE BEHAVIORAL HEALTH SCREENINGS?: YES

## 2025-08-27 SDOH — SOCIAL STABILITY: SOCIAL INSECURITY: WITHIN THE LAST YEAR, HAVE YOU BEEN HUMILIATED OR EMOTIONALLY ABUSED IN OTHER WAYS BY YOUR PARTNER OR EX-PARTNER?: NO

## 2025-08-27 SDOH — SOCIAL STABILITY: SOCIAL INSECURITY: HAVE YOU HAD THOUGHTS OF HARMING ANYONE ELSE?: NO

## 2025-08-27 ASSESSMENT — COGNITIVE AND FUNCTIONAL STATUS - GENERAL
WALKING IN HOSPITAL ROOM: A LITTLE
MOBILITY SCORE: 22
MOBILITY SCORE: 22
DAILY ACTIVITIY SCORE: 23
WALKING IN HOSPITAL ROOM: A LITTLE
PATIENT BASELINE BEDBOUND: NO
CLIMB 3 TO 5 STEPS WITH RAILING: A LITTLE
TOILETING: A LITTLE
CLIMB 3 TO 5 STEPS WITH RAILING: A LITTLE
DAILY ACTIVITIY SCORE: 23
TOILETING: A LITTLE

## 2025-08-27 ASSESSMENT — ACTIVITIES OF DAILY LIVING (ADL)
JUDGMENT_ADEQUATE_SAFELY_COMPLETE_DAILY_ACTIVITIES: YES
GROOMING: INDEPENDENT
HEARING - RIGHT EAR: HEARING AID
ASSISTIVE_DEVICE: WALKER;HEARING AID - LEFT;HEARING AID - RIGHT
TOILETING: INDEPENDENT
DRESSING YOURSELF: INDEPENDENT
BATHING: INDEPENDENT
FEEDING YOURSELF: INDEPENDENT
HEARING - LEFT EAR: HEARING AID
LACK_OF_TRANSPORTATION: NO
WALKS IN HOME: INDEPENDENT
PATIENT'S MEMORY ADEQUATE TO SAFELY COMPLETE DAILY ACTIVITIES?: YES
ADEQUATE_TO_COMPLETE_ADL: YES

## 2025-08-27 ASSESSMENT — LIFESTYLE VARIABLES
HOW OFTEN DO YOU HAVE 6 OR MORE DRINKS ON ONE OCCASION: NEVER
AUDIT-C TOTAL SCORE: 1
SKIP TO QUESTIONS 9-10: 1
AUDIT-C TOTAL SCORE: 1
HOW OFTEN DO YOU HAVE A DRINK CONTAINING ALCOHOL: MONTHLY OR LESS
HOW MANY STANDARD DRINKS CONTAINING ALCOHOL DO YOU HAVE ON A TYPICAL DAY: 1 OR 2

## 2025-08-27 ASSESSMENT — PAIN SCALES - GENERAL: PAINLEVEL_OUTOF10: 0 - NO PAIN

## 2025-08-27 ASSESSMENT — PAIN - FUNCTIONAL ASSESSMENT: PAIN_FUNCTIONAL_ASSESSMENT: 0-10

## 2025-08-28 ENCOUNTER — DOCUMENTATION (OUTPATIENT)
Dept: HOME HEALTH SERVICES | Facility: HOME HEALTH | Age: 75
End: 2025-08-28
Payer: MEDICARE

## 2025-08-28 ENCOUNTER — HOME HEALTH ADMISSION (OUTPATIENT)
Dept: HOME HEALTH SERVICES | Facility: HOME HEALTH | Age: 75
End: 2025-08-28
Payer: MEDICARE

## 2025-08-28 LAB — BACTERIA SPEC ANAEROBE+AEROBE CULT: NORMAL

## 2025-08-28 ASSESSMENT — COGNITIVE AND FUNCTIONAL STATUS - GENERAL
DAILY ACTIVITIY SCORE: 22
WALKING IN HOSPITAL ROOM: A LITTLE
CLIMB 3 TO 5 STEPS WITH RAILING: A LITTLE
HELP NEEDED FOR BATHING: A LITTLE
DRESSING REGULAR LOWER BODY CLOTHING: A LITTLE
DAILY ACTIVITIY SCORE: 23
MOBILITY SCORE: 24
TOILETING: A LITTLE
MOBILITY SCORE: 22

## 2025-08-28 ASSESSMENT — PAIN - FUNCTIONAL ASSESSMENT
PAIN_FUNCTIONAL_ASSESSMENT: 0-10
PAIN_FUNCTIONAL_ASSESSMENT: 0-10

## 2025-08-28 ASSESSMENT — PAIN SCALES - GENERAL
PAINLEVEL_OUTOF10: 0 - NO PAIN
PAINLEVEL_OUTOF10: 0 - NO PAIN

## 2025-08-28 ASSESSMENT — ACTIVITIES OF DAILY LIVING (ADL): BATHING_ASSISTANCE: STAND BY

## 2025-08-29 ENCOUNTER — PATIENT OUTREACH (OUTPATIENT)
Dept: PRIMARY CARE | Facility: CLINIC | Age: 75
End: 2025-08-29
Payer: MEDICARE

## 2025-08-30 ENCOUNTER — HOME CARE VISIT (OUTPATIENT)
Dept: HOME HEALTH SERVICES | Facility: HOME HEALTH | Age: 75
End: 2025-08-30
Payer: MEDICARE

## 2025-08-30 ASSESSMENT — ENCOUNTER SYMPTOMS
ANGER WITHIN DEFINED LIMITS: 1
HIGHEST PAIN SEVERITY IN PAST 24 HOURS: 9/10
APPETITE LEVEL: FAIR
PAIN LOCATION: BACK
LIMITED RANGE OF MOTION: 1
PAIN: 1
PAIN LOCATION - PAIN QUALITY: STABBING
SLEEP QUALITY: ADEQUATE
SUBJECTIVE PAIN PROGRESSION: WAXING AND WANING
FATIGUES EASILY: 1
LAST BOWEL MOVEMENT: 67446
AGGRESSION WITHIN DEFINED LIMITS: 1
PERSON REPORTING PAIN: PATIENT
DESCRIPTION OF MEMORY LOSS: SHORT TERM
BOWEL PATTERN NORMAL: 1
MUSCLE WEAKNESS: 1
ARTHRALGIAS: 1
CHANGE IN APPETITE: UNCHANGED
DYSPNEA ON EXERTION: 1
PAIN LOCATION - PAIN SEVERITY: 9/10
STOOL FREQUENCY: DAILY
LOWEST PAIN SEVERITY IN PAST 24 HOURS: 5/10

## 2025-08-30 ASSESSMENT — ACTIVITIES OF DAILY LIVING (ADL)
CURRENT_FUNCTION: STAND BY ASSIST
OASIS_M1830: 03
ENTERING_EXITING_HOME: STAND BY ASSIST
AMBULATION ASSISTANCE: STAND BY ASSIST

## 2025-09-01 ENCOUNTER — HOME CARE VISIT (OUTPATIENT)
Dept: HOME HEALTH SERVICES | Facility: HOME HEALTH | Age: 75
End: 2025-09-01
Payer: MEDICARE

## 2025-09-02 ENCOUNTER — HOME CARE VISIT (OUTPATIENT)
Dept: HOME HEALTH SERVICES | Facility: HOME HEALTH | Age: 75
End: 2025-09-02
Payer: MEDICARE

## 2025-09-02 VITALS
SYSTOLIC BLOOD PRESSURE: 140 MMHG | DIASTOLIC BLOOD PRESSURE: 76 MMHG | TEMPERATURE: 97.4 F | RESPIRATION RATE: 14 BRPM | OXYGEN SATURATION: 94 % | HEART RATE: 59 BPM

## 2025-09-02 PROCEDURE — G0152 HHCP-SERV OF OT,EA 15 MIN: HCPCS | Mod: HHH

## 2025-09-02 PROCEDURE — G0151 HHCP-SERV OF PT,EA 15 MIN: HCPCS | Mod: HHH

## 2025-09-02 SDOH — ECONOMIC STABILITY: HOUSING INSECURITY
HOME SAFETY: HELD URINAL IF NEEDED. PATIENT EXHIBITING INCREASED WEAKNESS AND FATIGUE WITH ADLS, TRANSFERS AND MOBILITY. EDUCATED PATIENT IN SAFETY WITH FUNCTIONAL TRANSFERS, PROPER BODY MECHANICS AND POSTURE WITH USE OF ROLLATOR FOR TRANSFERS AND MOBILITY, HOME

## 2025-09-02 SDOH — ECONOMIC STABILITY: HOUSING INSECURITY
HOME SAFETY: LIVES ALONE, 2SH, 4 STE WITH HANDRAIL, 1ST FLOOR SET UP AND PATIENT ACCESSES THE BASEMENT, INDEP ADLS, INDEP LIGHT MEAL PREP/WARMING MEALS. PATIENT USES ROLLATOR FOR TRANSFERS AND MOBILITY.  PATIENT HAS HAD HHA 2 DAYS A WEEK TO ASSIST WITH MEAL PREP,

## 2025-09-02 SDOH — HEALTH STABILITY: PHYSICAL HEALTH: PHYSICAL EXERCISE: SEATED

## 2025-09-02 SDOH — HEALTH STABILITY: PHYSICAL HEALTH: EXERCISE ACTIVITIES SETS: 1

## 2025-09-02 SDOH — HEALTH STABILITY: PHYSICAL HEALTH: PHYSICAL EXERCISE: 15

## 2025-09-02 SDOH — HEALTH STABILITY: PHYSICAL HEALTH: EXERCISE ACTIVITY: MARCHING

## 2025-09-02 SDOH — ECONOMIC STABILITY: HOUSING INSECURITY
HOME SAFETY: SAFETY AND FALL PREVENTION, UE THER EX, MEDIATION MANAGMENT AND REVIEW, ENERGY CONSERVATION. OT TO TX 2W4 TO ADDRESS ADLS, TRANSFERS, FUNCTIONAL MOBILITY AND STRENGTHENING.

## 2025-09-02 SDOH — HEALTH STABILITY: PHYSICAL HEALTH: EXERCISE ACTIVITY: LAQ

## 2025-09-02 SDOH — ECONOMIC STABILITY: HOUSING INSECURITY
HOME SAFETY: 74 YO MALE WHO WAS BEING TREATED OUTPATIENT FOR INFECTION/ABCESS OF CYST ON HIS BACK. PATIENT THEN PRESENTED  TO ED FOR COPD EXACERBATION. PRIOR TO HOSPITALIZATION PATIENT WAS GOING TO OUTPATIENT REHAB FOR STRENGTHENING AND MOBILITY.   PLOF: PATIENT

## 2025-09-02 SDOH — ECONOMIC STABILITY: HOUSING INSECURITY
HOME SAFETY: LAUNDRY. PATIENT ALSO HAS OUTSIDE ASSISTANCE FOR CLEANING MONTHLY.   PATIENT STATES HE HAS AN INCREASED FEAR OF FALLING SINCE RETURNING HOME AS HE HAS EXPERIENCED SOME UNSTEADINESS WHEN GETTING UP AT NIGHT TO USE THE BATHROOM. PATIENT WILL USE HAND

## 2025-09-02 SDOH — HEALTH STABILITY: PHYSICAL HEALTH: EXERCISE TYPE: LE EXERCISES

## 2025-09-02 SDOH — HEALTH STABILITY: PHYSICAL HEALTH: EXERCISE ACTIVITY: ANKLE DF/PF

## 2025-09-02 ASSESSMENT — GAIT ASSESSMENTS
STEP SYMMETRY: 0 - RIGHT AND LEFT STEP LENGTH NOT EQUAL
TRUNK SCORE: 0
TRUNK: 0 - MARKED SWAY OR USES WALKING AID
STEP CONTINUITY: 0 - STOPPING OR DISCONTINUITY BETWEEN STEPS
GAIT SCORE: 6
PATH SCORE: 0
PATH: 0 - MARKED DEVIATION
INITIATION OF GAIT IMMEDIATELY AFTER GO: 1 - NO HESITANCY
BALANCE AND GAIT SCORE: 13
WALKING STANCE: 1 - HEELS ALMOST TOUCHING WHILE WALKING

## 2025-09-02 ASSESSMENT — BALANCE ASSESSMENTS
SITTING DOWN: 1 - USES ARMS OR NOT SMOOTH MOTION
ATTEMPTS TO ARISE: 2 - ABLE TO RISE, ONE ATTEMPT
STANDING BALANCE: 1 - STEADY BUT WIDE STANCE AND USES CANE OR OTHER SUPPORT
NUDGED SCORE: 0
IMMEDIATE STANDING BALANCE FIRST 5 SECONDS: 1 - STEADY BUT USES WALKER OR OTHER SUPPORT
ARISING SCORE: 1
NUDGED: 0 - BEGINS TO FALL
ARISES: 1 - ABLE, USES ARMS TO HELP
SITTING BALANCE: 1 - STEADY, SAFE
EYES CLOSED AT MAXIMUM POSITION NUDGED: 0 - UNSTEADY
TURNING 360 DEGREES STEPS: 0 - DISCONTINUOUS STEPS
BALANCE SCORE: 7

## 2025-09-02 ASSESSMENT — ENCOUNTER SYMPTOMS
LOWEST PAIN SEVERITY IN PAST 24 HOURS: 1/10
PAIN LOCATION - PAIN DURATION: 2 WEEKS
PAIN SEVERITY GOAL: 0/10
PERSON REPORTING PAIN: PATIENT
PAIN LOCATION - RELIEVING FACTORS: TYLENOL
PERSON REPORTING PAIN: PATIENT
PAIN LOCATION - PAIN QUALITY: ACHING
PAIN LOCATION - PAIN SEVERITY: 1/10
OCCASIONAL FEELINGS OF UNSTEADINESS: 1
PAIN LOCATION - RELIEVING FACTORS: TYLENOL
PAIN LOCATION - PAIN FREQUENCY: CONSTANT
DENIES PAIN: 1
MUSCLE WEAKNESS: 1
HIGHEST PAIN SEVERITY IN PAST 24 HOURS: 1/10
PAIN LOCATION - PAIN DURATION: 2 WEEKS
PAIN LOCATION - PAIN FREQUENCY: CONSTANT
PAIN LOCATION - PAIN QUALITY: ACHING
PAIN: 1
PAIN LOCATION: LEFT KNEE
PAIN LOCATION: RIGHT KNEE
PAIN LOCATION - PAIN SEVERITY: 1/10
SUBJECTIVE PAIN PROGRESSION: UNCHANGED

## 2025-09-02 ASSESSMENT — ACTIVITIES OF DAILY LIVING (ADL)
DRESSING_UB_CURRENT_FUNCTION: SUPERVISION
AMBULATION ASSISTANCE ON FLAT SURFACES: 1
DRESSING_LB_CURRENT_FUNCTION: SUPERVISION
TOILETING: CONTACT GUARD ASSIST
GROOMING_CURRENT_FUNCTION: SUPERVISION
AMBULATION ASSISTANCE: 1
AMBULATION ASSISTANCE: STAND BY ASSIST
GROOMING ASSESSED: 1
PHYSICAL TRANSFERS ASSESSED: 1
CURRENT_FUNCTION: STAND BY ASSIST
AMBULATION ASSISTANCE: SUPERVISION
AMBULATION_DISTANCE/DURATION_TOLERATED: 50 FT
CURRENT_FUNCTION: CONTACT GUARD ASSIST
AMBULATION ASSISTANCE: CONTACT GUARD ASSIST
TOILETING: 1
CURRENT_FUNCTION: SUPERVISION

## 2025-09-03 ENCOUNTER — HOME CARE VISIT (OUTPATIENT)
Dept: HOME HEALTH SERVICES | Facility: HOME HEALTH | Age: 75
End: 2025-09-03
Payer: MEDICARE

## 2025-09-03 VITALS
OXYGEN SATURATION: 92 % | TEMPERATURE: 97.5 F | DIASTOLIC BLOOD PRESSURE: 63 MMHG | RESPIRATION RATE: 16 BRPM | HEART RATE: 57 BPM | SYSTOLIC BLOOD PRESSURE: 101 MMHG

## 2025-09-03 PROCEDURE — G0299 HHS/HOSPICE OF RN EA 15 MIN: HCPCS | Mod: HHH

## 2025-09-03 ASSESSMENT — ENCOUNTER SYMPTOMS
PAIN LOCATION - EXACERBATING FACTORS: WEIGHT BEARING
APPETITE LEVEL: FAIR
CHANGE IN APPETITE: UNCHANGED
PERSON REPORTING PAIN: PATIENT
BOWEL PATTERN NORMAL: 1
PAIN LOCATION - PAIN SEVERITY: 8/10
PAIN LOCATION - PAIN QUALITY: ACHING
PAIN LOCATION: RIGHT KNEE
PAIN LOCATION: LEFT KNEE
DENIES PAIN: 1
ARTHRALGIAS: 1
HIGHEST PAIN SEVERITY IN PAST 24 HOURS: 8/10
PAIN LOCATION - PAIN QUALITY: ACHING
PAIN LOCATION - EXACERBATING FACTORS: WEIGHT BEARING
STOOL FREQUENCY: DAILY
LIMITED RANGE OF MOTION: 1
PAIN LOCATION - PAIN SEVERITY: 8/10
LAST BOWEL MOVEMENT: 67450
MUSCLE WEAKNESS: 1

## 2025-09-03 ASSESSMENT — ACTIVITIES OF DAILY LIVING (ADL)
CURRENT_FUNCTION: STAND BY ASSIST
AMBULATION ASSISTANCE: STAND BY ASSIST

## 2025-09-05 ENCOUNTER — HOME CARE VISIT (OUTPATIENT)
Dept: HOME HEALTH SERVICES | Facility: HOME HEALTH | Age: 75
End: 2025-09-05
Payer: MEDICARE

## 2025-09-05 ENCOUNTER — HOSPITAL ENCOUNTER (INPATIENT)
Facility: HOSPITAL | Age: 75
End: 2025-09-05
Attending: GENERAL PRACTICE | Admitting: STUDENT IN AN ORGANIZED HEALTH CARE EDUCATION/TRAINING PROGRAM
Payer: MEDICARE

## 2025-09-05 VITALS
DIASTOLIC BLOOD PRESSURE: 66 MMHG | OXYGEN SATURATION: 94 % | SYSTOLIC BLOOD PRESSURE: 100 MMHG | TEMPERATURE: 98.3 F | RESPIRATION RATE: 18 BRPM | HEART RATE: 70 BPM

## 2025-09-05 DIAGNOSIS — J18.1 LUNG CONSOLIDATION: ICD-10-CM

## 2025-09-05 DIAGNOSIS — N28.9 ACUTE ON CHRONIC RENAL INSUFFICIENCY: ICD-10-CM

## 2025-09-05 DIAGNOSIS — N18.9 ACUTE ON CHRONIC RENAL INSUFFICIENCY: ICD-10-CM

## 2025-09-05 DIAGNOSIS — R53.1 GENERALIZED WEAKNESS: Primary | ICD-10-CM

## 2025-09-05 LAB
ALBUMIN SERPL BCP-MCNC: 4.1 G/DL (ref 3.4–5)
ALP SERPL-CCNC: 58 U/L (ref 33–136)
ALT SERPL W P-5'-P-CCNC: 13 U/L (ref 10–52)
ANION GAP SERPL CALC-SCNC: 11 MMOL/L (ref 10–20)
APPEARANCE UR: CLEAR
AST SERPL W P-5'-P-CCNC: 26 U/L (ref 9–39)
ATRIAL RATE: 250 BPM
ATRIAL RATE: 65 BPM
BASOPHILS # BLD MANUAL: 0 X10*3/UL (ref 0–0.1)
BASOPHILS NFR BLD MANUAL: 0 %
BILIRUB SERPL-MCNC: 0.4 MG/DL (ref 0–1.2)
BILIRUB UR STRIP.AUTO-MCNC: NEGATIVE MG/DL
BNP SERPL-MCNC: 42 PG/ML (ref 0–99)
BUN SERPL-MCNC: 22 MG/DL (ref 6–23)
BURR CELLS BLD QL SMEAR: ABNORMAL
CALCIUM SERPL-MCNC: 9.8 MG/DL (ref 8.6–10.3)
CARDIAC TROPONIN I PNL SERPL HS: 3 NG/L (ref 0–20)
CARDIAC TROPONIN I PNL SERPL HS: 4 NG/L (ref 0–20)
CHLORIDE SERPL-SCNC: 102 MMOL/L (ref 98–107)
CK SERPL-CCNC: 69 U/L (ref 0–325)
CO2 SERPL-SCNC: 27 MMOL/L (ref 21–32)
COLOR UR: YELLOW
CREAT SERPL-MCNC: 1.62 MG/DL (ref 0.5–1.3)
DACRYOCYTES BLD QL SMEAR: ABNORMAL
EGFRCR SERPLBLD CKD-EPI 2021: 44 ML/MIN/1.73M*2
EOSINOPHIL # BLD MANUAL: 0.11 X10*3/UL (ref 0–0.4)
EOSINOPHIL NFR BLD MANUAL: 1 %
ERYTHROCYTE [DISTWIDTH] IN BLOOD BY AUTOMATED COUNT: 14.8 % (ref 11.5–14.5)
GLUCOSE BLD MANUAL STRIP-MCNC: 123 MG/DL (ref 74–99)
GLUCOSE SERPL-MCNC: 84 MG/DL (ref 74–99)
GLUCOSE UR STRIP.AUTO-MCNC: NORMAL MG/DL
HCT VFR BLD AUTO: 40.5 % (ref 41–52)
HGB BLD-MCNC: 12.9 G/DL (ref 13.5–17.5)
IMM GRANULOCYTES # BLD AUTO: 0.64 X10*3/UL (ref 0–0.5)
IMM GRANULOCYTES NFR BLD AUTO: 6 % (ref 0–0.9)
INR PPP: 1.1 (ref 0.9–1.1)
KETONES UR STRIP.AUTO-MCNC: NEGATIVE MG/DL
LACTATE SERPL-SCNC: 0.9 MMOL/L (ref 0.4–2)
LEUKOCYTE ESTERASE UR QL STRIP.AUTO: NEGATIVE
LYMPHOCYTES # BLD MANUAL: 0.11 X10*3/UL (ref 0.8–3)
LYMPHOCYTES NFR BLD MANUAL: 1 %
MAGNESIUM SERPL-MCNC: 2.24 MG/DL (ref 1.6–2.4)
MCH RBC QN AUTO: 29.1 PG (ref 26–34)
MCHC RBC AUTO-ENTMCNC: 31.9 G/DL (ref 32–36)
MCV RBC AUTO: 91 FL (ref 80–100)
MONOCYTES # BLD MANUAL: 0.43 X10*3/UL (ref 0.05–0.8)
MONOCYTES NFR BLD MANUAL: 4 %
MYELOCYTES # BLD MANUAL: 0.43 X10*3/UL
MYELOCYTES NFR BLD MANUAL: 4 %
NEUTROPHILS # BLD MANUAL: 9.63 X10*3/UL (ref 1.6–5.5)
NEUTS BAND # BLD MANUAL: 0.43 X10*3/UL (ref 0–0.5)
NEUTS BAND NFR BLD MANUAL: 4 %
NEUTS SEG # BLD MANUAL: 9.2 X10*3/UL (ref 1.6–5)
NEUTS SEG NFR BLD MANUAL: 86 %
NITRITE UR QL STRIP.AUTO: NEGATIVE
NRBC BLD-RTO: 0 /100 WBCS (ref 0–0)
OVALOCYTES BLD QL SMEAR: ABNORMAL
P AXIS: 33 DEGREES
P OFFSET: 128 MS
P ONSET: 96 MS
PH UR STRIP.AUTO: 6.5 [PH]
PLATELET # BLD AUTO: 308 X10*3/UL (ref 150–450)
POLYCHROMASIA BLD QL SMEAR: ABNORMAL
POTASSIUM SERPL-SCNC: 4.7 MMOL/L (ref 3.5–5.3)
PROT SERPL-MCNC: 7.3 G/DL (ref 6.4–8.2)
PROT UR STRIP.AUTO-MCNC: NEGATIVE MG/DL
PROTHROMBIN TIME: 12.2 SECONDS (ref 9.8–12.4)
Q ONSET: 193 MS
Q ONSET: 199 MS
QRS COUNT: 10 BEATS
QRS COUNT: 11 BEATS
QRS DURATION: 150 MS
QRS DURATION: 162 MS
QT INTERVAL: 452 MS
QT INTERVAL: 464 MS
QTC CALCULATION(BAZETT): 462 MS
QTC CALCULATION(BAZETT): 470 MS
QTC FREDERICIA: 459 MS
QTC FREDERICIA: 469 MS
R AXIS: -39 DEGREES
R AXIS: 113 DEGREES
RBC # BLD AUTO: 4.44 X10*6/UL (ref 4.5–5.9)
RBC # UR STRIP.AUTO: NEGATIVE MG/DL
RBC MORPH BLD: ABNORMAL
SARS-COV-2 RNA RESP QL NAA+PROBE: NOT DETECTED
SODIUM SERPL-SCNC: 135 MMOL/L (ref 136–145)
SP GR UR STRIP.AUTO: 1.02
T AXIS: -12 DEGREES
T AXIS: 80 DEGREES
T OFFSET: 419 MS
T OFFSET: 431 MS
TOTAL CELLS COUNTED BLD: 100
TSH SERPL-ACNC: 2.49 MIU/L (ref 0.44–3.98)
UROBILINOGEN UR STRIP.AUTO-MCNC: NORMAL MG/DL
VENTRICULAR RATE: 62 BPM
VENTRICULAR RATE: 63 BPM
WBC # BLD AUTO: 10.7 X10*3/UL (ref 4.4–11.3)

## 2025-09-05 PROCEDURE — 84484 ASSAY OF TROPONIN QUANT: CPT | Performed by: NURSE PRACTITIONER

## 2025-09-05 PROCEDURE — 83735 ASSAY OF MAGNESIUM: CPT | Performed by: NURSE PRACTITIONER

## 2025-09-05 PROCEDURE — 2500000004 HC RX 250 GENERAL PHARMACY W/ HCPCS (ALT 636 FOR OP/ED): Performed by: NURSE PRACTITIONER

## 2025-09-05 PROCEDURE — 82947 ASSAY GLUCOSE BLOOD QUANT: CPT

## 2025-09-05 PROCEDURE — G0378 HOSPITAL OBSERVATION PER HR: HCPCS

## 2025-09-05 PROCEDURE — 87040 BLOOD CULTURE FOR BACTERIA: CPT | Mod: ELYLAB | Performed by: PHYSICIAN ASSISTANT

## 2025-09-05 PROCEDURE — 2500000004 HC RX 250 GENERAL PHARMACY W/ HCPCS (ALT 636 FOR OP/ED): Performed by: PHYSICIAN ASSISTANT

## 2025-09-05 PROCEDURE — G0299 HHS/HOSPICE OF RN EA 15 MIN: HCPCS | Mod: HHH

## 2025-09-05 PROCEDURE — 81003 URINALYSIS AUTO W/O SCOPE: CPT | Performed by: NURSE PRACTITIONER

## 2025-09-05 PROCEDURE — 83880 ASSAY OF NATRIURETIC PEPTIDE: CPT | Performed by: NURSE PRACTITIONER

## 2025-09-05 PROCEDURE — 36415 COLL VENOUS BLD VENIPUNCTURE: CPT | Performed by: NURSE PRACTITIONER

## 2025-09-05 PROCEDURE — 87449 NOS EACH ORGANISM AG IA: CPT | Mod: ELYLAB | Performed by: INTERNAL MEDICINE

## 2025-09-05 PROCEDURE — 84145 PROCALCITONIN (PCT): CPT | Mod: ELYLAB | Performed by: INTERNAL MEDICINE

## 2025-09-05 PROCEDURE — 99223 1ST HOSP IP/OBS HIGH 75: CPT | Performed by: INTERNAL MEDICINE

## 2025-09-05 PROCEDURE — 2500000004 HC RX 250 GENERAL PHARMACY W/ HCPCS (ALT 636 FOR OP/ED): Performed by: INTERNAL MEDICINE

## 2025-09-05 PROCEDURE — 85027 COMPLETE CBC AUTOMATED: CPT | Performed by: NURSE PRACTITIONER

## 2025-09-05 PROCEDURE — 87635 SARS-COV-2 COVID-19 AMP PRB: CPT | Performed by: NURSE PRACTITIONER

## 2025-09-05 PROCEDURE — 85007 BL SMEAR W/DIFF WBC COUNT: CPT | Performed by: NURSE PRACTITIONER

## 2025-09-05 PROCEDURE — G0152 HHCP-SERV OF OT,EA 15 MIN: HCPCS | Mod: HHH

## 2025-09-05 PROCEDURE — 84443 ASSAY THYROID STIM HORMONE: CPT | Performed by: INTERNAL MEDICINE

## 2025-09-05 PROCEDURE — 82550 ASSAY OF CK (CPK): CPT | Performed by: INTERNAL MEDICINE

## 2025-09-05 PROCEDURE — 87899 AGENT NOS ASSAY W/OPTIC: CPT | Mod: ELYLAB | Performed by: INTERNAL MEDICINE

## 2025-09-05 PROCEDURE — 80053 COMPREHEN METABOLIC PANEL: CPT | Performed by: NURSE PRACTITIONER

## 2025-09-05 PROCEDURE — 85610 PROTHROMBIN TIME: CPT | Performed by: NURSE PRACTITIONER

## 2025-09-05 PROCEDURE — 2500000001 HC RX 250 WO HCPCS SELF ADMINISTERED DRUGS (ALT 637 FOR MEDICARE OP): Performed by: INTERNAL MEDICINE

## 2025-09-05 PROCEDURE — 2500000002 HC RX 250 W HCPCS SELF ADMINISTERED DRUGS (ALT 637 FOR MEDICARE OP, ALT 636 FOR OP/ED): Performed by: INTERNAL MEDICINE

## 2025-09-05 PROCEDURE — 99285 EMERGENCY DEPT VISIT HI MDM: CPT | Performed by: GENERAL PRACTICE

## 2025-09-05 PROCEDURE — 83605 ASSAY OF LACTIC ACID: CPT | Performed by: NURSE PRACTITIONER

## 2025-09-05 RX ORDER — ACETAMINOPHEN 160 MG/5ML
650 SOLUTION ORAL EVERY 4 HOURS PRN
Status: ACTIVE | OUTPATIENT
Start: 2025-09-05

## 2025-09-05 RX ORDER — BUPROPION HYDROCHLORIDE 150 MG/1
150 TABLET ORAL DAILY
Status: DISPENSED | OUTPATIENT
Start: 2025-09-06

## 2025-09-05 RX ORDER — AMLODIPINE BESYLATE 5 MG/1
5 TABLET ORAL DAILY
Status: ACTIVE | OUTPATIENT
Start: 2025-09-06

## 2025-09-05 RX ORDER — VALSARTAN 160 MG/1
160 TABLET ORAL DAILY
Status: ACTIVE | OUTPATIENT
Start: 2025-09-06

## 2025-09-05 RX ORDER — ACETAMINOPHEN 325 MG/1
650 TABLET ORAL EVERY 4 HOURS PRN
Status: ACTIVE | OUTPATIENT
Start: 2025-09-05

## 2025-09-05 RX ORDER — CHOLECALCIFEROL (VITAMIN D3) 25 MCG
50 TABLET ORAL DAILY
Status: DISPENSED | OUTPATIENT
Start: 2025-09-06

## 2025-09-05 RX ORDER — FENOFIBRATE 54 MG/1
54 TABLET ORAL DAILY
Status: DISPENSED | OUTPATIENT
Start: 2025-09-06

## 2025-09-05 RX ORDER — ALBUTEROL SULFATE 90 UG/1
2 INHALANT RESPIRATORY (INHALATION) EVERY 4 HOURS PRN
Status: DISPENSED | OUTPATIENT
Start: 2025-09-05

## 2025-09-05 RX ORDER — MIRTAZAPINE 15 MG/1
45 TABLET, FILM COATED ORAL NIGHTLY
Status: DISPENSED | OUTPATIENT
Start: 2025-09-05

## 2025-09-05 RX ORDER — METOPROLOL TARTRATE 25 MG/1
25 TABLET, FILM COATED ORAL 2 TIMES DAILY
Status: ACTIVE | OUTPATIENT
Start: 2025-09-05

## 2025-09-05 RX ORDER — VANCOMYCIN 2 GRAM/500 ML IN 0.9 % SODIUM CHLORIDE INTRAVENOUS
2 ONCE
Status: COMPLETED | OUTPATIENT
Start: 2025-09-05 | End: 2025-09-05

## 2025-09-05 RX ORDER — SENNOSIDES 8.6 MG/1
2 TABLET ORAL 2 TIMES DAILY
Status: DISPENSED | OUTPATIENT
Start: 2025-09-05

## 2025-09-05 RX ORDER — LEVOTHYROXINE SODIUM 25 UG/1
25 TABLET ORAL
Status: DISPENSED | OUTPATIENT
Start: 2025-09-06

## 2025-09-05 RX ORDER — ATORVASTATIN CALCIUM 20 MG/1
20 TABLET, FILM COATED ORAL DAILY
Status: DISPENSED | OUTPATIENT
Start: 2025-09-06

## 2025-09-05 RX ORDER — SODIUM CHLORIDE, SODIUM LACTATE, POTASSIUM CHLORIDE, CALCIUM CHLORIDE 600; 310; 30; 20 MG/100ML; MG/100ML; MG/100ML; MG/100ML
100 INJECTION, SOLUTION INTRAVENOUS CONTINUOUS
Status: ACTIVE | OUTPATIENT
Start: 2025-09-05 | End: 2025-09-06

## 2025-09-05 RX ORDER — CEFTRIAXONE 1 G/50ML
1 INJECTION, SOLUTION INTRAVENOUS EVERY 24 HOURS
Status: DISCONTINUED | OUTPATIENT
Start: 2025-09-05 | End: 2025-09-07

## 2025-09-05 RX ORDER — ACETAMINOPHEN 650 MG/1
650 SUPPOSITORY RECTAL EVERY 4 HOURS PRN
Status: ACTIVE | OUTPATIENT
Start: 2025-09-05

## 2025-09-05 RX ORDER — SODIUM CHLORIDE, SODIUM LACTATE, POTASSIUM CHLORIDE, CALCIUM CHLORIDE 600; 310; 30; 20 MG/100ML; MG/100ML; MG/100ML; MG/100ML
100 INJECTION, SOLUTION INTRAVENOUS CONTINUOUS
Status: ACTIVE | OUTPATIENT
Start: 2025-09-05 | End: 2025-09-07

## 2025-09-05 RX ORDER — DOXYCYCLINE HYCLATE 100 MG
100 TABLET ORAL EVERY 12 HOURS SCHEDULED
Status: DISCONTINUED | OUTPATIENT
Start: 2025-09-05 | End: 2025-09-07

## 2025-09-05 RX ORDER — CARBIDOPA AND LEVODOPA 25; 100 MG/1; MG/1
1 TABLET ORAL 3 TIMES DAILY
Status: DISPENSED | OUTPATIENT
Start: 2025-09-05

## 2025-09-05 RX ADMIN — RIVAROXABAN 20 MG: 20 TABLET, FILM COATED ORAL at 21:38

## 2025-09-05 RX ADMIN — CARBIDOPA AND LEVODOPA 1 TABLET: 25; 100 TABLET ORAL at 21:38

## 2025-09-05 RX ADMIN — PIPERACILLIN AND TAZOBACTAM 4.5 G: 4; .5 INJECTION, POWDER, FOR SOLUTION INTRAVENOUS at 18:07

## 2025-09-05 RX ADMIN — Medication 2 G: at 18:55

## 2025-09-05 RX ADMIN — SODIUM CHLORIDE, SODIUM LACTATE, POTASSIUM CHLORIDE, AND CALCIUM CHLORIDE 100 ML/HR: .6; .31; .03; .02 INJECTION, SOLUTION INTRAVENOUS at 21:55

## 2025-09-05 RX ADMIN — SODIUM CHLORIDE 1000 ML: 0.9 INJECTION, SOLUTION INTRAVENOUS at 17:14

## 2025-09-05 RX ADMIN — MIRTAZAPINE 45 MG: 15 TABLET, FILM COATED ORAL at 21:37

## 2025-09-05 RX ADMIN — DOXYCYCLINE HYCLATE 100 MG: 100 TABLET, FILM COATED ORAL at 21:38

## 2025-09-05 RX ADMIN — CEFTRIAXONE 1 G: 1 INJECTION, SOLUTION INTRAVENOUS at 22:20

## 2025-09-05 RX ADMIN — SENNOSIDES 17.2 MG: 8.6 TABLET ORAL at 21:38

## 2025-09-05 SDOH — ECONOMIC STABILITY: HOUSING INSECURITY: IN THE PAST 12 MONTHS, HOW MANY TIMES HAVE YOU MOVED WHERE YOU WERE LIVING?: 0

## 2025-09-05 SDOH — SOCIAL STABILITY: SOCIAL INSECURITY: DO YOU FEEL UNSAFE GOING BACK TO THE PLACE WHERE YOU ARE LIVING?: NO

## 2025-09-05 SDOH — SOCIAL STABILITY: SOCIAL INSECURITY: ARE YOU OR HAVE YOU BEEN THREATENED OR ABUSED PHYSICALLY, EMOTIONALLY, OR SEXUALLY BY ANYONE?: NO

## 2025-09-05 SDOH — ECONOMIC STABILITY: HOUSING INSECURITY: IN THE LAST 12 MONTHS, WAS THERE A TIME WHEN YOU WERE NOT ABLE TO PAY THE MORTGAGE OR RENT ON TIME?: NO

## 2025-09-05 SDOH — SOCIAL STABILITY: SOCIAL INSECURITY: WITHIN THE LAST YEAR, HAVE YOU BEEN HUMILIATED OR EMOTIONALLY ABUSED IN OTHER WAYS BY YOUR PARTNER OR EX-PARTNER?: NO

## 2025-09-05 SDOH — ECONOMIC STABILITY: HOUSING INSECURITY: AT ANY TIME IN THE PAST 12 MONTHS, WERE YOU HOMELESS OR LIVING IN A SHELTER (INCLUDING NOW)?: NO

## 2025-09-05 SDOH — HEALTH STABILITY: PHYSICAL HEALTH: ON AVERAGE, HOW MANY MINUTES DO YOU ENGAGE IN EXERCISE AT THIS LEVEL?: 30 MIN

## 2025-09-05 SDOH — SOCIAL STABILITY: SOCIAL INSECURITY: ARE THERE ANY APPARENT SIGNS OF INJURIES/BEHAVIORS THAT COULD BE RELATED TO ABUSE/NEGLECT?: NO

## 2025-09-05 SDOH — ECONOMIC STABILITY: FOOD INSECURITY: HOW HARD IS IT FOR YOU TO PAY FOR THE VERY BASICS LIKE FOOD, HOUSING, MEDICAL CARE, AND HEATING?: NOT HARD AT ALL

## 2025-09-05 SDOH — SOCIAL STABILITY: SOCIAL INSECURITY: ABUSE: ADULT

## 2025-09-05 SDOH — HEALTH STABILITY: PHYSICAL HEALTH: ON AVERAGE, HOW MANY DAYS PER WEEK DO YOU ENGAGE IN MODERATE TO STRENUOUS EXERCISE (LIKE A BRISK WALK)?: 3 DAYS

## 2025-09-05 SDOH — SOCIAL STABILITY: SOCIAL INSECURITY: HAVE YOU HAD THOUGHTS OF HARMING ANYONE ELSE?: NO

## 2025-09-05 SDOH — SOCIAL STABILITY: SOCIAL INSECURITY: WITHIN THE LAST YEAR, HAVE YOU BEEN AFRAID OF YOUR PARTNER OR EX-PARTNER?: NO

## 2025-09-05 SDOH — ECONOMIC STABILITY: FOOD INSECURITY: WITHIN THE PAST 12 MONTHS, YOU WORRIED THAT YOUR FOOD WOULD RUN OUT BEFORE YOU GOT THE MONEY TO BUY MORE.: NEVER TRUE

## 2025-09-05 SDOH — SOCIAL STABILITY: SOCIAL INSECURITY: HAS ANYONE EVER THREATENED TO HURT YOUR FAMILY OR YOUR PETS?: NO

## 2025-09-05 SDOH — ECONOMIC STABILITY: FOOD INSECURITY: WITHIN THE PAST 12 MONTHS, THE FOOD YOU BOUGHT JUST DIDN'T LAST AND YOU DIDN'T HAVE MONEY TO GET MORE.: NEVER TRUE

## 2025-09-05 SDOH — ECONOMIC STABILITY: INCOME INSECURITY: IN THE PAST 12 MONTHS HAS THE ELECTRIC, GAS, OIL, OR WATER COMPANY THREATENED TO SHUT OFF SERVICES IN YOUR HOME?: NO

## 2025-09-05 SDOH — SOCIAL STABILITY: SOCIAL INSECURITY: HAVE YOU HAD ANY THOUGHTS OF HARMING ANYONE ELSE?: NO

## 2025-09-05 SDOH — SOCIAL STABILITY: SOCIAL INSECURITY: WERE YOU ABLE TO COMPLETE ALL THE BEHAVIORAL HEALTH SCREENINGS?: YES

## 2025-09-05 SDOH — ECONOMIC STABILITY: TRANSPORTATION INSECURITY: IN THE PAST 12 MONTHS, HAS LACK OF TRANSPORTATION KEPT YOU FROM MEDICAL APPOINTMENTS OR FROM GETTING MEDICATIONS?: NO

## 2025-09-05 SDOH — SOCIAL STABILITY: SOCIAL INSECURITY: DOES ANYONE TRY TO KEEP YOU FROM HAVING/CONTACTING OTHER FRIENDS OR DOING THINGS OUTSIDE YOUR HOME?: NO

## 2025-09-05 SDOH — SOCIAL STABILITY: SOCIAL INSECURITY: DO YOU FEEL ANYONE HAS EXPLOITED OR TAKEN ADVANTAGE OF YOU FINANCIALLY OR OF YOUR PERSONAL PROPERTY?: NO

## 2025-09-05 ASSESSMENT — COGNITIVE AND FUNCTIONAL STATUS - GENERAL
STANDING UP FROM CHAIR USING ARMS: A LOT
MOVING TO AND FROM BED TO CHAIR: A LOT
MOBILITY SCORE: 13
CLIMB 3 TO 5 STEPS WITH RAILING: A LOT
DRESSING REGULAR LOWER BODY CLOTHING: A LITTLE
MOVING FROM LYING ON BACK TO SITTING ON SIDE OF FLAT BED WITH BEDRAILS: A LITTLE
HELP NEEDED FOR BATHING: A LITTLE
TURNING FROM BACK TO SIDE WHILE IN FLAT BAD: A LOT
WALKING IN HOSPITAL ROOM: A LOT
DRESSING REGULAR UPPER BODY CLOTHING: A LITTLE
TOILETING: A LITTLE
DAILY ACTIVITIY SCORE: 20
PATIENT BASELINE BEDBOUND: NO

## 2025-09-05 ASSESSMENT — PAIN - FUNCTIONAL ASSESSMENT
PAIN_FUNCTIONAL_ASSESSMENT: 0-10

## 2025-09-05 ASSESSMENT — ENCOUNTER SYMPTOMS: DENIES PAIN: 1

## 2025-09-05 ASSESSMENT — LIFESTYLE VARIABLES
AUDIT-C TOTAL SCORE: 1
HOW MANY STANDARD DRINKS CONTAINING ALCOHOL DO YOU HAVE ON A TYPICAL DAY: 1 OR 2
AUDIT-C TOTAL SCORE: 1
EVER FELT BAD OR GUILTY ABOUT YOUR DRINKING: NO
TOTAL SCORE: 0
HAVE YOU EVER FELT YOU SHOULD CUT DOWN ON YOUR DRINKING: NO
HOW OFTEN DO YOU HAVE A DRINK CONTAINING ALCOHOL: MONTHLY OR LESS
HAVE PEOPLE ANNOYED YOU BY CRITICIZING YOUR DRINKING: NO
SKIP TO QUESTIONS 9-10: 1
EVER HAD A DRINK FIRST THING IN THE MORNING TO STEADY YOUR NERVES TO GET RID OF A HANGOVER: NO
HOW OFTEN DO YOU HAVE 6 OR MORE DRINKS ON ONE OCCASION: NEVER

## 2025-09-05 ASSESSMENT — ACTIVITIES OF DAILY LIVING (ADL)
GROOMING: INDEPENDENT
WALKS IN HOME: INDEPENDENT
BATHING: INDEPENDENT
ASSISTIVE_DEVICE: WALKER;CANE
HEARING - RIGHT EAR: DIFFICULTY WITH NOISE
DRESSING YOURSELF: INDEPENDENT
HEARING - LEFT EAR: DIFFICULTY WITH NOISE
FEEDING YOURSELF: INDEPENDENT
LACK_OF_TRANSPORTATION: NO
TOILETING: INDEPENDENT
PATIENT'S MEMORY ADEQUATE TO SAFELY COMPLETE DAILY ACTIVITIES?: YES
JUDGMENT_ADEQUATE_SAFELY_COMPLETE_DAILY_ACTIVITIES: YES
ADEQUATE_TO_COMPLETE_ADL: YES

## 2025-09-05 ASSESSMENT — PAIN SCALES - GENERAL
PAINLEVEL_OUTOF10: 0 - NO PAIN

## 2025-09-06 VITALS — RESPIRATION RATE: 18 BRPM | DIASTOLIC BLOOD PRESSURE: 74 MMHG | SYSTOLIC BLOOD PRESSURE: 118 MMHG | TEMPERATURE: 98.1 F

## 2025-09-06 LAB
ANION GAP SERPL CALC-SCNC: 9 MMOL/L (ref 10–20)
BUN SERPL-MCNC: 17 MG/DL (ref 6–23)
CALCIUM SERPL-MCNC: 9.3 MG/DL (ref 8.6–10.3)
CHLORIDE SERPL-SCNC: 104 MMOL/L (ref 98–107)
CO2 SERPL-SCNC: 26 MMOL/L (ref 21–32)
CREAT SERPL-MCNC: 1.55 MG/DL (ref 0.5–1.3)
EGFRCR SERPLBLD CKD-EPI 2021: 47 ML/MIN/1.73M*2
ERYTHROCYTE [DISTWIDTH] IN BLOOD BY AUTOMATED COUNT: 15 % (ref 11.5–14.5)
GLUCOSE BLD MANUAL STRIP-MCNC: 88 MG/DL (ref 74–99)
GLUCOSE SERPL-MCNC: 115 MG/DL (ref 74–99)
HCT VFR BLD AUTO: 35.1 % (ref 41–52)
HGB BLD-MCNC: 11.3 G/DL (ref 13.5–17.5)
HOLD SPECIMEN: NORMAL
LEGIONELLA AG UR QL: NEGATIVE
MCH RBC QN AUTO: 29.1 PG (ref 26–34)
MCHC RBC AUTO-ENTMCNC: 32.2 G/DL (ref 32–36)
MCV RBC AUTO: 91 FL (ref 80–100)
NRBC BLD-RTO: 0 /100 WBCS (ref 0–0)
PLATELET # BLD AUTO: 264 X10*3/UL (ref 150–450)
POTASSIUM SERPL-SCNC: 4.3 MMOL/L (ref 3.5–5.3)
PROCALCITONIN SERPL-MCNC: 0.19 NG/ML
RBC # BLD AUTO: 3.88 X10*6/UL (ref 4.5–5.9)
S PNEUM AG UR QL: NEGATIVE
SODIUM SERPL-SCNC: 135 MMOL/L (ref 136–145)
WBC # BLD AUTO: 10.9 X10*3/UL (ref 4.4–11.3)

## 2025-09-06 PROCEDURE — 1210000001 HC SEMI-PRIVATE ROOM DAILY

## 2025-09-06 PROCEDURE — 2500000004 HC RX 250 GENERAL PHARMACY W/ HCPCS (ALT 636 FOR OP/ED): Performed by: INTERNAL MEDICINE

## 2025-09-06 PROCEDURE — 97161 PT EVAL LOW COMPLEX 20 MIN: CPT | Mod: GP | Performed by: PHYSICAL THERAPIST

## 2025-09-06 PROCEDURE — 85027 COMPLETE CBC AUTOMATED: CPT

## 2025-09-06 PROCEDURE — 2500000001 HC RX 250 WO HCPCS SELF ADMINISTERED DRUGS (ALT 637 FOR MEDICARE OP)

## 2025-09-06 PROCEDURE — 94660 CPAP INITIATION&MGMT: CPT

## 2025-09-06 PROCEDURE — 82947 ASSAY GLUCOSE BLOOD QUANT: CPT

## 2025-09-06 PROCEDURE — 80048 BASIC METABOLIC PNL TOTAL CA: CPT

## 2025-09-06 PROCEDURE — 36415 COLL VENOUS BLD VENIPUNCTURE: CPT

## 2025-09-06 PROCEDURE — 2500000001 HC RX 250 WO HCPCS SELF ADMINISTERED DRUGS (ALT 637 FOR MEDICARE OP): Performed by: INTERNAL MEDICINE

## 2025-09-06 PROCEDURE — 2500000002 HC RX 250 W HCPCS SELF ADMINISTERED DRUGS (ALT 637 FOR MEDICARE OP, ALT 636 FOR OP/ED): Performed by: INTERNAL MEDICINE

## 2025-09-06 PROCEDURE — 97165 OT EVAL LOW COMPLEX 30 MIN: CPT | Mod: GO

## 2025-09-06 RX ADMIN — SENNOSIDES 17.2 MG: 8.6 TABLET ORAL at 09:04

## 2025-09-06 RX ADMIN — LEVOTHYROXINE SODIUM 25 MCG: 0.03 TABLET ORAL at 06:09

## 2025-09-06 RX ADMIN — CARBIDOPA AND LEVODOPA 1 TABLET: 25; 100 TABLET ORAL at 09:05

## 2025-09-06 RX ADMIN — CARBIDOPA AND LEVODOPA 1 TABLET: 25; 100 TABLET ORAL at 20:09

## 2025-09-06 RX ADMIN — CEFTRIAXONE 1 G: 1 INJECTION, SOLUTION INTRAVENOUS at 23:06

## 2025-09-06 RX ADMIN — CARBIDOPA AND LEVODOPA 1 TABLET: 25; 100 TABLET ORAL at 15:05

## 2025-09-06 RX ADMIN — SODIUM CHLORIDE, SODIUM LACTATE, POTASSIUM CHLORIDE, AND CALCIUM CHLORIDE 100 ML/HR: .6; .31; .03; .02 INJECTION, SOLUTION INTRAVENOUS at 17:21

## 2025-09-06 RX ADMIN — TOFACITINIB 22 MG: 22 TABLET, FILM COATED, EXTENDED RELEASE ORAL at 17:25

## 2025-09-06 RX ADMIN — BUPROPION HYDROCHLORIDE 150 MG: 150 TABLET, EXTENDED RELEASE ORAL at 09:05

## 2025-09-06 RX ADMIN — RIVAROXABAN 20 MG: 20 TABLET, FILM COATED ORAL at 20:09

## 2025-09-06 RX ADMIN — Medication 50 MCG: at 06:09

## 2025-09-06 RX ADMIN — Medication: at 20:01

## 2025-09-06 RX ADMIN — FENOFIBRATE 54 MG: 54 TABLET, FILM COATED ORAL at 09:05

## 2025-09-06 RX ADMIN — DOXYCYCLINE HYCLATE 100 MG: 100 TABLET, FILM COATED ORAL at 20:09

## 2025-09-06 RX ADMIN — MIRTAZAPINE 45 MG: 15 TABLET, FILM COATED ORAL at 20:08

## 2025-09-06 RX ADMIN — DOXYCYCLINE HYCLATE 100 MG: 100 TABLET, FILM COATED ORAL at 09:05

## 2025-09-06 RX ADMIN — ATORVASTATIN CALCIUM 20 MG: 20 TABLET, FILM COATED ORAL at 09:05

## 2025-09-06 ASSESSMENT — COGNITIVE AND FUNCTIONAL STATUS - GENERAL
CLIMB 3 TO 5 STEPS WITH RAILING: TOTAL
CLIMB 3 TO 5 STEPS WITH RAILING: TOTAL
MOBILITY SCORE: 7
HELP NEEDED FOR BATHING: A LOT
DRESSING REGULAR LOWER BODY CLOTHING: TOTAL
DRESSING REGULAR UPPER BODY CLOTHING: A LITTLE
MOVING FROM LYING ON BACK TO SITTING ON SIDE OF FLAT BED WITH BEDRAILS: A LITTLE
EATING MEALS: A LITTLE
TURNING FROM BACK TO SIDE WHILE IN FLAT BAD: A LITTLE
DRESSING REGULAR LOWER BODY CLOTHING: A LOT
HELP NEEDED FOR BATHING: A LITTLE
PERSONAL GROOMING: A LOT
WALKING IN HOSPITAL ROOM: A LOT
CLIMB 3 TO 5 STEPS WITH RAILING: TOTAL
MOVING TO AND FROM BED TO CHAIR: A LOT
MOBILITY SCORE: 14
STANDING UP FROM CHAIR USING ARMS: TOTAL
DAILY ACTIVITIY SCORE: 11
WALKING IN HOSPITAL ROOM: TOTAL
DAILY ACTIVITIY SCORE: 20
DRESSING REGULAR LOWER BODY CLOTHING: A LITTLE
TOILETING: A LITTLE
DAILY ACTIVITIY SCORE: 16
STANDING UP FROM CHAIR USING ARMS: A LOT
TURNING FROM BACK TO SIDE WHILE IN FLAT BAD: TOTAL
MOBILITY SCORE: 12
PERSONAL GROOMING: A LITTLE
DRESSING REGULAR UPPER BODY CLOTHING: A LOT
MOVING TO AND FROM BED TO CHAIR: A LOT
WALKING IN HOSPITAL ROOM: A LOT
TOILETING: A LOT
TOILETING: TOTAL
MOVING TO AND FROM BED TO CHAIR: TOTAL
MOVING FROM LYING ON BACK TO SITTING ON SIDE OF FLAT BED WITH BEDRAILS: A LOT
HELP NEEDED FOR BATHING: A LOT
STANDING UP FROM CHAIR USING ARMS: A LOT
TURNING FROM BACK TO SIDE WHILE IN FLAT BAD: A LOT
DRESSING REGULAR UPPER BODY CLOTHING: A LITTLE

## 2025-09-06 ASSESSMENT — ENCOUNTER SYMPTOMS
DYSPNEA ON EXERTION: 1
APPETITE LEVEL: FAIR
PAIN LOCATION - PAIN SEVERITY: 4/10
PAIN LOCATION - EXACERBATING FACTORS: MOVEMENT
PERSON REPORTING PAIN: PATIENT
LIMITED RANGE OF MOTION: 1
HIGHEST PAIN SEVERITY IN PAST 24 HOURS: 5/10
PAIN LOCATION - PAIN SEVERITY: 4/10
CHANGE IN APPETITE: DECREASED
LOWEST PAIN SEVERITY IN PAST 24 HOURS: 0/10
PAIN: 1
PAIN LOCATION: LEFT KNEE
PAIN LOCATION - PAIN QUALITY: ACHING
MUSCLE WEAKNESS: 1
PAIN LOCATION - EXACERBATING FACTORS: MOVEMENT
PAIN LOCATION: RIGHT KNEE
FATIGUES EASILY: 1
PAIN LOCATION - PAIN QUALITY: ACHING

## 2025-09-06 ASSESSMENT — ACTIVITIES OF DAILY LIVING (ADL)
AMBULATION ASSISTANCE: ONE PERSON
BATHING_ASSISTANCE: TOTAL
AMBULATION ASSISTANCE: STAND BY ASSIST
CURRENT_FUNCTION: ONE PERSON

## 2025-09-06 ASSESSMENT — PAIN - FUNCTIONAL ASSESSMENT
PAIN_FUNCTIONAL_ASSESSMENT: 0-10
PAIN_FUNCTIONAL_ASSESSMENT: 0-10

## 2025-09-06 ASSESSMENT — PAIN SCALES - GENERAL
PAINLEVEL_OUTOF10: 0 - NO PAIN

## 2025-09-07 VITALS
HEIGHT: 72 IN | BODY MASS INDEX: 35.24 KG/M2 | HEART RATE: 59 BPM | OXYGEN SATURATION: 92 % | WEIGHT: 260.14 LBS | RESPIRATION RATE: 18 BRPM | SYSTOLIC BLOOD PRESSURE: 122 MMHG | TEMPERATURE: 98.2 F | DIASTOLIC BLOOD PRESSURE: 72 MMHG

## 2025-09-07 LAB
ANION GAP SERPL CALC-SCNC: 8 MMOL/L (ref 10–20)
BUN SERPL-MCNC: 15 MG/DL (ref 6–23)
CALCIUM SERPL-MCNC: 9 MG/DL (ref 8.6–10.3)
CHLORIDE SERPL-SCNC: 106 MMOL/L (ref 98–107)
CO2 SERPL-SCNC: 26 MMOL/L (ref 21–32)
CREAT SERPL-MCNC: 1.43 MG/DL (ref 0.5–1.3)
EGFRCR SERPLBLD CKD-EPI 2021: 51 ML/MIN/1.73M*2
ERYTHROCYTE [DISTWIDTH] IN BLOOD BY AUTOMATED COUNT: 14.9 % (ref 11.5–14.5)
GLUCOSE SERPL-MCNC: 92 MG/DL (ref 74–99)
HCT VFR BLD AUTO: 33.3 % (ref 41–52)
HGB BLD-MCNC: 10.6 G/DL (ref 13.5–17.5)
MAGNESIUM SERPL-MCNC: 1.97 MG/DL (ref 1.6–2.4)
MCH RBC QN AUTO: 29.1 PG (ref 26–34)
MCHC RBC AUTO-ENTMCNC: 31.8 G/DL (ref 32–36)
MCV RBC AUTO: 92 FL (ref 80–100)
NRBC BLD-RTO: 0 /100 WBCS (ref 0–0)
PLATELET # BLD AUTO: 218 X10*3/UL (ref 150–450)
POTASSIUM SERPL-SCNC: 4.1 MMOL/L (ref 3.5–5.3)
RBC # BLD AUTO: 3.64 X10*6/UL (ref 4.5–5.9)
SODIUM SERPL-SCNC: 136 MMOL/L (ref 136–145)
WBC # BLD AUTO: 8.2 X10*3/UL (ref 4.4–11.3)

## 2025-09-07 PROCEDURE — 2500000001 HC RX 250 WO HCPCS SELF ADMINISTERED DRUGS (ALT 637 FOR MEDICARE OP)

## 2025-09-07 PROCEDURE — 80048 BASIC METABOLIC PNL TOTAL CA: CPT

## 2025-09-07 PROCEDURE — 36415 COLL VENOUS BLD VENIPUNCTURE: CPT

## 2025-09-07 PROCEDURE — 83735 ASSAY OF MAGNESIUM: CPT

## 2025-09-07 PROCEDURE — 2500000004 HC RX 250 GENERAL PHARMACY W/ HCPCS (ALT 636 FOR OP/ED): Performed by: INTERNAL MEDICINE

## 2025-09-07 PROCEDURE — 2500000001 HC RX 250 WO HCPCS SELF ADMINISTERED DRUGS (ALT 637 FOR MEDICARE OP): Performed by: INTERNAL MEDICINE

## 2025-09-07 PROCEDURE — 2500000002 HC RX 250 W HCPCS SELF ADMINISTERED DRUGS (ALT 637 FOR MEDICARE OP, ALT 636 FOR OP/ED): Performed by: INTERNAL MEDICINE

## 2025-09-07 PROCEDURE — 94660 CPAP INITIATION&MGMT: CPT

## 2025-09-07 PROCEDURE — 1210000001 HC SEMI-PRIVATE ROOM DAILY

## 2025-09-07 PROCEDURE — 85027 COMPLETE CBC AUTOMATED: CPT

## 2025-09-07 RX ORDER — L. ACIDOPHILUS/L.BULGARICUS 1MM CELL
1 TABLET ORAL DAILY
Status: DISPENSED | OUTPATIENT
Start: 2025-09-07

## 2025-09-07 RX ADMIN — Medication 50 MCG: at 09:28

## 2025-09-07 RX ADMIN — MIRTAZAPINE 45 MG: 15 TABLET, FILM COATED ORAL at 20:09

## 2025-09-07 RX ADMIN — CARBIDOPA AND LEVODOPA 1 TABLET: 25; 100 TABLET ORAL at 20:09

## 2025-09-07 RX ADMIN — SODIUM CHLORIDE, SODIUM LACTATE, POTASSIUM CHLORIDE, AND CALCIUM CHLORIDE 100 ML/HR: .6; .31; .03; .02 INJECTION, SOLUTION INTRAVENOUS at 13:39

## 2025-09-07 RX ADMIN — RIVAROXABAN 20 MG: 20 TABLET, FILM COATED ORAL at 20:09

## 2025-09-07 RX ADMIN — CARBIDOPA AND LEVODOPA 1 TABLET: 25; 100 TABLET ORAL at 09:28

## 2025-09-07 RX ADMIN — BUPROPION HYDROCHLORIDE 150 MG: 150 TABLET, EXTENDED RELEASE ORAL at 09:28

## 2025-09-07 RX ADMIN — LEVOTHYROXINE SODIUM 25 MCG: 0.03 TABLET ORAL at 05:31

## 2025-09-07 RX ADMIN — ATORVASTATIN CALCIUM 20 MG: 20 TABLET, FILM COATED ORAL at 09:28

## 2025-09-07 RX ADMIN — DOXYCYCLINE HYCLATE 100 MG: 100 TABLET, FILM COATED ORAL at 09:28

## 2025-09-07 RX ADMIN — FENOFIBRATE 54 MG: 54 TABLET, FILM COATED ORAL at 09:28

## 2025-09-07 RX ADMIN — SENNOSIDES 8.6 MG: 8.6 TABLET ORAL at 20:09

## 2025-09-07 RX ADMIN — Medication 1 TABLET: at 15:08

## 2025-09-07 RX ADMIN — TOFACITINIB 22 MG: 22 TABLET, FILM COATED, EXTENDED RELEASE ORAL at 09:29

## 2025-09-07 RX ADMIN — CARBIDOPA AND LEVODOPA 1 TABLET: 25; 100 TABLET ORAL at 15:08

## 2025-09-07 ASSESSMENT — PAIN SCALES - GENERAL: PAINLEVEL_OUTOF10: 0 - NO PAIN

## 2025-09-09 ENCOUNTER — APPOINTMENT (OUTPATIENT)
Dept: SURGERY | Facility: CLINIC | Age: 75
End: 2025-09-09
Payer: MEDICARE

## 2025-10-02 ENCOUNTER — APPOINTMENT (OUTPATIENT)
Dept: PRIMARY CARE | Facility: CLINIC | Age: 75
End: 2025-10-02
Payer: MEDICARE

## 2025-10-08 ENCOUNTER — APPOINTMENT (OUTPATIENT)
Dept: PRIMARY CARE | Facility: CLINIC | Age: 75
End: 2025-10-08
Payer: MEDICARE

## 2025-11-03 ENCOUNTER — APPOINTMENT (OUTPATIENT)
Dept: PRIMARY CARE | Facility: CLINIC | Age: 75
End: 2025-11-03
Payer: COMMERCIAL

## (undated) DEVICE — NEURO: Brand: MEDLINE INDUSTRIES, INC.

## (undated) DEVICE — SPONGE,DISSECTOR,K,XRAY,9/16"X1/4",STRL: Brand: MEDLINE

## (undated) DEVICE — COLLAR CERV AD COT LN PD HT ADJ TECHNOLOGY W/ REPL PDS

## (undated) DEVICE — DISTRACTOR PIN, 14MM

## (undated) DEVICE — BLADE ES ELASTOMERIC COAT INSUL DURABLE BEND UPTO 90DEG

## (undated) DEVICE — POUCH INSTR W6.75XL11.5IN FRST 2 PKT ADH FOR ORTH AND

## (undated) DEVICE — APPLICATOR MEDICATED 10.5 CC SOLUTION HI LT ORNG CHLORAPREP

## (undated) DEVICE — SNAP KOVER: Brand: UNBRANDED

## (undated) DEVICE — NEEDLE SPNL 18GA L3.5IN W/ QNCKE SHARPER BVL DURA CLICK

## (undated) DEVICE — GAUZE,SPONGE,2"X2",8PLY,STERILE,LF,2'S: Brand: MEDLINE

## (undated) DEVICE — LABEL MED MINI W/ MARKER

## (undated) DEVICE — COVER MICSCP W46XL120IN 4 BINOC GLS LENS LEICA

## (undated) DEVICE — AWL WITH SELF-CENTERING SLEEVE, BENT

## (undated) DEVICE — LIQUIBAND RAPID ADHESIVE 36/CS 0.8ML: Brand: MEDLINE

## (undated) DEVICE — TOWEL,OR,DSP,ST,BLUE,STD,4/PK,20PK/CS: Brand: MEDLINE

## (undated) DEVICE — MONITORING NEURO W INTERPRETATION SYS PRICING

## (undated) DEVICE — SUTURE VICRYL SZ 3-0 L18IN ABSRB UD L26MM SH 1/2 CIR J864D

## (undated) DEVICE — FLOSEAL WITH RECOTHROM - 10ML.: Brand: FLOSEAL HEMOSTATIC MATRIX

## (undated) DEVICE — SPONGE,NEURO,1"X1",XR,STRL,LF,10/PK: Brand: MEDLINE

## (undated) DEVICE — 3M™ TEGADERM™ TRANSPARENT FILM DRESSING FRAME STYLE, 1624W, 2-3/8 IN X 2-3/4 IN (6 CM X 7 CM), 100/CT 4CT/CASE: Brand: 3M™ TEGADERM™

## (undated) DEVICE — COVER,TABLE,44X90,STERILE: Brand: MEDLINE

## (undated) DEVICE — CARBIDE MATCH HEAD

## (undated) DEVICE — FRAZIER SUCTION INSTRUMENT 12 FR W/CONTROL VENT & OBTURATOR: Brand: FRAZIER

## (undated) DEVICE — SURGICAL SUCTION CONNECTING TUBE WITH MALE CONNECTOR AND SUCTION CLAMP, 2 FT. LONG (.6 M), 5 MM I.D.: Brand: CONMED

## (undated) DEVICE — SYRINGE MED 30ML STD CLR PLAS LUERLOCK TIP N CTRL DISP

## (undated) DEVICE — X-LARGE COTTON GLOVE: Brand: DEROYAL

## (undated) DEVICE — SUTURE VICRYL SZ 4-0 L18IN ABSRB UD L19MM PS-2 3/8 CIR PRIM J496H

## (undated) DEVICE — MARKER SURG SKIN GENTIAN VLT REG TIP W/ 6IN RUL DYNJSM01

## (undated) DEVICE — GLOVE ORANGE PI 7 1/2   MSG9075

## (undated) DEVICE — SINGLE PORT MANIFOLD: Brand: NEPTUNE 2

## (undated) DEVICE — APPLICATOR MEDICATED 26 CC SOLUTION HI LT ORNG CHLORAPREP